# Patient Record
Sex: MALE | Race: OTHER | HISPANIC OR LATINO
[De-identification: names, ages, dates, MRNs, and addresses within clinical notes are randomized per-mention and may not be internally consistent; named-entity substitution may affect disease eponyms.]

---

## 2018-08-08 PROBLEM — Z00.00 ENCOUNTER FOR PREVENTIVE HEALTH EXAMINATION: Status: ACTIVE | Noted: 2018-08-08

## 2018-09-07 ENCOUNTER — APPOINTMENT (OUTPATIENT)
Dept: COLORECTAL SURGERY | Facility: CLINIC | Age: 53
End: 2018-09-07
Payer: COMMERCIAL

## 2018-09-07 VITALS
BODY MASS INDEX: 26.2 KG/M2 | TEMPERATURE: 98.7 F | WEIGHT: 183 LBS | DIASTOLIC BLOOD PRESSURE: 90 MMHG | SYSTOLIC BLOOD PRESSURE: 135 MMHG | HEIGHT: 70 IN | HEART RATE: 86 BPM

## 2018-09-07 PROCEDURE — 45300 PROCTOSIGMOIDOSCOPY DX: CPT

## 2018-09-07 PROCEDURE — 99213 OFFICE O/P EST LOW 20 MIN: CPT | Mod: 25

## 2019-01-18 ENCOUNTER — APPOINTMENT (OUTPATIENT)
Dept: COLORECTAL SURGERY | Facility: CLINIC | Age: 54
End: 2019-01-18
Payer: COMMERCIAL

## 2019-01-18 VITALS
TEMPERATURE: 98.3 F | DIASTOLIC BLOOD PRESSURE: 97 MMHG | SYSTOLIC BLOOD PRESSURE: 167 MMHG | WEIGHT: 187 LBS | BODY MASS INDEX: 26.77 KG/M2 | HEIGHT: 70 IN

## 2019-01-18 DIAGNOSIS — I10 ESSENTIAL (PRIMARY) HYPERTENSION: ICD-10-CM

## 2019-01-18 PROCEDURE — 45331 SIGMOIDOSCOPY AND BIOPSY: CPT

## 2019-01-18 PROCEDURE — 99215 OFFICE O/P EST HI 40 MIN: CPT | Mod: 25

## 2019-01-18 RX ORDER — CYANOCOBALAMIN (VITAMIN B-12) 2000 MCG
2000 TABLET ORAL
Refills: 0 | Status: ACTIVE | COMMUNITY

## 2019-01-19 NOTE — HISTORY OF PRESENT ILLNESS
[FreeTextEntry1] : 54 yo M presents for f/u rectal cancer\par \par Diagnosed 5/2018\par Completed CRT 07/2018\par Completed chemotherapy 09/2018-12/20/2018\par \par BRBPR after BMs, noted on TP and scant amount on stool\par BH: twice daily, formed.\par Denies pain, fever, weight loss\par (+) reports weight gain since chemotherapy, attributes it to eating more lately.\par \par Last seen by Dr. Pina 1/2/19:\par \par MRI w/wo 1/2/19:\par Tumor location from anal verge: 4 cm\par Distance of inferior border to top of sphincter: 0 cm\par Relationship to anterior peritoneal reflection: below\par Size: 0.9 x 0.9 x 0.4 cm mass\par Circumferential location: Indeterminate, too low to tell\par Mucinous: mildly T2 hyperintense\par T2N0\par Impression: very tiny lesion which is decreased in size at the superior margin of the internal anal sphincter suggestive of T2N0 low rectal cancer\par \par CT c/a/p 1/2/2019:\par no evidence of metastatic disease\par 5 mm RLL groundglass nodular apactiy will need contniued follow up, atypical for metasasis, current protocol should suffice\par Hepatic steatosis\par Significantly thickened and mildly contracted urinary bladder w/ punctate calcification sitting on median lobe of the prostate vs. the bladder wall or bladder lumen is unchanged\par \par Recently diagnosed with hypertension, started on unknown medication once daily

## 2019-01-19 NOTE — ASSESSMENT
[FreeTextEntry1] : Review of his recent MRI by Dr. Osmani Bliss at [facility] revealed MRtrg 1- complete regression.\par \par We will await results of biopsy .\par \par I have reviewed the patient the risks benefits alternatives of a definitive resection versus continued close observation.

## 2019-01-19 NOTE — PHYSICAL EXAM
[Normal] : was normal [None] : there was no rectal mass  [Excoriation] : no perianal excoriation [FreeTextEntry1] : The risks , benefits and alternatives of the procedure were reviewed with the patient. The patient consents to the planned procedure.\par \par The flexible sigmoidoscope was passed through the anus into the rectum. The scope was passed to  30 cm from the anal verge.\par \par The findings revealed:\par \par well healed scar at the dentate line extending 3 cm.  biopsied x 3.\par

## 2019-01-23 LAB — LH SURGICAL PATHOLOGY FINAL REPORT: NORMAL

## 2019-04-26 ENCOUNTER — APPOINTMENT (OUTPATIENT)
Dept: COLORECTAL SURGERY | Facility: CLINIC | Age: 54
End: 2019-04-26
Payer: COMMERCIAL

## 2019-04-26 VITALS
HEIGHT: 70 IN | HEART RATE: 114 BPM | DIASTOLIC BLOOD PRESSURE: 91 MMHG | WEIGHT: 192 LBS | SYSTOLIC BLOOD PRESSURE: 148 MMHG | TEMPERATURE: 97.7 F | BODY MASS INDEX: 27.49 KG/M2

## 2019-04-26 PROCEDURE — 45300 PROCTOSIGMOIDOSCOPY DX: CPT

## 2019-04-26 PROCEDURE — 99214 OFFICE O/P EST MOD 30 MIN: CPT | Mod: 25

## 2019-04-26 NOTE — HISTORY OF PRESENT ILLNESS
[FreeTextEntry1] : 54 y/o M presents for 3 mo f/u rectal cancer\par Diagnosed 5/2018, Completed CRT 07/2018\par Completed chemotherapy 09/2018-12/20/2018\par Complete regression noted on last MRI 1/2019\par Last seen by Dr. Pina yesterday\par \par Last seen in office 1/18/19, Biopsy at 3cm taken\par Pathology: negative for malignancy (1/18/19), (+) focal active inflammation, evidence of erosion/ulcer and associated reactive changes\par Denies abdominal or rectal pain or itching. Reports 1 episodes of BRBPR on the TP after a BM\par Reports good appetite, has continued to gain weight. Energy still decreased in comparison to pre-treatment but slowly returning to normal\par BH: BID\par Denies constipation, diarrhea or straining. Reports FU and partial FI a couple times per month \par \par

## 2019-04-26 NOTE — ASSESSMENT
[FreeTextEntry1] : The patient will continue with close surveillance-watch and wait protocol. Advised return in 3-4 months repeat sigmoidoscopy and MRI.\par

## 2019-04-26 NOTE — PHYSICAL EXAM
[Abdomen Masses] : No abdominal masses [Abdomen Tenderness] : ~T No ~M abdominal tenderness [Normal] : was normal [None] : there was no rectal mass  [FreeTextEntry1] : A rigid proctosigmoidoscope was passed through he anus into the rectum to 12  cm. . The mucosal surface were inspected. The patient tolerated the procedure well.\par \par The findings revealed:\par well healed scar at the dentate line- no evidence of recurrent mass or lesions/ulcer

## 2019-08-19 ENCOUNTER — APPOINTMENT (OUTPATIENT)
Dept: COLORECTAL SURGERY | Facility: CLINIC | Age: 54
End: 2019-08-19
Payer: COMMERCIAL

## 2019-08-19 VITALS
DIASTOLIC BLOOD PRESSURE: 90 MMHG | HEIGHT: 70 IN | HEART RATE: 82 BPM | SYSTOLIC BLOOD PRESSURE: 135 MMHG | BODY MASS INDEX: 26.2 KG/M2 | WEIGHT: 183 LBS | TEMPERATURE: 97.8 F

## 2019-08-19 DIAGNOSIS — E78.00 PURE HYPERCHOLESTEROLEMIA, UNSPECIFIED: ICD-10-CM

## 2019-08-19 PROCEDURE — 45300 PROCTOSIGMOIDOSCOPY DX: CPT

## 2019-08-19 PROCEDURE — 99215 OFFICE O/P EST HI 40 MIN: CPT | Mod: 25

## 2019-08-19 RX ORDER — ATORVASTATIN CALCIUM 10 MG/1
10 TABLET, FILM COATED ORAL
Refills: 0 | Status: ACTIVE | COMMUNITY

## 2019-08-19 RX ORDER — VITAMIN B COMPLEX
CAPSULE ORAL
Refills: 0 | Status: ACTIVE | COMMUNITY

## 2019-08-19 RX ORDER — AMLODIPINE BESYLATE 5 MG/1
5 TABLET ORAL
Refills: 0 | Status: ACTIVE | COMMUNITY

## 2019-08-23 ENCOUNTER — APPOINTMENT (OUTPATIENT)
Dept: COLORECTAL SURGERY | Facility: HOSPITAL | Age: 54
End: 2019-08-23

## 2019-08-23 ENCOUNTER — OUTPATIENT (OUTPATIENT)
Dept: OUTPATIENT SERVICES | Facility: HOSPITAL | Age: 54
LOS: 1 days | Discharge: HOME | End: 2019-08-23
Payer: COMMERCIAL

## 2019-08-23 ENCOUNTER — RESULT REVIEW (OUTPATIENT)
Age: 54
End: 2019-08-23

## 2019-08-23 PROCEDURE — 45380 COLONOSCOPY AND BIOPSY: CPT

## 2019-08-23 PROCEDURE — 45380 COLONOSCOPY AND BIOPSY: CPT | Mod: GC

## 2019-08-23 PROCEDURE — 88305 TISSUE EXAM BY PATHOLOGIST: CPT

## 2019-08-23 NOTE — HISTORY OF PRESENT ILLNESS
[FreeTextEntry1] : 55 y/o M presents for f/u rectal cancer\par Diagnosed 5/2018, Completed RT 07/2018, chemotherapy 09/2018-12/20/2018\par Complete regression noted on last MRI 1/2019\par Reports new onset intermittent rectal pain for the past week, present more often than not. Describes it as stabbing pain that radiates down his legs. Has never had similar pain in the past, denies back injury or h/o back pain\par Continues to f/u with Dr. Pina. Last seen in his office 3-4 weeks. CT and MRI done in the last month [facility]\par Denies N/V\par Reports sensation of fullness and needing to run to the restroom, worse after eating. Eats 2 meals daily\par Denies change in weight\par BH: up 10 times daily, soft but formed \par Denies recent travel, change in medications\par last colonoscopy completed 4/11/18

## 2019-08-23 NOTE — ASSESSMENT
[FreeTextEntry1] : I had extensive discussion with the patient regarding my concerns of recurrent rectal cancer. I have recommended prompt colonoscopy and biopsy. I have reviewed with the patient that pending review of the pathology, the suspicion of recurrent rectal cancer will require a definitive abdominal perineal resection with permanent colostomy creation.\par \par I had extensive discussion with the patient (45 minutes) regarding the diagnosis and treatment options. I recommended that he consider proceeding with a robotic abdominal perineal resection of the rectum.\par \par The associated risks, benefits, alternatives of the procedure have been outlined discussed and reviewed with the patient's family. These risks including but not limited to bleeding, infection, change in bowel habits, change in urinary function, nerve injury, change in sexual function, as well as the risk of heart and lung complications infection , DVT/PE and death were detailed. The patient understands these risks and consents the planned procedure. Appropriate  literature regarding surgery and post operative treatment/complications and enhanced recovery pathway has been detailed and reviewed. Consent was obtained. All questions were answered.\par \par

## 2019-08-23 NOTE — CONSULT LETTER
[Dear  ___] : Dear  [unfilled], [( Thank you for referring [unfilled] for consultation for _____ )] : Thank you for referring [unfilled] for consultation for [unfilled] [Consult Letter:] : I had the pleasure of evaluating your patient, [unfilled]. [Please see my note below.] : Please see my note below. [Consult Closing:] : Thank you very much for allowing me to participate in the care of this patient.  If you have any questions, please do not hesitate to contact me. [Sincerely,] : Sincerely, [FreeTextEntry2] : NATANAEL ROTHMAN\par [address]  [address]\[place] [address] [FreeTextEntry3] : MARGARET EDWARDS MD

## 2019-08-23 NOTE — PHYSICAL EXAM
[None] : no anal fissures seen [Normal] : was normal [___ Posterior] : a [unfilled] ~Ucm rectal mass was present posteriorly [Abdomen Masses] : No abdominal masses [Abdomen Tenderness] : ~T No ~M abdominal tenderness [de-identified] : At 4 cm from anal verge-firm 3 x 3 cm mass with central ulceration tender [FreeTextEntry1] : A rigid proctosigmoidoscope was passed through he anus into the rectum to 4  cm. . The mucosal surface were inspected. The patient tolerated the procedure well.\par \par The findings revealed:\par limited exam secondary to pain.\par Mass at 4 cm. . friable. ulcerated.

## 2019-08-26 ENCOUNTER — OUTPATIENT (OUTPATIENT)
Dept: OUTPATIENT SERVICES | Facility: HOSPITAL | Age: 54
LOS: 1 days | End: 2019-08-26

## 2019-08-26 LAB — SURGICAL PATHOLOGY STUDY: SIGNIFICANT CHANGE UP

## 2019-08-28 ENCOUNTER — APPOINTMENT (OUTPATIENT)
Dept: COLORECTAL SURGERY | Facility: CLINIC | Age: 54
End: 2019-08-28
Payer: COMMERCIAL

## 2019-08-28 ENCOUNTER — LABORATORY RESULT (OUTPATIENT)
Age: 54
End: 2019-08-28

## 2019-08-28 VITALS — OXYGEN SATURATION: 94 % | DIASTOLIC BLOOD PRESSURE: 77 MMHG | SYSTOLIC BLOOD PRESSURE: 119 MMHG | HEART RATE: 65 BPM

## 2019-08-28 VITALS
DIASTOLIC BLOOD PRESSURE: 88 MMHG | SYSTOLIC BLOOD PRESSURE: 134 MMHG | TEMPERATURE: 97.8 F | HEART RATE: 84 BPM | BODY MASS INDEX: 25.91 KG/M2 | HEIGHT: 70 IN | WEIGHT: 181 LBS

## 2019-08-28 PROCEDURE — 45305 PROCTOSIGMOIDOSCOPY W/BX: CPT

## 2019-08-28 PROCEDURE — 99212 OFFICE O/P EST SF 10 MIN: CPT | Mod: 25

## 2019-08-28 RX ORDER — OXYCODONE AND ACETAMINOPHEN 5; 325 MG/1; MG/1
5-325 TABLET ORAL EVERY 6 HOURS
Qty: 20 | Refills: 0 | Status: DISCONTINUED | COMMUNITY
Start: 2019-08-19 | End: 2019-08-28

## 2019-08-28 NOTE — HISTORY OF PRESENT ILLNESS
[FreeTextEntry1] : 53 y/o M presents for biopsy of rectal mass\par Diagnosed 5/2018, Completed RT 07/2018, chemotherapy 09/2018-12/20/2018\par Reports new onset intermittent rectal pain for the past week, present more often than not. Describes it as stabbing pain that radiates down his legs. Has never had similar pain in the past, denies back injury or h/o back pain\par Reports sensation of fullness and needing to run to the restroom, worse after eating. Eats 2 meals daily\par Denies change in weight\par BH: up 10 times daily, soft but formed \par Colonoscopy completed 8/23/19 which reveled necrotic debris \par Has lost ~ 10 lbs over the last couple of week since pain began. He reports he has cut back on starches but also reports eating less recently due decreased appetite\par \par Colonoscopy performed 8/23/19 revealed distal rectal mass, friable. Biopsies taken \par \par Surgical Final Report\par \par \par \par \par Final Diagnosis\par \par Rectum, mass; biopsy:\par - Necrotic debris and fibrino-purulent exudate with\par bacterial colonies\par \par Verified by: Angie Moseley M.D.\par (Electronic Signature)\par Reported on: 08/26/19 10:39 EDT, [facility] [address] [facility], [place] [address]\par \par Discussed with patient results, given concern for sampling error, recommend repeat biopsy today

## 2019-08-28 NOTE — ASSESSMENT
[FreeTextEntry1] : Distal rectal mass, concern for recurrent cancer\par Patient scheduled for imaging in 2 days\par Biopsy sent STAT, discussed with pathologist\par Surgical planning and management per Dr Lafleur\par Discussed with Dr Lafleur\par All questions were answered, patient expressed understanding, and is agreeable to this plan.\par

## 2019-08-28 NOTE — PHYSICAL EXAM
[FreeTextEntry1] :  Medical assistant present for duration of physical examination\par \par Gen NAD, AOx3\par \par Anorectal Exam:\par Inspection no perianal lesions\par COREY mildly tender, posterior mass palpated\par Rigid Proctoscopy reveals distal posterior friable rectal mass, multiple biopsies taken with biopsy forcep through the scope, sent to pathology for specimen, patient tolerated procedure\par \par

## 2019-08-29 ENCOUNTER — FORM ENCOUNTER (OUTPATIENT)
Age: 54
End: 2019-08-29

## 2019-08-30 ENCOUNTER — APPOINTMENT (OUTPATIENT)
Dept: MRI IMAGING | Facility: CLINIC | Age: 54
End: 2019-08-30
Payer: COMMERCIAL

## 2019-08-30 ENCOUNTER — OUTPATIENT (OUTPATIENT)
Dept: OUTPATIENT SERVICES | Facility: HOSPITAL | Age: 54
LOS: 1 days | End: 2019-08-30

## 2019-08-30 PROCEDURE — 72197 MRI PELVIS W/O & W/DYE: CPT | Mod: 26

## 2019-09-03 PROBLEM — C18.9 MALIGNANT NEOPLASM OF COLON, UNSPECIFIED: Chronic | Status: ACTIVE | Noted: 2019-08-30

## 2019-09-14 ENCOUNTER — APPOINTMENT (OUTPATIENT)
Dept: PLASTIC SURGERY | Facility: CLINIC | Age: 54
End: 2019-09-14

## 2019-09-18 ENCOUNTER — APPOINTMENT (OUTPATIENT)
Dept: PLASTIC SURGERY | Facility: CLINIC | Age: 54
End: 2019-09-18
Payer: COMMERCIAL

## 2019-09-18 VITALS — WEIGHT: 182 LBS | BODY MASS INDEX: 26.05 KG/M2 | HEIGHT: 70 IN

## 2019-09-18 PROCEDURE — 99244 OFF/OP CNSLTJ NEW/EST MOD 40: CPT

## 2019-09-19 NOTE — CONSULT LETTER
[Dear  ___] : Dear  [unfilled], [Consult Letter:] : I had the pleasure of evaluating your patient, [unfilled]. [Consult Closing:] : Thank you very much for allowing me to participate in the care of this patient.  If you have any questions, please do not hesitate to contact me. [Please see my note below.] : Please see my note below. [FreeTextEntry3] : Saturnino Preciado MD [Sincerely,] : Sincerely,

## 2019-09-19 NOTE — PHYSICAL EXAM
[de-identified] : The patient is ambulatory, well groomed, no signs of cachexia. [de-identified] : Normocephalic, atraumatic. [de-identified] : No conjunctival erythema, hyperemia, or discharge bilaterally; no ectropion, entropion, lagophthalmos, or lid malposition bilaterally. Pupils are equal, round, and reactive to light bilaterally. [de-identified] : There are no masses, scars, or lesions of the external ear. External nose is midline with no scars or masses. Gross hearing intact bilaterally (finger rub). Nasal mucosa has no edema, lesions, or signs of desiccation. Lips and gums have no masses, lesions, friability, or edema. [de-identified] : Neck is soft without edema, masses, or crepitus. Trachea midline. No thyromegaly; no palpable thyroid nodules. [de-identified] : No sign of respiratory distress, no tachypnea, no use of accessory respiratory muscles. [de-identified] : No hepatomegaly or splenomegaly. No abdominal wall tenderness or masses on palpation. No abdominal wall hernias noted. [de-identified] : No swelling, tenderness, or varicosities of the extremities bilaterally; extremities are warm to palpation and pedal pulses intact. [de-identified] : On examination, patient has normal gait and station. [de-identified] : On exam perianal area with radiation changes noted, \par no perianal lesions, no active bleeding or discharge\par  [de-identified] : CN 2-12 are intact, no sensory disturbances noted (e.g. by touch) [de-identified] : The patient is alert and oriented to time, place, and person. No obvious disorder of mood or affect such as anxiety or agitation.

## 2019-09-19 NOTE — ADDENDUM
[FreeTextEntry1] : All medical record entries made were at my, ARLIN ZAPATA MD, direction and personally dictated by me. I have reviewed the chart and agree that the record accurately reflects my personal performance of the history, physical exam, assessment, and plan.

## 2019-09-19 NOTE — ASSESSMENT
[FreeTextEntry1] : 54 year old male who presents to the office for an initial consultation at the request of Dr. Lafleur (Colorectal Surgery) regarding recent upcoming APR with Dr. Lafleur. History of Radiation 05-07/2018. Exam findings were discussed with the patient.\par I explained that following the procedure, there will be a full thickness defect of the involved area.  The reconstructive options will be based on the defect size and surrounding tissue laxity of the involved area.  Primary closure is only possible for smaller defects. For larger defects, local tissue rearrangement such as a myocutaneous flap or local rotational flaps may be necessary.  The patient was explained the risks involved with reconstruction including but not limited to wound dehiscence, contour irregularity, bleeding, infection, and paraesthesias, and donor site complications.  We discussed increased risk of wound complications following the surgery due to recent history of radiation to the area. All questions were answered; the patient fully understands the risks and plan and would like to proceed with the above.\par \par Surgery will be planned in accordance with Dr. Lafleur; surgical paperwork to follow.

## 2019-09-19 NOTE — HISTORY OF PRESENT ILLNESS
[FreeTextEntry1] : This is a pleasant 54 year old male who presents to the office for an initial consultation at the request of Dr. Lafleur (Colorectal Surgery) regarding recent upcoming APR with Dr. Lafleur.\par Pt was diagnosed with rectal cancer in May 2018. Pt underwent radiation and chemotherapy. He finished radiation July 2018 and finished chemotherapy in Dec 2018. He reports recent occurrence of difficulty defecating as well as pain which prompted medical attention. Subsequent colonoscopy and biopsies confirmed recurrence of rectal cancer. Pt will be proceeding with colon resection, APR with formation of colostomy and is referred to PRS to discuss reconstructive options. Pt currently has pain in the area poorly controlled with oral medications.\par His PMH is pertinent for HLD and HTN. He denies smoking, no anticoagulant use. \par Denies family history of colon or rectal cancer.\par Otherwise no other complaints or concerns; denies fever, sweats, or chills.

## 2019-09-20 RX ORDER — OXYCODONE AND ACETAMINOPHEN 5; 325 MG/1; MG/1
5-325 TABLET ORAL EVERY 6 HOURS
Qty: 10 | Refills: 0 | Status: DISCONTINUED | COMMUNITY
Start: 2019-08-28 | End: 2019-09-20

## 2019-09-20 RX ORDER — OXYCODONE AND ACETAMINOPHEN 5; 325 MG/1; MG/1
5-325 TABLET ORAL EVERY 6 HOURS
Qty: 30 | Refills: 0 | Status: DISCONTINUED | COMMUNITY
Start: 2019-09-06 | End: 2019-09-20

## 2019-09-23 VITALS
HEART RATE: 89 BPM | SYSTOLIC BLOOD PRESSURE: 120 MMHG | RESPIRATION RATE: 19 BRPM | OXYGEN SATURATION: 100 % | DIASTOLIC BLOOD PRESSURE: 75 MMHG | TEMPERATURE: 97 F | HEIGHT: 70 IN | WEIGHT: 177.47 LBS

## 2019-09-23 RX ORDER — AMLODIPINE BESYLATE 2.5 MG/1
1 TABLET ORAL
Qty: 0 | Refills: 0 | DISCHARGE

## 2019-09-23 RX ORDER — INFLUENZA VIRUS VACCINE 15; 15; 15; 15 UG/.5ML; UG/.5ML; UG/.5ML; UG/.5ML
0.5 SUSPENSION INTRAMUSCULAR ONCE
Refills: 0 | Status: DISCONTINUED | OUTPATIENT
Start: 2019-09-24 | End: 2019-10-31

## 2019-09-24 ENCOUNTER — INPATIENT (INPATIENT)
Facility: HOSPITAL | Age: 54
LOS: 36 days | Discharge: HOME | DRG: 329 | End: 2019-10-31
Attending: SURGERY | Admitting: SURGERY
Payer: COMMERCIAL

## 2019-09-24 ENCOUNTER — APPOINTMENT (OUTPATIENT)
Dept: COLORECTAL SURGERY | Facility: HOSPITAL | Age: 54
End: 2019-09-24

## 2019-09-24 ENCOUNTER — RESULT REVIEW (OUTPATIENT)
Age: 54
End: 2019-09-24

## 2019-09-24 DIAGNOSIS — Z41.9 ENCOUNTER FOR PROCEDURE FOR PURPOSES OTHER THAN REMEDYING HEALTH STATE, UNSPECIFIED: Chronic | ICD-10-CM

## 2019-09-24 LAB
BLD GP AB SCN SERPL QL: NEGATIVE — SIGNIFICANT CHANGE UP
GLUCOSE BLDC GLUCOMTR-MCNC: 108 MG/DL — HIGH (ref 70–99)
GLUCOSE BLDC GLUCOMTR-MCNC: 156 MG/DL — HIGH (ref 70–99)
RH IG SCN BLD-IMP: POSITIVE — SIGNIFICANT CHANGE UP

## 2019-09-24 PROCEDURE — 15738 MUSCLE-SKIN GRAFT LEG: CPT

## 2019-09-24 PROCEDURE — 45395 LAP REMOVAL OF RECTUM: CPT | Mod: GC,62

## 2019-09-24 PROCEDURE — 14000 TIS TRNFR TRUNK 10 SQ CM/<: CPT | Mod: 59

## 2019-09-24 PROCEDURE — 14301 TIS TRNFR ANY 30.1-60 SQ CM: CPT

## 2019-09-24 PROCEDURE — 45395 LAP REMOVAL OF RECTUM: CPT | Mod: 62

## 2019-09-24 RX ORDER — ONDANSETRON 8 MG/1
4 TABLET, FILM COATED ORAL EVERY 6 HOURS
Refills: 0 | Status: DISCONTINUED | OUTPATIENT
Start: 2019-09-24 | End: 2019-09-24

## 2019-09-24 RX ORDER — SODIUM CHLORIDE 9 MG/ML
1000 INJECTION, SOLUTION INTRAVENOUS
Refills: 0 | Status: DISCONTINUED | OUTPATIENT
Start: 2019-09-24 | End: 2019-09-24

## 2019-09-24 RX ORDER — SODIUM CHLORIDE 9 MG/ML
1000 INJECTION, SOLUTION INTRAVENOUS
Refills: 0 | Status: DISCONTINUED | OUTPATIENT
Start: 2019-09-24 | End: 2019-10-03

## 2019-09-24 RX ORDER — DOCUSATE SODIUM 100 MG
100 CAPSULE ORAL THREE TIMES A DAY
Refills: 0 | Status: DISCONTINUED | OUTPATIENT
Start: 2019-09-24 | End: 2019-09-27

## 2019-09-24 RX ORDER — OXYCODONE HYDROCHLORIDE 5 MG/1
5 TABLET ORAL EVERY 4 HOURS
Refills: 0 | Status: DISCONTINUED | OUTPATIENT
Start: 2019-09-24 | End: 2019-09-24

## 2019-09-24 RX ORDER — ENOXAPARIN SODIUM 100 MG/ML
40 INJECTION SUBCUTANEOUS DAILY
Refills: 0 | Status: DISCONTINUED | OUTPATIENT
Start: 2019-09-24 | End: 2019-09-24

## 2019-09-24 RX ORDER — ENOXAPARIN SODIUM 100 MG/ML
40 INJECTION SUBCUTANEOUS DAILY
Refills: 0 | Status: DISCONTINUED | OUTPATIENT
Start: 2019-09-25 | End: 2019-10-14

## 2019-09-24 RX ORDER — ACETAMINOPHEN 500 MG
1000 TABLET ORAL ONCE
Refills: 0 | Status: COMPLETED | OUTPATIENT
Start: 2019-09-24 | End: 2019-09-24

## 2019-09-24 RX ORDER — KETOROLAC TROMETHAMINE 0.5 %
1 DROPS OPHTHALMIC (EYE) EVERY 6 HOURS
Refills: 0 | Status: DISCONTINUED | OUTPATIENT
Start: 2019-09-24 | End: 2019-10-31

## 2019-09-24 RX ORDER — HYDROMORPHONE HYDROCHLORIDE 2 MG/ML
0.5 INJECTION INTRAMUSCULAR; INTRAVENOUS; SUBCUTANEOUS
Refills: 0 | Status: DISCONTINUED | OUTPATIENT
Start: 2019-09-24 | End: 2019-09-25

## 2019-09-24 RX ORDER — ATORVASTATIN CALCIUM 80 MG/1
1 TABLET, FILM COATED ORAL
Qty: 0 | Refills: 0 | DISCHARGE

## 2019-09-24 RX ORDER — KETOROLAC TROMETHAMINE 30 MG/ML
30 SYRINGE (ML) INJECTION EVERY 8 HOURS
Refills: 0 | Status: DISCONTINUED | OUTPATIENT
Start: 2019-09-24 | End: 2019-09-25

## 2019-09-24 RX ORDER — HEPARIN SODIUM 5000 [USP'U]/ML
5000 INJECTION INTRAVENOUS; SUBCUTANEOUS ONCE
Refills: 0 | Status: COMPLETED | OUTPATIENT
Start: 2019-09-24 | End: 2019-09-24

## 2019-09-24 RX ORDER — ATORVASTATIN CALCIUM 80 MG/1
10 TABLET, FILM COATED ORAL AT BEDTIME
Refills: 0 | Status: DISCONTINUED | OUTPATIENT
Start: 2019-09-24 | End: 2019-10-14

## 2019-09-24 RX ORDER — AMLODIPINE BESYLATE 2.5 MG/1
2 TABLET ORAL
Qty: 0 | Refills: 0 | DISCHARGE

## 2019-09-24 RX ORDER — ACETAMINOPHEN 500 MG
1000 TABLET ORAL EVERY 6 HOURS
Refills: 0 | Status: COMPLETED | OUTPATIENT
Start: 2019-09-24 | End: 2019-09-25

## 2019-09-24 RX ORDER — OXYCODONE HYDROCHLORIDE 5 MG/1
10 TABLET ORAL EVERY 4 HOURS
Refills: 0 | Status: DISCONTINUED | OUTPATIENT
Start: 2019-09-24 | End: 2019-09-24

## 2019-09-24 RX ORDER — CEFOTETAN DISODIUM 1 G
1 VIAL (EA) INJECTION EVERY 12 HOURS
Refills: 0 | Status: DISCONTINUED | OUTPATIENT
Start: 2019-09-24 | End: 2019-10-10

## 2019-09-24 RX ORDER — HYDROMORPHONE HYDROCHLORIDE 2 MG/ML
0.2 INJECTION INTRAMUSCULAR; INTRAVENOUS; SUBCUTANEOUS EVERY 4 HOURS
Refills: 0 | Status: DISCONTINUED | OUTPATIENT
Start: 2019-09-24 | End: 2019-09-27

## 2019-09-24 RX ORDER — AMLODIPINE BESYLATE 2.5 MG/1
5 TABLET ORAL DAILY
Refills: 0 | Status: DISCONTINUED | OUTPATIENT
Start: 2019-09-24 | End: 2019-10-14

## 2019-09-24 RX ORDER — GABAPENTIN 400 MG/1
600 CAPSULE ORAL ONCE
Refills: 0 | Status: COMPLETED | OUTPATIENT
Start: 2019-09-24 | End: 2019-09-24

## 2019-09-24 RX ADMIN — Medication 100 MILLIGRAM(S): at 22:48

## 2019-09-24 RX ADMIN — GABAPENTIN 600 MILLIGRAM(S): 400 CAPSULE ORAL at 11:25

## 2019-09-24 RX ADMIN — HEPARIN SODIUM 5000 UNIT(S): 5000 INJECTION INTRAVENOUS; SUBCUTANEOUS at 11:25

## 2019-09-24 RX ADMIN — HYDROMORPHONE HYDROCHLORIDE 0.5 MILLIGRAM(S): 2 INJECTION INTRAMUSCULAR; INTRAVENOUS; SUBCUTANEOUS at 21:12

## 2019-09-24 RX ADMIN — ATORVASTATIN CALCIUM 10 MILLIGRAM(S): 80 TABLET, FILM COATED ORAL at 22:48

## 2019-09-24 RX ADMIN — HYDROMORPHONE HYDROCHLORIDE 0.5 MILLIGRAM(S): 2 INJECTION INTRAMUSCULAR; INTRAVENOUS; SUBCUTANEOUS at 21:27

## 2019-09-24 RX ADMIN — Medication 1000 MILLIGRAM(S): at 11:25

## 2019-09-24 RX ADMIN — Medication 1 DROP(S): at 22:09

## 2019-09-24 NOTE — H&P ADULT - HISTORY OF PRESENT ILLNESS
54M with PMH HTN, HLD, rectal adenocarcinoma presents for elective surgery.    Diagnosed in May 2018, underwent chemoXRT (finished radiation July 2018, chemotherapy December 2018)

## 2019-09-24 NOTE — BRIEF OPERATIVE NOTE - OPERATION/FINDINGS
Robot-assisted laparoscopic abdominoperineal resection performed for low rectal cancer. Patient had previously undergone chemotherapy and radiation. Adhesions noted in the pelvis, taken down. Medial to lateral dissection. Descending colon stapled. Mesorectum dissected. Dissection from below performed and specimen pulled through perineum. End colostomy brought up. All other ports closed. Patient currently in OR with PRS for reconstruction.

## 2019-09-24 NOTE — H&P ADULT - NSHPPHYSICALEXAM_GEN_ALL_CORE
Gen: NAD, resting comfortably in bed. Well developed, well groomed  Neuro: CNII-XII grossly intact. AAOx3. Follows commands  HEENT: PERRL, EOMI, MMM  Pulm: no respiratory distress, nonlabored breathing  C/V: no MRG  Abd: Soft, NT/ND. No tympany, no rebound, no guarding.

## 2019-09-24 NOTE — PROGRESS NOTE ADULT - SUBJECTIVE AND OBJECTIVE BOX
POST-OPERATIVE NOTE    Procedure: Laparoscopy-assisted abdominoperineal resection     Diagnosis/Indication: Rectal cancer     Surgeon: Dr. Lafleur    S: Patient denies CP, SOB, JENNINGS, calf tenderness. Pain controlled with medication. Denies nausea, vomiting. Pt does c/o of eyes burning bilaterally since he came out of surgery.  Eye drops ordered by the anesthesiologist.      O:  T(C): 36.4 (09-24-19 @ 20:28), Max: 36.4 (09-24-19 @ 20:28)  T(F): 97.5 (09-24-19 @ 20:28), Max: 97.5 (09-24-19 @ 20:28)  HR: 82 (09-24-19 @ 21:28) (70 - 82)  BP: 141/81 (09-24-19 @ 21:28) (126/90 - 148/85)  RR: 13 (09-24-19 @ 21:28) (13 - 16)  SpO2: 100% (09-24-19 @ 21:28) (99% - 100%)  Wt(kg): --    Gen: NAD, resting comfortably in bed  C/V: NSR  Pulm: Nonlabored breathing, no respiratory distress, on high flow (ERAS protocol)  Abd: soft, NT/ND, incisional areas on abd are clean, dry and intact, ostomy area is pink, no output yet, JAKUB to suction with serosang output  : peritoneal area is covered with petroleum jelly dressing and is clean and intact  Extrem: WWP, no calf edema or tenderness, JAKUB to suction on left thigh with serosang output, SCDs in place    A/P: 54y Male s/p above procedure  Diet: CLD  IVF: LR @ 40cc/hr  ERAS protocol  Pain/nausea control  Lovenox/SCDs/OOBA/IS  Dispo pending pain control, PO tolerance, clinical improvement POST-OPERATIVE NOTE    Procedure: Laparoscopy-assisted abdominoperineal resection and reconstruction with the Plastics team    Diagnosis/Indication: Rectal cancer     Surgeon: Dr. Lafleur    S: Patient denies CP, SOB, JENNINGS, calf tenderness. Pain controlled with medication. Denies nausea, vomiting. Pt does c/o of eyes burning bilaterally since he came out of surgery.  Eye drops ordered by the anesthesiologist.      O:  T(C): 36.4 (09-24-19 @ 20:28), Max: 36.4 (09-24-19 @ 20:28)  T(F): 97.5 (09-24-19 @ 20:28), Max: 97.5 (09-24-19 @ 20:28)  HR: 82 (09-24-19 @ 21:28) (70 - 82)  BP: 141/81 (09-24-19 @ 21:28) (126/90 - 148/85)  RR: 13 (09-24-19 @ 21:28) (13 - 16)  SpO2: 100% (09-24-19 @ 21:28) (99% - 100%)  Wt(kg): --    Gen: NAD, resting comfortably in bed  C/V: NSR  Pulm: Nonlabored breathing, no respiratory distress, on high flow (ERAS protocol)  Abd: soft, NT/ND, incisional areas on abd are clean, dry and intact, ostomy area is pink, no output yet, JAKUB to suction with serosang output  : peritoneal area is covered with petroleum jelly dressing and is clean and intact  Extrem: WWP, no calf edema or tenderness, JAKUB to suction on left thigh with serosang output, SCDs in place    A/P: 54y Male s/p above procedure  Diet: CLD  IVF: LR @ 40cc/hr  ERAS protocol  Pain/nausea control  Lovenox/SCDs/OOBA/IS  Dispo pending pain control, PO tolerance, clinical improvement

## 2019-09-24 NOTE — H&P ADULT - ASSESSMENT
54M with HTN, HLD, rectal cancer presents for elective resection  OR  Admit to Surgery Team 1 Dr Lafleur [facility]

## 2019-09-25 LAB
ANION GAP SERPL CALC-SCNC: 12 MMOL/L — SIGNIFICANT CHANGE UP (ref 5–17)
BUN SERPL-MCNC: 13 MG/DL — SIGNIFICANT CHANGE UP (ref 7–23)
CALCIUM SERPL-MCNC: 9.8 MG/DL — SIGNIFICANT CHANGE UP (ref 8.4–10.5)
CHLORIDE SERPL-SCNC: 102 MMOL/L — SIGNIFICANT CHANGE UP (ref 96–108)
CO2 SERPL-SCNC: 25 MMOL/L — SIGNIFICANT CHANGE UP (ref 22–31)
CREAT SERPL-MCNC: 0.83 MG/DL — SIGNIFICANT CHANGE UP (ref 0.5–1.3)
GLUCOSE SERPL-MCNC: 141 MG/DL — HIGH (ref 70–99)
HCT VFR BLD CALC: 36 % — LOW (ref 39–50)
HCV AB S/CO SERPL IA: 0.03 S/CO — SIGNIFICANT CHANGE UP
HCV AB SERPL-IMP: SIGNIFICANT CHANGE UP
HGB BLD-MCNC: 11.5 G/DL — LOW (ref 13–17)
MAGNESIUM SERPL-MCNC: 2 MG/DL — SIGNIFICANT CHANGE UP (ref 1.6–2.6)
MCHC RBC-ENTMCNC: 30.8 PG — SIGNIFICANT CHANGE UP (ref 27–34)
MCHC RBC-ENTMCNC: 31.9 GM/DL — LOW (ref 32–36)
MCV RBC AUTO: 96.5 FL — SIGNIFICANT CHANGE UP (ref 80–100)
NRBC # BLD: 0 /100 WBCS — SIGNIFICANT CHANGE UP (ref 0–0)
PHOSPHATE SERPL-MCNC: 4 MG/DL — SIGNIFICANT CHANGE UP (ref 2.5–4.5)
PLATELET # BLD AUTO: 277 K/UL — SIGNIFICANT CHANGE UP (ref 150–400)
POTASSIUM SERPL-MCNC: 4.6 MMOL/L — SIGNIFICANT CHANGE UP (ref 3.5–5.3)
POTASSIUM SERPL-SCNC: 4.6 MMOL/L — SIGNIFICANT CHANGE UP (ref 3.5–5.3)
RBC # BLD: 3.73 M/UL — LOW (ref 4.2–5.8)
RBC # FLD: 12.8 % — SIGNIFICANT CHANGE UP (ref 10.3–14.5)
SODIUM SERPL-SCNC: 139 MMOL/L — SIGNIFICANT CHANGE UP (ref 135–145)
WBC # BLD: 13.42 K/UL — HIGH (ref 3.8–10.5)
WBC # FLD AUTO: 13.42 K/UL — HIGH (ref 3.8–10.5)

## 2019-09-25 RX ORDER — HYDROMORPHONE HYDROCHLORIDE 2 MG/ML
1 INJECTION INTRAMUSCULAR; INTRAVENOUS; SUBCUTANEOUS EVERY 4 HOURS
Refills: 0 | Status: DISCONTINUED | OUTPATIENT
Start: 2019-09-25 | End: 2019-09-25

## 2019-09-25 RX ORDER — HYDROMORPHONE HYDROCHLORIDE 2 MG/ML
0.5 INJECTION INTRAMUSCULAR; INTRAVENOUS; SUBCUTANEOUS ONCE
Refills: 0 | Status: DISCONTINUED | OUTPATIENT
Start: 2019-09-25 | End: 2019-09-25

## 2019-09-25 RX ORDER — HYDROMORPHONE HYDROCHLORIDE 2 MG/ML
0.5 INJECTION INTRAMUSCULAR; INTRAVENOUS; SUBCUTANEOUS EVERY 4 HOURS
Refills: 0 | Status: DISCONTINUED | OUTPATIENT
Start: 2019-09-25 | End: 2019-09-26

## 2019-09-25 RX ADMIN — ATORVASTATIN CALCIUM 10 MILLIGRAM(S): 80 TABLET, FILM COATED ORAL at 21:59

## 2019-09-25 RX ADMIN — Medication 1 DROP(S): at 21:59

## 2019-09-25 RX ADMIN — Medication 30 MILLIGRAM(S): at 11:57

## 2019-09-25 RX ADMIN — Medication 1000 MILLIGRAM(S): at 18:15

## 2019-09-25 RX ADMIN — HYDROMORPHONE HYDROCHLORIDE 0.5 MILLIGRAM(S): 2 INJECTION INTRAMUSCULAR; INTRAVENOUS; SUBCUTANEOUS at 23:46

## 2019-09-25 RX ADMIN — Medication 1000 MILLIGRAM(S): at 12:00

## 2019-09-25 RX ADMIN — ENOXAPARIN SODIUM 40 MILLIGRAM(S): 100 INJECTION SUBCUTANEOUS at 11:57

## 2019-09-25 RX ADMIN — HYDROMORPHONE HYDROCHLORIDE 0.2 MILLIGRAM(S): 2 INJECTION INTRAMUSCULAR; INTRAVENOUS; SUBCUTANEOUS at 21:59

## 2019-09-25 RX ADMIN — Medication 100 GRAM(S): at 18:16

## 2019-09-25 RX ADMIN — Medication 100 GRAM(S): at 05:47

## 2019-09-25 RX ADMIN — Medication 30 MILLIGRAM(S): at 04:40

## 2019-09-25 RX ADMIN — Medication 1000 MILLIGRAM(S): at 19:00

## 2019-09-25 RX ADMIN — Medication 1000 MILLIGRAM(S): at 05:47

## 2019-09-25 RX ADMIN — Medication 30 MILLIGRAM(S): at 03:46

## 2019-09-25 RX ADMIN — Medication 100 MILLIGRAM(S): at 05:47

## 2019-09-25 RX ADMIN — Medication 100 MILLIGRAM(S): at 11:57

## 2019-09-25 RX ADMIN — HYDROMORPHONE HYDROCHLORIDE 0.2 MILLIGRAM(S): 2 INJECTION INTRAMUSCULAR; INTRAVENOUS; SUBCUTANEOUS at 15:03

## 2019-09-25 RX ADMIN — Medication 1000 MILLIGRAM(S): at 11:57

## 2019-09-25 RX ADMIN — Medication 30 MILLIGRAM(S): at 19:00

## 2019-09-25 RX ADMIN — HYDROMORPHONE HYDROCHLORIDE 0.2 MILLIGRAM(S): 2 INJECTION INTRAMUSCULAR; INTRAVENOUS; SUBCUTANEOUS at 09:50

## 2019-09-25 RX ADMIN — HYDROMORPHONE HYDROCHLORIDE 0.2 MILLIGRAM(S): 2 INJECTION INTRAMUSCULAR; INTRAVENOUS; SUBCUTANEOUS at 22:42

## 2019-09-25 RX ADMIN — AMLODIPINE BESYLATE 5 MILLIGRAM(S): 2.5 TABLET ORAL at 05:47

## 2019-09-25 RX ADMIN — Medication 30 MILLIGRAM(S): at 18:16

## 2019-09-25 RX ADMIN — HYDROMORPHONE HYDROCHLORIDE 0.2 MILLIGRAM(S): 2 INJECTION INTRAMUSCULAR; INTRAVENOUS; SUBCUTANEOUS at 16:00

## 2019-09-25 RX ADMIN — Medication 30 MILLIGRAM(S): at 12:50

## 2019-09-25 RX ADMIN — Medication 1 DROP(S): at 04:47

## 2019-09-25 RX ADMIN — Medication 100 MILLIGRAM(S): at 21:59

## 2019-09-25 RX ADMIN — HYDROMORPHONE HYDROCHLORIDE 0.2 MILLIGRAM(S): 2 INJECTION INTRAMUSCULAR; INTRAVENOUS; SUBCUTANEOUS at 08:55

## 2019-09-25 NOTE — PROGRESS NOTE ADULT - SUBJECTIVE AND OBJECTIVE BOX
called to see patient regarding pain left eye after LAR . I saw him and examined him.  pain started post operatively Rxed in PACU with toradol opthalmic.  Patient still complaining of pain Photophobia "sticking feeling".  Visual acuity  intact.  I  would continue conservative treatment.  ie. parental /po analgesics and consult Opthomology

## 2019-09-25 NOTE — ADVANCED PRACTICE NURSE CONSULT - ASSESSMENT
54M with PMH HTN, HLD, rectal adenocarcinoma, POD#1 APR, creation of colostomy. Pt c/o left eye pain which is making it hard for him to concentrate on learning how to care for his ostomy. Extra supplies left at bedside, ostomy appliance in place without leaks.

## 2019-09-25 NOTE — PROGRESS NOTE ADULT - ASSESSMENT
54M with HTN, HLD, rectal cancer s/p abdominal peritoneal resection  - abx/pain per primary  - keep incision clean, dry, and intact

## 2019-09-25 NOTE — PROGRESS NOTE ADULT - SUBJECTIVE AND OBJECTIVE BOX
STATUS POST:  9/24: abdominal peritoneal resection     SUBJECTIVE: Patient seen and examined bedside by chief resident.  complains of left eye pain, irritation and tearing. abdominal pain and leg pain controlled.     amLODIPine   Tablet 5 milliGRAM(s) Oral daily  cefoTEtan  IVPB 1 Gram(s) IV Intermittent every 12 hours  enoxaparin Injectable 40 milliGRAM(s) SubCutaneous daily      Vital Signs Last 24 Hrs  T(C): 36.8 (25 Sep 2019 05:01), Max: 37.2 (24 Sep 2019 23:20)  T(F): 98.3 (25 Sep 2019 05:01), Max: 99 (24 Sep 2019 23:20)  HR: 75 (25 Sep 2019 05:01) (70 - 93)  BP: 115/76 (25 Sep 2019 05:01) (115/76 - 148/85)  BP(mean): 106 (24 Sep 2019 22:28) (95 - 109)  RR: 17 (25 Sep 2019 05:01) (13 - 17)  SpO2: 97% (25 Sep 2019 05:01) (97% - 100%)  I&O's Detail    24 Sep 2019 07:01  -  25 Sep 2019 07:00  --------------------------------------------------------  IN:    lactated ringers.: 1880 mL  Total IN: 1880 mL    OUT:    Bulb: 27.5 mL    Bulb: 45 mL    Estimated Blood Loss: 200 mL    Indwelling Catheter - Urethral: 1850 mL  Total OUT: 2122.5 mL    Total NET: -242.5 mL          General: NAD, resting comfortably in bed  C/V: NSR  Pulm: Nonlabored breathing, no respiratory distress  Abd: soft, ND, minimal tenderness, JAKUB to suction with serosang fluid, ostomy without output, stoma pink and patent  Extrem: WWP, left thigh incision and dressing c/d/i, JAKUB to suction with minimal serosang fluid        LABS:                        11.5   13.42 )-----------( 277      ( 25 Sep 2019 06:33 )             36.0                 RADIOLOGY & ADDITIONAL STUDIES:

## 2019-09-25 NOTE — PROGRESS NOTE ADULT - ASSESSMENT
54M with HTN, HLD, rectal cancer s/p abdominal peritoneal resection    Plan:  ERAS  pain/nausea control  CLD/IVF  kim  JAKUB x 2 (1 on abd and 1 L thigh)  Ostomy teaching

## 2019-09-25 NOTE — PROGRESS NOTE ADULT - SUBJECTIVE AND OBJECTIVE BOX
SUBJECTIVE: Primary complaint is pain to left eye. No overnight events.     OBJECTIVE:     ** VITAL SIGNS / I&O's **    Vital Signs Last 24 Hrs  T(C): 36.8 (25 Sep 2019 05:01), Max: 37.2 (24 Sep 2019 23:20)  T(F): 98.3 (25 Sep 2019 05:01), Max: 99 (24 Sep 2019 23:20)  HR: 75 (25 Sep 2019 05:01) (70 - 93)  BP: 115/76 (25 Sep 2019 05:01) (115/76 - 148/85)  BP(mean): 106 (24 Sep 2019 22:28) (95 - 109)  RR: 17 (25 Sep 2019 05:01) (13 - 17)  SpO2: 97% (25 Sep 2019 05:01) (97% - 100%)      24 Sep 2019 07:01  -  25 Sep 2019 07:00  --------------------------------------------------------  IN:    lactated ringers.: 1880 mL  Total IN: 1880 mL    OUT:    Bulb: 27.5 mL    Bulb: 45 mL    Estimated Blood Loss: 200 mL    Indwelling Catheter - Urethral: 1850 mL  Total OUT: 2122.5 mL    Total NET: -242.5 mL          ** PHYSICAL EXAM **    -- CONSTITUTIONAL: Alert, Awake. NAD.   -- RESPIRATORY: unlabored breathing, no respiratory distress  -- ABD: Incision clean, dry, and intact  -- EXTREMITY: Left thigh incision and dressing c/d/i      ** LABS **                          11.5   13.42 )-----------( 277      ( 25 Sep 2019 06:33 )             36.0     25 Sep 2019 06:33    139    |  102    |  13     ----------------------------<  141    4.6     |  25     |  0.83     Ca    9.8        25 Sep 2019 06:33  Phos  4.0       25 Sep 2019 06:33  Mg     2.0       25 Sep 2019 06:33        CAPILLARY BLOOD GLUCOSE      POCT Blood Glucose.: 156 mg/dL (24 Sep 2019 21:19)  POCT Blood Glucose.: 108 mg/dL (24 Sep 2019 11:22)

## 2019-09-26 LAB
ANION GAP SERPL CALC-SCNC: 10 MMOL/L — SIGNIFICANT CHANGE UP (ref 5–17)
BUN SERPL-MCNC: 19 MG/DL — SIGNIFICANT CHANGE UP (ref 7–23)
CALCIUM SERPL-MCNC: 9.4 MG/DL — SIGNIFICANT CHANGE UP (ref 8.4–10.5)
CHLORIDE SERPL-SCNC: 100 MMOL/L — SIGNIFICANT CHANGE UP (ref 96–108)
CO2 SERPL-SCNC: 28 MMOL/L — SIGNIFICANT CHANGE UP (ref 22–31)
CREAT SERPL-MCNC: 0.84 MG/DL — SIGNIFICANT CHANGE UP (ref 0.5–1.3)
GLUCOSE SERPL-MCNC: 171 MG/DL — HIGH (ref 70–99)
HCT VFR BLD CALC: 36 % — LOW (ref 39–50)
HGB BLD-MCNC: 11.3 G/DL — LOW (ref 13–17)
MAGNESIUM SERPL-MCNC: 2 MG/DL — SIGNIFICANT CHANGE UP (ref 1.6–2.6)
MCHC RBC-ENTMCNC: 30.5 PG — SIGNIFICANT CHANGE UP (ref 27–34)
MCHC RBC-ENTMCNC: 31.4 GM/DL — LOW (ref 32–36)
MCV RBC AUTO: 97.3 FL — SIGNIFICANT CHANGE UP (ref 80–100)
NRBC # BLD: 0 /100 WBCS — SIGNIFICANT CHANGE UP (ref 0–0)
PHOSPHATE SERPL-MCNC: 2.8 MG/DL — SIGNIFICANT CHANGE UP (ref 2.5–4.5)
PLATELET # BLD AUTO: 307 K/UL — SIGNIFICANT CHANGE UP (ref 150–400)
POTASSIUM SERPL-MCNC: 4.6 MMOL/L — SIGNIFICANT CHANGE UP (ref 3.5–5.3)
POTASSIUM SERPL-SCNC: 4.6 MMOL/L — SIGNIFICANT CHANGE UP (ref 3.5–5.3)
RBC # BLD: 3.7 M/UL — LOW (ref 4.2–5.8)
RBC # FLD: 13.2 % — SIGNIFICANT CHANGE UP (ref 10.3–14.5)
SODIUM SERPL-SCNC: 138 MMOL/L — SIGNIFICANT CHANGE UP (ref 135–145)
WBC # BLD: 12.58 K/UL — HIGH (ref 3.8–10.5)
WBC # FLD AUTO: 12.58 K/UL — HIGH (ref 3.8–10.5)

## 2019-09-26 PROCEDURE — 74019 RADEX ABDOMEN 2 VIEWS: CPT | Mod: 26

## 2019-09-26 PROCEDURE — 71045 X-RAY EXAM CHEST 1 VIEW: CPT | Mod: 26,76

## 2019-09-26 RX ORDER — HYDROMORPHONE HYDROCHLORIDE 2 MG/ML
0.5 INJECTION INTRAMUSCULAR; INTRAVENOUS; SUBCUTANEOUS EVERY 4 HOURS
Refills: 0 | Status: DISCONTINUED | OUTPATIENT
Start: 2019-09-26 | End: 2019-10-03

## 2019-09-26 RX ORDER — ALVIMOPAN 12 MG/1
12 CAPSULE ORAL EVERY 12 HOURS
Refills: 0 | Status: DISCONTINUED | OUTPATIENT
Start: 2019-09-26 | End: 2019-09-26

## 2019-09-26 RX ORDER — HYDROMORPHONE HYDROCHLORIDE 2 MG/ML
1 INJECTION INTRAMUSCULAR; INTRAVENOUS; SUBCUTANEOUS EVERY 4 HOURS
Refills: 0 | Status: DISCONTINUED | OUTPATIENT
Start: 2019-09-26 | End: 2019-10-03

## 2019-09-26 RX ORDER — ACETAMINOPHEN 500 MG
1000 TABLET ORAL ONCE
Refills: 0 | Status: COMPLETED | OUTPATIENT
Start: 2019-09-26 | End: 2019-09-26

## 2019-09-26 RX ORDER — ACETAMINOPHEN 500 MG
1000 TABLET ORAL EVERY 8 HOURS
Refills: 0 | Status: COMPLETED | OUTPATIENT
Start: 2019-09-26 | End: 2019-09-26

## 2019-09-26 RX ORDER — PANTOPRAZOLE SODIUM 20 MG/1
40 TABLET, DELAYED RELEASE ORAL DAILY
Refills: 0 | Status: DISCONTINUED | OUTPATIENT
Start: 2019-09-26 | End: 2019-10-14

## 2019-09-26 RX ORDER — ACETAMINOPHEN 500 MG
1000 TABLET ORAL ONCE
Refills: 0 | Status: DISCONTINUED | OUTPATIENT
Start: 2019-09-26 | End: 2019-09-26

## 2019-09-26 RX ORDER — FAMOTIDINE 10 MG/ML
20 INJECTION INTRAVENOUS ONCE
Refills: 0 | Status: COMPLETED | OUTPATIENT
Start: 2019-09-26 | End: 2019-09-26

## 2019-09-26 RX ORDER — METOCLOPRAMIDE HCL 10 MG
10 TABLET ORAL ONCE
Refills: 0 | Status: COMPLETED | OUTPATIENT
Start: 2019-09-26 | End: 2019-09-26

## 2019-09-26 RX ORDER — SODIUM CHLORIDE 9 MG/ML
1000 INJECTION, SOLUTION INTRAVENOUS ONCE
Refills: 0 | Status: COMPLETED | OUTPATIENT
Start: 2019-09-26 | End: 2019-09-26

## 2019-09-26 RX ADMIN — Medication 400 MILLIGRAM(S): at 10:48

## 2019-09-26 RX ADMIN — HYDROMORPHONE HYDROCHLORIDE 0.2 MILLIGRAM(S): 2 INJECTION INTRAMUSCULAR; INTRAVENOUS; SUBCUTANEOUS at 06:02

## 2019-09-26 RX ADMIN — AMLODIPINE BESYLATE 5 MILLIGRAM(S): 2.5 TABLET ORAL at 05:02

## 2019-09-26 RX ADMIN — Medication 100 GRAM(S): at 05:02

## 2019-09-26 RX ADMIN — ENOXAPARIN SODIUM 40 MILLIGRAM(S): 100 INJECTION SUBCUTANEOUS at 14:17

## 2019-09-26 RX ADMIN — HYDROMORPHONE HYDROCHLORIDE 1 MILLIGRAM(S): 2 INJECTION INTRAMUSCULAR; INTRAVENOUS; SUBCUTANEOUS at 22:51

## 2019-09-26 RX ADMIN — HYDROMORPHONE HYDROCHLORIDE 1 MILLIGRAM(S): 2 INJECTION INTRAMUSCULAR; INTRAVENOUS; SUBCUTANEOUS at 06:40

## 2019-09-26 RX ADMIN — Medication 400 MILLIGRAM(S): at 18:43

## 2019-09-26 RX ADMIN — Medication 1 DROP(S): at 05:02

## 2019-09-26 RX ADMIN — HYDROMORPHONE HYDROCHLORIDE 1 MILLIGRAM(S): 2 INJECTION INTRAMUSCULAR; INTRAVENOUS; SUBCUTANEOUS at 15:00

## 2019-09-26 RX ADMIN — HYDROMORPHONE HYDROCHLORIDE 1 MILLIGRAM(S): 2 INJECTION INTRAMUSCULAR; INTRAVENOUS; SUBCUTANEOUS at 03:00

## 2019-09-26 RX ADMIN — Medication 1000 MILLIGRAM(S): at 11:20

## 2019-09-26 RX ADMIN — HYDROMORPHONE HYDROCHLORIDE 0.5 MILLIGRAM(S): 2 INJECTION INTRAMUSCULAR; INTRAVENOUS; SUBCUTANEOUS at 09:33

## 2019-09-26 RX ADMIN — FAMOTIDINE 104 MILLIGRAM(S): 10 INJECTION INTRAVENOUS at 15:35

## 2019-09-26 RX ADMIN — HYDROMORPHONE HYDROCHLORIDE 1 MILLIGRAM(S): 2 INJECTION INTRAMUSCULAR; INTRAVENOUS; SUBCUTANEOUS at 14:18

## 2019-09-26 RX ADMIN — Medication 100 MILLIGRAM(S): at 05:02

## 2019-09-26 RX ADMIN — HYDROMORPHONE HYDROCHLORIDE 1 MILLIGRAM(S): 2 INJECTION INTRAMUSCULAR; INTRAVENOUS; SUBCUTANEOUS at 06:27

## 2019-09-26 RX ADMIN — HYDROMORPHONE HYDROCHLORIDE 0.5 MILLIGRAM(S): 2 INJECTION INTRAMUSCULAR; INTRAVENOUS; SUBCUTANEOUS at 09:45

## 2019-09-26 RX ADMIN — Medication 1 DROP(S): at 01:00

## 2019-09-26 RX ADMIN — HYDROMORPHONE HYDROCHLORIDE 1 MILLIGRAM(S): 2 INJECTION INTRAMUSCULAR; INTRAVENOUS; SUBCUTANEOUS at 22:20

## 2019-09-26 RX ADMIN — Medication 1 DROP(S): at 18:10

## 2019-09-26 RX ADMIN — Medication 10 MILLIGRAM(S): at 21:23

## 2019-09-26 RX ADMIN — Medication 1 DROP(S): at 14:17

## 2019-09-26 RX ADMIN — Medication 100 GRAM(S): at 18:09

## 2019-09-26 RX ADMIN — HYDROMORPHONE HYDROCHLORIDE 0.2 MILLIGRAM(S): 2 INJECTION INTRAMUSCULAR; INTRAVENOUS; SUBCUTANEOUS at 05:02

## 2019-09-26 RX ADMIN — Medication 1000 MILLIGRAM(S): at 19:10

## 2019-09-26 RX ADMIN — Medication 1 DROP(S): at 10:48

## 2019-09-26 RX ADMIN — HYDROMORPHONE HYDROCHLORIDE 1 MILLIGRAM(S): 2 INJECTION INTRAMUSCULAR; INTRAVENOUS; SUBCUTANEOUS at 02:11

## 2019-09-26 RX ADMIN — HYDROMORPHONE HYDROCHLORIDE 0.5 MILLIGRAM(S): 2 INJECTION INTRAMUSCULAR; INTRAVENOUS; SUBCUTANEOUS at 00:15

## 2019-09-26 RX ADMIN — Medication 100 MILLIGRAM(S): at 14:17

## 2019-09-26 RX ADMIN — SODIUM CHLORIDE 1000 MILLILITER(S): 9 INJECTION, SOLUTION INTRAVENOUS at 15:07

## 2019-09-26 NOTE — PROGRESS NOTE ADULT - ASSESSMENT
54M with HTN, HLD, rectal cancer s/p robot assisted abdominal peritoneal resection 9/24.    - F/u Stat KUB flat and upright   - ERAS  - pain/nausea control  - CLD/IVF  - Marie  - Lovenox   - Abx: cefotetan  - JAKUB x 2 (1 on abd and 1 L thigh)  - Ostomy teaching  - F/u anesthesia  - F/u optho consult  - F/u plastics recs

## 2019-09-26 NOTE — PROGRESS NOTE ADULT - ASSESSMENT
54M with HTN, HLD, rectal cancer s/p abdominal peritoneal resection  - abx/pain per primary  - keep incision clean, dry, and intact  - Okay to begin ambulation with legs maintained in adduction

## 2019-09-26 NOTE — ADVANCED PRACTICE NURSE CONSULT - ASSESSMENT
Pt reports eye pain has decreased so is ready to be educated on ostomy care. Stoma approx 1 3/8", flush to abdomen. No output noted in old appliance, new 2 3/4" Dutton appliance placed with patient observing. Will use 1 3/4" appliance with next change. Stoma ring used around stoma to prevent leakage, skin barrier wipe applied to peristomal area. Extra supplies left at bedside for discharge. Spoke with pt on frequency of emptying and changing appliance.

## 2019-09-26 NOTE — PROGRESS NOTE ADULT - SUBJECTIVE AND OBJECTIVE BOX
SUBJECTIVE:  Patient's left eye has improved. Primary complaint is abdominal pain. No overnight events.     OBJECTIVE:     ** VITAL SIGNS / I&O's **    Vital Signs Last 24 Hrs  T(C): 37.3 (26 Sep 2019 05:07), Max: 37.5 (25 Sep 2019 23:48)  T(F): 99.2 (26 Sep 2019 05:07), Max: 99.5 (25 Sep 2019 23:48)  HR: 73 (26 Sep 2019 05:07) (71 - 78)  BP: 115/72 (26 Sep 2019 05:07) (100/55 - 115/72)  BP(mean): --  RR: 16 (26 Sep 2019 05:07) (15 - 16)  SpO2: 100% (26 Sep 2019 05:07) (96% - 100%)      25 Sep 2019 07:01  -  26 Sep 2019 07:00  --------------------------------------------------------  IN:    IV PiggyBack: 50 mL    lactated ringers.: 1000 mL    Oral Fluid: 500 mL  Total IN: 1550 mL    OUT:    Bulb: 250 mL    Bulb: 55 mL    Indwelling Catheter - Urethral: 1000 mL  Total OUT: 1305 mL    Total NET: 245 mL          ** PHYSICAL EXAM **    -- CONSTITUTIONAL: Alert, Awake. NAD.   -- RESPIRATORY: unlabored breathing, no respiratory distress  -- ABD: Incision clean, dry, and intact  -- EXTREMITY: Left thigh incision and dressing c/d/i      ** LABS **                          11.3   12.58 )-----------( 307      ( 26 Sep 2019 06:26 )             36.0     26 Sep 2019 06:26    138    |  100    |  19     ----------------------------<  171    4.6     |  28     |  0.84     Ca    9.4        26 Sep 2019 06:26  Phos  2.8       26 Sep 2019 06:26  Mg     2.0       26 Sep 2019 06:26        CAPILLARY BLOOD GLUCOSE

## 2019-09-26 NOTE — DIETITIAN INITIAL EVALUATION ADULT. - ADD RECOMMEND
1. Recommend advance towards low fiber, DASH/TLC diet as medically appropriate and add Ensure Enlive TID (1050 kcal, 60 gm protein) 2. Add MVI/minerals

## 2019-09-26 NOTE — DIETITIAN INITIAL EVALUATION ADULT. - ENERGY NEEDS
Ht (9/24): 177.8cm, Wt (9/24): 80.5kg, IBW: 75.5kg +/-10%, %IBW: 107%, BMI: 25.5   ABW used to calculate energy needs due to pt's current body weight within % IBW. Needs adjusted post-op, suspected malnutrition, hypermetabolic state.

## 2019-09-26 NOTE — DIETITIAN INITIAL EVALUATION ADULT. - OTHER INFO
54M with hx HTN, HLD, rectal cancer now s/p robot assisted abdominal peritoneal resection 9/24. Pt on CLD, pt has tried to drink some cranberry juice, water, and jello but pt endorses abdominal pain and acid reflux. Pt reports decreased PO intake for ~1.5 months PTA, pt follows regular diet, pt reports allergies to shellfish, cherries, papaya, and apples (entered into computer system). Pt endorses ~20lb unintentional wt loss x1.5 months. Moderate muscle loss noted around clavicles and moderate fat loss noted around triceps. GI: no N/V, +acid reflux, +colostomy (0mL x24h per flow sheet), Skin: Hector score 18, Pain: +pain per pt- improved with pain medications. Suspect severe malnutrition based on NFPE, ~10% unintentional wt loss x1.5 months, pt meeting <75% EER for >1 month; see chart note. Encouraged pt to increase PO intake as able as diet advanced. Discussed optimizing diet  with pt as diet further advanced. Left education regarding low fiber diet with pt at bedside as pt would like to read over information before discussing. Please see below for full nutritional recommendations- d/w team. RD to monitor and f/u for further diet education per protocol.

## 2019-09-26 NOTE — DIETITIAN INITIAL EVALUATION ADULT. - MALNUTRITION
Suspect severe malnutrition based on NFPE, ~10% unintentional wt loss x1.5 months, pt meeting <75% EER for >1 month; see chart note (suspect severe)

## 2019-09-26 NOTE — CHART NOTE - NSCHARTNOTEFT_GEN_A_CORE
Upon Nutritional Assessment by the Registered Dietitian your patient was determined to meet criteria / has evidence of the following diagnosis/diagnoses:          [ ]  Mild Protein Calorie Malnutrition        [ ]  Moderate Protein Calorie Malnutrition        [X] Severe Protein Calorie Malnutrition        [ ] Unspecified Protein Calorie Malnutrition        [ ] Underweight / BMI <19        [ ] Morbid Obesity / BMI > 40    Moderate muscle loss noted around clavicles and moderate fat loss noted around triceps  Suspect severe malnutrition based on NFPE, ~10% unintentional wt loss x1.5 months, pt meeting <75% EER for >1 month  Findings as based on:  •  Comprehensive nutrition assessment and consultation    Treatment:    The following diet has been recommended:  1. Recommend advance towards low fiber, DASH/TLC diet as medically appropriate and add Ensure Enlive TID (1050 kcal, 60 gm protein)  2. Add MVI/minerals    PROVIDER Section:     By signing this assessment you are acknowledging and agree with the diagnosis/diagnoses assigned by the Registered Dietitian    Comments:

## 2019-09-26 NOTE — PROGRESS NOTE ADULT - SUBJECTIVE AND OBJECTIVE BOX
INTERVAL HPI/OVERNIGHT EVENTS: NAEO.    STATUS POST:  robot assisted laparoscopic abdominal peritoneal resection 9/24    POST OPERATIVE DAY #: 2    SUBJECTIVE: Pt seen and examined at bedside this AM by surgery team. Eye pain improved this AM. Will follow up optho team recs. Denies f/n/v/cp/sob.    MEDICATIONS  (STANDING):  alvimopan 12 milliGRAM(s) Oral every 12 hours  amLODIPine   Tablet 5 milliGRAM(s) Oral daily  artificial  tears Solution 1 Drop(s) Both EYES every 4 hours  atorvastatin 10 milliGRAM(s) Oral at bedtime  cefoTEtan  IVPB 1 Gram(s) IV Intermittent every 12 hours  docusate sodium 100 milliGRAM(s) Oral three times a day  enoxaparin Injectable 40 milliGRAM(s) SubCutaneous daily  influenza   Vaccine 0.5 milliLiter(s) IntraMuscular once  lactated ringers. 1000 milliLiter(s) (40 mL/Hr) IV Continuous <Continuous>    MEDICATIONS  (PRN):  acetaminophen  IVPB .. 1000 milliGRAM(s) IV Intermittent every 8 hours PRN Moderate Pain (4 - 6)  HYDROmorphone  Injectable 0.2 milliGRAM(s) IV Push every 4 hours PRN breakthrough pain  HYDROmorphone  Injectable 1 milliGRAM(s) IV Push every 4 hours PRN Severe Pain (7 - 10)  HYDROmorphone  Injectable 0.5 milliGRAM(s) IV Push every 4 hours PRN Moderate Pain (4 - 6)  ketorolac 0.5% Ophthalmic Solution 1 Drop(s) Left EYE every 6 hours PRN eye pain irritation      Vital Signs Last 24 Hrs  T(C): 37.3 (26 Sep 2019 05:07), Max: 37.5 (25 Sep 2019 23:48)  T(F): 99.2 (26 Sep 2019 05:07), Max: 99.5 (25 Sep 2019 23:48)  HR: 73 (26 Sep 2019 05:07) (73 - 78)  BP: 115/72 (26 Sep 2019 05:07) (100/55 - 115/72)  BP(mean): --  RR: 16 (26 Sep 2019 05:07) (16 - 16)  SpO2: 100% (26 Sep 2019 05:07) (96% - 100%)    PHYSICAL EXAM:      Constitutional: A&Ox3, NAD     Respiratory: non labored breathing, no respiratory distress    Cardiovascular: NSR, RRR    Gastrointestinal:  soft, distended, appropriately ttp. JAKUB to suction w/ SS OP. Ostomy w/ stoma pink and patent.    : Marie draining clear yellow urine     Extremities: WWP, left thigh incision and dressing c/d/i, appropriately ttp. JAKUB to suction with minimal SS OP.            I&O's Detail    25 Sep 2019 07:01  -  26 Sep 2019 07:00  --------------------------------------------------------  IN:    IV PiggyBack: 50 mL    lactated ringers.: 1000 mL    Oral Fluid: 500 mL  Total IN: 1550 mL    OUT:    Bulb: 250 mL    Bulb: 55 mL    Indwelling Catheter - Urethral: 1000 mL  Total OUT: 1305 mL    Total NET: 245 mL          LABS:                        11.3   12.58 )-----------( 307      ( 26 Sep 2019 06:26 )             36.0     09-26    138  |  100  |  19  ----------------------------<  171<H>  4.6   |  28  |  0.84    Ca    9.4      26 Sep 2019 06:26  Phos  2.8     09-26  Mg     2.0     09-26            RADIOLOGY & ADDITIONAL STUDIES:

## 2019-09-26 NOTE — DIETITIAN INITIAL EVALUATION ADULT. - NUTRITIONGOAL OUTCOME1
Diet to advance within 24-48h. Pt to consistently meet>75%EER when diet advanced with good tolerance

## 2019-09-27 LAB
ANION GAP SERPL CALC-SCNC: 10 MMOL/L — SIGNIFICANT CHANGE UP (ref 5–17)
BUN SERPL-MCNC: 19 MG/DL — SIGNIFICANT CHANGE UP (ref 7–23)
CALCIUM SERPL-MCNC: 9.6 MG/DL — SIGNIFICANT CHANGE UP (ref 8.4–10.5)
CHLORIDE SERPL-SCNC: 99 MMOL/L — SIGNIFICANT CHANGE UP (ref 96–108)
CO2 SERPL-SCNC: 28 MMOL/L — SIGNIFICANT CHANGE UP (ref 22–31)
CREAT SERPL-MCNC: 0.71 MG/DL — SIGNIFICANT CHANGE UP (ref 0.5–1.3)
GLUCOSE SERPL-MCNC: 170 MG/DL — HIGH (ref 70–99)
HCT VFR BLD CALC: 36.3 % — LOW (ref 39–50)
HCT VFR BLD CALC: 37 % — LOW (ref 39–50)
HGB BLD-MCNC: 11.8 G/DL — LOW (ref 13–17)
HGB BLD-MCNC: 12.1 G/DL — LOW (ref 13–17)
MAGNESIUM SERPL-MCNC: 1.9 MG/DL — SIGNIFICANT CHANGE UP (ref 1.6–2.6)
MCHC RBC-ENTMCNC: 30.6 PG — SIGNIFICANT CHANGE UP (ref 27–34)
MCHC RBC-ENTMCNC: 30.8 PG — SIGNIFICANT CHANGE UP (ref 27–34)
MCHC RBC-ENTMCNC: 32.5 GM/DL — SIGNIFICANT CHANGE UP (ref 32–36)
MCHC RBC-ENTMCNC: 32.7 GM/DL — SIGNIFICANT CHANGE UP (ref 32–36)
MCV RBC AUTO: 94 FL — SIGNIFICANT CHANGE UP (ref 80–100)
MCV RBC AUTO: 94.1 FL — SIGNIFICANT CHANGE UP (ref 80–100)
NRBC # BLD: 0 /100 WBCS — SIGNIFICANT CHANGE UP (ref 0–0)
NRBC # BLD: 0 /100 WBCS — SIGNIFICANT CHANGE UP (ref 0–0)
PHOSPHATE SERPL-MCNC: 2.7 MG/DL — SIGNIFICANT CHANGE UP (ref 2.5–4.5)
PLATELET # BLD AUTO: 324 K/UL — SIGNIFICANT CHANGE UP (ref 150–400)
PLATELET # BLD AUTO: 336 K/UL — SIGNIFICANT CHANGE UP (ref 150–400)
POTASSIUM SERPL-MCNC: 4.3 MMOL/L — SIGNIFICANT CHANGE UP (ref 3.5–5.3)
POTASSIUM SERPL-SCNC: 4.3 MMOL/L — SIGNIFICANT CHANGE UP (ref 3.5–5.3)
RBC # BLD: 3.86 M/UL — LOW (ref 4.2–5.8)
RBC # BLD: 3.93 M/UL — LOW (ref 4.2–5.8)
RBC # FLD: 12.9 % — SIGNIFICANT CHANGE UP (ref 10.3–14.5)
RBC # FLD: 12.9 % — SIGNIFICANT CHANGE UP (ref 10.3–14.5)
SODIUM SERPL-SCNC: 137 MMOL/L — SIGNIFICANT CHANGE UP (ref 135–145)
WBC # BLD: 13.93 K/UL — HIGH (ref 3.8–10.5)
WBC # BLD: 15.81 K/UL — HIGH (ref 3.8–10.5)
WBC # FLD AUTO: 13.93 K/UL — HIGH (ref 3.8–10.5)
WBC # FLD AUTO: 15.81 K/UL — HIGH (ref 3.8–10.5)

## 2019-09-27 PROCEDURE — 74177 CT ABD & PELVIS W/CONTRAST: CPT | Mod: 26

## 2019-09-27 RX ORDER — DOCUSATE SODIUM 100 MG
100 CAPSULE ORAL THREE TIMES A DAY
Refills: 0 | Status: DISCONTINUED | OUTPATIENT
Start: 2019-09-27 | End: 2019-10-04

## 2019-09-27 RX ORDER — ACETAMINOPHEN 500 MG
1000 TABLET ORAL ONCE
Refills: 0 | Status: COMPLETED | OUTPATIENT
Start: 2019-09-27 | End: 2019-09-27

## 2019-09-27 RX ORDER — SODIUM CHLORIDE 9 MG/ML
1000 INJECTION, SOLUTION INTRAVENOUS ONCE
Refills: 0 | Status: COMPLETED | OUTPATIENT
Start: 2019-09-27 | End: 2019-09-27

## 2019-09-27 RX ADMIN — HYDROMORPHONE HYDROCHLORIDE 0.5 MILLIGRAM(S): 2 INJECTION INTRAMUSCULAR; INTRAVENOUS; SUBCUTANEOUS at 20:29

## 2019-09-27 RX ADMIN — HYDROMORPHONE HYDROCHLORIDE 0.2 MILLIGRAM(S): 2 INJECTION INTRAMUSCULAR; INTRAVENOUS; SUBCUTANEOUS at 01:25

## 2019-09-27 RX ADMIN — Medication 1000 MILLIGRAM(S): at 13:33

## 2019-09-27 RX ADMIN — HYDROMORPHONE HYDROCHLORIDE 1 MILLIGRAM(S): 2 INJECTION INTRAMUSCULAR; INTRAVENOUS; SUBCUTANEOUS at 14:29

## 2019-09-27 RX ADMIN — ENOXAPARIN SODIUM 40 MILLIGRAM(S): 100 INJECTION SUBCUTANEOUS at 11:10

## 2019-09-27 RX ADMIN — HYDROMORPHONE HYDROCHLORIDE 0.2 MILLIGRAM(S): 2 INJECTION INTRAMUSCULAR; INTRAVENOUS; SUBCUTANEOUS at 01:10

## 2019-09-27 RX ADMIN — SODIUM CHLORIDE 1000 MILLILITER(S): 9 INJECTION, SOLUTION INTRAVENOUS at 13:03

## 2019-09-27 RX ADMIN — HYDROMORPHONE HYDROCHLORIDE 1 MILLIGRAM(S): 2 INJECTION INTRAMUSCULAR; INTRAVENOUS; SUBCUTANEOUS at 06:49

## 2019-09-27 RX ADMIN — HYDROMORPHONE HYDROCHLORIDE 1 MILLIGRAM(S): 2 INJECTION INTRAMUSCULAR; INTRAVENOUS; SUBCUTANEOUS at 15:00

## 2019-09-27 RX ADMIN — HYDROMORPHONE HYDROCHLORIDE 1 MILLIGRAM(S): 2 INJECTION INTRAMUSCULAR; INTRAVENOUS; SUBCUTANEOUS at 02:45

## 2019-09-27 RX ADMIN — ATORVASTATIN CALCIUM 10 MILLIGRAM(S): 80 TABLET, FILM COATED ORAL at 22:17

## 2019-09-27 RX ADMIN — Medication 400 MILLIGRAM(S): at 13:01

## 2019-09-27 RX ADMIN — HYDROMORPHONE HYDROCHLORIDE 1 MILLIGRAM(S): 2 INJECTION INTRAMUSCULAR; INTRAVENOUS; SUBCUTANEOUS at 07:24

## 2019-09-27 RX ADMIN — Medication 1 DROP(S): at 22:17

## 2019-09-27 RX ADMIN — Medication 100 GRAM(S): at 18:19

## 2019-09-27 RX ADMIN — HYDROMORPHONE HYDROCHLORIDE 1 MILLIGRAM(S): 2 INJECTION INTRAMUSCULAR; INTRAVENOUS; SUBCUTANEOUS at 23:59

## 2019-09-27 RX ADMIN — Medication 100 GRAM(S): at 05:19

## 2019-09-27 RX ADMIN — HYDROMORPHONE HYDROCHLORIDE 1 MILLIGRAM(S): 2 INJECTION INTRAMUSCULAR; INTRAVENOUS; SUBCUTANEOUS at 02:33

## 2019-09-27 RX ADMIN — HYDROMORPHONE HYDROCHLORIDE 0.5 MILLIGRAM(S): 2 INJECTION INTRAMUSCULAR; INTRAVENOUS; SUBCUTANEOUS at 20:08

## 2019-09-27 RX ADMIN — PANTOPRAZOLE SODIUM 40 MILLIGRAM(S): 20 TABLET, DELAYED RELEASE ORAL at 11:10

## 2019-09-27 NOTE — PROGRESS NOTE ADULT - SUBJECTIVE AND OBJECTIVE BOX
SUBJECTIVE:  Patient with NG tube in place. Complaining of continued severe abdominal pain. Patient was able to stand yesterday, but felt to weak to ambulate.    OBJECTIVE:     ** VITAL SIGNS / I&O's **    Vital Signs Last 24 Hrs  T(C): 36.4 (27 Sep 2019 05:03), Max: 37.5 (26 Sep 2019 17:25)  T(F): 97.5 (27 Sep 2019 05:03), Max: 99.5 (26 Sep 2019 17:25)  HR: 93 (27 Sep 2019 01:13) (91 - 94)  BP: 138/86 (27 Sep 2019 01:13) (126/76 - 143/95)  BP(mean): --  RR: 18 (27 Sep 2019 01:13) (17 - 18)  SpO2: 97% (27 Sep 2019 01:13) (97% - 100%)      26 Sep 2019 07:01  -  27 Sep 2019 07:00  --------------------------------------------------------  IN:    IV PiggyBack: 300 mL    lactated ringers.: 1900 mL  Total IN: 2200 mL    OUT:    Bulb: 735 mL    Bulb: 40 mL    Indwelling Catheter - Urethral: 1050 mL    Nasoenteral Tube: 700 mL  Total OUT: 2525 mL    Total NET: -325 mL          ** PHYSICAL EXAM **    -- CONSTITUTIONAL: Alert, Awake. NAD.   -- RESPIRATORY: unlabored breathing, no respiratory distress  -- ABD: Incision clean, dry, and intact  -- EXTREMITY: Left thigh incision and dressing c/d/i       ** LABS **                          12.1   15.81 )-----------( 336      ( 27 Sep 2019 05:53 )             37.0     27 Sep 2019 05:53    137    |  99     |  19     ----------------------------<  170    4.3     |  28     |  0.71     Ca    9.6        27 Sep 2019 05:53  Phos  2.7       27 Sep 2019 05:53  Mg     1.9       27 Sep 2019 05:53        CAPILLARY BLOOD GLUCOSE

## 2019-09-27 NOTE — PROGRESS NOTE ADULT - SUBJECTIVE AND OBJECTIVE BOX
STATUS POST:  POD # 3 s/p APR    INTERVAL HPI/OVERNIGHT EVENTS: NGT placed for abdominal distention,       SUBJECTIVE: This morning he states he has pain in the abdomen. Denies nausea or vomiting. Passing gas, no BM.     amLODIPine   Tablet 5 milliGRAM(s) Oral daily  cefoTEtan  IVPB 1 Gram(s) IV Intermittent every 12 hours  enoxaparin Injectable 40 milliGRAM(s) SubCutaneous daily      Vital Signs Last 24 Hrs  T(C): 37.1 (27 Sep 2019 15:02), Max: 37.3 (27 Sep 2019 00:41)  T(F): 98.7 (27 Sep 2019 15:02), Max: 99.2 (27 Sep 2019 00:41)  HR: 88 (27 Sep 2019 15:02) (88 - 93)  BP: 132/78 (27 Sep 2019 15:02) (126/76 - 138/86)  BP(mean): --  RR: 18 (27 Sep 2019 15:02) (18 - 18)  SpO2: 97% (27 Sep 2019 15:02) (96% - 97%)  I&O's Detail    26 Sep 2019 07:01  -  27 Sep 2019 07:00  --------------------------------------------------------  IN:    IV PiggyBack: 300 mL    lactated ringers.: 1900 mL  Total IN: 2200 mL    OUT:    Bulb: 735 mL    Bulb: 40 mL    Indwelling Catheter - Urethral: 1050 mL    Nasoenteral Tube: 700 mL  Total OUT: 2525 mL    Total NET: -325 mL      27 Sep 2019 07:01  -  27 Sep 2019 19:51  --------------------------------------------------------  IN:    IV PiggyBack: 50 mL    Lactated Ringers IV Bolus: 1000 mL    lactated ringers.: 480 mL  Total IN: 1530 mL    OUT:    Bulb: 380 mL    Bulb: 30 mL    Colostomy: 150 mL    Indwelling Catheter - Urethral: 600 mL  Total OUT: 1160 mL    Total NET: 370 mL          Constitutional: A&Ox3, NAD   Respiratory: non labored breathing, no respiratory distress  Cardiovascular: NSR, RRR  Gastrointestinal:  soft, distended, tender. abdominal JAKUB w/ SS OP. Ostomy w/ stoma pink and patent minimal output.  : Marie draining clear yellow urine   Extremities: WWP, left thigh incision and dressing c/d/i, JAKUB to suction with minimal SS OP.          LABS:                        11.8   13.93 )-----------( 324      ( 27 Sep 2019 15:18 )             36.3     09-27    137  |  99  |  19  ----------------------------<  170<H>  4.3   |  28  |  0.71    Ca    9.6      27 Sep 2019 05:53  Phos  2.7     09-27  Mg     1.9     09-27

## 2019-09-27 NOTE — PROGRESS NOTE ADULT - ASSESSMENT
54M with HTN, HLD, rectal cancer s/p robot assisted abdominal peritoneal resection 9/24. Abdominal pain present. Will investigate it CT scan.     - ERAS  - Pain/nausea control  - NPO/IVF/NGT   - Marie  - Lovenox   - JAKUB x 2 (1 on abd and 1 L thigh)  - Ostomy teaching  - F/u plastics recs  -f/u CT

## 2019-09-28 LAB
ANION GAP SERPL CALC-SCNC: 7 MMOL/L — SIGNIFICANT CHANGE UP (ref 5–17)
BUN SERPL-MCNC: 21 MG/DL — SIGNIFICANT CHANGE UP (ref 7–23)
CALCIUM SERPL-MCNC: 9.2 MG/DL — SIGNIFICANT CHANGE UP (ref 8.4–10.5)
CHLORIDE SERPL-SCNC: 102 MMOL/L — SIGNIFICANT CHANGE UP (ref 96–108)
CO2 SERPL-SCNC: 30 MMOL/L — SIGNIFICANT CHANGE UP (ref 22–31)
CREAT SERPL-MCNC: 0.72 MG/DL — SIGNIFICANT CHANGE UP (ref 0.5–1.3)
GLUCOSE SERPL-MCNC: 140 MG/DL — HIGH (ref 70–99)
HCT VFR BLD CALC: 35.9 % — LOW (ref 39–50)
HGB BLD-MCNC: 11.7 G/DL — LOW (ref 13–17)
MAGNESIUM SERPL-MCNC: 2.1 MG/DL — SIGNIFICANT CHANGE UP (ref 1.6–2.6)
MCHC RBC-ENTMCNC: 30.8 PG — SIGNIFICANT CHANGE UP (ref 27–34)
MCHC RBC-ENTMCNC: 32.6 GM/DL — SIGNIFICANT CHANGE UP (ref 32–36)
MCV RBC AUTO: 94.5 FL — SIGNIFICANT CHANGE UP (ref 80–100)
NRBC # BLD: 0 /100 WBCS — SIGNIFICANT CHANGE UP (ref 0–0)
PHOSPHATE SERPL-MCNC: 3.3 MG/DL — SIGNIFICANT CHANGE UP (ref 2.5–4.5)
PLATELET # BLD AUTO: 346 K/UL — SIGNIFICANT CHANGE UP (ref 150–400)
POTASSIUM SERPL-MCNC: 4.1 MMOL/L — SIGNIFICANT CHANGE UP (ref 3.5–5.3)
POTASSIUM SERPL-SCNC: 4.1 MMOL/L — SIGNIFICANT CHANGE UP (ref 3.5–5.3)
RBC # BLD: 3.8 M/UL — LOW (ref 4.2–5.8)
RBC # FLD: 13 % — SIGNIFICANT CHANGE UP (ref 10.3–14.5)
SODIUM SERPL-SCNC: 139 MMOL/L — SIGNIFICANT CHANGE UP (ref 135–145)
WBC # BLD: 11.88 K/UL — HIGH (ref 3.8–10.5)
WBC # FLD AUTO: 11.88 K/UL — HIGH (ref 3.8–10.5)

## 2019-09-28 PROCEDURE — 74019 RADEX ABDOMEN 2 VIEWS: CPT | Mod: 26

## 2019-09-28 RX ADMIN — AMLODIPINE BESYLATE 5 MILLIGRAM(S): 2.5 TABLET ORAL at 07:47

## 2019-09-28 RX ADMIN — HYDROMORPHONE HYDROCHLORIDE 1 MILLIGRAM(S): 2 INJECTION INTRAMUSCULAR; INTRAVENOUS; SUBCUTANEOUS at 22:31

## 2019-09-28 RX ADMIN — ENOXAPARIN SODIUM 40 MILLIGRAM(S): 100 INJECTION SUBCUTANEOUS at 13:56

## 2019-09-28 RX ADMIN — Medication 1 DROP(S): at 22:18

## 2019-09-28 RX ADMIN — HYDROMORPHONE HYDROCHLORIDE 1 MILLIGRAM(S): 2 INJECTION INTRAMUSCULAR; INTRAVENOUS; SUBCUTANEOUS at 01:00

## 2019-09-28 RX ADMIN — Medication 100 GRAM(S): at 07:46

## 2019-09-28 RX ADMIN — Medication 100 MILLIGRAM(S): at 07:47

## 2019-09-28 RX ADMIN — HYDROMORPHONE HYDROCHLORIDE 1 MILLIGRAM(S): 2 INJECTION INTRAMUSCULAR; INTRAVENOUS; SUBCUTANEOUS at 12:23

## 2019-09-28 RX ADMIN — SODIUM CHLORIDE 40 MILLILITER(S): 9 INJECTION, SOLUTION INTRAVENOUS at 07:47

## 2019-09-28 RX ADMIN — HYDROMORPHONE HYDROCHLORIDE 1 MILLIGRAM(S): 2 INJECTION INTRAMUSCULAR; INTRAVENOUS; SUBCUTANEOUS at 16:08

## 2019-09-28 RX ADMIN — PANTOPRAZOLE SODIUM 40 MILLIGRAM(S): 20 TABLET, DELAYED RELEASE ORAL at 13:56

## 2019-09-28 RX ADMIN — HYDROMORPHONE HYDROCHLORIDE 0.5 MILLIGRAM(S): 2 INJECTION INTRAMUSCULAR; INTRAVENOUS; SUBCUTANEOUS at 09:50

## 2019-09-28 RX ADMIN — Medication 1 DROP(S): at 11:29

## 2019-09-28 RX ADMIN — HYDROMORPHONE HYDROCHLORIDE 1 MILLIGRAM(S): 2 INJECTION INTRAMUSCULAR; INTRAVENOUS; SUBCUTANEOUS at 05:00

## 2019-09-28 RX ADMIN — HYDROMORPHONE HYDROCHLORIDE 0.5 MILLIGRAM(S): 2 INJECTION INTRAMUSCULAR; INTRAVENOUS; SUBCUTANEOUS at 09:17

## 2019-09-28 RX ADMIN — Medication 100 MILLIGRAM(S): at 13:56

## 2019-09-28 RX ADMIN — Medication 100 MILLIGRAM(S): at 22:18

## 2019-09-28 RX ADMIN — HYDROMORPHONE HYDROCHLORIDE 1 MILLIGRAM(S): 2 INJECTION INTRAMUSCULAR; INTRAVENOUS; SUBCUTANEOUS at 22:45

## 2019-09-28 RX ADMIN — HYDROMORPHONE HYDROCHLORIDE 1 MILLIGRAM(S): 2 INJECTION INTRAMUSCULAR; INTRAVENOUS; SUBCUTANEOUS at 04:22

## 2019-09-28 RX ADMIN — Medication 100 GRAM(S): at 18:48

## 2019-09-28 RX ADMIN — Medication 1 DROP(S): at 06:46

## 2019-09-28 RX ADMIN — ATORVASTATIN CALCIUM 10 MILLIGRAM(S): 80 TABLET, FILM COATED ORAL at 22:18

## 2019-09-28 RX ADMIN — Medication 1 DROP(S): at 13:56

## 2019-09-28 NOTE — PROGRESS NOTE ADULT - SUBJECTIVE AND OBJECTIVE BOX
STATUS POST: POD # 4 s/p APR     INTERVAL HPI/OVERNIGHT EVENTS: VENKATESH   	  SUBJECTIVE: This morning he still complains of pain in the abdomen and the throat. Nausea is improved. Passing gas    amLODIPine   Tablet 5 milliGRAM(s) Oral daily  cefoTEtan  IVPB 1 Gram(s) IV Intermittent every 12 hours  enoxaparin Injectable 40 milliGRAM(s) SubCutaneous daily      Vital Signs Last 24 Hrs  T(C): 36.8 (28 Sep 2019 14:44), Max: 36.9 (27 Sep 2019 22:00)  T(F): 98.2 (28 Sep 2019 14:44), Max: 98.5 (27 Sep 2019 22:00)  HR: 88 (28 Sep 2019 18:56) (83 - 98)  BP: 138/84 (28 Sep 2019 18:56) (133/79 - 155/88)  BP(mean): --  RR: 17 (28 Sep 2019 18:56) (14 - 17)  SpO2: 99% (28 Sep 2019 18:56) (95% - 99%)  I&O's Detail    27 Sep 2019 07:01  -  28 Sep 2019 07:00  --------------------------------------------------------  IN:    IV PiggyBack: 50 mL    Lactated Ringers IV Bolus: 1000 mL    lactated ringers.: 720 mL  Total IN: 1770 mL    OUT:    Bulb: 100 mL    Bulb: 540 mL    Colostomy: 150 mL    Indwelling Catheter - Urethral: 1750 mL    Nasoenteral Tube: 950 mL  Total OUT: 3490 mL    Total NET: -1720 mL      28 Sep 2019 07:01  -  28 Sep 2019 19:22  --------------------------------------------------------  IN:    IV PiggyBack: 50 mL    lactated ringers.: 400 mL  Total IN: 450 mL    OUT:    Bulb: 20 mL    Bulb: 200 mL    Indwelling Catheter - Urethral: 450 mL    Nasoenteral Tube: 250 mL  Total OUT: 920 mL    Total NET: -470 mL        Constitutional: resting comfortably   Respiratory: non labored breathing, no respiratory distress  Cardiovascular: NSR, RRR  Gastrointestinal:  soft, distended, tender. abdominal JAKUB w/ SS OP. Ostomy w/ stoma pink and patent minimal output.  : Marie draining clear yellow urine   Extremities: WWP, left thigh incision and dressing c/d/i, JAKUB to suction with minimal SS OP.        LABS:                        11.7   11.88 )-----------( 346      ( 28 Sep 2019 07:28 )             35.9     09-28    139  |  102  |  21  ----------------------------<  140<H>  4.1   |  30  |  0.72    Ca    9.2      28 Sep 2019 07:28  Phos  3.3     09-28  Mg     2.1     09-28            RADIOLOGY & ADDITIONAL STUDIES:

## 2019-09-28 NOTE — PROGRESS NOTE ADULT - SUBJECTIVE AND OBJECTIVE BOX
SUBJECTIVE:  Patient with NG tube in place. Complaining of continued severe abdominal pain. Patient ambulated yesterday. CT scan showed possible ileus    OBJECTIVE:     ** VITAL SIGNS / I&O's **    Vital Signs Last 24 Hrs  T(C): 36.4 (27 Sep 2019 05:03), Max: 37.5 (26 Sep 2019 17:25)  T(F): 97.5 (27 Sep 2019 05:03), Max: 99.5 (26 Sep 2019 17:25)  HR: 93 (27 Sep 2019 01:13) (91 - 94)  BP: 138/86 (27 Sep 2019 01:13) (126/76 - 143/95)  BP(mean): --  RR: 18 (27 Sep 2019 01:13) (17 - 18)  SpO2: 97% (27 Sep 2019 01:13) (97% - 100%)      26 Sep 2019 07:01  -  27 Sep 2019 07:00  --------------------------------------------------------  IN:    IV PiggyBack: 300 mL    lactated ringers.: 1900 mL  Total IN: 2200 mL    OUT:    Bulb: 735 mL    Bulb: 40 mL    Indwelling Catheter - Urethral: 1050 mL    Nasoenteral Tube: 700 mL  Total OUT: 2525 mL    Total NET: -325 mL          ** PHYSICAL EXAM **    -- CONSTITUTIONAL: Alert, Awake. NAD.   -- RESPIRATORY: unlabored breathing, no respiratory distress  -- ABD: Incision clean, dry, and intact  -- EXTREMITY: Left thigh incision and dressing c/d/i. Perineal incision CDI      ** LABS **                          12.1   15.81 )-----------( 336      ( 27 Sep 2019 05:53 )             37.0     27 Sep 2019 05:53    137    |  99     |  19     ----------------------------<  170    4.3     |  28     |  0.71     Ca    9.6        27 Sep 2019 05:53  Phos  2.7       27 Sep 2019 05:53  Mg     1.9       27 Sep 2019 05:53        CAPILLARY BLOOD GLUCOSE

## 2019-09-28 NOTE — PROGRESS NOTE ADULT - ASSESSMENT
54M with HTN, HLD, rectal cancer s/p robot assisted abdominal peritoneal resection 9/24. Distention improved, but pain still present, possibly due to ileus. Will investigate with AXR    - ERAS  - Pain/nausea control  - NPO/IVF/NGT   - Marie  - Lovenox   - JAKUB x 2 (1 on abd and 1 L thigh)  - Ostomy teaching  - F/u anesthesia  - F/u plastics recs

## 2019-09-28 NOTE — PROGRESS NOTE ADULT - SUBJECTIVE AND OBJECTIVE BOX
Seen / examined.     Intermittent heartburn with occasional abdominal pain.  vss.   Afeb  JAKUB- serosang  colostomy- some stool  abd- soft, mild tender, mod distention.    CT- ileus    A/P ileus  pod 4  continue supportive care. No evidence of intraabdominal etiology of ileus.

## 2019-09-29 LAB
ANION GAP SERPL CALC-SCNC: 10 MMOL/L — SIGNIFICANT CHANGE UP (ref 5–17)
BUN SERPL-MCNC: 23 MG/DL — SIGNIFICANT CHANGE UP (ref 7–23)
CALCIUM SERPL-MCNC: 9.5 MG/DL — SIGNIFICANT CHANGE UP (ref 8.4–10.5)
CHLORIDE SERPL-SCNC: 104 MMOL/L — SIGNIFICANT CHANGE UP (ref 96–108)
CO2 SERPL-SCNC: 29 MMOL/L — SIGNIFICANT CHANGE UP (ref 22–31)
CREAT SERPL-MCNC: 0.77 MG/DL — SIGNIFICANT CHANGE UP (ref 0.5–1.3)
GLUCOSE SERPL-MCNC: 126 MG/DL — HIGH (ref 70–99)
HCT VFR BLD CALC: 36.4 % — LOW (ref 39–50)
HGB BLD-MCNC: 11.6 G/DL — LOW (ref 13–17)
MAGNESIUM SERPL-MCNC: 2.1 MG/DL — SIGNIFICANT CHANGE UP (ref 1.6–2.6)
MCHC RBC-ENTMCNC: 31 PG — SIGNIFICANT CHANGE UP (ref 27–34)
MCHC RBC-ENTMCNC: 31.9 GM/DL — LOW (ref 32–36)
MCV RBC AUTO: 97.3 FL — SIGNIFICANT CHANGE UP (ref 80–100)
NRBC # BLD: 0 /100 WBCS — SIGNIFICANT CHANGE UP (ref 0–0)
PHOSPHATE SERPL-MCNC: 3 MG/DL — SIGNIFICANT CHANGE UP (ref 2.5–4.5)
PLATELET # BLD AUTO: 350 K/UL — SIGNIFICANT CHANGE UP (ref 150–400)
POTASSIUM SERPL-MCNC: 3.7 MMOL/L — SIGNIFICANT CHANGE UP (ref 3.5–5.3)
POTASSIUM SERPL-SCNC: 3.7 MMOL/L — SIGNIFICANT CHANGE UP (ref 3.5–5.3)
RBC # BLD: 3.74 M/UL — LOW (ref 4.2–5.8)
RBC # FLD: 12.9 % — SIGNIFICANT CHANGE UP (ref 10.3–14.5)
SODIUM SERPL-SCNC: 143 MMOL/L — SIGNIFICANT CHANGE UP (ref 135–145)
WBC # BLD: 10.86 K/UL — HIGH (ref 3.8–10.5)
WBC # FLD AUTO: 10.86 K/UL — HIGH (ref 3.8–10.5)

## 2019-09-29 RX ORDER — POTASSIUM CHLORIDE 20 MEQ
10 PACKET (EA) ORAL
Refills: 0 | Status: COMPLETED | OUTPATIENT
Start: 2019-09-29 | End: 2019-09-29

## 2019-09-29 RX ADMIN — HYDROMORPHONE HYDROCHLORIDE 1 MILLIGRAM(S): 2 INJECTION INTRAMUSCULAR; INTRAVENOUS; SUBCUTANEOUS at 21:34

## 2019-09-29 RX ADMIN — HYDROMORPHONE HYDROCHLORIDE 1 MILLIGRAM(S): 2 INJECTION INTRAMUSCULAR; INTRAVENOUS; SUBCUTANEOUS at 05:59

## 2019-09-29 RX ADMIN — Medication 1 DROP(S): at 21:42

## 2019-09-29 RX ADMIN — Medication 100 MILLIEQUIVALENT(S): at 21:31

## 2019-09-29 RX ADMIN — Medication 1 DROP(S): at 18:59

## 2019-09-29 RX ADMIN — AMLODIPINE BESYLATE 5 MILLIGRAM(S): 2.5 TABLET ORAL at 06:32

## 2019-09-29 RX ADMIN — HYDROMORPHONE HYDROCHLORIDE 1 MILLIGRAM(S): 2 INJECTION INTRAMUSCULAR; INTRAVENOUS; SUBCUTANEOUS at 16:27

## 2019-09-29 RX ADMIN — PANTOPRAZOLE SODIUM 40 MILLIGRAM(S): 20 TABLET, DELAYED RELEASE ORAL at 13:50

## 2019-09-29 RX ADMIN — Medication 100 GRAM(S): at 06:32

## 2019-09-29 RX ADMIN — Medication 1 DROP(S): at 06:36

## 2019-09-29 RX ADMIN — Medication 100 MILLIGRAM(S): at 06:32

## 2019-09-29 RX ADMIN — Medication 100 MILLIEQUIVALENT(S): at 16:17

## 2019-09-29 RX ADMIN — HYDROMORPHONE HYDROCHLORIDE 1 MILLIGRAM(S): 2 INJECTION INTRAMUSCULAR; INTRAVENOUS; SUBCUTANEOUS at 16:42

## 2019-09-29 RX ADMIN — ENOXAPARIN SODIUM 40 MILLIGRAM(S): 100 INJECTION SUBCUTANEOUS at 13:50

## 2019-09-29 RX ADMIN — Medication 1 DROP(S): at 11:33

## 2019-09-29 RX ADMIN — Medication 100 MILLIEQUIVALENT(S): at 21:40

## 2019-09-29 RX ADMIN — HYDROMORPHONE HYDROCHLORIDE 1 MILLIGRAM(S): 2 INJECTION INTRAMUSCULAR; INTRAVENOUS; SUBCUTANEOUS at 06:15

## 2019-09-29 RX ADMIN — Medication 100 MILLIEQUIVALENT(S): at 16:18

## 2019-09-29 RX ADMIN — Medication 100 GRAM(S): at 18:59

## 2019-09-29 RX ADMIN — HYDROMORPHONE HYDROCHLORIDE 1 MILLIGRAM(S): 2 INJECTION INTRAMUSCULAR; INTRAVENOUS; SUBCUTANEOUS at 11:45

## 2019-09-29 RX ADMIN — Medication 100 MILLIGRAM(S): at 16:17

## 2019-09-29 RX ADMIN — HYDROMORPHONE HYDROCHLORIDE 1 MILLIGRAM(S): 2 INJECTION INTRAMUSCULAR; INTRAVENOUS; SUBCUTANEOUS at 22:00

## 2019-09-29 RX ADMIN — HYDROMORPHONE HYDROCHLORIDE 1 MILLIGRAM(S): 2 INJECTION INTRAMUSCULAR; INTRAVENOUS; SUBCUTANEOUS at 11:28

## 2019-09-29 NOTE — PROGRESS NOTE ADULT - SUBJECTIVE AND OBJECTIVE BOX
Post op ileus  Feels better  Abdomen- less distended, minimal tenderenss    Labs- pending    Improved Ileus  OOB,  ambulate  cont IV fluids    dc kim

## 2019-09-29 NOTE — PROGRESS NOTE ADULT - SUBJECTIVE AND OBJECTIVE BOX
SUBJECTIVE:  Patient with NG tube in place. Pain improved, distension improved    OBJECTIVE:  T(C): 36.8 (09-29-19 @ 05:20), Max: 37.1 (09-28-19 @ 20:30)  HR: 88 (09-29-19 @ 06:00) (84 - 90)  BP: 137/82 (09-29-19 @ 06:00) (131/79 - 148/88)  BP(mean): --  ABP: --  ABP(mean): --  RR: 15 (09-29-19 @ 06:00) (15 - 17)  SpO2: 97% (09-29-19 @ 06:00) (97% - 99%)  Wt(kg): --  CVP(mm Hg): --  CI: --  CAPILLARY BLOOD GLUCOSE       N/A      09-28 @ 07:01  -  09-29 @ 07:00  --------------------------------------------------------  IN:    IV PiggyBack: 50 mL    lactated ringers.: 480 mL  Total IN: 530 mL    OUT:    Bulb: 50 mL    Bulb: 270 mL    Indwelling Catheter - Urethral: 1650 mL    Nasoenteral Tube: 970 mL  Total OUT: 2940 mL    Total NET: -2410 mL          ** PHYSICAL EXAM **    -- CONSTITUTIONAL: Alert, Awake. NAD.   -- RESPIRATORY: unlabored breathing, no respiratory distress  -- ABD: Incision clean, dry, and intact  -- EXTREMITY: Left thigh incision and dressing c/d/i. Perineal incision CDI      ** LABS **                          12.1   15.81 )-----------( 336      ( 27 Sep 2019 05:53 )             37.0     27 Sep 2019 05:53    137    |  99     |  19     ----------------------------<  170    4.3     |  28     |  0.71     Ca    9.6        27 Sep 2019 05:53  Phos  2.7       27 Sep 2019 05:53  Mg     1.9       27 Sep 2019 05:53        CAPILLARY BLOOD GLUCOSE

## 2019-09-29 NOTE — PROGRESS NOTE ADULT - SUBJECTIVE AND OBJECTIVE BOX
STATUS POST:  abdominal perineal resection    POST OPERATIVE DAY #: 5    SUBJECTIVE: Patient examined bedside with chief resident. Patient reports pain is well controlled. Patient denies flatus and bowel movements. Patient denies nausea , emesis , SOB and chest pain. Patient making adequate urine output.      amLODIPine   Tablet 5 milliGRAM(s) Oral daily  cefoTEtan  IVPB 1 Gram(s) IV Intermittent every 12 hours  enoxaparin Injectable 40 milliGRAM(s) SubCutaneous daily      Vital Signs Last 24 Hrs  T(C): 36.8 (29 Sep 2019 14:00), Max: 37.1 (28 Sep 2019 20:30)  T(F): 98.2 (29 Sep 2019 14:00), Max: 98.7 (28 Sep 2019 20:30)  HR: 88 (29 Sep 2019 14:00) (77 - 88)  BP: 141/82 (29 Sep 2019 14:00) (131/79 - 148/88)  BP(mean): --  RR: 15 (29 Sep 2019 14:00) (15 - 17)  SpO2: 95% (29 Sep 2019 14:00) (95% - 99%)  I&O's Detail    28 Sep 2019 07:01  -  29 Sep 2019 07:00  --------------------------------------------------------  IN:    IV PiggyBack: 50 mL    lactated ringers.: 480 mL  Total IN: 530 mL    OUT:    Bulb: 50 mL    Bulb: 270 mL    Indwelling Catheter - Urethral: 1650 mL    Nasoenteral Tube: 970 mL  Total OUT: 2940 mL    Total NET: -2410 mL      29 Sep 2019 07:01  -  29 Sep 2019 16:12  --------------------------------------------------------  IN:    IV PiggyBack: 200 mL    lactated ringers.: 320 mL  Total IN: 520 mL    OUT:    Indwelling Catheter - Urethral: 1050 mL  Total OUT: 1050 mL    Total NET: -530 mL          General: NAD, resting comfortably in bed  C/V: NSR  Pulm: Nonlabored breathing, no respiratory distress  Abd: Soft , non-distended , TTP around incision site , incision clean dry and intact  Extrem: WWP, no edema, SCDs in place  JAKUB drain x2 serosanguineous       LABS:                        11.6   10.86 )-----------( 350      ( 29 Sep 2019 10:58 )             36.4     09-29    143  |  104  |  23  ----------------------------<  126<H>  3.7   |  29  |  0.77    Ca    9.5      29 Sep 2019 10:58  Phos  3.0     09-29  Mg     2.1     09-29            RADIOLOGY & ADDITIONAL STUDIES:

## 2019-09-29 NOTE — PROGRESS NOTE ADULT - ASSESSMENT
54M with HTN, HLD, rectal cancer s/p abdominal peritoneal resection  - abx/pain per primary  - keep incision clean, dry, and intact  - Okay to begin ambulation with legs maintained in adduction  - Encourage ambulation  - DVT ppx

## 2019-09-30 LAB
ANION GAP SERPL CALC-SCNC: 11 MMOL/L — SIGNIFICANT CHANGE UP (ref 5–17)
BUN SERPL-MCNC: 23 MG/DL — SIGNIFICANT CHANGE UP (ref 7–23)
CALCIUM SERPL-MCNC: 9.5 MG/DL — SIGNIFICANT CHANGE UP (ref 8.4–10.5)
CHLORIDE SERPL-SCNC: 103 MMOL/L — SIGNIFICANT CHANGE UP (ref 96–108)
CO2 SERPL-SCNC: 28 MMOL/L — SIGNIFICANT CHANGE UP (ref 22–31)
CREAT SERPL-MCNC: 0.73 MG/DL — SIGNIFICANT CHANGE UP (ref 0.5–1.3)
GLUCOSE SERPL-MCNC: 140 MG/DL — HIGH (ref 70–99)
HCT VFR BLD CALC: 34.7 % — LOW (ref 39–50)
HGB BLD-MCNC: 11.5 G/DL — LOW (ref 13–17)
MAGNESIUM SERPL-MCNC: 2.2 MG/DL — SIGNIFICANT CHANGE UP (ref 1.6–2.6)
MCHC RBC-ENTMCNC: 30.7 PG — SIGNIFICANT CHANGE UP (ref 27–34)
MCHC RBC-ENTMCNC: 33.1 GM/DL — SIGNIFICANT CHANGE UP (ref 32–36)
MCV RBC AUTO: 92.8 FL — SIGNIFICANT CHANGE UP (ref 80–100)
NRBC # BLD: 0 /100 WBCS — SIGNIFICANT CHANGE UP (ref 0–0)
PHOSPHATE SERPL-MCNC: 2.5 MG/DL — SIGNIFICANT CHANGE UP (ref 2.5–4.5)
PLATELET # BLD AUTO: 376 K/UL — SIGNIFICANT CHANGE UP (ref 150–400)
POTASSIUM SERPL-MCNC: 3.7 MMOL/L — SIGNIFICANT CHANGE UP (ref 3.5–5.3)
POTASSIUM SERPL-SCNC: 3.7 MMOL/L — SIGNIFICANT CHANGE UP (ref 3.5–5.3)
RBC # BLD: 3.74 M/UL — LOW (ref 4.2–5.8)
RBC # FLD: 12.9 % — SIGNIFICANT CHANGE UP (ref 10.3–14.5)
SODIUM SERPL-SCNC: 142 MMOL/L — SIGNIFICANT CHANGE UP (ref 135–145)
WBC # BLD: 11.85 K/UL — HIGH (ref 3.8–10.5)
WBC # FLD AUTO: 11.85 K/UL — HIGH (ref 3.8–10.5)

## 2019-09-30 RX ORDER — KETOROLAC TROMETHAMINE 30 MG/ML
15 SYRINGE (ML) INJECTION EVERY 6 HOURS
Refills: 0 | Status: DISCONTINUED | OUTPATIENT
Start: 2019-09-30 | End: 2019-10-05

## 2019-09-30 RX ORDER — POTASSIUM PHOSPHATE, MONOBASIC POTASSIUM PHOSPHATE, DIBASIC 236; 224 MG/ML; MG/ML
15 INJECTION, SOLUTION INTRAVENOUS ONCE
Refills: 0 | Status: COMPLETED | OUTPATIENT
Start: 2019-09-30 | End: 2019-09-30

## 2019-09-30 RX ADMIN — Medication 100 MILLIGRAM(S): at 23:02

## 2019-09-30 RX ADMIN — Medication 100 MILLIGRAM(S): at 14:20

## 2019-09-30 RX ADMIN — Medication 15 MILLIGRAM(S): at 11:51

## 2019-09-30 RX ADMIN — Medication 15 MILLIGRAM(S): at 11:36

## 2019-09-30 RX ADMIN — AMLODIPINE BESYLATE 5 MILLIGRAM(S): 2.5 TABLET ORAL at 05:23

## 2019-09-30 RX ADMIN — PANTOPRAZOLE SODIUM 40 MILLIGRAM(S): 20 TABLET, DELAYED RELEASE ORAL at 11:33

## 2019-09-30 RX ADMIN — Medication 100 GRAM(S): at 18:20

## 2019-09-30 RX ADMIN — Medication 15 MILLIGRAM(S): at 23:35

## 2019-09-30 RX ADMIN — HYDROMORPHONE HYDROCHLORIDE 1 MILLIGRAM(S): 2 INJECTION INTRAMUSCULAR; INTRAVENOUS; SUBCUTANEOUS at 14:44

## 2019-09-30 RX ADMIN — Medication 100 GRAM(S): at 05:23

## 2019-09-30 RX ADMIN — ENOXAPARIN SODIUM 40 MILLIGRAM(S): 100 INJECTION SUBCUTANEOUS at 11:34

## 2019-09-30 RX ADMIN — HYDROMORPHONE HYDROCHLORIDE 1 MILLIGRAM(S): 2 INJECTION INTRAMUSCULAR; INTRAVENOUS; SUBCUTANEOUS at 06:00

## 2019-09-30 RX ADMIN — Medication 15 MILLIGRAM(S): at 23:33

## 2019-09-30 RX ADMIN — Medication 15 MILLIGRAM(S): at 18:35

## 2019-09-30 RX ADMIN — HYDROMORPHONE HYDROCHLORIDE 1 MILLIGRAM(S): 2 INJECTION INTRAMUSCULAR; INTRAVENOUS; SUBCUTANEOUS at 05:24

## 2019-09-30 RX ADMIN — Medication 100 MILLIGRAM(S): at 05:24

## 2019-09-30 RX ADMIN — HYDROMORPHONE HYDROCHLORIDE 1 MILLIGRAM(S): 2 INJECTION INTRAMUSCULAR; INTRAVENOUS; SUBCUTANEOUS at 14:29

## 2019-09-30 RX ADMIN — Medication 15 MILLIGRAM(S): at 18:20

## 2019-09-30 RX ADMIN — POTASSIUM PHOSPHATE, MONOBASIC POTASSIUM PHOSPHATE, DIBASIC 63.75 MILLIMOLE(S): 236; 224 INJECTION, SOLUTION INTRAVENOUS at 10:13

## 2019-09-30 RX ADMIN — SODIUM CHLORIDE 40 MILLILITER(S): 9 INJECTION, SOLUTION INTRAVENOUS at 10:12

## 2019-09-30 NOTE — PROGRESS NOTE ADULT - SUBJECTIVE AND OBJECTIVE BOX
SUBJECTIVE: Patient complaining of difficulty sleeping with pain 2/2 NG tube irritation and abdominal pain. No overnight events.     OBJECTIVE:     ** VITAL SIGNS / I&O's **    Vital Signs Last 24 Hrs  T(C): 36.7 (30 Sep 2019 05:01), Max: 36.9 (29 Sep 2019 20:42)  T(F): 98.1 (30 Sep 2019 05:01), Max: 98.5 (29 Sep 2019 20:42)  HR: 97 (30 Sep 2019 05:01) (77 - 97)  BP: 145/75 (30 Sep 2019 05:01) (140/76 - 154/85)  BP(mean): --  RR: 16 (30 Sep 2019 05:01) (15 - 16)  SpO2: 97% (30 Sep 2019 05:01) (95% - 97%)      29 Sep 2019 07:01  -  30 Sep 2019 07:00  --------------------------------------------------------  IN:    IV PiggyBack: 550 mL    lactated ringers.: 920 mL  Total IN: 1470 mL    OUT:    Bulb: 20 mL    Bulb: 40 mL    Indwelling Catheter - Urethral: 1300 mL    Nasoenteral Tube: 400 mL    Voided: 1000 mL  Total OUT: 2760 mL    Total NET: -1290 mL          ** PHYSICAL EXAM **    -- CONSTITUTIONAL: Alert, Awake. NAD.   -- RESPIRATORY: unlabored breathing, no respiratory distress  -- ABD: Incision clean, dry, and intact  -- EXTREMITY: Left thigh incision and dressing c/d/i. Perineal incision CDI            ** LABS **                          11.5   11.85 )-----------( 376      ( 30 Sep 2019 06:02 )             34.7     30 Sep 2019 06:02    142    |  103    |  23     ----------------------------<  140    3.7     |  28     |  0.73     Ca    9.5        30 Sep 2019 06:02  Phos  2.5       30 Sep 2019 06:02  Mg     2.2       30 Sep 2019 06:02        CAPILLARY BLOOD GLUCOSE

## 2019-09-30 NOTE — PROGRESS NOTE ADULT - SUBJECTIVE AND OBJECTIVE BOX
STATUS POST: abdominal perineal resection (9/24)    POST OPERATIVE DAY #: 6    SUBJECTIVE: Patient examined bedside with chief resident. Patient reports pain is well controlled. Patient denies flatus and bowel movements. Patient denies nausea , emesis , SOB and chest pain. Patient making adequate urine output.      amLODIPine   Tablet 5 milliGRAM(s) Oral daily  cefoTEtan  IVPB 1 Gram(s) IV Intermittent every 12 hours  enoxaparin Injectable 40 milliGRAM(s) SubCutaneous daily      Vital Signs Last 24 Hrs  T(C): 36.7 (30 Sep 2019 05:01), Max: 36.9 (29 Sep 2019 20:42)  T(F): 98.1 (30 Sep 2019 05:01), Max: 98.5 (29 Sep 2019 20:42)  HR: 97 (30 Sep 2019 05:01) (77 - 97)  BP: 145/75 (30 Sep 2019 05:01) (140/76 - 154/85)  RR: 16 (30 Sep 2019 05:01) (15 - 16)  SpO2: 97% (30 Sep 2019 05:01) (95% - 97%)I&O's Detail      I&O's Detail    29 Sep 2019 07:01  -  30 Sep 2019 07:00  --------------------------------------------------------  IN:    IV PiggyBack: 550 mL    lactated ringers.: 920 mL  Total IN: 1470 mL    OUT:    Bulb: 20 mL    Bulb: 40 mL    Indwelling Catheter - Urethral: 1300 mL    Nasoenteral Tube: 400 mL    Voided: 1000 mL  Total OUT: 2760 mL    Total NET: -1290 mL        General: NAD, resting comfortably in bed  Pulm: Nonlabored breathing, no respiratory distress  Abd: Soft , non-distended , TTP around incision site, incision clean dry and intact. Ostomy w/ stoma pink and patent. Anal wound c/d/i. R abdominal JAKUB with serosanguious output.  Extrem: WWP, no edema, SCDs in place. Thigh flap incision c/d/i, no surrounding erythema. Thigh JAKUB with serosanguinous output.      LABS:                   09-30    142  |  103  |  23  ----------------------------<  140<H>  3.7   |  28  |  0.73    Ca    9.5      30 Sep 2019 06:02  Phos  2.5     09-30  Mg     2.2     09-30                          11.5   11.85 )-----------( 376      ( 30 Sep 2019 06:02 )             34.7 STATUS POST: abdominal perineal resection (9/24)    POST OPERATIVE DAY #: 6    SUBJECTIVE: Patient examined bedside with chief resident. Patient reports pain is well controlled, states that eye discomfort has resolved. Patient denies nausea, emesis , SOB and chest pain. Small amount of stool and gas present in colostomy bag.      amLODIPine   Tablet 5 milliGRAM(s) Oral daily  cefoTEtan  IVPB 1 Gram(s) IV Intermittent every 12 hours  enoxaparin Injectable 40 milliGRAM(s) SubCutaneous daily      Vital Signs Last 24 Hrs  T(C): 36.7 (30 Sep 2019 05:01), Max: 36.9 (29 Sep 2019 20:42)  T(F): 98.1 (30 Sep 2019 05:01), Max: 98.5 (29 Sep 2019 20:42)  HR: 97 (30 Sep 2019 05:01) (77 - 97)  BP: 145/75 (30 Sep 2019 05:01) (140/76 - 154/85)  RR: 16 (30 Sep 2019 05:01) (15 - 16)  SpO2: 97% (30 Sep 2019 05:01) (95% - 97%)I&O's Detail      I&O's Detail    29 Sep 2019 07:01  -  30 Sep 2019 07:00  --------------------------------------------------------  IN:    IV PiggyBack: 550 mL    lactated ringers.: 920 mL  Total IN: 1470 mL    OUT:    Bulb: 20 mL    Bulb: 40 mL    Indwelling Catheter - Urethral: 1300 mL    Nasoenteral Tube: 400 mL    Voided: 1000 mL  Total OUT: 2760 mL    Total NET: -1290 mL        General: NAD, resting comfortably in bed  Pulm: Nonlabored breathing, no respiratory distress  Abd: Soft , non-distended , TTP around incision site, incision clean dry and intact. Ostomy w/ stoma pink and patent, small amount of stool and gas in colostomy bag. Anal wound c/d/i. R abdominal JAKUB with serosanguinous output.  Extrem: WWP, no edema, SCDs in place. Thigh flap incision c/d/i, no surrounding erythema. Thigh JAKUB with serosanguinous output.      LABS:                   09-30    142  |  103  |  23  ----------------------------<  140<H>  3.7   |  28  |  0.73    Ca    9.5      30 Sep 2019 06:02  Phos  2.5     09-30  Mg     2.2     09-30                          11.5   11.85 )---------( 376      ( 30 Sep 2019 06:02 )             34.7

## 2019-09-30 NOTE — ADVANCED PRACTICE NURSE CONSULT - ASSESSMENT
Pt had just gotten pain medication, no teaching performed at this time. New 1 piece flat [place] appliance placed around stoma due to distension of abdomen. Very small amount of dark brown liquid in old appliance, which had begun to leak slightly.

## 2019-10-01 LAB
ANION GAP SERPL CALC-SCNC: 10 MMOL/L — SIGNIFICANT CHANGE UP (ref 5–17)
BASOPHILS # BLD AUTO: 0.02 K/UL — SIGNIFICANT CHANGE UP (ref 0–0.2)
BASOPHILS NFR BLD AUTO: 0.2 % — SIGNIFICANT CHANGE UP (ref 0–2)
BUN SERPL-MCNC: 24 MG/DL — HIGH (ref 7–23)
CALCIUM SERPL-MCNC: 9.3 MG/DL — SIGNIFICANT CHANGE UP (ref 8.4–10.5)
CHLORIDE SERPL-SCNC: 104 MMOL/L — SIGNIFICANT CHANGE UP (ref 96–108)
CO2 SERPL-SCNC: 27 MMOL/L — SIGNIFICANT CHANGE UP (ref 22–31)
CREAT SERPL-MCNC: 0.78 MG/DL — SIGNIFICANT CHANGE UP (ref 0.5–1.3)
EOSINOPHIL # BLD AUTO: 0.17 K/UL — SIGNIFICANT CHANGE UP (ref 0–0.5)
EOSINOPHIL NFR BLD AUTO: 1.5 % — SIGNIFICANT CHANGE UP (ref 0–6)
GLUCOSE SERPL-MCNC: 124 MG/DL — HIGH (ref 70–99)
HCT VFR BLD CALC: 34.3 % — LOW (ref 39–50)
HGB BLD-MCNC: 11.1 G/DL — LOW (ref 13–17)
IMM GRANULOCYTES NFR BLD AUTO: 0.6 % — SIGNIFICANT CHANGE UP (ref 0–1.5)
LYMPHOCYTES # BLD AUTO: 0.68 K/UL — LOW (ref 1–3.3)
LYMPHOCYTES # BLD AUTO: 6 % — LOW (ref 13–44)
MAGNESIUM SERPL-MCNC: 2.1 MG/DL — SIGNIFICANT CHANGE UP (ref 1.6–2.6)
MCHC RBC-ENTMCNC: 30.2 PG — SIGNIFICANT CHANGE UP (ref 27–34)
MCHC RBC-ENTMCNC: 32.4 GM/DL — SIGNIFICANT CHANGE UP (ref 32–36)
MCV RBC AUTO: 93.2 FL — SIGNIFICANT CHANGE UP (ref 80–100)
MONOCYTES # BLD AUTO: 0.73 K/UL — SIGNIFICANT CHANGE UP (ref 0–0.9)
MONOCYTES NFR BLD AUTO: 6.4 % — SIGNIFICANT CHANGE UP (ref 2–14)
NEUTROPHILS # BLD AUTO: 9.74 K/UL — HIGH (ref 1.8–7.4)
NEUTROPHILS NFR BLD AUTO: 85.3 % — HIGH (ref 43–77)
NRBC # BLD: 0 /100 WBCS — SIGNIFICANT CHANGE UP (ref 0–0)
PHOSPHATE SERPL-MCNC: 3.2 MG/DL — SIGNIFICANT CHANGE UP (ref 2.5–4.5)
PLATELET # BLD AUTO: 344 K/UL — SIGNIFICANT CHANGE UP (ref 150–400)
POTASSIUM SERPL-MCNC: 4 MMOL/L — SIGNIFICANT CHANGE UP (ref 3.5–5.3)
POTASSIUM SERPL-SCNC: 4 MMOL/L — SIGNIFICANT CHANGE UP (ref 3.5–5.3)
RBC # BLD: 3.68 M/UL — LOW (ref 4.2–5.8)
RBC # FLD: 13.2 % — SIGNIFICANT CHANGE UP (ref 10.3–14.5)
SODIUM SERPL-SCNC: 141 MMOL/L — SIGNIFICANT CHANGE UP (ref 135–145)
WBC # BLD: 11.41 K/UL — HIGH (ref 3.8–10.5)
WBC # FLD AUTO: 11.41 K/UL — HIGH (ref 3.8–10.5)

## 2019-10-01 PROCEDURE — 76937 US GUIDE VASCULAR ACCESS: CPT | Mod: 26,59

## 2019-10-01 PROCEDURE — 36569 INSJ PICC 5 YR+ W/O IMAGING: CPT

## 2019-10-01 PROCEDURE — 93010 ELECTROCARDIOGRAM REPORT: CPT

## 2019-10-01 PROCEDURE — 74177 CT ABD & PELVIS W/CONTRAST: CPT | Mod: 26

## 2019-10-01 RX ORDER — CHLORHEXIDINE GLUCONATE 213 G/1000ML
1 SOLUTION TOPICAL
Refills: 0 | Status: DISCONTINUED | OUTPATIENT
Start: 2019-10-02 | End: 2019-10-14

## 2019-10-01 RX ORDER — SODIUM CHLORIDE 9 MG/ML
10 INJECTION INTRAMUSCULAR; INTRAVENOUS; SUBCUTANEOUS
Refills: 0 | Status: DISCONTINUED | OUTPATIENT
Start: 2019-10-01 | End: 2019-10-14

## 2019-10-01 RX ORDER — ACETAMINOPHEN 500 MG
1000 TABLET ORAL ONCE
Refills: 0 | Status: COMPLETED | OUTPATIENT
Start: 2019-10-01 | End: 2019-10-01

## 2019-10-01 RX ORDER — FAMOTIDINE 10 MG/ML
20 INJECTION INTRAVENOUS ONCE
Refills: 0 | Status: COMPLETED | OUTPATIENT
Start: 2019-10-01 | End: 2019-10-01

## 2019-10-01 RX ORDER — ELECTROLYTE SOLUTION,INJ
1 VIAL (ML) INTRAVENOUS
Refills: 0 | Status: DISCONTINUED | OUTPATIENT
Start: 2019-10-01 | End: 2019-10-01

## 2019-10-01 RX ORDER — IOHEXOL 300 MG/ML
30 INJECTION, SOLUTION INTRAVENOUS ONCE
Refills: 0 | Status: DISCONTINUED | OUTPATIENT
Start: 2019-10-01 | End: 2019-10-01

## 2019-10-01 RX ORDER — ACETAMINOPHEN 500 MG
1000 TABLET ORAL ONCE
Refills: 0 | Status: COMPLETED | OUTPATIENT
Start: 2019-10-02 | End: 2019-10-02

## 2019-10-01 RX ORDER — CHLORPROMAZINE HCL 10 MG
25 TABLET ORAL EVERY 8 HOURS
Refills: 0 | Status: DISCONTINUED | OUTPATIENT
Start: 2019-10-01 | End: 2019-10-14

## 2019-10-01 RX ORDER — FAMOTIDINE 10 MG/ML
20 INJECTION INTRAVENOUS DAILY
Refills: 0 | Status: DISCONTINUED | OUTPATIENT
Start: 2019-10-02 | End: 2019-10-14

## 2019-10-01 RX ADMIN — HYDROMORPHONE HYDROCHLORIDE 1 MILLIGRAM(S): 2 INJECTION INTRAMUSCULAR; INTRAVENOUS; SUBCUTANEOUS at 02:25

## 2019-10-01 RX ADMIN — Medication 1 EACH: at 18:00

## 2019-10-01 RX ADMIN — Medication 100 GRAM(S): at 17:59

## 2019-10-01 RX ADMIN — ATORVASTATIN CALCIUM 10 MILLIGRAM(S): 80 TABLET, FILM COATED ORAL at 20:56

## 2019-10-01 RX ADMIN — HYDROMORPHONE HYDROCHLORIDE 1 MILLIGRAM(S): 2 INJECTION INTRAMUSCULAR; INTRAVENOUS; SUBCUTANEOUS at 23:29

## 2019-10-01 RX ADMIN — Medication 100 MILLIGRAM(S): at 05:35

## 2019-10-01 RX ADMIN — Medication 100 MILLIGRAM(S): at 13:55

## 2019-10-01 RX ADMIN — Medication 1000 MILLIGRAM(S): at 18:14

## 2019-10-01 RX ADMIN — HYDROMORPHONE HYDROCHLORIDE 1 MILLIGRAM(S): 2 INJECTION INTRAMUSCULAR; INTRAVENOUS; SUBCUTANEOUS at 10:25

## 2019-10-01 RX ADMIN — ENOXAPARIN SODIUM 40 MILLIGRAM(S): 100 INJECTION SUBCUTANEOUS at 11:52

## 2019-10-01 RX ADMIN — PANTOPRAZOLE SODIUM 40 MILLIGRAM(S): 20 TABLET, DELAYED RELEASE ORAL at 10:39

## 2019-10-01 RX ADMIN — Medication 15 MILLIGRAM(S): at 11:51

## 2019-10-01 RX ADMIN — HYDROMORPHONE HYDROCHLORIDE 1 MILLIGRAM(S): 2 INJECTION INTRAMUSCULAR; INTRAVENOUS; SUBCUTANEOUS at 22:47

## 2019-10-01 RX ADMIN — Medication 102 MILLIGRAM(S): at 16:52

## 2019-10-01 RX ADMIN — Medication 100 GRAM(S): at 05:40

## 2019-10-01 RX ADMIN — Medication 15 MILLIGRAM(S): at 05:36

## 2019-10-01 RX ADMIN — Medication 15 MILLIGRAM(S): at 18:14

## 2019-10-01 RX ADMIN — HYDROMORPHONE HYDROCHLORIDE 1 MILLIGRAM(S): 2 INJECTION INTRAMUSCULAR; INTRAVENOUS; SUBCUTANEOUS at 03:00

## 2019-10-01 RX ADMIN — SODIUM CHLORIDE 40 MILLILITER(S): 9 INJECTION, SOLUTION INTRAVENOUS at 11:51

## 2019-10-01 RX ADMIN — Medication 15 MILLIGRAM(S): at 17:59

## 2019-10-01 RX ADMIN — Medication 100 MILLIGRAM(S): at 20:56

## 2019-10-01 RX ADMIN — Medication 15 MILLIGRAM(S): at 07:10

## 2019-10-01 RX ADMIN — AMLODIPINE BESYLATE 5 MILLIGRAM(S): 2.5 TABLET ORAL at 05:35

## 2019-10-01 RX ADMIN — Medication 15 MILLIGRAM(S): at 12:06

## 2019-10-01 RX ADMIN — HYDROMORPHONE HYDROCHLORIDE 1 MILLIGRAM(S): 2 INJECTION INTRAMUSCULAR; INTRAVENOUS; SUBCUTANEOUS at 10:40

## 2019-10-01 RX ADMIN — FAMOTIDINE 20 MILLIGRAM(S): 10 INJECTION INTRAVENOUS at 12:41

## 2019-10-01 RX ADMIN — Medication 400 MILLIGRAM(S): at 11:51

## 2019-10-01 RX ADMIN — Medication 400 MILLIGRAM(S): at 17:59

## 2019-10-01 RX ADMIN — Medication 1000 MILLIGRAM(S): at 12:06

## 2019-10-01 NOTE — PROGRESS NOTE ADULT - SUBJECTIVE AND OBJECTIVE BOX
STATUS POST: abdominal perineal resection (9/24)    POST OPERATIVE DAY #: 7    SUBJECTIVE: Patient examined bedside with chief resident. Patient complains of persistent hiccuping, which exacerbates his abdominal pain. Patient denies nausea, emesis , SOB and chest pain. Small amount of stool and gas present in colostomy bag.      amLODIPine   Tablet 5 milliGRAM(s) Oral daily  cefoTEtan  IVPB 1 Gram(s) IV Intermittent every 12 hours  enoxaparin Injectable 40 milliGRAM(s) SubCutaneous daily      Vital Signs Last 24 Hrs  T(C): 36.8 (01 Oct 2019 05:54), Max: 37.5 (30 Sep 2019 20:34)  T(F): 98.3 (01 Oct 2019 05:54), Max: 99.5 (30 Sep 2019 20:34)  HR: 75 (01 Oct 2019 05:54) (75 - 92)  BP: 141/78 (01 Oct 2019 05:54) (121/78 - 143/84)  RR: 16 (01 Oct 2019 05:54) (15 - 17)  SpO2: 96% (01 Oct 2019 05:54) (95% - 97%)    I&O's Detail    30 Sep 2019 07:01  -  01 Oct 2019 07:00  --------------------------------------------------------  IN:    IV PiggyBack: 400 mL    lactated ringers.: 880 mL  Total IN: 1280 mL    OUT:    Bulb: 47.5 mL    Bulb: 22.5 mL    Colostomy: 20 mL    Nasoenteral Tube: 1000 mL    Voided: 900 mL  Total OUT: 1990 mL    Total NET: -710 mL          General: NAD, pt appears uncomfortable d/t hiccups  HEENT: NGT in place with clear output  Pulm: Nonlabored breathing, no respiratory distress  Abd: Soft , non-distended , TTP around incision site, incision clean dry and intact. Ostomy w/ stoma pink and patent, small amount of stool and gas in colostomy bag. Anal wound c/d/i. R abdominal JAKUB with serosanguinous output.  Extrem: WWP, no edema, SCDs in place. Thigh flap incision c/d/i, no surrounding erythema. L thigh JAKUB with serosanguinous output.      LABS:     09-30    142  |  103  |  23  ----------------------------<  140<H>  3.7   |  28  |  0.73    Ca    9.5      30 Sep 2019 06:02  Phos  2.5     09-30  Mg     2.2     09-30                          11.1   11.41 )-----------( 344      ( 01 Oct 2019 06:27 )             34.3 STATUS POST: abdominal perineal resection (9/24)    POST OPERATIVE DAY #: 7    SUBJECTIVE: Patient examined bedside with chief resident. Patient complains of persistent hiccuping, which exacerbates his abdominal pain. Patient denies nausea, emesis , SOB and chest pain. Small amount of stool and gas present in colostomy bag.      amLODIPine   Tablet 5 milliGRAM(s) Oral daily  cefoTEtan  IVPB 1 Gram(s) IV Intermittent every 12 hours  enoxaparin Injectable 40 milliGRAM(s) SubCutaneous daily      Vital Signs Last 24 Hrs  T(C): 36.8 (01 Oct 2019 05:54), Max: 37.5 (30 Sep 2019 20:34)  T(F): 98.3 (01 Oct 2019 05:54), Max: 99.5 (30 Sep 2019 20:34)  HR: 75 (01 Oct 2019 05:54) (75 - 92)  BP: 141/78 (01 Oct 2019 05:54) (121/78 - 143/84)  RR: 16 (01 Oct 2019 05:54) (15 - 17)  SpO2: 96% (01 Oct 2019 05:54) (95% - 97%)    I&O's Detail    30 Sep 2019 07:01  -  01 Oct 2019 07:00  --------------------------------------------------------  IN:    IV PiggyBack: 400 mL    lactated ringers.: 880 mL  Total IN: 1280 mL    OUT:    Bulb: 47.5 mL    Bulb: 22.5 mL    Colostomy: 20 mL    Nasoenteral Tube: 1000 mL    Voided: 900 mL  Total OUT: 1990 mL    Total NET: -710 mL          General: NAD, pt appears uncomfortable d/t hiccups  HEENT: NGT in place with clear output  Pulm: Nonlabored breathing, no respiratory distress  Abd: Soft , non-distended , TTP around incision site, incision clean dry and intact. Ostomy w/ stoma pink and patent, small amount of stool and gas in colostomy bag. Anal wound c/d/i. R abdominal JAKUB with serosanguinous output.  Extrem: WWP, no edema, SCDs in place. Thigh flap incision c/d/i, no surrounding erythema. L thigh JAKUB with serosanguinous output.      LABS:     10-01    141  |  104  |  24<H>  ----------------------------<  124<H>  4.0   |  27  |  0.78    Ca    9.3      01 Oct 2019 06:27  Phos  3.2     10-01  Mg     2.1     10-01                            11.1   11.41 )-----------( 344      ( 01 Oct 2019 06:27 )             34.3

## 2019-10-01 NOTE — PHYSICAL THERAPY INITIAL EVALUATION ADULT - THERAPY FREQUENCY, PT EVAL
2-3x/week/Patient educated on frequency of inpatient therapy at [facility], patient verbalized understanding.

## 2019-10-01 NOTE — PROCEDURE NOTE - NSPOSTCAREGUIDE_GEN_A_CORE
Care for catheter as per unit/ICU protocols/Verbal/written post procedure instructions were given to patient/caregiver/Keep the cast/splint/dressing clean and dry

## 2019-10-01 NOTE — PHYSICAL THERAPY INITIAL EVALUATION ADULT - PHYSICAL ASSIST/NONPHYSICAL ASSIST: SIT/SUPINE, REHAB EVAL
nonverbal cues (demo/gestures)/increased assist for B/L LEs onto bed surface; decreased eccentric trunk control/verbal cues/1 person assist

## 2019-10-01 NOTE — PHYSICAL THERAPY INITIAL EVALUATION ADULT - ADDITIONAL COMMENTS
Patient reports previously independent with all ADLs/IADLs prior to admission. No HHA. Denies history of mechanical falls. 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 Therefore no one home to assist him upon discharge (patient endorses sister and others may be able to help)".

## 2019-10-01 NOTE — PROGRESS NOTE ADULT - ASSESSMENT
54M with HTN, HLD, rectal cancer s/p robot assisted abdominal peritoneal resection 9/24    - ERAS  - Pain/nausea control  - NPO/IVF/NGT   - Lovenox   - JAKUB x 2 (1 on abd and 1 L thigh)  - Ostomy teaching  - F/u plastics recs  - F/u PT

## 2019-10-01 NOTE — PHYSICAL THERAPY INITIAL EVALUATION ADULT - PHYSICAL ASSIST/NONPHYSICAL ASSIST: STAND/SIT, REHAB EVAL
increased time required to complete task 2/2 abdominal incision discomfort; fair eccentric control upon descent/1 person assist/verbal cues/nonverbal cues (demo/gestures)

## 2019-10-01 NOTE — PHYSICAL THERAPY INITIAL EVALUATION ADULT - PERTINENT HX OF CURRENT PROBLEM, REHAB EVAL
54M with PMH HTN, HLD, rectal adenocarcinoma presents for elective surgery. Diagnosed in May 2018, underwent chemoXRT (finished radiation July 2018, chemotherapy December 2018). Please refer to H&P on [place] for remaining.

## 2019-10-01 NOTE — PHYSICAL THERAPY INITIAL EVALUATION ADULT - PHYSICAL ASSIST/NONPHYSICAL ASSIST: SIT/STAND, REHAB EVAL
1 person assist/nonverbal cues (demo/gestures)/verbal cues/slightly unsteady however no LOB/knee buckling noted

## 2019-10-01 NOTE — CHART NOTE - NSCHARTNOTEFT_GEN_A_CORE
Admitting Diagnosis:   Patient is a 54 y.o Male who presents with a chief complaint of elective surgery (01 Oct 2019 07:48)      PAST MEDICAL & SURGICAL HISTORY:  HLD (hyperlipidemia)  HTN (hypertension)  Colon cancer: near rectum  Other elective surgery: Right Upper Chest- Chemo Port      Current Nutrition Order:  NPO Except Medications (09-30-19 @ 15:40)      PO Intake: Good (%) [   ]  Fair (50-75%) [   ] Poor (<25%) [   ]- 0% as Pt is NPO.     GI Issues: No N/V/D/C noted.     Pain: Per chart, pt c/o pain throughout his abdomen.     Skin Integrity: Surgical incision noted.     Labs:   10-01    141  |  104  |  24<H>  ----------------------------<  124<H>  4.0   |  27  |  0.78    Ca    9.3      01 Oct 2019 06:27  Phos  3.2     10-01  Mg     2.1     10-01      CAPILLARY BLOOD GLUCOSE          Medications:  MEDICATIONS  (STANDING):  acetaminophen  IVPB .. 1000 milliGRAM(s) IV Intermittent once  amLODIPine   Tablet 5 milliGRAM(s) Oral daily  atorvastatin 10 milliGRAM(s) Oral at bedtime  cefoTEtan  IVPB 1 Gram(s) IV Intermittent every 12 hours  docusate sodium Liquid 100 milliGRAM(s) Oral three times a day  enoxaparin Injectable 40 milliGRAM(s) SubCutaneous daily  famotidine Injectable 20 milliGRAM(s) IV Push once  influenza   Vaccine 0.5 milliLiter(s) IntraMuscular once  ketorolac   Injectable 15 milliGRAM(s) IV Push every 6 hours  lactated ringers. 1000 milliLiter(s) (40 mL/Hr) IV Continuous <Continuous>  pantoprazole  Injectable 40 milliGRAM(s) IV Push daily  Parenteral Nutrition - Adult 1 Each (63 mL/Hr) TPN Continuous <Continuous>    MEDICATIONS  (PRN):  artificial  tears Solution 1 Drop(s) Both EYES every 4 hours PRN Dry Eyes  HYDROmorphone  Injectable 0.2 milliGRAM(s) IV Push every 4 hours PRN breakthrough pain  HYDROmorphone  Injectable 1 milliGRAM(s) IV Push every 4 hours PRN Severe Pain (7 - 10)  HYDROmorphone  Injectable 0.5 milliGRAM(s) IV Push every 4 hours PRN Moderate Pain (4 - 6)  ketorolac 0.5% Ophthalmic Solution 1 Drop(s) Left EYE every 6 hours PRN eye pain irritation      Weight: (9/24) 80 kg     Weight Change: No new wts available. Please obtain new wts     Nutrition Focused Physical Exam: Completed [ X; Completed 9/26 see note ]  Not Pertinent [   ]      Estimated energy needs:   Ht (9/24): 177.8cm, Wt (9/24): 80.5kg, IBW: 75.5kg +/-10%, %IBW: 107%, BMI: 25.5    ABW (80.5 kg) used to calculate energy needs due to pt's current body weight within % IBW. Needs adjusted post-op, suspected malnutrition, hypermetabolic state.     (30-35 kcal/kg): 1025-2343 kcal/day  (1.0-1.2 gm protein/kg): 80.5-96.6 g protein/day   (25-30 ml/kg): 6143-6304 ml/day        Subjective:   54 y.o M with hx HTN, HLD, rectal cancer now POD7 s/p robot assisted abdominal peritoneal resection 9/24. Pt reports decreased PO intake for ~1.5 months PTA, pt follows regular diet, pt reports allergies to shellfish, cherries, papaya, and apples (entered into computer system). Pt endorses ~20lb unintentional wt loss x1.5 months. Moderate muscle loss noted around clavicles and moderate fat loss noted around triceps. +colostomy output (20 mL x 24hrs per flowsheet). Pt off unit for CT scan at this time. Pt previously on clear liquids x 2 days (9/24-9/26), followed by NPO x 6 days (9/26-9/30). Pt noted w/ high NGT output (1000 mL x 24 hrs). Per discussion with team, pending plans for TPN this afternoon. See below for full nutritional recommendations.     Previous Nutrition Diagnosis:  Malnutrition (suspect severe) RT decreased ability to meet EER AEB NFPE, ~10% unintentional wt loss x1.5 months, pt meeting <75% EER for >1 month.    Active [ X ]  Resolved [   ]    Goal:   Diet to advance within 24-48h. Pt to consistently meet>75%EER when diet advanced with good tolerance.    Recommendations:  1) Recommend goal TPN via central line: 480g Dex, 90 g AA, 50g 20% Lipids to provide in total: 2492 Kcal, 90 g protein, GIR 4.14 mg/kg/min based on ABW (80.5 kg). Fluids and lytes per MD discretion.   >> Start at:     150g Dex, 50 g 20% lipids, 90 g AA on Day 1     250g Dex, 50 g 20% lipids, 90 g AA on Day 2 and advance to goal of:    480 g Dex, 50 g 20% lipids, 90 g AA on Day 3  2) Recommend checking TG before starting TPN and then check TG weekly.   3) Check Mg, Phos, K daily and POC BG Q6hrs.   4) Trend daily weights.       Education:   RD to f/u on nutrition education.     Risk Level: High [ X  ] Moderate [   ] Low [   ] Admitting Diagnosis:   Patient is a 54 y.o Male who presents with a chief complaint of elective surgery (01 Oct 2019 07:48)      PAST MEDICAL & SURGICAL HISTORY:  HLD (hyperlipidemia)  HTN (hypertension)  Colon cancer: near rectum  Other elective surgery: Right Upper Chest- Chemo Port      Current Nutrition Order:  NPO Except Medications (09-30-19 @ 15:40)      PO Intake: Good (%) [   ]  Fair (50-75%) [   ] Poor (<25%) [   ]- 0% as Pt is NPO.     GI Issues: No N/V/D/C noted.     Pain: Per chart, pt c/o pain throughout his abdomen.     Skin Integrity: Surgical incision noted.     Labs:   10-01    141  |  104  |  24<H>  ----------------------------<  124<H>  4.0   |  27  |  0.78    Ca    9.3      01 Oct 2019 06:27  Phos  3.2     10-01  Mg     2.1     10-01      CAPILLARY BLOOD GLUCOSE          Medications:  MEDICATIONS  (STANDING):  acetaminophen  IVPB .. 1000 milliGRAM(s) IV Intermittent once  amLODIPine   Tablet 5 milliGRAM(s) Oral daily  atorvastatin 10 milliGRAM(s) Oral at bedtime  cefoTEtan  IVPB 1 Gram(s) IV Intermittent every 12 hours  docusate sodium Liquid 100 milliGRAM(s) Oral three times a day  enoxaparin Injectable 40 milliGRAM(s) SubCutaneous daily  famotidine Injectable 20 milliGRAM(s) IV Push once  influenza   Vaccine 0.5 milliLiter(s) IntraMuscular once  ketorolac   Injectable 15 milliGRAM(s) IV Push every 6 hours  lactated ringers. 1000 milliLiter(s) (40 mL/Hr) IV Continuous <Continuous>  pantoprazole  Injectable 40 milliGRAM(s) IV Push daily  Parenteral Nutrition - Adult 1 Each (63 mL/Hr) TPN Continuous <Continuous>    MEDICATIONS  (PRN):  artificial  tears Solution 1 Drop(s) Both EYES every 4 hours PRN Dry Eyes  HYDROmorphone  Injectable 0.2 milliGRAM(s) IV Push every 4 hours PRN breakthrough pain  HYDROmorphone  Injectable 1 milliGRAM(s) IV Push every 4 hours PRN Severe Pain (7 - 10)  HYDROmorphone  Injectable 0.5 milliGRAM(s) IV Push every 4 hours PRN Moderate Pain (4 - 6)  ketorolac 0.5% Ophthalmic Solution 1 Drop(s) Left EYE every 6 hours PRN eye pain irritation      Weight: (9/24) 80 kg     Weight Change: No new wts available. Please obtain new wts     Nutrition Focused Physical Exam: Completed [ X; Completed 9/26 see note ]  Not Pertinent [   ]      Estimated energy needs:   Ht (9/24): 177.8cm, Wt (9/24): 80.5kg, IBW: 75.5kg +/-10%, %IBW: 107%, BMI: 25.5    ABW (80.5 kg) used to calculate energy needs due to pt's current body weight within % IBW. Needs adjusted post-op, suspected malnutrition, hypermetabolic state.     (30-35 kcal/kg): 4940-8072 kcal/day  (1.0-1.2 gm protein/kg): 80.5-96.6 g protein/day   (25-30 ml/kg): 3030-8219 ml/day        Subjective:   54 y.o M with hx HTN, HLD, rectal cancer now POD7 s/p robot assisted abdominal peritoneal resection 9/24. Pt reports decreased PO intake for ~1.5 months PTA, pt follows regular diet, pt reports allergies to shellfish, cherries, papaya, and apples (entered into computer system). Pt endorses ~20lb unintentional wt loss x1.5 months. Moderate muscle loss noted around clavicles and moderate fat loss noted around triceps. +colostomy output (20 mL x 24hrs per flowsheet). Pt off unit for CT scan at this time. Pt previously on clear liquids x 2 days (9/24-9/26), followed by NPO x 6 days (9/26-9/30). Pt noted w/ high NGT output (1000 mL x 24 hrs). Per discussion with team, pending plans for TPN this afternoon. See below for full nutritional recommendations.     Previous Nutrition Diagnosis:  Malnutrition (suspect severe) RT decreased ability to meet EER AEB NFPE, ~10% unintentional wt loss x1.5 months, pt meeting <75% EER for >1 month.    Active [ X ]  Resolved [   ]    Goal:   Diet to advance within 24-48h. Pt to consistently meet>75%EER when diet advanced with good tolerance.    Recommendations:  1) Recommend goal TPN via **line: 480g Dex, 90 g AA, 50g 20% daily Lipids to provide in total: 2492 Kcal, 1.1 g protein/kg, GIR 4.14 mg/kg/min based on ABW (80.5 kg). Fluids and lytes per MD discretion.   >> Start at:     150g Dex, 50 g 20% lipids, 90 g AA on Day 1     250g Dex, 50 g 20% lipids, 90 g AA on Day 2     350g Dex, 50 g 20% lipids, 90 g AA on Day 3 and advance to goal of:    480g Dex, 50 g 20% lipids, 90 g AA on Day 4  2) Recommend checking TG before starting TPN, check TG daily for a week, and then check TG weekly.   3) Check Mg, Phos, K daily and POC BG Q6hrs.   4) Recommend closely monitor lytes and aggressive repletion as pt at risk for refeeding syndrome.   5) Trend daily weights.       Education:   RD to f/u on nutrition education.     Risk Level: High [ X  ] Moderate [   ] Low [   ] Admitting Diagnosis:   Patient is a 54 y.o Male who presents with a chief complaint of elective surgery (01 Oct 2019 07:48)      PAST MEDICAL & SURGICAL HISTORY:  HLD (hyperlipidemia)  HTN (hypertension)  Colon cancer: near rectum  Other elective surgery: Right Upper Chest- Chemo Port      Current Nutrition Order:  NPO Except Medications (09-30-19 @ 15:40)      PO Intake: Good (%) [   ]  Fair (50-75%) [   ] Poor (<25%) [   ]- 0% as Pt is NPO.     GI Issues: No N/V/D/C noted.     Pain: Per chart, pt c/o pain throughout his abdomen.     Skin Integrity: Surgical incision noted.     Labs:   10-01    141  |  104  |  24<H>  ----------------------------<  124<H>  4.0   |  27  |  0.78    Ca    9.3      01 Oct 2019 06:27  Phos  3.2     10-01  Mg     2.1     10-01      CAPILLARY BLOOD GLUCOSE          Medications:  MEDICATIONS  (STANDING):  acetaminophen  IVPB .. 1000 milliGRAM(s) IV Intermittent once  amLODIPine   Tablet 5 milliGRAM(s) Oral daily  atorvastatin 10 milliGRAM(s) Oral at bedtime  cefoTEtan  IVPB 1 Gram(s) IV Intermittent every 12 hours  docusate sodium Liquid 100 milliGRAM(s) Oral three times a day  enoxaparin Injectable 40 milliGRAM(s) SubCutaneous daily  famotidine Injectable 20 milliGRAM(s) IV Push once  influenza   Vaccine 0.5 milliLiter(s) IntraMuscular once  ketorolac   Injectable 15 milliGRAM(s) IV Push every 6 hours  lactated ringers. 1000 milliLiter(s) (40 mL/Hr) IV Continuous <Continuous>  pantoprazole  Injectable 40 milliGRAM(s) IV Push daily  Parenteral Nutrition - Adult 1 Each (63 mL/Hr) TPN Continuous <Continuous>    MEDICATIONS  (PRN):  artificial  tears Solution 1 Drop(s) Both EYES every 4 hours PRN Dry Eyes  HYDROmorphone  Injectable 0.2 milliGRAM(s) IV Push every 4 hours PRN breakthrough pain  HYDROmorphone  Injectable 1 milliGRAM(s) IV Push every 4 hours PRN Severe Pain (7 - 10)  HYDROmorphone  Injectable 0.5 milliGRAM(s) IV Push every 4 hours PRN Moderate Pain (4 - 6)  ketorolac 0.5% Ophthalmic Solution 1 Drop(s) Left EYE every 6 hours PRN eye pain irritation      Weight: (9/24) 80 kg     Weight Change: No new wts available. Please obtain new wts     Nutrition Focused Physical Exam: Completed [ X; Completed 9/26]  Not Pertinent [   ]  (From 9/26): Moderate muscle loss noted around clavicles and moderate fat loss noted around triceps.       Estimated energy needs:   Ht (9/24): 177.8cm, Wt (9/24): 80.5kg, IBW: 75.5kg +/-10%, %IBW: 107%, BMI: 25.5    ABW (80.5 kg) used to calculate energy needs due to pt's current body weight within % IBW. Needs adjusted post-op, suspected malnutrition, hypermetabolic state.     (30-35 kcal/kg): 9179-5646 kcal/day  (1.0-1.2 gm protein/kg): 80.5-96.6 g protein/day   (25-30 ml/kg): 5916-7607 ml/day        Subjective:   54 y.o M with hx HTN, HLD, rectal cancer now POD7 s/p robot assisted abdominal peritoneal resection 9/24. Pt reports decreased PO intake for ~1.5 months PTA, pt follows regular diet, pt reports allergies to shellfish, cherries, papaya, and apples (entered into computer system). Pt endorses ~20lb unintentional wt loss x1.5 months. +colostomy output (20 mL x 24hrs per flowsheet). Pt off unit for CT scan at this time. Pt previously on clear liquids x 2 days (9/24-9/26), followed by NPO x 6 days (9/26-9/30). Pt noted w/ high NGT output (1000 mL x 24 hrs). Per discussion with team, pending plans for TPN this afternoon. See below for full nutritional recommendations.     Previous Nutrition Diagnosis:  Malnutrition (suspect severe) RT decreased ability to meet EER AEB NFPE, ~10% unintentional wt loss x1.5 months, pt meeting <75% EER for >1 month.    Active [ X ]  Resolved [   ]    Goal:   Diet to advance within 24-48h. Pt to consistently meet>75%EER when diet advanced with good tolerance.    Recommendations:  1) Recommend goal TPN via **line: 480g Dex, 90 g AA, 50g 20% daily Lipids to provide in total: 2492 Kcal, 1.1 g protein/kg, GIR 4.14 mg/kg/min based on ABW (80.5 kg). Fluids and lytes per MD discretion.   >> Start at:     150g Dex, 50 g 20% lipids, 90 g AA on Day 1     250g Dex, 50 g 20% lipids, 90 g AA on Day 2     350g Dex, 50 g 20% lipids, 90 g AA on Day 3 and advance to goal of:    480g Dex, 50 g 20% lipids, 90 g AA on Day 4  2) Recommend checking TG before starting TPN, check TG daily for a week, and then check TG weekly.   3) Check Mg, Phos, K daily and POC BG Q6hrs.   4) Recommend closely monitor lytes and aggressive repletion as pt at risk for refeeding syndrome.   5) Trend daily weights.       Education:   RD to f/u on nutrition education.     Risk Level: High [ X  ] Moderate [   ] Low [   ] Admitting Diagnosis:   Patient is a 54 y.o Male who presents with a chief complaint of elective surgery (01 Oct 2019 07:48)      PAST MEDICAL & SURGICAL HISTORY:  HLD (hyperlipidemia)  HTN (hypertension)  Colon cancer: near rectum  Other elective surgery: Right Upper Chest- Chemo Port      Current Nutrition Order:  NPO Except Medications (09-30-19 @ 15:40)      PO Intake: Good (%) [   ]  Fair (50-75%) [   ] Poor (<25%) [   ]- 0% as Pt is NPO.     GI Issues: No N/V/D/C noted.     Pain: Per chart, pt c/o pain throughout his abdomen.     Skin Integrity: Surgical incision noted.     Labs:   10-01    141  |  104  |  24<H>  ----------------------------<  124<H>  4.0   |  27  |  0.78    Ca    9.3      01 Oct 2019 06:27  Phos  3.2     10-01  Mg     2.1     10-01      CAPILLARY BLOOD GLUCOSE          Medications:  MEDICATIONS  (STANDING):  acetaminophen  IVPB .. 1000 milliGRAM(s) IV Intermittent once  amLODIPine   Tablet 5 milliGRAM(s) Oral daily  atorvastatin 10 milliGRAM(s) Oral at bedtime  cefoTEtan  IVPB 1 Gram(s) IV Intermittent every 12 hours  docusate sodium Liquid 100 milliGRAM(s) Oral three times a day  enoxaparin Injectable 40 milliGRAM(s) SubCutaneous daily  famotidine Injectable 20 milliGRAM(s) IV Push once  influenza   Vaccine 0.5 milliLiter(s) IntraMuscular once  ketorolac   Injectable 15 milliGRAM(s) IV Push every 6 hours  lactated ringers. 1000 milliLiter(s) (40 mL/Hr) IV Continuous <Continuous>  pantoprazole  Injectable 40 milliGRAM(s) IV Push daily  Parenteral Nutrition - Adult 1 Each (63 mL/Hr) TPN Continuous <Continuous>    MEDICATIONS  (PRN):  artificial  tears Solution 1 Drop(s) Both EYES every 4 hours PRN Dry Eyes  HYDROmorphone  Injectable 0.2 milliGRAM(s) IV Push every 4 hours PRN breakthrough pain  HYDROmorphone  Injectable 1 milliGRAM(s) IV Push every 4 hours PRN Severe Pain (7 - 10)  HYDROmorphone  Injectable 0.5 milliGRAM(s) IV Push every 4 hours PRN Moderate Pain (4 - 6)  ketorolac 0.5% Ophthalmic Solution 1 Drop(s) Left EYE every 6 hours PRN eye pain irritation      Weight: (9/24) 80 kg     Weight Change: No new wts available. Please obtain new wts     Nutrition Focused Physical Exam: Completed [ X; Completed 9/26]  Not Pertinent [   ]  (From 9/26): Moderate muscle loss noted around clavicles and moderate fat loss noted around triceps.       Estimated energy needs:   Ht (9/24): 177.8cm, Wt (9/24): 80.5kg, IBW: 75.5kg +/-10%, %IBW: 107%, BMI: 25.5    ABW (80.5 kg) used to calculate energy needs due to pt's current body weight within % IBW. Needs adjusted post-op, suspected malnutrition, hypermetabolic state.     (30-35 kcal/kg): 5483-8248 kcal/day  (1.0-1.2 gm protein/kg): 80.5-96.6 g protein/day   (25-30 ml/kg): 7539-1745 ml/day        Subjective:   54 y.o M with hx HTN, HLD, rectal cancer now POD7 s/p robot assisted abdominal peritoneal resection 9/24. Pt reports decreased PO intake for ~1.5 months PTA, pt follows regular diet, pt reports allergies to shellfish, cherries, papaya, and apples (entered into computer system). Pt endorses ~20lb unintentional wt loss x1.5 months. +colostomy output (20 mL x 24hrs per flowsheet). Pt off unit for CT scan at this time. Pt previously on clear liquids x 2 days (9/24-9/26), followed by NPO x 6 days (9/26-9/30). Pt noted w/ high NGT output (1000 mL x 24 hrs). Per discussion with team, pending plans for TPN this afternoon. See below for full nutritional recommendations-discussed recommendations w/ team.     Previous Nutrition Diagnosis:  Malnutrition (suspect severe) RT decreased ability to meet EER AEB NFPE, ~10% unintentional wt loss x1.5 months, pt meeting <75% EER for >1 month.    Active [ X ]  Resolved [   ]    Goal:   Diet to advance within 24-48h. Pt to consistently meet>75%EER when diet advanced with good tolerance.    Recommendations:  1) Recommend goal TPN via central line: 480g Dex, 90 g AA, 50g 20% daily Lipids to provide in total: 2492 Kcal, 1.1 g protein/kg, GIR 4.14 mg/kg/min based on ABW (80.5 kg). Fluids and lytes per MD discretion.   >> Start at:     150g Dex, 50 g 20% lipids, 90 g AA on Day 1     250g Dex, 50 g 20% lipids, 90 g AA on Day 2     350g Dex, 50 g 20% lipids, 90 g AA on Day 3 and advance to goal of:    480g Dex, 50 g 20% lipids, 90 g AA on Day 4  2) Recommend checking TG before starting TPN, check TG daily for a week, and then check TG weekly.   3) Check Mg, Phos, K daily and POC BG Q6hrs.   4) Recommend closely monitor lytes and aggressive repletion as pt at risk for refeeding syndrome.   5) Trend daily weights.     Education:   RD to f/u on nutrition education.     Risk Level: High [ X  ] Moderate [   ] Low [   ] Admitting Diagnosis:   Patient is a 54 y.o Male who presents with a chief complaint of elective surgery (01 Oct 2019 07:48)      PAST MEDICAL & SURGICAL HISTORY:  HLD (hyperlipidemia)  HTN (hypertension)  Colon cancer: near rectum  Other elective surgery: Right Upper Chest- Chemo Port      Current Nutrition Order:  NPO Except Medications (09-30-19 @ 15:40)      PO Intake: Good (%) [   ]  Fair (50-75%) [   ] Poor (<25%) [   ]- 0% as Pt is NPO.     GI Issues: No N/V/D/C noted.     Pain: Per chart, pt c/o pain throughout his abdomen.     Skin Integrity: Surgical incision noted.     Labs:   10-01    141  |  104  |  24<H>  ----------------------------<  124<H>  4.0   |  27  |  0.78    Ca    9.3      01 Oct 2019 06:27  Phos  3.2     10-01  Mg     2.1     10-01      CAPILLARY BLOOD GLUCOSE          Medications:  MEDICATIONS  (STANDING):  acetaminophen  IVPB .. 1000 milliGRAM(s) IV Intermittent once  amLODIPine   Tablet 5 milliGRAM(s) Oral daily  atorvastatin 10 milliGRAM(s) Oral at bedtime  cefoTEtan  IVPB 1 Gram(s) IV Intermittent every 12 hours  docusate sodium Liquid 100 milliGRAM(s) Oral three times a day  enoxaparin Injectable 40 milliGRAM(s) SubCutaneous daily  famotidine Injectable 20 milliGRAM(s) IV Push once  influenza   Vaccine 0.5 milliLiter(s) IntraMuscular once  ketorolac   Injectable 15 milliGRAM(s) IV Push every 6 hours  lactated ringers. 1000 milliLiter(s) (40 mL/Hr) IV Continuous <Continuous>  pantoprazole  Injectable 40 milliGRAM(s) IV Push daily  Parenteral Nutrition - Adult 1 Each (63 mL/Hr) TPN Continuous <Continuous>    MEDICATIONS  (PRN):  artificial  tears Solution 1 Drop(s) Both EYES every 4 hours PRN Dry Eyes  HYDROmorphone  Injectable 0.2 milliGRAM(s) IV Push every 4 hours PRN breakthrough pain  HYDROmorphone  Injectable 1 milliGRAM(s) IV Push every 4 hours PRN Severe Pain (7 - 10)  HYDROmorphone  Injectable 0.5 milliGRAM(s) IV Push every 4 hours PRN Moderate Pain (4 - 6)  ketorolac 0.5% Ophthalmic Solution 1 Drop(s) Left EYE every 6 hours PRN eye pain irritation      Weight: (9/24) 80 kg     Weight Change: No new wts available. Please obtain new wts     Nutrition Focused Physical Exam: Completed [ X; Completed 9/26]  Not Pertinent [   ]  (From 9/26): Moderate muscle loss noted around clavicles and moderate fat loss noted around triceps.       Estimated energy needs:   Ht (9/24): 177.8cm, Wt (9/24): 80.5kg, IBW: 75.5kg +/-10%, %IBW: 107%, BMI: 25.5    ABW (80.5 kg) used to calculate energy needs due to pt's current body weight within % IBW. Needs adjusted post-op, suspected malnutrition, hypermetabolic state.     (30-35 kcal/kg): 2796-7429 kcal/day  (1.0-1.2 gm protein/kg): 80.5-96.6 g protein/day   (25-30 ml/kg): 0618-5790 ml/day        Subjective:   Pt seen for nutrition consult for TPN/PPN. 54 y.o M with hx HTN, HLD, rectal cancer now POD7 s/p robot assisted abdominal peritoneal resection 9/24. Pt reports decreased PO intake for ~1.5 months PTA, pt follows regular diet, pt reports allergies to shellfish, cherries, papaya, and apples (entered into computer system). Pt endorses ~20lb unintentional wt loss x1.5 months. +colostomy output (20 mL x 24hrs per flowsheet). Pt off unit for CT scan at this time. Pt previously on clear liquids x 2 days (9/24-9/26), followed by NPO x 6 days (9/26-9/30). Pt noted w/ high NGT output (1000 mL x 24 hrs). Per discussion with team, pending plans for PICC line placement this afternoon. See below for full nutritional recs-discussed recommendations w/ team.     Previous Nutrition Diagnosis:  Malnutrition (suspect severe) RT decreased ability to meet EER AEB NFPE, ~10% unintentional wt loss x1.5 months, pt meeting <75% EER for >1 month.    Active [ X ]  Resolved [   ]    Goal:   Diet to advance within 24-48h. Pt to consistently meet>75%EER when diet advanced with good tolerance.    Recommendations:  1) Recommend goal TPN via central line: 480g Dex, 90 g AA, 50g 20% daily Lipids to provide in total: 2492 Kcal, 1.1 g protein/kg, GIR 4.14 mg/kg/min based on ABW (80.5 kg). Fluids and lytes per MD discretion.   >> Start at:     150g Dex, 50 g 20% lipids, 90 g AA on Day 1     250g Dex, 50 g 20% lipids, 90 g AA on Day 2     350g Dex, 50 g 20% lipids, 90 g AA on Day 3 and advance to goal of:    480g Dex, 50 g 20% lipids, 90 g AA on Day 4  2) Recommend checking TG before starting TPN, check TG daily for a week, and then check TG weekly.   3) Check Mg, Phos, K daily and POC BG Q6hrs.   4) Recommend closely monitor lytes and aggressive repletion as pt at risk for refeeding syndrome.   5) Trend daily weights.     Education:   RD to f/u on nutrition education.     Risk Level: High [ X  ] Moderate [   ] Low [   ] Admitting Diagnosis:   Patient is a 54 y.o Male who presents with a chief complaint of elective surgery (01 Oct 2019 07:48)      PAST MEDICAL & SURGICAL HISTORY:  HLD (hyperlipidemia)  HTN (hypertension)  Colon cancer: near rectum  Other elective surgery: Right Upper Chest- Chemo Port      Current Nutrition Order:  NPO Except Medications (09-30-19 @ 15:40)      PO Intake: Good (%) [   ]  Fair (50-75%) [   ] Poor (<25%) [   ]- 0% as Pt is NPO.     GI Issues: No N/V/D/C noted.     Pain: Per chart, pt c/o pain throughout his abdomen.     Skin Integrity: Surgical incision noted.     Labs:   10-01    141  |  104  |  24<H>  ----------------------------<  124<H>  4.0   |  27  |  0.78    Ca    9.3      01 Oct 2019 06:27  Phos  3.2     10-01  Mg     2.1     10-01      CAPILLARY BLOOD GLUCOSE          Medications:  MEDICATIONS  (STANDING):  acetaminophen  IVPB .. 1000 milliGRAM(s) IV Intermittent once  amLODIPine   Tablet 5 milliGRAM(s) Oral daily  atorvastatin 10 milliGRAM(s) Oral at bedtime  cefoTEtan  IVPB 1 Gram(s) IV Intermittent every 12 hours  docusate sodium Liquid 100 milliGRAM(s) Oral three times a day  enoxaparin Injectable 40 milliGRAM(s) SubCutaneous daily  famotidine Injectable 20 milliGRAM(s) IV Push once  influenza   Vaccine 0.5 milliLiter(s) IntraMuscular once  ketorolac   Injectable 15 milliGRAM(s) IV Push every 6 hours  lactated ringers. 1000 milliLiter(s) (40 mL/Hr) IV Continuous <Continuous>  pantoprazole  Injectable 40 milliGRAM(s) IV Push daily  Parenteral Nutrition - Adult 1 Each (63 mL/Hr) TPN Continuous <Continuous>    MEDICATIONS  (PRN):  artificial  tears Solution 1 Drop(s) Both EYES every 4 hours PRN Dry Eyes  HYDROmorphone  Injectable 0.2 milliGRAM(s) IV Push every 4 hours PRN breakthrough pain  HYDROmorphone  Injectable 1 milliGRAM(s) IV Push every 4 hours PRN Severe Pain (7 - 10)  HYDROmorphone  Injectable 0.5 milliGRAM(s) IV Push every 4 hours PRN Moderate Pain (4 - 6)  ketorolac 0.5% Ophthalmic Solution 1 Drop(s) Left EYE every 6 hours PRN eye pain irritation      Weight: (9/24) 80 kg     Weight Change: No new wts available. Please obtain new wts     Nutrition Focused Physical Exam: Completed [ X; Completed 9/26]  Not Pertinent [   ]  (From 9/26): Moderate muscle loss noted around clavicles and moderate fat loss noted around triceps.       Estimated energy needs:   Ht (9/24): 177.8cm, Wt (9/24): 80.5kg, IBW: 75.5kg +/-10%, %IBW: 107%, BMI: 25.5    ABW (80.5 kg) used to calculate energy needs due to pt's current body weight within % IBW. Needs adjusted post-op, suspected malnutrition, hypermetabolic state.     (30-35 kcal/kg): 9769-2666 kcal/day  (1.0-1.2 gm protein/kg): 80.5-96.6 g protein/day   (25-30 ml/kg): 1726-8703 ml/day        Subjective:   Pt seen for nutrition consult for TPN/PPN. 54 y.o M with hx HTN, HLD, rectal cancer now POD7 s/p robot assisted abdominal peritoneal resection 9/24. Pt reports decreased PO intake for ~1.5 months PTA, pt follows regular diet, pt reports allergies to shellfish, cherries, papaya, and apples (entered into computer system). Pt endorses ~20lb unintentional wt loss x1.5 months. +colostomy output (20 mL x 24hrs per flowsheet). Pt off unit for CT scan upon initial attempt and in the process of PICC line placement upon second attempt. Pt previously on clear liquids x 2 days (9/24-9/26), followed by NPO x 6 days (9/26-9/30). Pt noted w/ high NGT output (1000 mL x 24 hrs). Per discussion with team, pending plans for TPN this afternoon. See below for full nutritional recs-discussed recommendations w/ team.     Previous Nutrition Diagnosis:  Malnutrition (suspect severe) RT decreased ability to meet EER AEB NFPE, ~10% unintentional wt loss x1.5 months, pt meeting <75% EER for >1 month.    Active [ X ]  Resolved [   ]    Goal:   Diet to advance within 24-48h. Pt to consistently meet>75%EER when diet advanced with good tolerance.    Recommendations:  1) Recommend goal TPN via central line: 480g Dex, 90 g AA, 50g 20% daily Lipids to provide in total: 2492 Kcal, 1.1 g protein/kg, GIR 4.14 mg/kg/min based on ABW (80.5 kg). Fluids and lytes per MD discretion.   >> Start at:     150g Dex, 50 g 20% lipids, 90 g AA on Day 1     250g Dex, 50 g 20% lipids, 90 g AA on Day 2     350g Dex, 50 g 20% lipids, 90 g AA on Day 3 and advance to goal of:    480g Dex, 50 g 20% lipids, 90 g AA on Day 4  2) Recommend checking TG before starting TPN, check TG daily for a week, and then check TG weekly.   3) Check Mg, Phos, K daily and POC BG Q6hrs.   4) Recommend closely monitor lytes and aggressive repletion as pt at risk for refeeding syndrome.   5) Trend daily weights.     Education:   RD to f/u on nutrition education.     Risk Level: High [ X  ] Moderate [   ] Low [   ]

## 2019-10-01 NOTE — PROGRESS NOTE ADULT - SUBJECTIVE AND OBJECTIVE BOX
Plastic Surgery Progress Note (pg [facility]: [id number], [facility]: [phone], [facility]: [phone])    SUBJECTIVE:  Pt c/o significant pain throughout his abdomen, he is disappointed that his pain control has not been optimized, he continues to hiccup, he would like to have complete pain relief.     OBJECTIVE:     ** VITAL SIGNS / I&O's **    Vital Signs Last 24 Hrs  T(C): 36.8 (01 Oct 2019 05:54), Max: 37.5 (30 Sep 2019 20:34)  T(F): 98.3 (01 Oct 2019 05:54), Max: 99.5 (30 Sep 2019 20:34)  HR: 75 (01 Oct 2019 05:54) (75 - 92)  BP: 141/78 (01 Oct 2019 05:54) (121/78 - 143/84)  BP(mean): --  RR: 16 (01 Oct 2019 05:54) (15 - 17)  SpO2: 96% (01 Oct 2019 05:54) (95% - 97%)      30 Sep 2019 07:01  -  01 Oct 2019 07:00  --------------------------------------------------------  IN:    IV PiggyBack: 400 mL    lactated ringers.: 960 mL  Total IN: 1360 mL    OUT:    Bulb: 47.5 mL    Bulb: 22.5 mL    Colostomy: 20 mL    Nasoenteral Tube: 1000 mL    Voided: 1150 mL  Total OUT: 2240 mL    Total NET: -880 mL          ** PHYSICAL EXAM **    -- CONSTITUTIONAL: AOx3. NAD.   -- ABDOMEN: Incisional tenderness, non-distended, drain site c/d/i, JAKUB sero>sang  -- EXTREMITIES: L thigh incision c/d/i, no collections, JAKUB sero>sang      **MEDS**  amLODIPine   Tablet 5 milliGRAM(s) Oral daily  artificial  tears Solution 1 Drop(s) Both EYES every 4 hours PRN  atorvastatin 10 milliGRAM(s) Oral at bedtime  cefoTEtan  IVPB 1 Gram(s) IV Intermittent every 12 hours  docusate sodium Liquid 100 milliGRAM(s) Oral three times a day  enoxaparin Injectable 40 milliGRAM(s) SubCutaneous daily  HYDROmorphone  Injectable 0.2 milliGRAM(s) IV Push every 4 hours PRN  HYDROmorphone  Injectable 1 milliGRAM(s) IV Push every 4 hours PRN  HYDROmorphone  Injectable 0.5 milliGRAM(s) IV Push every 4 hours PRN  influenza   Vaccine 0.5 milliLiter(s) IntraMuscular once  ketorolac   Injectable 15 milliGRAM(s) IV Push every 6 hours  ketorolac 0.5% Ophthalmic Solution 1 Drop(s) Left EYE every 6 hours PRN  lactated ringers. 1000 milliLiter(s) IV Continuous <Continuous>  pantoprazole  Injectable 40 milliGRAM(s) IV Push daily      ** LABS **                          11.1   11.41 )-----------( 344      ( 01 Oct 2019 06:27 )             34.3     01 Oct 2019 06:27    141    |  104    |  24     ----------------------------<  124    4.0     |  27     |  0.78     Ca    9.3        01 Oct 2019 06:27  Phos  3.2       01 Oct 2019 06:27  Mg     2.1       01 Oct 2019 06:27

## 2019-10-01 NOTE — PHYSICAL THERAPY INITIAL EVALUATION ADULT - GAIT DEVIATIONS NOTED, PT EVAL
fairly steady gait, no LOB/knee buckling noted, *NARROW CELINA maintained throughout to prevent B/L hip abduction; good negotiation through hallway obstacles without gait disturbances noted/decreased dwayne/decreased velocity of limb motion/decreased weight-shifting ability/decreased step length

## 2019-10-01 NOTE — PHYSICAL THERAPY INITIAL EVALUATION ADULT - GENERAL OBSERVATIONS, REHAB EVAL
Chart reviewed. IE Completed. Patient without complaints of pain at rest, agreeable to PT (only reports pain with onset of 'hiccups'). Patient received semi-supine, NAD, +tele, +(L)PICC, +IV hep lock, +abd incision C/D/I, +(R)abd JAKUB intact, +(L)gracilis incision C/D/I, +(L)thigh JAKUB intact, sister at bedside, ISAMAR Laurent cleared patient for treatment.

## 2019-10-01 NOTE — PROCEDURE NOTE - NSPROCDETAILS_GEN_ALL_CORE
sterile dressing applied/location identified, draped/prepped, sterile technique used/sterile technique, catheter placed/ultrasound guidance/ultrasound assessment/supine position

## 2019-10-01 NOTE — PHYSICAL THERAPY INITIAL EVALUATION ADULT - IMPAIRED TRANSFERS: SIT/STAND, REHAB EVAL
decreased strength/pain/impaired postural control/decreased ROM/impaired balance/decreased flexibility

## 2019-10-01 NOTE — PHYSICAL THERAPY INITIAL EVALUATION ADULT - ACTIVE RANGE OF MOTION EXAMINATION, REHAB EVAL
REFRAIN (R)hip abduction/bilateral upper extremity Active ROM was WFL (within functional limits)/bilateral  lower extremity Active ROM was WFL (within functional limits)

## 2019-10-01 NOTE — PROGRESS NOTE ADULT - ASSESSMENT
54M with HTN, HLD, rectal cancer s/p abdominal peritoneal resection (POD7)  - Would recommend Acute pain services for enhance pain control  - abx/pain per primary  - JAKUB care  - keep incision clean, dry, and intact  - Okay to begin ambulation with legs maintained in adduction  - Encourage ambulation  - DVT ppx    Jose Chavez  PGY-5  Plastic Surgery

## 2019-10-01 NOTE — PHYSICAL THERAPY INITIAL EVALUATION ADULT - PLANNED THERAPY INTERVENTIONS, PT EVAL
stair training/strengthening/transfer training/balance training balance training/bed mobility training/transfer training/gait training/strengthening/stair training

## 2019-10-02 LAB
ALBUMIN SERPL ELPH-MCNC: 3.1 G/DL — LOW (ref 3.3–5)
ALP SERPL-CCNC: 64 U/L — SIGNIFICANT CHANGE UP (ref 40–120)
ALT FLD-CCNC: 68 U/L — HIGH (ref 10–45)
ANION GAP SERPL CALC-SCNC: 11 MMOL/L — SIGNIFICANT CHANGE UP (ref 5–17)
AST SERPL-CCNC: 45 U/L — HIGH (ref 10–40)
BILIRUB SERPL-MCNC: 1 MG/DL — SIGNIFICANT CHANGE UP (ref 0.2–1.2)
BUN SERPL-MCNC: 21 MG/DL — SIGNIFICANT CHANGE UP (ref 7–23)
CALCIUM SERPL-MCNC: 9 MG/DL — SIGNIFICANT CHANGE UP (ref 8.4–10.5)
CHLORIDE SERPL-SCNC: 104 MMOL/L — SIGNIFICANT CHANGE UP (ref 96–108)
CO2 SERPL-SCNC: 27 MMOL/L — SIGNIFICANT CHANGE UP (ref 22–31)
CREAT SERPL-MCNC: 0.71 MG/DL — SIGNIFICANT CHANGE UP (ref 0.5–1.3)
GLUCOSE SERPL-MCNC: 152 MG/DL — HIGH (ref 70–99)
HCT VFR BLD CALC: 36.8 % — LOW (ref 39–50)
HGB BLD-MCNC: 11.9 G/DL — LOW (ref 13–17)
MAGNESIUM SERPL-MCNC: 2.4 MG/DL — SIGNIFICANT CHANGE UP (ref 1.6–2.6)
MCHC RBC-ENTMCNC: 30.7 PG — SIGNIFICANT CHANGE UP (ref 27–34)
MCHC RBC-ENTMCNC: 32.3 GM/DL — SIGNIFICANT CHANGE UP (ref 32–36)
MCV RBC AUTO: 94.8 FL — SIGNIFICANT CHANGE UP (ref 80–100)
NRBC # BLD: 0 /100 WBCS — SIGNIFICANT CHANGE UP (ref 0–0)
PHOSPHATE SERPL-MCNC: 3.2 MG/DL — SIGNIFICANT CHANGE UP (ref 2.5–4.5)
PLATELET # BLD AUTO: 342 K/UL — SIGNIFICANT CHANGE UP (ref 150–400)
POTASSIUM SERPL-MCNC: 4.2 MMOL/L — SIGNIFICANT CHANGE UP (ref 3.5–5.3)
POTASSIUM SERPL-SCNC: 4.2 MMOL/L — SIGNIFICANT CHANGE UP (ref 3.5–5.3)
PROT SERPL-MCNC: 5.9 G/DL — LOW (ref 6–8.3)
RBC # BLD: 3.88 M/UL — LOW (ref 4.2–5.8)
RBC # FLD: 13.2 % — SIGNIFICANT CHANGE UP (ref 10.3–14.5)
SODIUM SERPL-SCNC: 142 MMOL/L — SIGNIFICANT CHANGE UP (ref 135–145)
TRIGL SERPL-MCNC: 111 MG/DL — SIGNIFICANT CHANGE UP (ref 10–149)
WBC # BLD: 10.12 K/UL — SIGNIFICANT CHANGE UP (ref 3.8–10.5)
WBC # FLD AUTO: 10.12 K/UL — SIGNIFICANT CHANGE UP (ref 3.8–10.5)

## 2019-10-02 RX ORDER — ELECTROLYTE SOLUTION,INJ
1 VIAL (ML) INTRAVENOUS
Refills: 0 | Status: DISCONTINUED | OUTPATIENT
Start: 2019-10-02 | End: 2019-10-02

## 2019-10-02 RX ORDER — BENZOCAINE AND MENTHOL 5; 1 G/100ML; G/100ML
1 LIQUID ORAL EVERY 4 HOURS
Refills: 0 | Status: DISCONTINUED | OUTPATIENT
Start: 2019-10-02 | End: 2019-10-03

## 2019-10-02 RX ADMIN — Medication 15 MILLIGRAM(S): at 00:04

## 2019-10-02 RX ADMIN — AMLODIPINE BESYLATE 5 MILLIGRAM(S): 2.5 TABLET ORAL at 05:25

## 2019-10-02 RX ADMIN — HYDROMORPHONE HYDROCHLORIDE 1 MILLIGRAM(S): 2 INJECTION INTRAMUSCULAR; INTRAVENOUS; SUBCUTANEOUS at 21:58

## 2019-10-02 RX ADMIN — Medication 100 MILLIGRAM(S): at 12:22

## 2019-10-02 RX ADMIN — Medication 15 MILLIGRAM(S): at 18:25

## 2019-10-02 RX ADMIN — HYDROMORPHONE HYDROCHLORIDE 0.5 MILLIGRAM(S): 2 INJECTION INTRAMUSCULAR; INTRAVENOUS; SUBCUTANEOUS at 10:58

## 2019-10-02 RX ADMIN — ENOXAPARIN SODIUM 40 MILLIGRAM(S): 100 INJECTION SUBCUTANEOUS at 12:22

## 2019-10-02 RX ADMIN — BENZOCAINE AND MENTHOL 1 LOZENGE: 5; 1 LIQUID ORAL at 05:46

## 2019-10-02 RX ADMIN — Medication 100 MILLIGRAM(S): at 05:25

## 2019-10-02 RX ADMIN — HYDROMORPHONE HYDROCHLORIDE 0.5 MILLIGRAM(S): 2 INJECTION INTRAMUSCULAR; INTRAVENOUS; SUBCUTANEOUS at 10:34

## 2019-10-02 RX ADMIN — Medication 1 EACH: at 17:18

## 2019-10-02 RX ADMIN — Medication 100 GRAM(S): at 18:09

## 2019-10-02 RX ADMIN — BENZOCAINE AND MENTHOL 1 LOZENGE: 5; 1 LIQUID ORAL at 10:39

## 2019-10-02 RX ADMIN — Medication 15 MILLIGRAM(S): at 13:00

## 2019-10-02 RX ADMIN — Medication 15 MILLIGRAM(S): at 05:25

## 2019-10-02 RX ADMIN — ATORVASTATIN CALCIUM 10 MILLIGRAM(S): 80 TABLET, FILM COATED ORAL at 20:54

## 2019-10-02 RX ADMIN — Medication 1000 MILLIGRAM(S): at 03:08

## 2019-10-02 RX ADMIN — FAMOTIDINE 20 MILLIGRAM(S): 10 INJECTION INTRAVENOUS at 12:22

## 2019-10-02 RX ADMIN — Medication 400 MILLIGRAM(S): at 02:10

## 2019-10-02 RX ADMIN — HYDROMORPHONE HYDROCHLORIDE 1 MILLIGRAM(S): 2 INJECTION INTRAMUSCULAR; INTRAVENOUS; SUBCUTANEOUS at 06:44

## 2019-10-02 RX ADMIN — HYDROMORPHONE HYDROCHLORIDE 1 MILLIGRAM(S): 2 INJECTION INTRAMUSCULAR; INTRAVENOUS; SUBCUTANEOUS at 20:54

## 2019-10-02 RX ADMIN — Medication 100 GRAM(S): at 05:25

## 2019-10-02 RX ADMIN — Medication 100 MILLIGRAM(S): at 20:54

## 2019-10-02 RX ADMIN — Medication 15 MILLIGRAM(S): at 05:36

## 2019-10-02 RX ADMIN — Medication 15 MILLIGRAM(S): at 18:05

## 2019-10-02 RX ADMIN — CHLORHEXIDINE GLUCONATE 1 APPLICATION(S): 213 SOLUTION TOPICAL at 05:26

## 2019-10-02 RX ADMIN — PANTOPRAZOLE SODIUM 40 MILLIGRAM(S): 20 TABLET, DELAYED RELEASE ORAL at 12:22

## 2019-10-02 RX ADMIN — BENZOCAINE AND MENTHOL 1 LOZENGE: 5; 1 LIQUID ORAL at 16:23

## 2019-10-02 RX ADMIN — Medication 102 MILLIGRAM(S): at 12:42

## 2019-10-02 RX ADMIN — Medication 15 MILLIGRAM(S): at 12:22

## 2019-10-02 RX ADMIN — HYDROMORPHONE HYDROCHLORIDE 1 MILLIGRAM(S): 2 INJECTION INTRAMUSCULAR; INTRAVENOUS; SUBCUTANEOUS at 06:59

## 2019-10-02 RX ADMIN — Medication 15 MILLIGRAM(S): at 00:25

## 2019-10-02 NOTE — PROGRESS NOTE ADULT - SUBJECTIVE AND OBJECTIVE BOX
SUBJECTIVE:  Patient states pain slightly better controlled today. He feels he is making some progress. No new complaints.    OBJECTIVE:     ** VITAL SIGNS / I&O's **    Vital Signs Last 24 Hrs  T(C): 36.6 (02 Oct 2019 00:08), Max: 37.2 (01 Oct 2019 18:23)  T(F): 97.8 (02 Oct 2019 00:08), Max: 98.9 (01 Oct 2019 18:23)  HR: 90 (02 Oct 2019 05:21) (76 - 104)  BP: 116/78 (02 Oct 2019 05:21) (98/64 - 132/81)  BP(mean): --  RR: 17 (02 Oct 2019 00:08) (15 - 18)  SpO2: 93% (02 Oct 2019 00:08) (93% - 97%)      01 Oct 2019 07:01  -  02 Oct 2019 07:00  --------------------------------------------------------  IN:    IV PiggyBack: 350 mL    lactated ringers.: 1380 mL    TPN (Total Parenteral Nutrition): 882 mL  Total IN: 2612 mL    OUT:    Bulb: 30 mL    Bulb: 22.5 mL    Colostomy: 105 mL    Nasoenteral Tube: 1100 mL    Voided: 1550 mL  Total OUT: 2807.5 mL    Total NET: -195.5 mL          ** PHYSICAL EXAM **    -- CONSTITUTIONAL: Alert, Awake. NAD.   -- RESPIRATORY: unlabored breathing, no respiratory distress  -- THIGH: left thigh incision c/d/i with prineo dressing, no collections, iris serosanguinous      ** LABS **                          11.9   10.12 )-----------( 342      ( 02 Oct 2019 06:14 )             36.8     02 Oct 2019 06:14    142    |  104    |  21     ----------------------------<  152    4.2     |  27     |  0.71     Ca    9.0        02 Oct 2019 06:14  Phos  3.2       02 Oct 2019 06:14  Mg     2.4       02 Oct 2019 06:14    TPro  5.9    /  Alb  3.1    /  TBili  1.0    /  DBili  x      /  AST  45     /  ALT  68     /  AlkPhos  64     02 Oct 2019 06:14      CAPILLARY BLOOD GLUCOSE

## 2019-10-02 NOTE — ADVANCED PRACTICE NURSE CONSULT - ASSESSMENT
New 2 3/4" [place] 1 piece appliance placed with patient observing. He removed appliance independently. Small piece of stoma ring placed at indentation at peristomal area (9:00) to prevent leakage. CAvilon barrier wipes applied to peristomal area prior to application of pouch. Reviewed frequency of emptying and changing appliance. Small amount of dark green drainage noted in old appliance.

## 2019-10-02 NOTE — PROGRESS NOTE ADULT - ASSESSMENT
53 y/o male s/p abdominal peritoneal resection with gracilis flap  - continue iris care  - OOBA  - prn analgesia  - care per primary team

## 2019-10-02 NOTE — PROGRESS NOTE ADULT - ASSESSMENT
54M with HTN, HLD, rectal cancer s/p robot assisted abdominal peritoneal resection 9/24    - ERAS  - Pain/nausea control  - NPO/IVF/NGT   - Lovenox   - JAKUB x 2 (1 on abd and 1 L thigh)  - Ostomy teaching  - F/u plastics recs  - OOB/AAT

## 2019-10-02 NOTE — PROGRESS NOTE ADULT - SUBJECTIVE AND OBJECTIVE BOX
STATUS POST: abdominal perineal resection (9/24)    POST OPERATIVE DAY #: 8    SUBJECTIVE: Patient examined bedside with chief resident. Patient states he is feeling better, no longer having hiccups. Patient denies nausea, emesis , SOB and chest pain. Small amount of stool and gas present in colostomy bag.      amLODIPine   Tablet 5 milliGRAM(s) Oral daily  cefoTEtan  IVPB 1 Gram(s) IV Intermittent every 12 hours  enoxaparin Injectable 40 milliGRAM(s) SubCutaneous daily      Vital Signs Last 24 Hrs  T(C): 36.6 (02 Oct 2019 00:08), Max: 37.2 (01 Oct 2019 18:23)  T(F): 97.8 (02 Oct 2019 00:08), Max: 98.9 (01 Oct 2019 18:23)  HR: 90 (02 Oct 2019 05:21) (76 - 104)  BP: 116/78 (02 Oct 2019 05:21) (98/64 - 132/81)  BP(mean): --  RR: 17 (02 Oct 2019 00:08) (15 - 18)  SpO2: 93% (02 Oct 2019 00:08) (93% - 97%)      I&O's Detail    30 Sep 2019 07:01  -  01 Oct 2019 07:00  --------------------------------------------------------  IN:    IV PiggyBack: 400 mL    lactated ringers.: 960 mL  Total IN: 1360 mL    OUT:    Bulb: 47.5 mL    Bulb: 22.5 mL    Colostomy: 20 mL    Nasoenteral Tube: 1000 mL    Voided: 1150 mL  Total OUT: 2240 mL    Total NET: -880 mL      01 Oct 2019 07:01  -  02 Oct 2019 06:44  --------------------------------------------------------  IN:    IV PiggyBack: 350 mL    lactated ringers.: 1380 mL    TPN (Total Parenteral Nutrition): 882 mL  Total IN: 2612 mL    OUT:    Bulb: 30 mL    Bulb: 22.5 mL    Colostomy: 105 mL    Nasoenteral Tube: 1100 mL    Voided: 1550 mL  Total OUT: 2807.5 mL    Total NET: -195.5 mL          General: NAD  HEENT: NGT in place   Pulm: Nonlabored breathing, no respiratory distress  Abd: Soft , non-distended , TTP around incision site, incision clean dry and intact. Ostomy w/ stoma pink and patent, small amount of stool and gas in colostomy bag. Anal wound c/d/i. R abdominal JAKUB with serosanguinous output.  Extrem: WWP, no edema, SCDs in place. LUE PICC in place. Thigh flap incision c/d/i, no surrounding erythema. L thigh JAKUB with minimal serosanguinous output.      LABS:    10-01    141  |  104  |  24<H>  ----------------------------<  124<H>  4.0   |  27  |  0.78    Ca    9.3      01 Oct 2019 06:27  Phos  3.2     10-01  Mg     2.1     10-01                            11.9   10.12 )-----------( 342      ( 02 Oct 2019 06:14 )             36.8 STATUS POST: abdominal perineal resection (9/24)    POST OPERATIVE DAY #: 8    SUBJECTIVE: Patient examined bedside with chief resident. Patient states he is feeling better, no longer having hiccups. Patient denies nausea, emesis , SOB and chest pain. Small amount of stool and gas present in colostomy bag.      amLODIPine   Tablet 5 milliGRAM(s) Oral daily  cefoTEtan  IVPB 1 Gram(s) IV Intermittent every 12 hours  enoxaparin Injectable 40 milliGRAM(s) SubCutaneous daily      Vital Signs Last 24 Hrs  T(C): 36.6 (02 Oct 2019 00:08), Max: 37.2 (01 Oct 2019 18:23)  T(F): 97.8 (02 Oct 2019 00:08), Max: 98.9 (01 Oct 2019 18:23)  HR: 90 (02 Oct 2019 05:21) (76 - 104)  BP: 116/78 (02 Oct 2019 05:21) (98/64 - 132/81)  BP(mean): --  RR: 17 (02 Oct 2019 00:08) (15 - 18)  SpO2: 93% (02 Oct 2019 00:08) (93% - 97%)      I&O's Detail    30 Sep 2019 07:01  -  01 Oct 2019 07:00  --------------------------------------------------------  IN:    IV PiggyBack: 400 mL    lactated ringers.: 960 mL  Total IN: 1360 mL    OUT:    Bulb: 47.5 mL    Bulb: 22.5 mL    Colostomy: 20 mL    Nasoenteral Tube: 1000 mL    Voided: 1150 mL  Total OUT: 2240 mL    Total NET: -880 mL      01 Oct 2019 07:01  -  02 Oct 2019 06:44  --------------------------------------------------------  IN:    IV PiggyBack: 350 mL    lactated ringers.: 1380 mL    TPN (Total Parenteral Nutrition): 882 mL  Total IN: 2612 mL    OUT:    Bulb: 30 mL    Bulb: 22.5 mL    Colostomy: 105 mL    Nasoenteral Tube: 1100 mL    Voided: 1550 mL  Total OUT: 2807.5 mL    Total NET: -195.5 mL          General: NAD  HEENT: NGT in place   Pulm: Nonlabored breathing, no respiratory distress  Abd: Soft , non-distended , TTP around incision site, incision clean dry and intact. Ostomy w/ stoma pink and patent, small amount of stool and gas in colostomy bag. Anal wound c/d/i. R abdominal JAKUB with serosanguinous output.  Extrem: WWP, no edema, SCDs in place. LUE PICC in place. Thigh flap incision c/d/i, no surrounding erythema. L thigh JAKUB with minimal serosanguinous output.      LABS:   10-02    142  |  104  |  21  ----------------------------<  152<H>  4.2   |  27  |  0.71    Ca    9.0      02 Oct 2019 06:14  Phos  3.2     10-02  Mg     2.4     10-02    TPro  5.9<L>  /  Alb  3.1<L>  /  TBili  1.0  /  DBili  x   /  AST  45<H>  /  ALT  68<H>  /  AlkPhos  64  10-02                            11.9   10.12 )-----------( 342      ( 02 Oct 2019 06:14 )             36.8

## 2019-10-03 LAB
ANION GAP SERPL CALC-SCNC: 8 MMOL/L — SIGNIFICANT CHANGE UP (ref 5–17)
BUN SERPL-MCNC: 20 MG/DL — SIGNIFICANT CHANGE UP (ref 7–23)
CALCIUM SERPL-MCNC: 9 MG/DL — SIGNIFICANT CHANGE UP (ref 8.4–10.5)
CHLORIDE SERPL-SCNC: 104 MMOL/L — SIGNIFICANT CHANGE UP (ref 96–108)
CO2 SERPL-SCNC: 29 MMOL/L — SIGNIFICANT CHANGE UP (ref 22–31)
CREAT SERPL-MCNC: 0.7 MG/DL — SIGNIFICANT CHANGE UP (ref 0.5–1.3)
GLUCOSE SERPL-MCNC: 171 MG/DL — HIGH (ref 70–99)
HCT VFR BLD CALC: 31.6 % — LOW (ref 39–50)
HGB BLD-MCNC: 10.4 G/DL — LOW (ref 13–17)
MAGNESIUM SERPL-MCNC: 2.2 MG/DL — SIGNIFICANT CHANGE UP (ref 1.6–2.6)
MCHC RBC-ENTMCNC: 31 PG — SIGNIFICANT CHANGE UP (ref 27–34)
MCHC RBC-ENTMCNC: 32.9 GM/DL — SIGNIFICANT CHANGE UP (ref 32–36)
MCV RBC AUTO: 94.3 FL — SIGNIFICANT CHANGE UP (ref 80–100)
NRBC # BLD: 0 /100 WBCS — SIGNIFICANT CHANGE UP (ref 0–0)
PHOSPHATE SERPL-MCNC: 2.9 MG/DL — SIGNIFICANT CHANGE UP (ref 2.5–4.5)
PLATELET # BLD AUTO: 333 K/UL — SIGNIFICANT CHANGE UP (ref 150–400)
POTASSIUM SERPL-MCNC: 3.4 MMOL/L — LOW (ref 3.5–5.3)
POTASSIUM SERPL-SCNC: 3.4 MMOL/L — LOW (ref 3.5–5.3)
RBC # BLD: 3.35 M/UL — LOW (ref 4.2–5.8)
RBC # FLD: 13.5 % — SIGNIFICANT CHANGE UP (ref 10.3–14.5)
SODIUM SERPL-SCNC: 141 MMOL/L — SIGNIFICANT CHANGE UP (ref 135–145)
SURGICAL PATHOLOGY STUDY: SIGNIFICANT CHANGE UP
WBC # BLD: 10.08 K/UL — SIGNIFICANT CHANGE UP (ref 3.8–10.5)
WBC # FLD AUTO: 10.08 K/UL — SIGNIFICANT CHANGE UP (ref 3.8–10.5)

## 2019-10-03 RX ORDER — BENZOCAINE AND MENTHOL 5; 1 G/100ML; G/100ML
1 LIQUID ORAL
Refills: 0 | Status: DISCONTINUED | OUTPATIENT
Start: 2019-10-03 | End: 2019-10-14

## 2019-10-03 RX ORDER — ELECTROLYTE SOLUTION,INJ
1 VIAL (ML) INTRAVENOUS
Refills: 0 | Status: DISCONTINUED | OUTPATIENT
Start: 2019-10-03 | End: 2019-10-03

## 2019-10-03 RX ORDER — ACETAMINOPHEN 500 MG
650 TABLET ORAL ONCE
Refills: 0 | Status: COMPLETED | OUTPATIENT
Start: 2019-10-03 | End: 2019-10-03

## 2019-10-03 RX ORDER — HYDROMORPHONE HYDROCHLORIDE 2 MG/ML
0.5 INJECTION INTRAMUSCULAR; INTRAVENOUS; SUBCUTANEOUS EVERY 4 HOURS
Refills: 0 | Status: DISCONTINUED | OUTPATIENT
Start: 2019-10-03 | End: 2019-10-07

## 2019-10-03 RX ORDER — HYDROMORPHONE HYDROCHLORIDE 2 MG/ML
1 INJECTION INTRAMUSCULAR; INTRAVENOUS; SUBCUTANEOUS EVERY 4 HOURS
Refills: 0 | Status: DISCONTINUED | OUTPATIENT
Start: 2019-10-03 | End: 2019-10-07

## 2019-10-03 RX ADMIN — Medication 100 GRAM(S): at 04:31

## 2019-10-03 RX ADMIN — Medication 650 MILLIGRAM(S): at 22:10

## 2019-10-03 RX ADMIN — Medication 100 MILLIGRAM(S): at 21:24

## 2019-10-03 RX ADMIN — BENZOCAINE AND MENTHOL 1 LOZENGE: 5; 1 LIQUID ORAL at 10:34

## 2019-10-03 RX ADMIN — ATORVASTATIN CALCIUM 10 MILLIGRAM(S): 80 TABLET, FILM COATED ORAL at 21:24

## 2019-10-03 RX ADMIN — HYDROMORPHONE HYDROCHLORIDE 1 MILLIGRAM(S): 2 INJECTION INTRAMUSCULAR; INTRAVENOUS; SUBCUTANEOUS at 11:42

## 2019-10-03 RX ADMIN — Medication 15 MILLIGRAM(S): at 18:06

## 2019-10-03 RX ADMIN — AMLODIPINE BESYLATE 5 MILLIGRAM(S): 2.5 TABLET ORAL at 04:31

## 2019-10-03 RX ADMIN — BENZOCAINE AND MENTHOL 1 LOZENGE: 5; 1 LIQUID ORAL at 17:50

## 2019-10-03 RX ADMIN — HYDROMORPHONE HYDROCHLORIDE 1 MILLIGRAM(S): 2 INJECTION INTRAMUSCULAR; INTRAVENOUS; SUBCUTANEOUS at 02:29

## 2019-10-03 RX ADMIN — PANTOPRAZOLE SODIUM 40 MILLIGRAM(S): 20 TABLET, DELAYED RELEASE ORAL at 13:23

## 2019-10-03 RX ADMIN — BENZOCAINE AND MENTHOL 1 LOZENGE: 5; 1 LIQUID ORAL at 22:30

## 2019-10-03 RX ADMIN — Medication 650 MILLIGRAM(S): at 21:24

## 2019-10-03 RX ADMIN — HYDROMORPHONE HYDROCHLORIDE 1 MILLIGRAM(S): 2 INJECTION INTRAMUSCULAR; INTRAVENOUS; SUBCUTANEOUS at 12:05

## 2019-10-03 RX ADMIN — Medication 15 MILLIGRAM(S): at 00:00

## 2019-10-03 RX ADMIN — Medication 100 MILLIGRAM(S): at 04:31

## 2019-10-03 RX ADMIN — HYDROMORPHONE HYDROCHLORIDE 1 MILLIGRAM(S): 2 INJECTION INTRAMUSCULAR; INTRAVENOUS; SUBCUTANEOUS at 07:04

## 2019-10-03 RX ADMIN — Medication 15 MILLIGRAM(S): at 13:45

## 2019-10-03 RX ADMIN — Medication 15 MILLIGRAM(S): at 13:23

## 2019-10-03 RX ADMIN — Medication 100 GRAM(S): at 18:06

## 2019-10-03 RX ADMIN — HYDROMORPHONE HYDROCHLORIDE 1 MILLIGRAM(S): 2 INJECTION INTRAMUSCULAR; INTRAVENOUS; SUBCUTANEOUS at 02:45

## 2019-10-03 RX ADMIN — Medication 15 MILLIGRAM(S): at 05:00

## 2019-10-03 RX ADMIN — Medication 15 MILLIGRAM(S): at 00:34

## 2019-10-03 RX ADMIN — Medication 15 MILLIGRAM(S): at 18:50

## 2019-10-03 RX ADMIN — HYDROMORPHONE HYDROCHLORIDE 1 MILLIGRAM(S): 2 INJECTION INTRAMUSCULAR; INTRAVENOUS; SUBCUTANEOUS at 07:40

## 2019-10-03 RX ADMIN — Medication 1 EACH: at 18:07

## 2019-10-03 RX ADMIN — ENOXAPARIN SODIUM 40 MILLIGRAM(S): 100 INJECTION SUBCUTANEOUS at 13:23

## 2019-10-03 RX ADMIN — HYDROMORPHONE HYDROCHLORIDE 1 MILLIGRAM(S): 2 INJECTION INTRAMUSCULAR; INTRAVENOUS; SUBCUTANEOUS at 21:30

## 2019-10-03 RX ADMIN — CHLORHEXIDINE GLUCONATE 1 APPLICATION(S): 213 SOLUTION TOPICAL at 04:31

## 2019-10-03 RX ADMIN — Medication 15 MILLIGRAM(S): at 05:27

## 2019-10-03 RX ADMIN — HYDROMORPHONE HYDROCHLORIDE 1 MILLIGRAM(S): 2 INJECTION INTRAMUSCULAR; INTRAVENOUS; SUBCUTANEOUS at 21:01

## 2019-10-03 RX ADMIN — FAMOTIDINE 20 MILLIGRAM(S): 10 INJECTION INTRAVENOUS at 13:23

## 2019-10-03 NOTE — PROGRESS NOTE ADULT - SUBJECTIVE AND OBJECTIVE BOX
Plastic Surgery Progress Note (pg [facility]: [id number], [facility]: [phone], [facility]: [phone])    SUBJECTIVE:  Doing better today, pain well controlled, ambulating well.     OBJECTIVE:     ** VITAL SIGNS / I&O's **    Vital Signs Last 24 Hrs  T(C): 37.2 (03 Oct 2019 04:44), Max: 37.3 (02 Oct 2019 20:50)  T(F): 99 (03 Oct 2019 04:44), Max: 99.2 (02 Oct 2019 20:50)  HR: 89 (03 Oct 2019 04:44) (89 - 109)  BP: 134/81 (03 Oct 2019 04:44) (104/64 - 134/81)  BP(mean): --  RR: 18 (03 Oct 2019 04:44) (16 - 18)  SpO2: 97% (03 Oct 2019 04:44) (93% - 98%)      02 Oct 2019 07:01  -  03 Oct 2019 07:00  --------------------------------------------------------  IN:    IV PiggyBack: 151 mL    lactated ringers.: 1100 mL    TPN (Total Parenteral Nutrition): 1449 mL  Total IN: 2700 mL    OUT:    Bulb: 40 mL    Bulb: 35 mL    Colostomy: 150 mL    Nasoenteral Tube: 775 mL    Voided: 2000 mL  Total OUT: 3000 mL    Total NET: -300 mL          ** PHYSICAL EXAM **    -- CONSTITUTIONAL: AOx3. NAD.   -- ABDOMEN: incision c/d/i, non-tender, non-distended, JAKUB ss  -- PERINEUM: Incision c/d/i w/ sutures in place, no signs of infection  -- EXTREMITIES: L thigh incision c/d/i, no collections, JAKUB ss      **MEDS**  amLODIPine   Tablet 5 milliGRAM(s) Oral daily  artificial  tears Solution 1 Drop(s) Both EYES every 4 hours PRN  atorvastatin 10 milliGRAM(s) Oral at bedtime  benzocaine 15 mG/menthol 3.6 mG Lozenge 1 Lozenge Oral every 4 hours PRN  cefoTEtan  IVPB 1 Gram(s) IV Intermittent every 12 hours  chlorhexidine 2% Cloths 1 Application(s) Topical <User Schedule>  chlorproMAZINE    IVPB 25 milliGRAM(s) IV Intermittent every 8 hours PRN  docusate sodium Liquid 100 milliGRAM(s) Oral three times a day  enoxaparin Injectable 40 milliGRAM(s) SubCutaneous daily  famotidine Injectable 20 milliGRAM(s) IV Push daily  HYDROmorphone  Injectable 1 milliGRAM(s) IV Push every 4 hours PRN  HYDROmorphone  Injectable 0.5 milliGRAM(s) IV Push every 4 hours PRN  influenza   Vaccine 0.5 milliLiter(s) IntraMuscular once  ketorolac   Injectable 15 milliGRAM(s) IV Push every 6 hours  ketorolac 0.5% Ophthalmic Solution 1 Drop(s) Left EYE every 6 hours PRN  lactated ringers. 1000 milliLiter(s) IV Continuous <Continuous>  pantoprazole  Injectable 40 milliGRAM(s) IV Push daily  Parenteral Nutrition - Adult 1 Each TPN Continuous <Continuous>  sodium chloride 0.9% lock flush 10 milliLiter(s) IV Push every 1 hour PRN      ** LABS **                          10.4   10.08 )-----------( 333      ( 03 Oct 2019 06:23 )             31.6     03 Oct 2019 06:23    141    |  104    |  20     ----------------------------<  171    3.4     |  29     |  0.70     Ca    9.0        03 Oct 2019 06:23  Phos  2.9       03 Oct 2019 06:23  Mg     2.2       03 Oct 2019 06:23    TPro  5.9    /  Alb  3.1    /  TBili  1.0    /  DBili  x      /  AST  45     /  ALT  68     /  AlkPhos  64     02 Oct 2019 06:14      CAPILLARY BLOOD GLUCOSE

## 2019-10-03 NOTE — PROGRESS NOTE ADULT - ASSESSMENT
A/P: 55 y/o male s/p abdominal peritoneal resection with gracilis flap (POD9) w/ ileus requiring TPN    - continue iris care  - OOBA  - prn analgesia  - care per primary team    Jose Chavez  PGY-5  Plastic Surgery

## 2019-10-03 NOTE — ADVANCED PRACTICE NURSE CONSULT - ASSESSMENT
Pt had recently received pain medication so education provided to sister, who will help him after discharge. She speaks only Bahamian so video  used. Provided education on frequency of emptying and changing appliance, how to order supplies. Extra supplies given to patient for discharge. Dark brown/green effluent noted in appliance, which is adhering well.

## 2019-10-03 NOTE — PROVIDER CONTACT NOTE (OTHER) - SITUATION
Pt. had episode of V-tach, 3 beats and  6 beats but not sustained. V/S checked stable,afebrile. No complaints of chest pain or palpitation.

## 2019-10-03 NOTE — PROGRESS NOTE ADULT - SUBJECTIVE AND OBJECTIVE BOX
STATUS POST: abdominal perineal resection (9/24)    POST OPERATIVE DAY #: 9    SUBJECTIVE: Patient examined bedside with chief resident. Patient states he is feeling better, no longer having hiccups, has been ambulating frequently. Patient denies nausea, emesis , SOB and chest pain. Pain is controlled. States he has been having more output in colostomy bag.    amLODIPine   Tablet 5 milliGRAM(s) Oral daily  cefoTEtan  IVPB 1 Gram(s) IV Intermittent every 12 hours  enoxaparin Injectable 40 milliGRAM(s) SubCutaneous daily      Vital Signs Last 24 Hrs  T(C): 37.2 (03 Oct 2019 04:44), Max: 37.3 (02 Oct 2019 20:50)  T(F): 99 (03 Oct 2019 04:44), Max: 99.2 (02 Oct 2019 20:50)  HR: 89 (03 Oct 2019 04:44) (89 - 109)  BP: 134/81 (03 Oct 2019 04:44) (104/64 - 134/81)  RR: 18 (03 Oct 2019 04:44) (16 - 18)  SpO2: 97% (03 Oct 2019 04:44) (93% - 98%)      I&O's Detail    02 Oct 2019 07:01  -  03 Oct 2019 07:00  --------------------------------------------------------  IN:    IV PiggyBack: 151 mL    lactated ringers.: 1100 mL    TPN (Total Parenteral Nutrition): 1449 mL  Total IN: 2700 mL    OUT:    Bulb: 40 mL    Bulb: 35 mL    Colostomy: 150 mL    Nasoenteral Tube: 775 mL    Voided: 2000 mL  Total OUT: 3000 mL    Total NET: -300 mL          General: NAD  HEENT: NGT in place   Pulm: Nonlabored breathing, no respiratory distress  Abd: Soft , non-distended , TTP around incision site, incision clean dry and intact. Ostomy w/ stoma pink and patent, stool and gas in colostomy bag. R abdominal JAKUB with serosanguinous output. Anal wound c/d/i.   Extrem: WWP, no edema, SCDs in place. LUE PICC in place. Thigh flap incision c/d/i, no surrounding erythema. L thigh JAKUB with minimal serosanguinous output.      LABS:  10-03    141  |  104  |  20  ----------------------------<  171<H>  3.4<L>   |  29  |  0.70    Ca    9.0      03 Oct 2019 06:23  Phos  2.9     10-03  Mg     2.2     10-03    TPro  5.9<L>  /  Alb  3.1<L>  /  TBili  1.0  /  DBili  x   /  AST  45<H>  /  ALT  68<H>  /  AlkPhos  64  10-02                                10.4   10.08 )-----------( 333      ( 03 Oct 2019 06:23 )             31.6

## 2019-10-04 LAB
ANION GAP SERPL CALC-SCNC: 8 MMOL/L — SIGNIFICANT CHANGE UP (ref 5–17)
BASOPHILS # BLD AUTO: 0.02 K/UL — SIGNIFICANT CHANGE UP (ref 0–0.2)
BASOPHILS NFR BLD AUTO: 0.2 % — SIGNIFICANT CHANGE UP (ref 0–2)
BUN SERPL-MCNC: 20 MG/DL — SIGNIFICANT CHANGE UP (ref 7–23)
CALCIUM SERPL-MCNC: 8.7 MG/DL — SIGNIFICANT CHANGE UP (ref 8.4–10.5)
CHLORIDE SERPL-SCNC: 106 MMOL/L — SIGNIFICANT CHANGE UP (ref 96–108)
CO2 SERPL-SCNC: 26 MMOL/L — SIGNIFICANT CHANGE UP (ref 22–31)
CREAT SERPL-MCNC: 0.62 MG/DL — SIGNIFICANT CHANGE UP (ref 0.5–1.3)
EOSINOPHIL # BLD AUTO: 0.28 K/UL — SIGNIFICANT CHANGE UP (ref 0–0.5)
EOSINOPHIL NFR BLD AUTO: 3 % — SIGNIFICANT CHANGE UP (ref 0–6)
GLUCOSE SERPL-MCNC: 161 MG/DL — HIGH (ref 70–99)
HCT VFR BLD CALC: 30.5 % — LOW (ref 39–50)
HGB BLD-MCNC: 10 G/DL — LOW (ref 13–17)
IMM GRANULOCYTES NFR BLD AUTO: 0.8 % — SIGNIFICANT CHANGE UP (ref 0–1.5)
LYMPHOCYTES # BLD AUTO: 0.64 K/UL — LOW (ref 1–3.3)
LYMPHOCYTES # BLD AUTO: 6.9 % — LOW (ref 13–44)
MAGNESIUM SERPL-MCNC: 2.2 MG/DL — SIGNIFICANT CHANGE UP (ref 1.6–2.6)
MCHC RBC-ENTMCNC: 30.6 PG — SIGNIFICANT CHANGE UP (ref 27–34)
MCHC RBC-ENTMCNC: 32.8 GM/DL — SIGNIFICANT CHANGE UP (ref 32–36)
MCV RBC AUTO: 93.3 FL — SIGNIFICANT CHANGE UP (ref 80–100)
MONOCYTES # BLD AUTO: 0.59 K/UL — SIGNIFICANT CHANGE UP (ref 0–0.9)
MONOCYTES NFR BLD AUTO: 6.4 % — SIGNIFICANT CHANGE UP (ref 2–14)
NEUTROPHILS # BLD AUTO: 7.66 K/UL — HIGH (ref 1.8–7.4)
NEUTROPHILS NFR BLD AUTO: 82.7 % — HIGH (ref 43–77)
NRBC # BLD: 0 /100 WBCS — SIGNIFICANT CHANGE UP (ref 0–0)
PHOSPHATE SERPL-MCNC: 3 MG/DL — SIGNIFICANT CHANGE UP (ref 2.5–4.5)
PLATELET # BLD AUTO: 308 K/UL — SIGNIFICANT CHANGE UP (ref 150–400)
POTASSIUM SERPL-MCNC: 3.8 MMOL/L — SIGNIFICANT CHANGE UP (ref 3.5–5.3)
POTASSIUM SERPL-SCNC: 3.8 MMOL/L — SIGNIFICANT CHANGE UP (ref 3.5–5.3)
RBC # BLD: 3.27 M/UL — LOW (ref 4.2–5.8)
RBC # FLD: 13.5 % — SIGNIFICANT CHANGE UP (ref 10.3–14.5)
SODIUM SERPL-SCNC: 140 MMOL/L — SIGNIFICANT CHANGE UP (ref 135–145)
WBC # BLD: 9.26 K/UL — SIGNIFICANT CHANGE UP (ref 3.8–10.5)
WBC # FLD AUTO: 9.26 K/UL — SIGNIFICANT CHANGE UP (ref 3.8–10.5)

## 2019-10-04 RX ORDER — ELECTROLYTE SOLUTION,INJ
1 VIAL (ML) INTRAVENOUS
Refills: 0 | Status: DISCONTINUED | OUTPATIENT
Start: 2019-10-04 | End: 2019-10-04

## 2019-10-04 RX ORDER — I.V. FAT EMULSION 20 G/100ML
0.62 EMULSION INTRAVENOUS
Qty: 50 | Refills: 0 | Status: DISCONTINUED | OUTPATIENT
Start: 2019-10-04 | End: 2019-10-04

## 2019-10-04 RX ORDER — DOCUSATE SODIUM 100 MG
100 CAPSULE ORAL THREE TIMES A DAY
Refills: 0 | Status: DISCONTINUED | OUTPATIENT
Start: 2019-10-04 | End: 2019-10-14

## 2019-10-04 RX ADMIN — HYDROMORPHONE HYDROCHLORIDE 1 MILLIGRAM(S): 2 INJECTION INTRAMUSCULAR; INTRAVENOUS; SUBCUTANEOUS at 09:00

## 2019-10-04 RX ADMIN — Medication 15 MILLIGRAM(S): at 12:11

## 2019-10-04 RX ADMIN — Medication 1 EACH: at 18:00

## 2019-10-04 RX ADMIN — HYDROMORPHONE HYDROCHLORIDE 1 MILLIGRAM(S): 2 INJECTION INTRAMUSCULAR; INTRAVENOUS; SUBCUTANEOUS at 14:38

## 2019-10-04 RX ADMIN — Medication 100 MILLIGRAM(S): at 21:58

## 2019-10-04 RX ADMIN — ATORVASTATIN CALCIUM 10 MILLIGRAM(S): 80 TABLET, FILM COATED ORAL at 21:58

## 2019-10-04 RX ADMIN — FAMOTIDINE 20 MILLIGRAM(S): 10 INJECTION INTRAVENOUS at 12:10

## 2019-10-04 RX ADMIN — HYDROMORPHONE HYDROCHLORIDE 1 MILLIGRAM(S): 2 INJECTION INTRAMUSCULAR; INTRAVENOUS; SUBCUTANEOUS at 19:58

## 2019-10-04 RX ADMIN — HYDROMORPHONE HYDROCHLORIDE 1 MILLIGRAM(S): 2 INJECTION INTRAMUSCULAR; INTRAVENOUS; SUBCUTANEOUS at 14:55

## 2019-10-04 RX ADMIN — CHLORHEXIDINE GLUCONATE 1 APPLICATION(S): 213 SOLUTION TOPICAL at 12:11

## 2019-10-04 RX ADMIN — Medication 15 MILLIGRAM(S): at 06:35

## 2019-10-04 RX ADMIN — HYDROMORPHONE HYDROCHLORIDE 1 MILLIGRAM(S): 2 INJECTION INTRAMUSCULAR; INTRAVENOUS; SUBCUTANEOUS at 08:41

## 2019-10-04 RX ADMIN — Medication 15 MILLIGRAM(S): at 18:42

## 2019-10-04 RX ADMIN — HYDROMORPHONE HYDROCHLORIDE 1 MILLIGRAM(S): 2 INJECTION INTRAMUSCULAR; INTRAVENOUS; SUBCUTANEOUS at 19:37

## 2019-10-04 RX ADMIN — Medication 15 MILLIGRAM(S): at 12:30

## 2019-10-04 RX ADMIN — Medication 100 GRAM(S): at 06:06

## 2019-10-04 RX ADMIN — Medication 100 GRAM(S): at 18:25

## 2019-10-04 RX ADMIN — Medication 15 MILLIGRAM(S): at 18:25

## 2019-10-04 RX ADMIN — BENZOCAINE AND MENTHOL 1 LOZENGE: 5; 1 LIQUID ORAL at 12:10

## 2019-10-04 RX ADMIN — PANTOPRAZOLE SODIUM 40 MILLIGRAM(S): 20 TABLET, DELAYED RELEASE ORAL at 12:10

## 2019-10-04 RX ADMIN — BENZOCAINE AND MENTHOL 1 LOZENGE: 5; 1 LIQUID ORAL at 20:02

## 2019-10-04 RX ADMIN — Medication 15 MILLIGRAM(S): at 06:06

## 2019-10-04 RX ADMIN — Medication 15 MILLIGRAM(S): at 01:00

## 2019-10-04 RX ADMIN — Medication 100 MILLIGRAM(S): at 06:06

## 2019-10-04 RX ADMIN — ENOXAPARIN SODIUM 40 MILLIGRAM(S): 100 INJECTION SUBCUTANEOUS at 12:10

## 2019-10-04 RX ADMIN — HYDROMORPHONE HYDROCHLORIDE 1 MILLIGRAM(S): 2 INJECTION INTRAMUSCULAR; INTRAVENOUS; SUBCUTANEOUS at 04:43

## 2019-10-04 RX ADMIN — I.V. FAT EMULSION 20.83 GM/KG/DAY: 20 EMULSION INTRAVENOUS at 18:00

## 2019-10-04 RX ADMIN — Medication 15 MILLIGRAM(S): at 00:32

## 2019-10-04 RX ADMIN — AMLODIPINE BESYLATE 5 MILLIGRAM(S): 2.5 TABLET ORAL at 06:06

## 2019-10-04 RX ADMIN — Medication 102 MILLIGRAM(S): at 19:36

## 2019-10-04 NOTE — PROGRESS NOTE ADULT - SUBJECTIVE AND OBJECTIVE BOX
Pt seen and examined. Doing well. No acute events o/n. On TPN.    T(C): 36.5 (10-04-19 @ 06:01), Max: 37.9 (10-03-19 @ 20:56)  T(F): 97.7 (10-04-19 @ 06:01), Max: 100.2 (10-03-19 @ 20:56)  HR: 77 (10-04-19 @ 06:01) (77 - 89)  BP: 131/81 (10-04-19 @ 06:01) (113/70 - 134/78)  RR: 16 (10-04-19 @ 06:01) (15 - 18)  SpO2: 98% (10-04-19 @ 06:01) (95% - 98%)      03 Oct 2019 07:01  -  04 Oct 2019 07:00  --------------------------------------------------------  IN:    IV PiggyBack: 100 mL    lactated ringers.: 400 mL    TPN (Total Parenteral Nutrition): 1197 mL  Total IN: 1697 mL    OUT:    Bulb: 28.5 mL    Bulb: 45 mL    Colostomy: 100 mL    Voided: 1400 mL  Total OUT: 1573.5 mL    Total NET: 123.5 mL          Exam:  Abdomen soft but moderately distended  Minimal output in ostomy  Perineal incision c/d/i  Thigh soft  JPs serosanguinous.                           10.0   9.26  )-----------( 308      ( 04 Oct 2019 06:27 )             30.5     10-04    140  |  106  |  20  ----------------------------<  161<H>  3.8   |  26  |  0.62    Ca    8.7      04 Oct 2019 06:27  Phos  3.0     10-04  Mg     2.2     10-04

## 2019-10-04 NOTE — PROGRESS NOTE ADULT - SUBJECTIVE AND OBJECTIVE BOX
STATUS POST: abdominal perineal resection (9/24)    POST OPERATIVE DAY #: 10    SUBJECTIVE: Patient examined bedside with chief resident. Patient states he is feeling better, has been ambulating frequently. Patient denies nausea, emesis , SOB and chest pain. Pain is controlled.       amLODIPine   Tablet 5 milliGRAM(s) Oral daily  cefoTEtan  IVPB 1 Gram(s) IV Intermittent every 12 hours  enoxaparin Injectable 40 milliGRAM(s) SubCutaneous daily      Vital Signs Last 24 Hrs  T(C): 36.5 (04 Oct 2019 06:01), Max: 37.9 (03 Oct 2019 20:56)  T(F): 97.7 (04 Oct 2019 06:01), Max: 100.2 (03 Oct 2019 20:56)  HR: 77 (04 Oct 2019 06:01) (77 - 89)  BP: 131/81 (04 Oct 2019 06:01) (113/70 - 134/78)  RR: 16 (04 Oct 2019 06:01) (15 - 18)  SpO2: 98% (04 Oct 2019 06:01) (95% - 98%)      I&O's Detail    03 Oct 2019 07:01  -  04 Oct 2019 07:00  --------------------------------------------------------  IN:    IV PiggyBack: 100 mL    lactated ringers.: 400 mL    TPN (Total Parenteral Nutrition): 1197 mL  Total IN: 1697 mL    OUT:    Bulb: 28.5 mL    Bulb: 45 mL    Colostomy: 100 mL    Voided: 1400 mL  Total OUT: 1573.5 mL    Total NET: 123.5 mL          General: NAD  HEENT: NGT in place   Pulm: Nonlabored breathing, no respiratory distress  Abd: Soft , non-distended , TTP around incision site, incision clean dry and intact. Ostomy w/ stoma pink and patent, stool and gas in colostomy bag. R abdominal JAKUB with serosanguinous output. Anal wound c/d/i.   Extrem: WWP, no edema, SCDs in place. LUE PICC in place. Thigh flap incision c/d/i, no surrounding erythema. L thigh JAKUB with minimal serosanguinous output.      LABS:    10-04    140  |  106  |  20  ----------------------------<  161<H>  3.8   |  26  |  0.62    Ca    8.7      04 Oct 2019 06:27  Phos  3.0     10-04  Mg     2.2     10-04                          10.0   9.26  )-----------( 308      ( 04 Oct 2019 06:27 )             30.5

## 2019-10-04 NOTE — CHART NOTE - NSCHARTNOTEFT_GEN_A_CORE
Admitting Diagnosis:   Patient is a 54y old  Male who presents with a chief complaint of elective surgery (04 Oct 2019 07:47)    PAST MEDICAL & SURGICAL HISTORY:  HLD (hyperlipidemia)  HTN (hypertension)  Colon cancer: near rectum  Other elective surgery: Right Upper Chest- Chemo Port    Current Nutrition Order:   Diet, NPO:   Except Medications  With Ice Chips/Sips of Water (10-04-19 @ 08:32)    PO Intake: Good (%) [   ]  Fair (50-75%) [   ] Poor (<25%) [   ]- Pt is NPO on TPN at this time.     GI Issues: Awaiting bowel function to return. No N/V noted. Small amount of stool/ flatus present in colostomy bag.    Pain: Pain well controlled with medication    Skin Integrity: surgical incision.     Labs:   10-04    140  |  106  |  20  ----------------------------<  161<H>  3.8   |  26  |  0.62    Ca    8.7      04 Oct 2019 06:27  Phos  3.0     10-04  Mg     2.2     10-04    CAPILLARY BLOOD GLUCOSE    Medications:  MEDICATIONS  (STANDING):  amLODIPine   Tablet 5 milliGRAM(s) Oral daily  atorvastatin 10 milliGRAM(s) Oral at bedtime  cefoTEtan  IVPB 1 Gram(s) IV Intermittent every 12 hours  chlorhexidine 2% Cloths 1 Application(s) Topical <User Schedule>  docusate sodium Liquid 100 milliGRAM(s) Oral three times a day  enoxaparin Injectable 40 milliGRAM(s) SubCutaneous daily  famotidine Injectable 20 milliGRAM(s) IV Push daily  fat emulsion (Plant Based) 20% Infusion 0.621 Gm/kG/Day (20.83 mL/Hr) IV Continuous <Continuous>  influenza   Vaccine 0.5 milliLiter(s) IntraMuscular once  ketorolac   Injectable 15 milliGRAM(s) IV Push every 6 hours  pantoprazole  Injectable 40 milliGRAM(s) IV Push daily  Parenteral Nutrition - Adult 1 Each (63 mL/Hr) TPN Continuous <Continuous>  Parenteral Nutrition - Adult 1 Each (63 mL/Hr) TPN Continuous <Continuous>    MEDICATIONS  (PRN):  artificial  tears Solution 1 Drop(s) Both EYES every 4 hours PRN Dry Eyes  benzocaine 15 mG/menthol 3.6 mG (Sugar-Free) Lozenge 1 Lozenge Oral every 2 hours PRN Sore Throat  chlorproMAZINE    IVPB 25 milliGRAM(s) IV Intermittent every 8 hours PRN hiccups  HYDROmorphone  Injectable 1 milliGRAM(s) IV Push every 4 hours PRN Severe Pain (7 - 10)  HYDROmorphone  Injectable 0.5 milliGRAM(s) IV Push every 4 hours PRN Moderate Pain (4 - 6)  ketorolac 0.5% Ophthalmic Solution 1 Drop(s) Left EYE every 6 hours PRN eye pain irritation  sodium chloride 0.9% lock flush 10 milliLiter(s) IV Push every 1 hour PRN Pre/post blood products, medications, blood draw, and to maintain line patency    Weight:  9/24 77.7 kg/m2    Weight Change: No new weights recorded since admission. Recommend obtain new weight and trend weights bi-weekly.     Nutrition Focused Physical Exam: Completed [ X; Completed 9/26]  Not Pertinent [   ]  (From 9/26): Moderate muscle loss noted around clavicles and moderate fat loss noted around triceps.     Estimated energy needs:   Ht (9/24): 177.8cm, Wt (9/24): 80.5kg, IBW: 75.5kg +/-10%, %IBW: 107%, BMI: 25.5    ABW (80.5 kg) used to calculate energy needs due to pt's current body weight within % IBW. Needs adjusted post-op, suspected malnutrition, hypermetabolic state.     (30-35 kcal/kg): 6038-5046 kcal/day  (1.0-1.2 gm protein/kg): 80.5-96.6 g protein/day   (25-30 ml/kg): 5651-7480 ml/day      Subjective:   Pt seen for nutrition consult for TPN/PPN. 54 y.o M with hx HTN, HLD, rectal cancer now POD10 s/p robot assisted abdominal peritoneal resection 9/24. Pt reports decreased PO intake for ~1.5 months PTA, pt follows regular diet, pt reports allergies to shellfish, cherries, papaya, and apples (entered into computer system). Pt endorses ~20lb unintentional wt loss x1.5 months. +colostomy output (20 mL x 24hrs per flowsheet). Pt off unit for CT scan upon initial attempt and in the process of PICC line placement upon second attempt. Pt previously on clear liquids x 2 days (9/24-9/26), followed by NPO x 6 days (9/26-9/30). Pt noted w/ high NGT output (1000 mL x 24 hrs). Pt has been on TPN since 10/1, tolerating well with no complaints. Per emr, awaiting bowel function to return to start PO diet. On assessment pt is resting comfortably in bed with no complaints. Denies N/V/abd pain. Small amount of stool and gas present in colostomy bag per emr. Pt has been ambulating around unit. Plan to continue ERAS and pain/nausea control. GI: WDL, colostomy noted. Skin: surgical incisions. Pain: None noted at this time. RD to follow per protocol.     Previous Nutrition Diagnosis:  Malnutrition (suspect severe) RT decreased ability to meet EER AEB NFPE, ~10% unintentional wt loss x1.5 months, pt meeting <75% EER for >1 month.    Active [ X ]  Resolved [   ]    Goal: Diet to advance within 24-48h. Pt to consistently meet>75%EER when diet advanced with good tolerance.    Recommendations:  1) Continue with goal TPN via central line: 480g Dex, 90 g AA, 50g 20% daily Lipids to provide in total: 2492 Kcal, 1.1 g protein/kg, GIR 4.14 mg/kg/min based on ABW (80.5 kg). Fluids and lytes per MD discretion.   >> Start at:     150g Dex, 50 g 20% lipids, 90 g AA on Day 1     250g Dex, 50 g 20% lipids, 90 g AA on Day 2     350g Dex, 50 g 20% lipids, 90 g AA on Day 3 and advance to goal of:    480g Dex, 50 g 20% lipids, 90 g AA on Day 4  2) Recommend checking TG before starting TPN, check TG daily for a week, and then check TG weekly.   3) Check Mg, Phos, K daily and POC BG Q6hrs.   4) Recommend closely monitor lytes and aggressive repletion as pt at risk for refeeding syndrome.   5) Trend daily weights.     Education: RD to f/u on nutrition education once diet advances     Risk Level: High [ X  ] Moderate [   ] Low [   ].

## 2019-10-04 NOTE — PROGRESS NOTE ADULT - ASSESSMENT
Plan  - care per primary team  - cont thigh JAKUB  - keep incision c/d  - await return of bowel fxn  - will cont to follow

## 2019-10-04 NOTE — PROGRESS NOTE ADULT - ASSESSMENT
54M with HTN, HLD, rectal cancer s/p robot assisted abdominal peritoneal resection 9/24    - ERAS  - Pain/nausea control  - NPO/IVF/NGT   - Lovenox   - JAKUB x 2 (1 on abd and 1 L thigh)  - Ostomy teaching  - F/u plastics recs  - OOB/AAT 54M with HTN, HLD, rectal cancer s/p robot assisted abdominal peritoneal resection 9/24    - ERAS  - Pain/nausea control  - sips & chips/IVF  - Lovenox   - JAKUB x 2 (1 on abd and 1 L thigh)  - Ostomy teaching  - F/u plastics recs  - OOB/AAT

## 2019-10-05 LAB
ANION GAP SERPL CALC-SCNC: 11 MMOL/L — SIGNIFICANT CHANGE UP (ref 5–17)
BUN SERPL-MCNC: 19 MG/DL — SIGNIFICANT CHANGE UP (ref 7–23)
CALCIUM SERPL-MCNC: 9.3 MG/DL — SIGNIFICANT CHANGE UP (ref 8.4–10.5)
CHLORIDE SERPL-SCNC: 105 MMOL/L — SIGNIFICANT CHANGE UP (ref 96–108)
CO2 SERPL-SCNC: 26 MMOL/L — SIGNIFICANT CHANGE UP (ref 22–31)
CREAT SERPL-MCNC: 0.66 MG/DL — SIGNIFICANT CHANGE UP (ref 0.5–1.3)
GLUCOSE SERPL-MCNC: 158 MG/DL — HIGH (ref 70–99)
HCT VFR BLD CALC: 33.3 % — LOW (ref 39–50)
HGB BLD-MCNC: 10.9 G/DL — LOW (ref 13–17)
MAGNESIUM SERPL-MCNC: 2.4 MG/DL — SIGNIFICANT CHANGE UP (ref 1.6–2.6)
MCHC RBC-ENTMCNC: 30.8 PG — SIGNIFICANT CHANGE UP (ref 27–34)
MCHC RBC-ENTMCNC: 32.7 GM/DL — SIGNIFICANT CHANGE UP (ref 32–36)
MCV RBC AUTO: 94.1 FL — SIGNIFICANT CHANGE UP (ref 80–100)
NRBC # BLD: 0 /100 WBCS — SIGNIFICANT CHANGE UP (ref 0–0)
PHOSPHATE SERPL-MCNC: 3.3 MG/DL — SIGNIFICANT CHANGE UP (ref 2.5–4.5)
PLATELET # BLD AUTO: 312 K/UL — SIGNIFICANT CHANGE UP (ref 150–400)
POTASSIUM SERPL-MCNC: 4.2 MMOL/L — SIGNIFICANT CHANGE UP (ref 3.5–5.3)
POTASSIUM SERPL-SCNC: 4.2 MMOL/L — SIGNIFICANT CHANGE UP (ref 3.5–5.3)
RBC # BLD: 3.54 M/UL — LOW (ref 4.2–5.8)
RBC # FLD: 13.7 % — SIGNIFICANT CHANGE UP (ref 10.3–14.5)
SODIUM SERPL-SCNC: 142 MMOL/L — SIGNIFICANT CHANGE UP (ref 135–145)
TRIGL SERPL-MCNC: 101 MG/DL — SIGNIFICANT CHANGE UP (ref 10–149)
WBC # BLD: 10.46 K/UL — SIGNIFICANT CHANGE UP (ref 3.8–10.5)
WBC # FLD AUTO: 10.46 K/UL — SIGNIFICANT CHANGE UP (ref 3.8–10.5)

## 2019-10-05 RX ORDER — I.V. FAT EMULSION 20 G/100ML
0.62 EMULSION INTRAVENOUS
Qty: 50 | Refills: 0 | Status: DISCONTINUED | OUTPATIENT
Start: 2019-10-05 | End: 2019-10-05

## 2019-10-05 RX ORDER — ELECTROLYTE SOLUTION,INJ
1 VIAL (ML) INTRAVENOUS
Refills: 0 | Status: DISCONTINUED | OUTPATIENT
Start: 2019-10-05 | End: 2019-10-05

## 2019-10-05 RX ADMIN — Medication 15 MILLIGRAM(S): at 18:05

## 2019-10-05 RX ADMIN — Medication 15 MILLIGRAM(S): at 12:39

## 2019-10-05 RX ADMIN — ENOXAPARIN SODIUM 40 MILLIGRAM(S): 100 INJECTION SUBCUTANEOUS at 12:07

## 2019-10-05 RX ADMIN — Medication 15 MILLIGRAM(S): at 12:08

## 2019-10-05 RX ADMIN — Medication 100 MILLIGRAM(S): at 06:00

## 2019-10-05 RX ADMIN — Medication 15 MILLIGRAM(S): at 18:40

## 2019-10-05 RX ADMIN — AMLODIPINE BESYLATE 5 MILLIGRAM(S): 2.5 TABLET ORAL at 06:00

## 2019-10-05 RX ADMIN — Medication 100 MILLIGRAM(S): at 12:07

## 2019-10-05 RX ADMIN — Medication 100 GRAM(S): at 06:00

## 2019-10-05 RX ADMIN — HYDROMORPHONE HYDROCHLORIDE 1 MILLIGRAM(S): 2 INJECTION INTRAMUSCULAR; INTRAVENOUS; SUBCUTANEOUS at 03:25

## 2019-10-05 RX ADMIN — HYDROMORPHONE HYDROCHLORIDE 1 MILLIGRAM(S): 2 INJECTION INTRAMUSCULAR; INTRAVENOUS; SUBCUTANEOUS at 09:06

## 2019-10-05 RX ADMIN — Medication 100 MILLIGRAM(S): at 20:57

## 2019-10-05 RX ADMIN — FAMOTIDINE 20 MILLIGRAM(S): 10 INJECTION INTRAVENOUS at 12:08

## 2019-10-05 RX ADMIN — Medication 1 EACH: at 17:27

## 2019-10-05 RX ADMIN — BENZOCAINE AND MENTHOL 1 LOZENGE: 5; 1 LIQUID ORAL at 12:07

## 2019-10-05 RX ADMIN — HYDROMORPHONE HYDROCHLORIDE 0.5 MILLIGRAM(S): 2 INJECTION INTRAMUSCULAR; INTRAVENOUS; SUBCUTANEOUS at 17:00

## 2019-10-05 RX ADMIN — Medication 15 MILLIGRAM(S): at 06:00

## 2019-10-05 RX ADMIN — HYDROMORPHONE HYDROCHLORIDE 1 MILLIGRAM(S): 2 INJECTION INTRAMUSCULAR; INTRAVENOUS; SUBCUTANEOUS at 21:30

## 2019-10-05 RX ADMIN — Medication 15 MILLIGRAM(S): at 07:00

## 2019-10-05 RX ADMIN — HYDROMORPHONE HYDROCHLORIDE 0.5 MILLIGRAM(S): 2 INJECTION INTRAMUSCULAR; INTRAVENOUS; SUBCUTANEOUS at 16:29

## 2019-10-05 RX ADMIN — CHLORHEXIDINE GLUCONATE 1 APPLICATION(S): 213 SOLUTION TOPICAL at 06:30

## 2019-10-05 RX ADMIN — HYDROMORPHONE HYDROCHLORIDE 1 MILLIGRAM(S): 2 INJECTION INTRAMUSCULAR; INTRAVENOUS; SUBCUTANEOUS at 20:57

## 2019-10-05 RX ADMIN — I.V. FAT EMULSION 20.8 GM/KG/DAY: 20 EMULSION INTRAVENOUS at 17:27

## 2019-10-05 RX ADMIN — Medication 100 GRAM(S): at 18:05

## 2019-10-05 RX ADMIN — PANTOPRAZOLE SODIUM 40 MILLIGRAM(S): 20 TABLET, DELAYED RELEASE ORAL at 12:08

## 2019-10-05 RX ADMIN — HYDROMORPHONE HYDROCHLORIDE 1 MILLIGRAM(S): 2 INJECTION INTRAMUSCULAR; INTRAVENOUS; SUBCUTANEOUS at 02:51

## 2019-10-05 RX ADMIN — Medication 15 MILLIGRAM(S): at 01:30

## 2019-10-05 RX ADMIN — ATORVASTATIN CALCIUM 10 MILLIGRAM(S): 80 TABLET, FILM COATED ORAL at 20:57

## 2019-10-05 RX ADMIN — HYDROMORPHONE HYDROCHLORIDE 1 MILLIGRAM(S): 2 INJECTION INTRAMUSCULAR; INTRAVENOUS; SUBCUTANEOUS at 09:55

## 2019-10-05 RX ADMIN — Medication 15 MILLIGRAM(S): at 00:45

## 2019-10-05 NOTE — PROGRESS NOTE ADULT - ASSESSMENT
A/P: 55 y/o male s/p abdominal peritoneal resection with gracilis flap w/ ileus requiring TPN  - care per primary team  - cont thigh JAKUB  - keep incision c/d  - No need for xeroform over perineal incision  - will cont to follow    Jose Chavez  PGY-5  Plastic Surgery

## 2019-10-05 NOTE — PROGRESS NOTE ADULT - ASSESSMENT
54M with HTN, HLD, rectal cancer s/p robot assisted abdominal peritoneal resection 9/24    - Pain/nausea control  - NPO w/ sips and chips/IVF  - TPN  - Cefotetan  - Lovenox   - Protonix, famotidine  - JAKUB x 2 (1 on abd and 1 L thigh)  - F/u plastics recs  - PT recs home w/ home PT

## 2019-10-05 NOTE — PROGRESS NOTE ADULT - SUBJECTIVE AND OBJECTIVE BOX
INTERVAL HPI/OVERNIGHT EVENTS: Overnight the pt was tachy to 109 with exertion, which was self-limited. No other issues. He states that he is feeling less distended. Endorses a minimal gas release into ostomy. Denies n/v/cp/sob.    STATUS POST:  robotic abdominal perineal resection    POST OPERATIVE DAY #: 11    MEDICATIONS  (STANDING):  amLODIPine   Tablet 5 milliGRAM(s) Oral daily  atorvastatin 10 milliGRAM(s) Oral at bedtime  cefoTEtan  IVPB 1 Gram(s) IV Intermittent every 12 hours  chlorhexidine 2% Cloths 1 Application(s) Topical <User Schedule>  docusate sodium Liquid 100 milliGRAM(s) Oral three times a day  enoxaparin Injectable 40 milliGRAM(s) SubCutaneous daily  famotidine Injectable 20 milliGRAM(s) IV Push daily  fat emulsion (Plant Based) 20% Infusion 0.621 Gm/kG/Day (20.83 mL/Hr) IV Continuous <Continuous>  fat emulsion (Plant Based) 20% Infusion 0.621 Gm/kG/Day (20.83 mL/Hr) IV Continuous <Continuous>  influenza   Vaccine 0.5 milliLiter(s) IntraMuscular once  ketorolac   Injectable 15 milliGRAM(s) IV Push every 6 hours  pantoprazole  Injectable 40 milliGRAM(s) IV Push daily  Parenteral Nutrition - Adult 1 Each (63 mL/Hr) TPN Continuous <Continuous>  Parenteral Nutrition - Adult 1 Each (63 mL/Hr) TPN Continuous <Continuous>    MEDICATIONS  (PRN):  artificial  tears Solution 1 Drop(s) Both EYES every 4 hours PRN Dry Eyes  benzocaine 15 mG/menthol 3.6 mG (Sugar-Free) Lozenge 1 Lozenge Oral every 2 hours PRN Sore Throat  chlorproMAZINE    IVPB 25 milliGRAM(s) IV Intermittent every 8 hours PRN hiccups  HYDROmorphone  Injectable 1 milliGRAM(s) IV Push every 4 hours PRN Severe Pain (7 - 10)  HYDROmorphone  Injectable 0.5 milliGRAM(s) IV Push every 4 hours PRN Moderate Pain (4 - 6)  ketorolac 0.5% Ophthalmic Solution 1 Drop(s) Left EYE every 6 hours PRN eye pain irritation  sodium chloride 0.9% lock flush 10 milliLiter(s) IV Push every 1 hour PRN Pre/post blood products, medications, blood draw, and to maintain line patency      Vital Signs Last 24 Hrs  T(C): 36.1 (05 Oct 2019 05:25), Max: 37.6 (05 Oct 2019 00:44)  T(F): 97 (05 Oct 2019 05:25), Max: 99.7 (05 Oct 2019 00:44)  HR: 92 (05 Oct 2019 05:25) (74 - 109)  BP: 111/73 (05 Oct 2019 05:25) (96/56 - 155/96)  BP(mean): --  RR: 18 (05 Oct 2019 05:25) (15 - 18)  SpO2: 100% (05 Oct 2019 05:25) (95% - 100%)    PHYSICAL EXAM:      Constitutional: A&Ox3    Respiratory: non labored breathing, no respiratory distress    Cardiovascular: NSR, RRR    Gastrointestinal: abdomen is moderately distended, non-tender, mildly tympanitic to percussion                 Incision: c/d/i      Extremities: (-) edema                  I&O's Detail    04 Oct 2019 07:01  -  05 Oct 2019 07:00  --------------------------------------------------------  IN:    fat emulsion (Plant Based) 20% Infusion: 270.4 mL    IV PiggyBack: 100 mL    TPN (Total Parenteral Nutrition): 1386 mL  Total IN: 1756.4 mL    OUT:    Bulb: 26 mL    Bulb: 17 mL    Colostomy: 52 mL    Voided: 1550 mL  Total OUT: 1645 mL    Total NET: 111.4 mL          LABS:                        10.0   9.26  )-----------( 308      ( 04 Oct 2019 06:27 )             30.5     10-04    140  |  106  |  20  ----------------------------<  161<H>  3.8   |  26  |  0.62    Ca    8.7      04 Oct 2019 06:27  Phos  3.0     10-04  Mg     2.2     10-04            RADIOLOGY & ADDITIONAL STUDIES:

## 2019-10-05 NOTE — PROGRESS NOTE ADULT - SUBJECTIVE AND OBJECTIVE BOX
Plastic Surgery Progress Note (pg [facility]: [id number], [facility]: [phone], [facility]: [phone])    SUBJECTIVE:  Doing well. No overnight events. Still c/o some abdominal pain but the medications work to alleviate. Ambulating w/o issue. Denies leg pain.     OBJECTIVE:     ** VITAL SIGNS / I&O's **    Vital Signs Last 24 Hrs  T(C): 36.1 (05 Oct 2019 05:25), Max: 37.6 (05 Oct 2019 00:44)  T(F): 97 (05 Oct 2019 05:25), Max: 99.7 (05 Oct 2019 00:44)  HR: 92 (05 Oct 2019 05:25) (75 - 109)  BP: 111/73 (05 Oct 2019 05:25) (96/56 - 155/96)  BP(mean): --  RR: 18 (05 Oct 2019 05:25) (16 - 18)  SpO2: 100% (05 Oct 2019 05:25) (95% - 100%)      04 Oct 2019 07:01  -  05 Oct 2019 07:00  --------------------------------------------------------  IN:    fat emulsion (Plant Based) 20% Infusion: 270.4 mL    IV PiggyBack: 100 mL    TPN (Total Parenteral Nutrition): 1386 mL  Total IN: 1756.4 mL    OUT:    Bulb: 26 mL    Bulb: 17 mL    Colostomy: 52 mL    Voided: 1550 mL  Total OUT: 1645 mL    Total NET: 111.4 mL          ** PHYSICAL EXAM **    -- CONSTITUTIONAL: AOx3. NAD.   -- ABDOMEN: Some distension, JKAUB ss  -- BUTTOCK: Incision c/d/i, no breakdown, no signs of infection  -- EXTREMITIES: L thigh incision c/d/i, no collections, JAKUB serous      **MEDS**  amLODIPine   Tablet 5 milliGRAM(s) Oral daily  artificial  tears Solution 1 Drop(s) Both EYES every 4 hours PRN  atorvastatin 10 milliGRAM(s) Oral at bedtime  benzocaine 15 mG/menthol 3.6 mG (Sugar-Free) Lozenge 1 Lozenge Oral every 2 hours PRN  cefoTEtan  IVPB 1 Gram(s) IV Intermittent every 12 hours  chlorhexidine 2% Cloths 1 Application(s) Topical <User Schedule>  chlorproMAZINE    IVPB 25 milliGRAM(s) IV Intermittent every 8 hours PRN  docusate sodium Liquid 100 milliGRAM(s) Oral three times a day  enoxaparin Injectable 40 milliGRAM(s) SubCutaneous daily  famotidine Injectable 20 milliGRAM(s) IV Push daily  fat emulsion (Plant Based) 20% Infusion 0.621 Gm/kG/Day IV Continuous <Continuous>  fat emulsion (Plant Based) 20% Infusion 0.621 Gm/kG/Day IV Continuous <Continuous>  HYDROmorphone  Injectable 1 milliGRAM(s) IV Push every 4 hours PRN  HYDROmorphone  Injectable 0.5 milliGRAM(s) IV Push every 4 hours PRN  influenza   Vaccine 0.5 milliLiter(s) IntraMuscular once  ketorolac   Injectable 15 milliGRAM(s) IV Push every 6 hours  ketorolac 0.5% Ophthalmic Solution 1 Drop(s) Left EYE every 6 hours PRN  pantoprazole  Injectable 40 milliGRAM(s) IV Push daily  Parenteral Nutrition - Adult 1 Each TPN Continuous <Continuous>  Parenteral Nutrition - Adult 1 Each TPN Continuous <Continuous>  sodium chloride 0.9% lock flush 10 milliLiter(s) IV Push every 1 hour PRN      ** LABS **                          10.9   10.46 )-----------( 312      ( 05 Oct 2019 08:34 )             33.3     04 Oct 2019 06:27    140    |  106    |  20     ----------------------------<  161    3.8     |  26     |  0.62     Ca    8.7        04 Oct 2019 06:27  Phos  3.0       04 Oct 2019 06:27  Mg     2.2       04 Oct 2019 06:27        CAPILLARY BLOOD GLUCOSE

## 2019-10-06 LAB
ANION GAP SERPL CALC-SCNC: 11 MMOL/L — SIGNIFICANT CHANGE UP (ref 5–17)
BUN SERPL-MCNC: 17 MG/DL — SIGNIFICANT CHANGE UP (ref 7–23)
CALCIUM SERPL-MCNC: 9 MG/DL — SIGNIFICANT CHANGE UP (ref 8.4–10.5)
CHLORIDE SERPL-SCNC: 102 MMOL/L — SIGNIFICANT CHANGE UP (ref 96–108)
CO2 SERPL-SCNC: 26 MMOL/L — SIGNIFICANT CHANGE UP (ref 22–31)
CREAT SERPL-MCNC: 0.63 MG/DL — SIGNIFICANT CHANGE UP (ref 0.5–1.3)
GLUCOSE SERPL-MCNC: 157 MG/DL — HIGH (ref 70–99)
HCT VFR BLD CALC: 33.9 % — LOW (ref 39–50)
HGB BLD-MCNC: 11.2 G/DL — LOW (ref 13–17)
MAGNESIUM SERPL-MCNC: 2.2 MG/DL — SIGNIFICANT CHANGE UP (ref 1.6–2.6)
MCHC RBC-ENTMCNC: 30.6 PG — SIGNIFICANT CHANGE UP (ref 27–34)
MCHC RBC-ENTMCNC: 33 GM/DL — SIGNIFICANT CHANGE UP (ref 32–36)
MCV RBC AUTO: 92.6 FL — SIGNIFICANT CHANGE UP (ref 80–100)
NRBC # BLD: 0 /100 WBCS — SIGNIFICANT CHANGE UP (ref 0–0)
PHOSPHATE SERPL-MCNC: 3.3 MG/DL — SIGNIFICANT CHANGE UP (ref 2.5–4.5)
PLATELET # BLD AUTO: 346 K/UL — SIGNIFICANT CHANGE UP (ref 150–400)
POTASSIUM SERPL-MCNC: 4.3 MMOL/L — SIGNIFICANT CHANGE UP (ref 3.5–5.3)
POTASSIUM SERPL-SCNC: 4.3 MMOL/L — SIGNIFICANT CHANGE UP (ref 3.5–5.3)
RBC # BLD: 3.66 M/UL — LOW (ref 4.2–5.8)
RBC # FLD: 13.6 % — SIGNIFICANT CHANGE UP (ref 10.3–14.5)
SODIUM SERPL-SCNC: 139 MMOL/L — SIGNIFICANT CHANGE UP (ref 135–145)
WBC # BLD: 12.21 K/UL — HIGH (ref 3.8–10.5)
WBC # FLD AUTO: 12.21 K/UL — HIGH (ref 3.8–10.5)

## 2019-10-06 PROCEDURE — 74019 RADEX ABDOMEN 2 VIEWS: CPT | Mod: 26

## 2019-10-06 RX ORDER — SIMETHICONE 80 MG/1
80 TABLET, CHEWABLE ORAL DAILY
Refills: 0 | Status: DISCONTINUED | OUTPATIENT
Start: 2019-10-06 | End: 2019-10-14

## 2019-10-06 RX ORDER — ELECTROLYTE SOLUTION,INJ
1 VIAL (ML) INTRAVENOUS
Refills: 0 | Status: DISCONTINUED | OUTPATIENT
Start: 2019-10-06 | End: 2019-10-06

## 2019-10-06 RX ORDER — I.V. FAT EMULSION 20 G/100ML
0.62 EMULSION INTRAVENOUS
Qty: 50 | Refills: 0 | Status: DISCONTINUED | OUTPATIENT
Start: 2019-10-06 | End: 2019-10-06

## 2019-10-06 RX ORDER — ACETAMINOPHEN 500 MG
650 TABLET ORAL ONCE
Refills: 0 | Status: COMPLETED | OUTPATIENT
Start: 2019-10-06 | End: 2019-10-06

## 2019-10-06 RX ADMIN — I.V. FAT EMULSION 20.8 GM/KG/DAY: 20 EMULSION INTRAVENOUS at 17:19

## 2019-10-06 RX ADMIN — PANTOPRAZOLE SODIUM 40 MILLIGRAM(S): 20 TABLET, DELAYED RELEASE ORAL at 12:10

## 2019-10-06 RX ADMIN — HYDROMORPHONE HYDROCHLORIDE 1 MILLIGRAM(S): 2 INJECTION INTRAMUSCULAR; INTRAVENOUS; SUBCUTANEOUS at 15:13

## 2019-10-06 RX ADMIN — HYDROMORPHONE HYDROCHLORIDE 1 MILLIGRAM(S): 2 INJECTION INTRAMUSCULAR; INTRAVENOUS; SUBCUTANEOUS at 05:38

## 2019-10-06 RX ADMIN — Medication 100 MILLIGRAM(S): at 21:23

## 2019-10-06 RX ADMIN — HYDROMORPHONE HYDROCHLORIDE 1 MILLIGRAM(S): 2 INJECTION INTRAMUSCULAR; INTRAVENOUS; SUBCUTANEOUS at 02:00

## 2019-10-06 RX ADMIN — Medication 650 MILLIGRAM(S): at 10:10

## 2019-10-06 RX ADMIN — Medication 100 MILLIGRAM(S): at 05:38

## 2019-10-06 RX ADMIN — Medication 100 GRAM(S): at 05:38

## 2019-10-06 RX ADMIN — CHLORHEXIDINE GLUCONATE 1 APPLICATION(S): 213 SOLUTION TOPICAL at 07:19

## 2019-10-06 RX ADMIN — HYDROMORPHONE HYDROCHLORIDE 1 MILLIGRAM(S): 2 INJECTION INTRAMUSCULAR; INTRAVENOUS; SUBCUTANEOUS at 14:33

## 2019-10-06 RX ADMIN — Medication 650 MILLIGRAM(S): at 09:31

## 2019-10-06 RX ADMIN — Medication 1 EACH: at 17:20

## 2019-10-06 RX ADMIN — AMLODIPINE BESYLATE 5 MILLIGRAM(S): 2.5 TABLET ORAL at 05:38

## 2019-10-06 RX ADMIN — HYDROMORPHONE HYDROCHLORIDE 1 MILLIGRAM(S): 2 INJECTION INTRAMUSCULAR; INTRAVENOUS; SUBCUTANEOUS at 21:39

## 2019-10-06 RX ADMIN — FAMOTIDINE 20 MILLIGRAM(S): 10 INJECTION INTRAVENOUS at 12:10

## 2019-10-06 RX ADMIN — SIMETHICONE 80 MILLIGRAM(S): 80 TABLET, CHEWABLE ORAL at 12:10

## 2019-10-06 RX ADMIN — Medication 100 GRAM(S): at 17:50

## 2019-10-06 RX ADMIN — HYDROMORPHONE HYDROCHLORIDE 1 MILLIGRAM(S): 2 INJECTION INTRAMUSCULAR; INTRAVENOUS; SUBCUTANEOUS at 21:23

## 2019-10-06 RX ADMIN — HYDROMORPHONE HYDROCHLORIDE 1 MILLIGRAM(S): 2 INJECTION INTRAMUSCULAR; INTRAVENOUS; SUBCUTANEOUS at 06:22

## 2019-10-06 RX ADMIN — HYDROMORPHONE HYDROCHLORIDE 1 MILLIGRAM(S): 2 INJECTION INTRAMUSCULAR; INTRAVENOUS; SUBCUTANEOUS at 01:30

## 2019-10-06 RX ADMIN — Medication 100 MILLIGRAM(S): at 12:10

## 2019-10-06 RX ADMIN — ATORVASTATIN CALCIUM 10 MILLIGRAM(S): 80 TABLET, FILM COATED ORAL at 21:23

## 2019-10-06 RX ADMIN — ENOXAPARIN SODIUM 40 MILLIGRAM(S): 100 INJECTION SUBCUTANEOUS at 12:09

## 2019-10-06 NOTE — PROVIDER CONTACT NOTE (OTHER) - ASSESSMENT
Pt stated he has shooting pain across his abdomen that would come and go and last intermittently. As per pt, pt has been ambulating in hallway during the day.
Pt's HR is ranging between 106-114 bpm. At rest pt's HR is trending below 110 bpm. Pt denied chest pain or sob. Pain level 3/10 in abdomen after receiving dilaudid 1mg IV at 1937.
Pt's HR went as high as 136 while standing in bathroom. Pt denied chest pain, sob, or lightheadedness.
A/Ox4

## 2019-10-06 NOTE — PROGRESS NOTE ADULT - SUBJECTIVE AND OBJECTIVE BOX
Plastic Surgery Progress Note (pg [facility]: [id number], [facility]: [phone], [facility]: [phone])    SUBJECTIVE:  Doing well. No overnight events.     OBJECTIVE:     ** VITAL SIGNS / I&O's **    Vital Signs Last 24 Hrs  T(C): 37 (06 Oct 2019 04:49), Max: 37 (06 Oct 2019 04:49)  T(F): 98.6 (06 Oct 2019 04:49), Max: 98.6 (06 Oct 2019 04:49)  HR: 84 (06 Oct 2019 04:49) (81 - 85)  BP: 122/78 (06 Oct 2019 04:49) (122/78 - 138/83)  BP(mean): --  RR: 16 (06 Oct 2019 04:49) (16 - 16)  SpO2: 99% (06 Oct 2019 04:49) (99% - 100%)      05 Oct 2019 07:01  -  06 Oct 2019 07:00  --------------------------------------------------------  IN:    fat emulsion (Plant Based) 20% Infusion: 270.4 mL    IV PiggyBack: 100 mL    TPN (Total Parenteral Nutrition): 1449 mL  Total IN: 1819.4 mL    OUT:    Bulb: 85 mL    Bulb: 22.5 mL    Colostomy: 178 mL    Voided: 1320 mL  Total OUT: 1605.5 mL    Total NET: 213.9 mL          ** PHYSICAL EXAM **    -- CONSTITUTIONAL: AOx3. NAD.   -- ABDOMEN: Soft, less distended, drain site c/d/i, ss output  -- EXTREMITIES: L thigh incision c/d/i, no collections; JAKUB ss  -- PERINEUM: incision c/d/i; at the most cephalad portion of the incision, the right skin edge is not perfectly aligned with the left; there is no dehiscence, but there is some skin edge that is exposed; no signs of breakdown or infection    **MEDS**  amLODIPine   Tablet 5 milliGRAM(s) Oral daily  artificial  tears Solution 1 Drop(s) Both EYES every 4 hours PRN  atorvastatin 10 milliGRAM(s) Oral at bedtime  benzocaine 15 mG/menthol 3.6 mG (Sugar-Free) Lozenge 1 Lozenge Oral every 2 hours PRN  cefoTEtan  IVPB 1 Gram(s) IV Intermittent every 12 hours  chlorhexidine 2% Cloths 1 Application(s) Topical <User Schedule>  chlorproMAZINE    IVPB 25 milliGRAM(s) IV Intermittent every 8 hours PRN  docusate sodium Liquid 100 milliGRAM(s) Oral three times a day  enoxaparin Injectable 40 milliGRAM(s) SubCutaneous daily  famotidine Injectable 20 milliGRAM(s) IV Push daily  fat emulsion (Plant Based) 20% Infusion 0.621 Gm/kG/Day IV Continuous <Continuous>  HYDROmorphone  Injectable 1 milliGRAM(s) IV Push every 4 hours PRN  HYDROmorphone  Injectable 0.5 milliGRAM(s) IV Push every 4 hours PRN  influenza   Vaccine 0.5 milliLiter(s) IntraMuscular once  ketorolac 0.5% Ophthalmic Solution 1 Drop(s) Left EYE every 6 hours PRN  pantoprazole  Injectable 40 milliGRAM(s) IV Push daily  Parenteral Nutrition - Adult 1 Each TPN Continuous <Continuous>  sodium chloride 0.9% lock flush 10 milliLiter(s) IV Push every 1 hour PRN      ** LABS **                          11.2   12.21 )-----------( 346      ( 06 Oct 2019 07:43 )             33.9     06 Oct 2019 07:43    139    |  102    |  17     ----------------------------<  157    4.3     |  26     |  0.63     Ca    9.0        06 Oct 2019 07:43  Phos  3.3       06 Oct 2019 07:43  Mg     2.2       06 Oct 2019 07:43        CAPILLARY BLOOD GLUCOSE

## 2019-10-06 NOTE — PROGRESS NOTE ADULT - SUBJECTIVE AND OBJECTIVE BOX
INTERVAL HPI/OVERNIGHT EVENTS: Overnight the pt reports that he felt a sense of increased pressure and subsequent pain in his abdomen, however states he feels he is somewhat less bloated than yesterday. He is having increased output into his ostomy. Denies N/V, chest pain, SOB.    STATUS POST:  robotic abdominal perineal resection    POST OPERATIVE DAY #: 12        MEDICATIONS  (STANDING):  amLODIPine   Tablet 5 milliGRAM(s) Oral daily  atorvastatin 10 milliGRAM(s) Oral at bedtime  cefoTEtan  IVPB 1 Gram(s) IV Intermittent every 12 hours  chlorhexidine 2% Cloths 1 Application(s) Topical <User Schedule>  docusate sodium Liquid 100 milliGRAM(s) Oral three times a day  enoxaparin Injectable 40 milliGRAM(s) SubCutaneous daily  famotidine Injectable 20 milliGRAM(s) IV Push daily  fat emulsion (Plant Based) 20% Infusion 0.621 Gm/kG/Day (20.8 mL/Hr) IV Continuous <Continuous>  influenza   Vaccine 0.5 milliLiter(s) IntraMuscular once  pantoprazole  Injectable 40 milliGRAM(s) IV Push daily  Parenteral Nutrition - Adult 1 Each (63 mL/Hr) TPN Continuous <Continuous>    MEDICATIONS  (PRN):  artificial  tears Solution 1 Drop(s) Both EYES every 4 hours PRN Dry Eyes  benzocaine 15 mG/menthol 3.6 mG (Sugar-Free) Lozenge 1 Lozenge Oral every 2 hours PRN Sore Throat  chlorproMAZINE    IVPB 25 milliGRAM(s) IV Intermittent every 8 hours PRN hiccups  HYDROmorphone  Injectable 1 milliGRAM(s) IV Push every 4 hours PRN Severe Pain (7 - 10)  HYDROmorphone  Injectable 0.5 milliGRAM(s) IV Push every 4 hours PRN Moderate Pain (4 - 6)  ketorolac 0.5% Ophthalmic Solution 1 Drop(s) Left EYE every 6 hours PRN eye pain irritation  sodium chloride 0.9% lock flush 10 milliLiter(s) IV Push every 1 hour PRN Pre/post blood products, medications, blood draw, and to maintain line patency        Vital Signs Last 24 Hrs  T(C): 37 (06 Oct 2019 04:49), Max: 37.1 (05 Oct 2019 08:50)  T(F): 98.6 (06 Oct 2019 04:49), Max: 98.7 (05 Oct 2019 08:50)  HR: 84 (06 Oct 2019 04:49) (81 - 88)  BP: 122/78 (06 Oct 2019 04:49) (122/78 - 138/83)  RR: 16 (06 Oct 2019 04:49) (16 - 16)  SpO2: 99% (06 Oct 2019 04:49) (98% - 100%)        PHYSICAL EXAM:    General: Resting comfortably, NAD.  Respiratory: non labored breathing, no respiratory distress  Gastrointestinal: Abdomen is soft, moderately distended, non-tender, mildly tympanitic to percussion. Ostomy with stoma pink and patent, moderate amount of gas and stool output. Incision c/d/i. R abdominal JAKUB with serosanguinous output. Anal wound c/d/i.   Extremities: WWP, no edema, SCDs in place. LUE PICC in place. Thigh flap incision c/d/i, no surrounding erythema. L thigh JAKUB with minimal serosanguinous output.        I&O's Detail    05 Oct 2019 07:01  -  06 Oct 2019 07:00  --------------------------------------------------------  IN:    fat emulsion (Plant Based) 20% Infusion: 270.4 mL    IV PiggyBack: 100 mL    TPN (Total Parenteral Nutrition): 1449 mL  Total IN: 1819.4 mL    OUT:    Bulb: 85 mL    Bulb: 22.5 mL    Colostomy: 178 mL    Voided: 1320 mL  Total OUT: 1605.5 mL    Total NET: 213.9 mL            LABS:       10-05    142  |  105  |  19  ----------------------------<  158<H>  4.2   |  26  |  0.66    Ca    9.3      05 Oct 2019 08:34  Phos  3.3     10-05  Mg     2.4     10-05                            10.9   10.46 )-----------( 312      ( 05 Oct 2019 08:34 )             33.3 INTERVAL HPI/OVERNIGHT EVENTS: Overnight the pt reports that he felt a sense of increased pressure and subsequent pain in his abdomen, however states he feels he is somewhat less bloated than yesterday. He is having increased output into his ostomy. Denies N/V, chest pain, SOB.    STATUS POST:  robotic abdominal perineal resection    POST OPERATIVE DAY #: 12        MEDICATIONS  (STANDING):  amLODIPine   Tablet 5 milliGRAM(s) Oral daily  atorvastatin 10 milliGRAM(s) Oral at bedtime  cefoTEtan  IVPB 1 Gram(s) IV Intermittent every 12 hours  chlorhexidine 2% Cloths 1 Application(s) Topical <User Schedule>  docusate sodium Liquid 100 milliGRAM(s) Oral three times a day  enoxaparin Injectable 40 milliGRAM(s) SubCutaneous daily  famotidine Injectable 20 milliGRAM(s) IV Push daily  fat emulsion (Plant Based) 20% Infusion 0.621 Gm/kG/Day (20.8 mL/Hr) IV Continuous <Continuous>  influenza   Vaccine 0.5 milliLiter(s) IntraMuscular once  pantoprazole  Injectable 40 milliGRAM(s) IV Push daily  Parenteral Nutrition - Adult 1 Each (63 mL/Hr) TPN Continuous <Continuous>    MEDICATIONS  (PRN):  artificial  tears Solution 1 Drop(s) Both EYES every 4 hours PRN Dry Eyes  benzocaine 15 mG/menthol 3.6 mG (Sugar-Free) Lozenge 1 Lozenge Oral every 2 hours PRN Sore Throat  chlorproMAZINE    IVPB 25 milliGRAM(s) IV Intermittent every 8 hours PRN hiccups  HYDROmorphone  Injectable 1 milliGRAM(s) IV Push every 4 hours PRN Severe Pain (7 - 10)  HYDROmorphone  Injectable 0.5 milliGRAM(s) IV Push every 4 hours PRN Moderate Pain (4 - 6)  ketorolac 0.5% Ophthalmic Solution 1 Drop(s) Left EYE every 6 hours PRN eye pain irritation  sodium chloride 0.9% lock flush 10 milliLiter(s) IV Push every 1 hour PRN Pre/post blood products, medications, blood draw, and to maintain line patency        Vital Signs Last 24 Hrs  T(C): 37 (06 Oct 2019 04:49), Max: 37.1 (05 Oct 2019 08:50)  T(F): 98.6 (06 Oct 2019 04:49), Max: 98.7 (05 Oct 2019 08:50)  HR: 84 (06 Oct 2019 04:49) (81 - 88)  BP: 122/78 (06 Oct 2019 04:49) (122/78 - 138/83)  RR: 16 (06 Oct 2019 04:49) (16 - 16)  SpO2: 99% (06 Oct 2019 04:49) (98% - 100%)        PHYSICAL EXAM:    General: Resting comfortably, NAD.  Respiratory: non labored breathing, no respiratory distress  Gastrointestinal: Abdomen is soft, moderately distended, non-tender, mildly tympanitic to percussion. Ostomy with stoma pink and patent, moderate amount of gas and stool output. Incision c/d/i. R abdominal JAKUB with serosanguinous output. Anal wound c/d/i.   Extremities: WWP, no edema, SCDs in place. LUE PICC in place. Thigh flap incision c/d/i, no surrounding erythema. L thigh JAKUB with minimal serosanguinous output.        I&O's Detail    05 Oct 2019 07:01  -  06 Oct 2019 07:00  --------------------------------------------------------  IN:    fat emulsion (Plant Based) 20% Infusion: 270.4 mL    IV PiggyBack: 100 mL    TPN (Total Parenteral Nutrition): 1449 mL  Total IN: 1819.4 mL    OUT:    Bulb: 85 mL    Bulb: 22.5 mL    Colostomy: 178 mL    Voided: 1320 mL  Total OUT: 1605.5 mL    Total NET: 213.9 mL            LABS:       10-05    142  |  105  |  19  ----------------------------<  158<H>  4.2   |  26  |  0.66    Ca    9.3      05 Oct 2019 08:34  Phos  3.3     10-05  Mg     2.4     10-05                            11.2   12.21 )-----------( 346      ( 06 Oct 2019 07:43 )             33.9

## 2019-10-06 NOTE — PROVIDER CONTACT NOTE (OTHER) - ACTION/TREATMENT ORDERED:
No intervention at this time. Will continue to monitor.
No intervention at this time. Will continue to monitor.
MD Priti stated she will look into seeing if pt can get simethicone.

## 2019-10-06 NOTE — PROGRESS NOTE ADULT - ASSESSMENT
54M with HTN, HLD, rectal cancer s/p robot-assisted abdominal peritoneal resection 9/24. Given no significant improvement in bowel function, plan to continue current management and monitor for changes.    - Pain/nausea control  - NPO w/ sips and chips/IVF  - TPN  - Cefotetan  - Lovenox   - Protonix, famotidine  - JAKUB x 2 (1 on abd and 1 L thigh)  - Encourage OOB/AAT  - F/u plastics recs  - PT recs home w/ home PT

## 2019-10-07 LAB
ALBUMIN SERPL ELPH-MCNC: 3.6 G/DL — SIGNIFICANT CHANGE UP (ref 3.3–5)
ALP SERPL-CCNC: 131 U/L — HIGH (ref 40–120)
ALT FLD-CCNC: 83 U/L — HIGH (ref 10–45)
ANION GAP SERPL CALC-SCNC: 11 MMOL/L — SIGNIFICANT CHANGE UP (ref 5–17)
AST SERPL-CCNC: 41 U/L — HIGH (ref 10–40)
BILIRUB SERPL-MCNC: 0.9 MG/DL — SIGNIFICANT CHANGE UP (ref 0.2–1.2)
BUN SERPL-MCNC: 17 MG/DL — SIGNIFICANT CHANGE UP (ref 7–23)
CALCIUM SERPL-MCNC: 9.4 MG/DL — SIGNIFICANT CHANGE UP (ref 8.4–10.5)
CHLORIDE SERPL-SCNC: 102 MMOL/L — SIGNIFICANT CHANGE UP (ref 96–108)
CO2 SERPL-SCNC: 25 MMOL/L — SIGNIFICANT CHANGE UP (ref 22–31)
CREAT SERPL-MCNC: 0.71 MG/DL — SIGNIFICANT CHANGE UP (ref 0.5–1.3)
EXTRA LAVENDER TOP TUBE: SIGNIFICANT CHANGE UP
GLUCOSE SERPL-MCNC: 156 MG/DL — HIGH (ref 70–99)
HCT VFR BLD CALC: 31 % — LOW (ref 39–50)
HGB BLD-MCNC: 10.4 G/DL — LOW (ref 13–17)
MAGNESIUM SERPL-MCNC: 2.1 MG/DL — SIGNIFICANT CHANGE UP (ref 1.6–2.6)
MCHC RBC-ENTMCNC: 31 PG — SIGNIFICANT CHANGE UP (ref 27–34)
MCHC RBC-ENTMCNC: 33.5 GM/DL — SIGNIFICANT CHANGE UP (ref 32–36)
MCV RBC AUTO: 92.3 FL — SIGNIFICANT CHANGE UP (ref 80–100)
NRBC # BLD: 0 /100 WBCS — SIGNIFICANT CHANGE UP (ref 0–0)
PHOSPHATE SERPL-MCNC: 3.4 MG/DL — SIGNIFICANT CHANGE UP (ref 2.5–4.5)
PLATELET # BLD AUTO: 352 K/UL — SIGNIFICANT CHANGE UP (ref 150–400)
POTASSIUM SERPL-MCNC: 4 MMOL/L — SIGNIFICANT CHANGE UP (ref 3.5–5.3)
POTASSIUM SERPL-SCNC: 4 MMOL/L — SIGNIFICANT CHANGE UP (ref 3.5–5.3)
PROT SERPL-MCNC: 6.4 G/DL — SIGNIFICANT CHANGE UP (ref 6–8.3)
RBC # BLD: 3.36 M/UL — LOW (ref 4.2–5.8)
RBC # FLD: 13.6 % — SIGNIFICANT CHANGE UP (ref 10.3–14.5)
SODIUM SERPL-SCNC: 138 MMOL/L — SIGNIFICANT CHANGE UP (ref 135–145)
TRIGL SERPL-MCNC: 80 MG/DL — SIGNIFICANT CHANGE UP (ref 10–149)
WBC # BLD: 11.15 K/UL — HIGH (ref 3.8–10.5)
WBC # FLD AUTO: 11.15 K/UL — HIGH (ref 3.8–10.5)

## 2019-10-07 PROCEDURE — 74019 RADEX ABDOMEN 2 VIEWS: CPT | Mod: 26

## 2019-10-07 RX ORDER — I.V. FAT EMULSION 20 G/100ML
0.62 EMULSION INTRAVENOUS
Qty: 50 | Refills: 0 | Status: DISCONTINUED | OUTPATIENT
Start: 2019-10-07 | End: 2019-10-07

## 2019-10-07 RX ORDER — ELECTROLYTE SOLUTION,INJ
1 VIAL (ML) INTRAVENOUS
Refills: 0 | Status: DISCONTINUED | OUTPATIENT
Start: 2019-10-07 | End: 2019-10-07

## 2019-10-07 RX ORDER — ACETAMINOPHEN 500 MG
650 TABLET ORAL EVERY 6 HOURS
Refills: 0 | Status: DISCONTINUED | OUTPATIENT
Start: 2019-10-07 | End: 2019-10-14

## 2019-10-07 RX ORDER — HYDROMORPHONE HYDROCHLORIDE 2 MG/ML
0.5 INJECTION INTRAMUSCULAR; INTRAVENOUS; SUBCUTANEOUS EVERY 6 HOURS
Refills: 0 | Status: DISCONTINUED | OUTPATIENT
Start: 2019-10-07 | End: 2019-10-08

## 2019-10-07 RX ADMIN — AMLODIPINE BESYLATE 5 MILLIGRAM(S): 2.5 TABLET ORAL at 05:20

## 2019-10-07 RX ADMIN — Medication 100 MILLIGRAM(S): at 20:37

## 2019-10-07 RX ADMIN — PANTOPRAZOLE SODIUM 40 MILLIGRAM(S): 20 TABLET, DELAYED RELEASE ORAL at 13:05

## 2019-10-07 RX ADMIN — I.V. FAT EMULSION 20.83 GM/KG/DAY: 20 EMULSION INTRAVENOUS at 18:03

## 2019-10-07 RX ADMIN — HYDROMORPHONE HYDROCHLORIDE 1 MILLIGRAM(S): 2 INJECTION INTRAMUSCULAR; INTRAVENOUS; SUBCUTANEOUS at 01:49

## 2019-10-07 RX ADMIN — ATORVASTATIN CALCIUM 10 MILLIGRAM(S): 80 TABLET, FILM COATED ORAL at 20:37

## 2019-10-07 RX ADMIN — FAMOTIDINE 20 MILLIGRAM(S): 10 INJECTION INTRAVENOUS at 13:05

## 2019-10-07 RX ADMIN — HYDROMORPHONE HYDROCHLORIDE 1 MILLIGRAM(S): 2 INJECTION INTRAMUSCULAR; INTRAVENOUS; SUBCUTANEOUS at 19:16

## 2019-10-07 RX ADMIN — Medication 100 MILLIGRAM(S): at 13:19

## 2019-10-07 RX ADMIN — Medication 100 GRAM(S): at 05:20

## 2019-10-07 RX ADMIN — HYDROMORPHONE HYDROCHLORIDE 1 MILLIGRAM(S): 2 INJECTION INTRAMUSCULAR; INTRAVENOUS; SUBCUTANEOUS at 06:08

## 2019-10-07 RX ADMIN — HYDROMORPHONE HYDROCHLORIDE 1 MILLIGRAM(S): 2 INJECTION INTRAMUSCULAR; INTRAVENOUS; SUBCUTANEOUS at 19:42

## 2019-10-07 RX ADMIN — HYDROMORPHONE HYDROCHLORIDE 1 MILLIGRAM(S): 2 INJECTION INTRAMUSCULAR; INTRAVENOUS; SUBCUTANEOUS at 10:10

## 2019-10-07 RX ADMIN — HYDROMORPHONE HYDROCHLORIDE 1 MILLIGRAM(S): 2 INJECTION INTRAMUSCULAR; INTRAVENOUS; SUBCUTANEOUS at 02:30

## 2019-10-07 RX ADMIN — Medication 1 EACH: at 18:03

## 2019-10-07 RX ADMIN — HYDROMORPHONE HYDROCHLORIDE 1 MILLIGRAM(S): 2 INJECTION INTRAMUSCULAR; INTRAVENOUS; SUBCUTANEOUS at 15:30

## 2019-10-07 RX ADMIN — Medication 100 GRAM(S): at 18:03

## 2019-10-07 RX ADMIN — HYDROMORPHONE HYDROCHLORIDE 1 MILLIGRAM(S): 2 INJECTION INTRAMUSCULAR; INTRAVENOUS; SUBCUTANEOUS at 15:07

## 2019-10-07 RX ADMIN — ENOXAPARIN SODIUM 40 MILLIGRAM(S): 100 INJECTION SUBCUTANEOUS at 13:19

## 2019-10-07 RX ADMIN — CHLORHEXIDINE GLUCONATE 1 APPLICATION(S): 213 SOLUTION TOPICAL at 05:20

## 2019-10-07 RX ADMIN — HYDROMORPHONE HYDROCHLORIDE 1 MILLIGRAM(S): 2 INJECTION INTRAMUSCULAR; INTRAVENOUS; SUBCUTANEOUS at 10:30

## 2019-10-07 RX ADMIN — Medication 100 MILLIGRAM(S): at 05:20

## 2019-10-07 RX ADMIN — HYDROMORPHONE HYDROCHLORIDE 1 MILLIGRAM(S): 2 INJECTION INTRAMUSCULAR; INTRAVENOUS; SUBCUTANEOUS at 06:32

## 2019-10-07 NOTE — ADVANCED PRACTICE NURSE CONSULT - ASSESSMENT
New 1 piece 2 3/4" Liu appliance placed over stoma by pt's sister with patient observing. Minimal output noted in old appliance, abdomen still distended. Denies pain, states he will perform next appliance change.

## 2019-10-07 NOTE — PROGRESS NOTE ADULT - ASSESSMENT
Assessment/Plan:  54M with HTN, HLD, rectal cancer s/p robot assisted abdominal peritoneal resection 9/24    - Pain/nausea control  - NPO w/ sips and chips/IVF  - TPN  - Cefotetan  - Lovenox   - Protonix, famotidine  - JAKUB x 2 (1 on abd and 1 L thigh)  - Encourage OOB/AAT  - F/u plastics recs  - PT recs home w/ home PT

## 2019-10-07 NOTE — PROGRESS NOTE ADULT - SUBJECTIVE AND OBJECTIVE BOX
Post-Op Day #: 13  Procedure/Dx/Injuries: robotic abdominal perineal resection 9/24    Subjective: Pt seen and examined, states his pain is much improved since yesterday and that the ostomy output is also significantly increased compared to last week.       Vitals: T(F): 97.6 (10-07-19 @ 05:19), Max: 99.1 (10-06-19 @ 17:38)  HR: 96 (10-07-19 @ 05:19)  BP: 120/57 (10-07-19 @ 05:19)  RR: 16 (10-07-19 @ 05:19)  SpO2: 100% (10-07-19 @ 05:19)        Diet, NPO:   Except Medications  With Ice Chips/Sips of Water      10-06-19 @ 07:01  -  10-07-19 @ 07:00  --------------------------------------------------------  IN:    fat emulsion (Plant Based) 20% Infusion: 270.4 mL    IV PiggyBack: 100 mL    TPN (Total Parenteral Nutrition): 1512 mL  Total IN: 1882.4 mL    OUT:    Bulb: 34 mL    Bulb: 150 mL    Colostomy: 250 mL    Voided: 1460 mL  Total OUT: 1894 mL    Total NET: -11.6 mL      PHYSICAL EXAM  General: NAD. Resting comfortably.  Pulmonary: Non-labored breathing. No respiratory distress.  Abd: Soft , non-distended , mild TTP around incision site, incision clean dry and intact. Ostomy w/ stoma pink and patent, stool and gas in colostomy bag. R abdominal JAKUB with serosanguinous output. Anal wound c/d/i.   Extrem: WWP, no edema, SCDs in place. LUE PICC in place. Thigh flap incision c/d/i, no surrounding erythema. L thigh JAKUB with minimal serosanguinous output.        MEDICATIONS  (STANDING):  amLODIPine   Tablet 5 milliGRAM(s) Oral daily  atorvastatin 10 milliGRAM(s) Oral at bedtime  cefoTEtan  IVPB 1 Gram(s) IV Intermittent every 12 hours  chlorhexidine 2% Cloths 1 Application(s) Topical <User Schedule>  docusate sodium Liquid 100 milliGRAM(s) Oral three times a day  enoxaparin Injectable 40 milliGRAM(s) SubCutaneous daily  famotidine Injectable 20 milliGRAM(s) IV Push daily  influenza   Vaccine 0.5 milliLiter(s) IntraMuscular once  pantoprazole  Injectable 40 milliGRAM(s) IV Push daily  Parenteral Nutrition - Adult 1 Each (63 mL/Hr) TPN Continuous <Continuous>    MEDICATIONS  (PRN):  artificial  tears Solution 1 Drop(s) Both EYES every 4 hours PRN Dry Eyes  benzocaine 15 mG/menthol 3.6 mG (Sugar-Free) Lozenge 1 Lozenge Oral every 2 hours PRN Sore Throat  chlorproMAZINE    IVPB 25 milliGRAM(s) IV Intermittent every 8 hours PRN hiccups  HYDROmorphone  Injectable 1 milliGRAM(s) IV Push every 4 hours PRN Severe Pain (7 - 10)  HYDROmorphone  Injectable 0.5 milliGRAM(s) IV Push every 4 hours PRN Moderate Pain (4 - 6)  ketorolac 0.5% Ophthalmic Solution 1 Drop(s) Left EYE every 6 hours PRN eye pain irritation  simethicone 80 milliGRAM(s) Chew daily PRN gas pains  sodium chloride 0.9% lock flush 10 milliLiter(s) IV Push every 1 hour PRN Pre/post blood products, medications, blood draw, and to maintain line patency      LAB/STUDIES:  CAPILLARY BLOOD GLUCOSE                                         11.2   12.21 )-----------( 346      ( 06 Oct 2019 07:43 )             33.9       10-06    139  |  102  |  17  ----------------------------<  157<H>  4.3   |  26  |  0.63    Ca    9.0      06 Oct 2019 07:43  Phos  3.3     10-06  Mg     2.2     10-06 Post-Op Day #: 13  Procedure/Dx/Injuries: robotic abdominal perineal resection 9/24    Subjective: Pt seen and examined, states his pain is much improved since yesterday and that the ostomy output is also significantly increased compared to last week.       Vitals: T(F): 97.6 (10-07-19 @ 05:19), Max: 99.1 (10-06-19 @ 17:38)  HR: 96 (10-07-19 @ 05:19)  BP: 120/57 (10-07-19 @ 05:19)  RR: 16 (10-07-19 @ 05:19)  SpO2: 100% (10-07-19 @ 05:19)        Diet, NPO:   Except Medications  With Ice Chips/Sips of Water      10-06-19 @ 07:01  -  10-07-19 @ 07:00  --------------------------------------------------------  IN:    fat emulsion (Plant Based) 20% Infusion: 270.4 mL    IV PiggyBack: 100 mL    TPN (Total Parenteral Nutrition): 1512 mL  Total IN: 1882.4 mL    OUT:    Bulb: 34 mL    Bulb: 150 mL    Colostomy: 250 mL    Voided: 1460 mL  Total OUT: 1894 mL    Total NET: -11.6 mL      PHYSICAL EXAM  General: NAD. Resting comfortably.  Pulmonary: Non-labored breathing. No respiratory distress.  Abd: Soft , non-distended , mild TTP around incision site, incision clean dry and intact. Ostomy w/ stoma pink and patent, stool and gas in colostomy bag. R abdominal JAKUB with serosanguinous output. Anal wound c/d/i.   Extrem: WWP, no edema, SCDs in place. LUE PICC in place. Thigh flap incision c/d/i, no surrounding erythema. L thigh JAKUB with minimal serosanguinous output.        MEDICATIONS  (STANDING):  amLODIPine   Tablet 5 milliGRAM(s) Oral daily  atorvastatin 10 milliGRAM(s) Oral at bedtime  cefoTEtan  IVPB 1 Gram(s) IV Intermittent every 12 hours  chlorhexidine 2% Cloths 1 Application(s) Topical <User Schedule>  docusate sodium Liquid 100 milliGRAM(s) Oral three times a day  enoxaparin Injectable 40 milliGRAM(s) SubCutaneous daily  famotidine Injectable 20 milliGRAM(s) IV Push daily  influenza   Vaccine 0.5 milliLiter(s) IntraMuscular once  pantoprazole  Injectable 40 milliGRAM(s) IV Push daily  Parenteral Nutrition - Adult 1 Each (63 mL/Hr) TPN Continuous <Continuous>    MEDICATIONS  (PRN):  artificial  tears Solution 1 Drop(s) Both EYES every 4 hours PRN Dry Eyes  benzocaine 15 mG/menthol 3.6 mG (Sugar-Free) Lozenge 1 Lozenge Oral every 2 hours PRN Sore Throat  chlorproMAZINE    IVPB 25 milliGRAM(s) IV Intermittent every 8 hours PRN hiccups  HYDROmorphone  Injectable 1 milliGRAM(s) IV Push every 4 hours PRN Severe Pain (7 - 10)  HYDROmorphone  Injectable 0.5 milliGRAM(s) IV Push every 4 hours PRN Moderate Pain (4 - 6)  ketorolac 0.5% Ophthalmic Solution 1 Drop(s) Left EYE every 6 hours PRN eye pain irritation  simethicone 80 milliGRAM(s) Chew daily PRN gas pains  sodium chloride 0.9% lock flush 10 milliLiter(s) IV Push every 1 hour PRN Pre/post blood products, medications, blood draw, and to maintain line patency      LAB/STUDIES:                                 11.2   12.21 )-----------( 346      ( 06 Oct 2019 07:43 )             33.9     10-07    138  |  102  |  17  ----------------------------<  156<H>  4.0   |  25  |  0.71    Ca    9.4      07 Oct 2019 07:34  Phos  3.4     10-07  Mg     2.1     10-07    TPro  6.4  /  Alb  3.6  /  TBili  0.9  /  DBili  x   /  AST  41<H>  /  ALT  83<H>  /  AlkPhos  131<H>  10-07

## 2019-10-08 LAB
ALBUMIN SERPL ELPH-MCNC: 2.8 G/DL — LOW (ref 3.3–5)
ALP SERPL-CCNC: 128 U/L — HIGH (ref 40–120)
ALT FLD-CCNC: 84 U/L — HIGH (ref 10–45)
ANION GAP SERPL CALC-SCNC: 10 MMOL/L — SIGNIFICANT CHANGE UP (ref 5–17)
AST SERPL-CCNC: 46 U/L — HIGH (ref 10–40)
BASOPHILS # BLD AUTO: 0.02 K/UL — SIGNIFICANT CHANGE UP (ref 0–0.2)
BASOPHILS NFR BLD AUTO: 0.2 % — SIGNIFICANT CHANGE UP (ref 0–2)
BILIRUB DIRECT SERPL-MCNC: 0.3 MG/DL — HIGH (ref 0–0.2)
BILIRUB SERPL-MCNC: 0.7 MG/DL — SIGNIFICANT CHANGE UP (ref 0.2–1.2)
BUN SERPL-MCNC: 12 MG/DL — SIGNIFICANT CHANGE UP (ref 7–23)
CALCIUM SERPL-MCNC: 8.9 MG/DL — SIGNIFICANT CHANGE UP (ref 8.4–10.5)
CHLORIDE SERPL-SCNC: 103 MMOL/L — SIGNIFICANT CHANGE UP (ref 96–108)
CO2 SERPL-SCNC: 26 MMOL/L — SIGNIFICANT CHANGE UP (ref 22–31)
CREAT SERPL-MCNC: 0.56 MG/DL — SIGNIFICANT CHANGE UP (ref 0.5–1.3)
EOSINOPHIL # BLD AUTO: 0.19 K/UL — SIGNIFICANT CHANGE UP (ref 0–0.5)
EOSINOPHIL NFR BLD AUTO: 1.9 % — SIGNIFICANT CHANGE UP (ref 0–6)
GLUCOSE SERPL-MCNC: 173 MG/DL — HIGH (ref 70–99)
HCT VFR BLD CALC: 30.9 % — LOW (ref 39–50)
HGB BLD-MCNC: 10.1 G/DL — LOW (ref 13–17)
IMM GRANULOCYTES NFR BLD AUTO: 1.4 % — SIGNIFICANT CHANGE UP (ref 0–1.5)
LYMPHOCYTES # BLD AUTO: 0.69 K/UL — LOW (ref 1–3.3)
LYMPHOCYTES # BLD AUTO: 6.8 % — LOW (ref 13–44)
MAGNESIUM SERPL-MCNC: 2.1 MG/DL — SIGNIFICANT CHANGE UP (ref 1.6–2.6)
MCHC RBC-ENTMCNC: 30.4 PG — SIGNIFICANT CHANGE UP (ref 27–34)
MCHC RBC-ENTMCNC: 32.7 GM/DL — SIGNIFICANT CHANGE UP (ref 32–36)
MCV RBC AUTO: 93.1 FL — SIGNIFICANT CHANGE UP (ref 80–100)
MONOCYTES # BLD AUTO: 0.58 K/UL — SIGNIFICANT CHANGE UP (ref 0–0.9)
MONOCYTES NFR BLD AUTO: 5.7 % — SIGNIFICANT CHANGE UP (ref 2–14)
NEUTROPHILS # BLD AUTO: 8.57 K/UL — HIGH (ref 1.8–7.4)
NEUTROPHILS NFR BLD AUTO: 84 % — HIGH (ref 43–77)
NRBC # BLD: 0 /100 WBCS — SIGNIFICANT CHANGE UP (ref 0–0)
PHOSPHATE SERPL-MCNC: 3.3 MG/DL — SIGNIFICANT CHANGE UP (ref 2.5–4.5)
PLATELET # BLD AUTO: 337 K/UL — SIGNIFICANT CHANGE UP (ref 150–400)
POTASSIUM SERPL-MCNC: 4.2 MMOL/L — SIGNIFICANT CHANGE UP (ref 3.5–5.3)
POTASSIUM SERPL-SCNC: 4.2 MMOL/L — SIGNIFICANT CHANGE UP (ref 3.5–5.3)
PREALB SERPL-MCNC: 16 MG/DL — LOW (ref 20–40)
PROT SERPL-MCNC: 6.3 G/DL — SIGNIFICANT CHANGE UP (ref 6–8.3)
RBC # BLD: 3.32 M/UL — LOW (ref 4.2–5.8)
RBC # FLD: 13.4 % — SIGNIFICANT CHANGE UP (ref 10.3–14.5)
SODIUM SERPL-SCNC: 139 MMOL/L — SIGNIFICANT CHANGE UP (ref 135–145)
TRIGL SERPL-MCNC: 104 MG/DL — SIGNIFICANT CHANGE UP (ref 10–149)
WBC # BLD: 10.19 K/UL — SIGNIFICANT CHANGE UP (ref 3.8–10.5)
WBC # FLD AUTO: 10.19 K/UL — SIGNIFICANT CHANGE UP (ref 3.8–10.5)

## 2019-10-08 RX ORDER — ELECTROLYTE SOLUTION,INJ
1 VIAL (ML) INTRAVENOUS
Refills: 0 | Status: DISCONTINUED | OUTPATIENT
Start: 2019-10-08 | End: 2019-10-08

## 2019-10-08 RX ORDER — HYDROMORPHONE HYDROCHLORIDE 2 MG/ML
0.5 INJECTION INTRAMUSCULAR; INTRAVENOUS; SUBCUTANEOUS EVERY 4 HOURS
Refills: 0 | Status: DISCONTINUED | OUTPATIENT
Start: 2019-10-08 | End: 2019-10-14

## 2019-10-08 RX ORDER — I.V. FAT EMULSION 20 G/100ML
0.64 EMULSION INTRAVENOUS
Qty: 50 | Refills: 0 | Status: DISCONTINUED | OUTPATIENT
Start: 2019-10-08 | End: 2019-10-08

## 2019-10-08 RX ADMIN — Medication 100 MILLIGRAM(S): at 06:14

## 2019-10-08 RX ADMIN — HYDROMORPHONE HYDROCHLORIDE 0.5 MILLIGRAM(S): 2 INJECTION INTRAMUSCULAR; INTRAVENOUS; SUBCUTANEOUS at 01:40

## 2019-10-08 RX ADMIN — HYDROMORPHONE HYDROCHLORIDE 0.5 MILLIGRAM(S): 2 INJECTION INTRAMUSCULAR; INTRAVENOUS; SUBCUTANEOUS at 07:44

## 2019-10-08 RX ADMIN — ATORVASTATIN CALCIUM 10 MILLIGRAM(S): 80 TABLET, FILM COATED ORAL at 21:32

## 2019-10-08 RX ADMIN — Medication 1 EACH: at 18:00

## 2019-10-08 RX ADMIN — Medication 650 MILLIGRAM(S): at 18:33

## 2019-10-08 RX ADMIN — Medication 650 MILLIGRAM(S): at 18:55

## 2019-10-08 RX ADMIN — ENOXAPARIN SODIUM 40 MILLIGRAM(S): 100 INJECTION SUBCUTANEOUS at 12:21

## 2019-10-08 RX ADMIN — Medication 100 GRAM(S): at 18:33

## 2019-10-08 RX ADMIN — CHLORHEXIDINE GLUCONATE 1 APPLICATION(S): 213 SOLUTION TOPICAL at 06:15

## 2019-10-08 RX ADMIN — HYDROMORPHONE HYDROCHLORIDE 0.5 MILLIGRAM(S): 2 INJECTION INTRAMUSCULAR; INTRAVENOUS; SUBCUTANEOUS at 21:32

## 2019-10-08 RX ADMIN — Medication 650 MILLIGRAM(S): at 00:50

## 2019-10-08 RX ADMIN — Medication 100 GRAM(S): at 06:14

## 2019-10-08 RX ADMIN — HYDROMORPHONE HYDROCHLORIDE 0.5 MILLIGRAM(S): 2 INJECTION INTRAMUSCULAR; INTRAVENOUS; SUBCUTANEOUS at 17:30

## 2019-10-08 RX ADMIN — HYDROMORPHONE HYDROCHLORIDE 0.5 MILLIGRAM(S): 2 INJECTION INTRAMUSCULAR; INTRAVENOUS; SUBCUTANEOUS at 21:30

## 2019-10-08 RX ADMIN — Medication 100 MILLIGRAM(S): at 21:32

## 2019-10-08 RX ADMIN — FAMOTIDINE 20 MILLIGRAM(S): 10 INJECTION INTRAVENOUS at 12:20

## 2019-10-08 RX ADMIN — HYDROMORPHONE HYDROCHLORIDE 0.5 MILLIGRAM(S): 2 INJECTION INTRAMUSCULAR; INTRAVENOUS; SUBCUTANEOUS at 01:24

## 2019-10-08 RX ADMIN — HYDROMORPHONE HYDROCHLORIDE 0.5 MILLIGRAM(S): 2 INJECTION INTRAMUSCULAR; INTRAVENOUS; SUBCUTANEOUS at 12:21

## 2019-10-08 RX ADMIN — Medication 650 MILLIGRAM(S): at 05:50

## 2019-10-08 RX ADMIN — Medication 650 MILLIGRAM(S): at 04:57

## 2019-10-08 RX ADMIN — Medication 650 MILLIGRAM(S): at 21:38

## 2019-10-08 RX ADMIN — I.V. FAT EMULSION 20.96 GM/KG/DAY: 20 EMULSION INTRAVENOUS at 18:00

## 2019-10-08 RX ADMIN — Medication 650 MILLIGRAM(S): at 00:02

## 2019-10-08 RX ADMIN — PANTOPRAZOLE SODIUM 40 MILLIGRAM(S): 20 TABLET, DELAYED RELEASE ORAL at 12:20

## 2019-10-08 RX ADMIN — HYDROMORPHONE HYDROCHLORIDE 0.5 MILLIGRAM(S): 2 INJECTION INTRAMUSCULAR; INTRAVENOUS; SUBCUTANEOUS at 17:08

## 2019-10-08 RX ADMIN — Medication 650 MILLIGRAM(S): at 21:32

## 2019-10-08 RX ADMIN — Medication 650 MILLIGRAM(S): at 12:59

## 2019-10-08 RX ADMIN — Medication 650 MILLIGRAM(S): at 12:21

## 2019-10-08 RX ADMIN — HYDROMORPHONE HYDROCHLORIDE 0.5 MILLIGRAM(S): 2 INJECTION INTRAMUSCULAR; INTRAVENOUS; SUBCUTANEOUS at 12:45

## 2019-10-08 RX ADMIN — AMLODIPINE BESYLATE 5 MILLIGRAM(S): 2.5 TABLET ORAL at 06:14

## 2019-10-08 RX ADMIN — HYDROMORPHONE HYDROCHLORIDE 0.5 MILLIGRAM(S): 2 INJECTION INTRAMUSCULAR; INTRAVENOUS; SUBCUTANEOUS at 08:15

## 2019-10-08 NOTE — PROGRESS NOTE ADULT - SUBJECTIVE AND OBJECTIVE BOX
INTERVAL HPI/OVERNIGHT EVENTS: NAEO. Ostomy draining minimal OP however flatus present in bag.     STATUS POST:  Abdominal perineal resection 9/24    SUBJECTIVE: Pt seen and examined at bedside this AM by surgery team. Pain well controlled. No acute complaints. Denies f/n/v/cp/sob.    MEDICATIONS  (STANDING):  acetaminophen   Tablet .. 650 milliGRAM(s) Oral every 6 hours  amLODIPine   Tablet 5 milliGRAM(s) Oral daily  atorvastatin 10 milliGRAM(s) Oral at bedtime  cefoTEtan  IVPB 1 Gram(s) IV Intermittent every 12 hours  chlorhexidine 2% Cloths 1 Application(s) Topical <User Schedule>  docusate sodium Liquid 100 milliGRAM(s) Oral three times a day  enoxaparin Injectable 40 milliGRAM(s) SubCutaneous daily  famotidine Injectable 20 milliGRAM(s) IV Push daily  fat emulsion (Plant Based) 20% Infusion 0.621 Gm/kG/Day (20.83 mL/Hr) IV Continuous <Continuous>  influenza   Vaccine 0.5 milliLiter(s) IntraMuscular once  pantoprazole  Injectable 40 milliGRAM(s) IV Push daily  Parenteral Nutrition - Adult 1 Each (63 mL/Hr) TPN Continuous <Continuous>  Parenteral Nutrition - Adult 1 Each (63 mL/Hr) TPN Continuous <Continuous>    MEDICATIONS  (PRN):  artificial  tears Solution 1 Drop(s) Both EYES every 4 hours PRN Dry Eyes  benzocaine 15 mG/menthol 3.6 mG (Sugar-Free) Lozenge 1 Lozenge Oral every 2 hours PRN Sore Throat  chlorproMAZINE    IVPB 25 milliGRAM(s) IV Intermittent every 8 hours PRN hiccups  HYDROmorphone  Injectable 0.5 milliGRAM(s) IV Push every 6 hours PRN Severe Pain (7 - 10)  ketorolac 0.5% Ophthalmic Solution 1 Drop(s) Left EYE every 6 hours PRN eye pain irritation  simethicone 80 milliGRAM(s) Chew daily PRN gas pains  sodium chloride 0.9% lock flush 10 milliLiter(s) IV Push every 1 hour PRN Pre/post blood products, medications, blood draw, and to maintain line patency      Vital Signs Last 24 Hrs  T(C): 36.9 (08 Oct 2019 06:13), Max: 37 (08 Oct 2019 00:01)  T(F): 98.5 (08 Oct 2019 06:13), Max: 98.6 (08 Oct 2019 00:01)  HR: 99 (08 Oct 2019 06:13) (79 - 99)  BP: 110/66 (08 Oct 2019 06:13) (110/66 - 137/80)  BP(mean): --  RR: 16 (08 Oct 2019 06:13) (15 - 18)  SpO2: 98% (08 Oct 2019 06:13) (94% - 100%)    PHYSICAL EXAM:      Constitutional: A&Ox3, NAD     Respiratory: non labored breathing, no respiratory distress    Cardiovascular: NSR, RRR    Gastrointestinal: Abdomen soft, nd, mild TTP around incision site, incision c/d/i. Ostomy w/ stoma pink and patent, gas and minimal stool present in colostomy bag. R abdominal JAKUB leaking. Anal wound c/d/i.     Extremities: wwp, no calf tenderness or edema. SCDs in place. LUE PICC in place. Thigh flap incision c/d/i, no surrounding erythema. L thigh JAKUB with minimal serosanguinous output.        I&O's Detail    07 Oct 2019 07:01  -  08 Oct 2019 07:00  --------------------------------------------------------  IN:    fat emulsion (Plant Based) 20% Infusion: 249.6 mL    IV PiggyBack: 50 mL    TPN (Total Parenteral Nutrition): 1449 mL  Total IN: 1748.6 mL    OUT:    Bulb: 58 mL    Bulb: 110 mL    Colostomy: 25 mL    Voided: 1700 mL  Total OUT: 1893 mL    Total NET: -144.4 mL          LABS:                        10.1   10.19 )-----------( 337      ( 08 Oct 2019 06:27 )             30.9     10-08    139  |  103  |  12  ----------------------------<  173<H>  4.2   |  26  |  0.56    Ca    8.9      08 Oct 2019 06:27  Phos  3.3     10-08  Mg     2.1     10-08    TPro  6.3  /  Alb  2.8<L>  /  TBili  0.7  /  DBili  0.3<H>  /  AST  46<H>  /  ALT  84<H>  /  AlkPhos  128<H>  10-08          RADIOLOGY & ADDITIONAL STUDIES:

## 2019-10-08 NOTE — PROGRESS NOTE ADULT - ASSESSMENT
54M with HTN, HLD, rectal cancer s/p robot assisted abdominal peritoneal resection 9/24    - Pain/nausea control  - NPO w/ sips and chips/IVF  - TPN  - Cefotetan  - Lovenox   - Protonix, famotidine  - JAKUB x 2 (1 on abd and 1 L thigh), will remove abdominal JAKUB today due to leakage   - Encourage OOB/AAT  - F/u plastics recs  - PT recs home w/ home PT  - Monitor ostomy OP

## 2019-10-08 NOTE — PROGRESS NOTE ADULT - SUBJECTIVE AND OBJECTIVE BOX
Post op ileus  remains afeb.  abd-less distended this am. colostomy appliance filled with air  Iris- serosang fluid- leaking    Stable  cont tpn  Appears to have some return of gi function.  dc abd iris today.

## 2019-10-08 NOTE — PROGRESS NOTE ADULT - SUBJECTIVE AND OBJECTIVE BOX
Subjective:  Pt reports mild pain overnight.  Pt also states that one of his abdominal drains was leaking overnight as well.  Pt denies fever, cough, SOB, CP, N/V/D    Objective: **Vitals Signs/Input/Output**    Temp: 98.5  Pulse: 99-77  BP: 110/66  RR: 16  O2SAT: 98% RA      10/7/19-10/8/19 7:00-7:00    In: TPN-1449 ml       Fat Emulsion-249.6            IV Piggyback-50        Out: Urine-1700         Colostomy-25         L. Thigh drain-58         R. Thigh drain-110    Physical Exam:    General: A & O x3, no signs of acute distress,   Abdomen: mild distension, NT, minimal leakge coming from drain site  Extremities: L thigh incision is C/D/I   Perineum: no wound dehiscence noted, no signs of infection, incision is C/D/I    MEDICATIONS  (STANDING):  acetaminophen   Tablet .. 650 milliGRAM(s) Oral every 6 hours  amLODIPine   Tablet 5 milliGRAM(s) Oral daily  atorvastatin 10 milliGRAM(s) Oral at bedtime  cefoTEtan  IVPB 1 Gram(s) IV Intermittent every 12 hours  chlorhexidine 2% Cloths 1 Application(s) Topical <User Schedule>  docusate sodium Liquid 100 milliGRAM(s) Oral three times a day  enoxaparin Injectable 40 milliGRAM(s) SubCutaneous daily  famotidine Injectable 20 milliGRAM(s) IV Push daily  fat emulsion (Plant Based) 20% Infusion 0.621 Gm/kG/Day (20.83 mL/Hr) IV Continuous <Continuous>  influenza   Vaccine 0.5 milliLiter(s) IntraMuscular once  pantoprazole  Injectable 40 milliGRAM(s) IV Push daily  Parenteral Nutrition - Adult 1 Each (63 mL/Hr) TPN Continuous <Continuous>  Parenteral Nutrition - Adult 1 Each (63 mL/Hr) TPN Continuous <Continuous>    MEDICATIONS  (PRN):  artificial  tears Solution 1 Drop(s) Both EYES every 4 hours PRN Dry Eyes  benzocaine 15 mG/menthol 3.6 mG (Sugar-Free) Lozenge 1 Lozenge Oral every 2 hours PRN Sore Throat  chlorproMAZINE    IVPB 25 milliGRAM(s) IV Intermittent every 8 hours PRN hiccups  HYDROmorphone  Injectable 0.5 milliGRAM(s) IV Push every 6 hours PRN Severe Pain (7 - 10)  ketorolac 0.5% Ophthalmic Solution 1 Drop(s) Left EYE every 6 hours PRN eye pain irritation  simethicone 80 milliGRAM(s) Chew daily PRN gas pains  sodium chloride 0.9% lock flush 10 milliLiter(s) IV Push every 1 hour PRN Pre/post blood products, medications, blood draw, and to maintain line patency                          10.1   10.19 )-----------( 337      ( 08 Oct 2019 06:27 )             30.9   10-08    139  |  103  |  12  ----------------------------<  173<H>  4.2   |  26  |  0.56    Ca    8.9      08 Oct 2019 06:27  Phos  3.3     10-08  Mg     2.1     10-08 Subjective:  Pt reports mild pain overnight.  Pt also states that one of his abdominal drains was leaking overnight as well.  Pt denies fever, cough, SOB, CP, N/V/D    Objective: **Vitals Signs/Input/Output**    Temp: 98.5  Pulse: 99-77  BP: 110/66  RR: 16  O2SAT: 98% RA      10/7/19-10/8/19 7:00-7:00    In: TPN-1449 ml       Fat Emulsion-249.6            IV Piggyback-50        Out: Urine-1700         Colostomy-25         L. Thigh drain-58         R. Thigh drain-110    Physical Exam:    General: A & O x3, no signs of acute distress,   Abdomen: mild distension, NT, minimal leakage coming from drain site, +ostomy with more gas in bag 25 cc overnight  Extremities: L thigh incision is C/D/I, soft with no collections, iris x1 20 cc overnight  Perineum: no wound dehiscence noted, no signs of infection, incision is C/D/I    MEDICATIONS  (STANDING):  acetaminophen   Tablet .. 650 milliGRAM(s) Oral every 6 hours  amLODIPine   Tablet 5 milliGRAM(s) Oral daily  atorvastatin 10 milliGRAM(s) Oral at bedtime  cefoTEtan  IVPB 1 Gram(s) IV Intermittent every 12 hours  chlorhexidine 2% Cloths 1 Application(s) Topical <User Schedule>  docusate sodium Liquid 100 milliGRAM(s) Oral three times a day  enoxaparin Injectable 40 milliGRAM(s) SubCutaneous daily  famotidine Injectable 20 milliGRAM(s) IV Push daily  fat emulsion (Plant Based) 20% Infusion 0.621 Gm/kG/Day (20.83 mL/Hr) IV Continuous <Continuous>  influenza   Vaccine 0.5 milliLiter(s) IntraMuscular once  pantoprazole  Injectable 40 milliGRAM(s) IV Push daily  Parenteral Nutrition - Adult 1 Each (63 mL/Hr) TPN Continuous <Continuous>  Parenteral Nutrition - Adult 1 Each (63 mL/Hr) TPN Continuous <Continuous>    MEDICATIONS  (PRN):  artificial  tears Solution 1 Drop(s) Both EYES every 4 hours PRN Dry Eyes  benzocaine 15 mG/menthol 3.6 mG (Sugar-Free) Lozenge 1 Lozenge Oral every 2 hours PRN Sore Throat  chlorproMAZINE    IVPB 25 milliGRAM(s) IV Intermittent every 8 hours PRN hiccups  HYDROmorphone  Injectable 0.5 milliGRAM(s) IV Push every 6 hours PRN Severe Pain (7 - 10)  ketorolac 0.5% Ophthalmic Solution 1 Drop(s) Left EYE every 6 hours PRN eye pain irritation  simethicone 80 milliGRAM(s) Chew daily PRN gas pains  sodium chloride 0.9% lock flush 10 milliLiter(s) IV Push every 1 hour PRN Pre/post blood products, medications, blood draw, and to maintain line patency                          10.1   10.19 )-----------( 337      ( 08 Oct 2019 06:27 )             30.9   10-08    139  |  103  |  12  ----------------------------<  173<H>  4.2   |  26  |  0.56    Ca    8.9      08 Oct 2019 06:27  Phos  3.3     10-08  Mg     2.1     10-08

## 2019-10-08 NOTE — PROGRESS NOTE ADULT - ASSESSMENT
Assessment:  This is a 55 Y/O male s/p abdominal peritoneal resection with gracilis flap w/ileus requiring TPN  -general surgery to change leaking dressing today  -continue JAKUB in b/l thigh  -ensure thigh and perineal incisions are C/D/I  -will continue to follow

## 2019-10-09 LAB
ALBUMIN SERPL ELPH-MCNC: 3.2 G/DL — LOW (ref 3.3–5)
ALP SERPL-CCNC: 139 U/L — HIGH (ref 40–120)
ALT FLD-CCNC: 77 U/L — HIGH (ref 10–45)
ANION GAP SERPL CALC-SCNC: 9 MMOL/L — SIGNIFICANT CHANGE UP (ref 5–17)
AST SERPL-CCNC: 39 U/L — SIGNIFICANT CHANGE UP (ref 10–40)
BILIRUB SERPL-MCNC: 0.5 MG/DL — SIGNIFICANT CHANGE UP (ref 0.2–1.2)
BUN SERPL-MCNC: 10 MG/DL — SIGNIFICANT CHANGE UP (ref 7–23)
CALCIUM SERPL-MCNC: 9.1 MG/DL — SIGNIFICANT CHANGE UP (ref 8.4–10.5)
CHLORIDE SERPL-SCNC: 101 MMOL/L — SIGNIFICANT CHANGE UP (ref 96–108)
CO2 SERPL-SCNC: 26 MMOL/L — SIGNIFICANT CHANGE UP (ref 22–31)
CREAT SERPL-MCNC: 0.61 MG/DL — SIGNIFICANT CHANGE UP (ref 0.5–1.3)
GLUCOSE SERPL-MCNC: 181 MG/DL — HIGH (ref 70–99)
HCT VFR BLD CALC: 31.5 % — LOW (ref 39–50)
HGB BLD-MCNC: 10.5 G/DL — LOW (ref 13–17)
MAGNESIUM SERPL-MCNC: 2.1 MG/DL — SIGNIFICANT CHANGE UP (ref 1.6–2.6)
MCHC RBC-ENTMCNC: 30.8 PG — SIGNIFICANT CHANGE UP (ref 27–34)
MCHC RBC-ENTMCNC: 33.3 GM/DL — SIGNIFICANT CHANGE UP (ref 32–36)
MCV RBC AUTO: 92.4 FL — SIGNIFICANT CHANGE UP (ref 80–100)
NRBC # BLD: 0 /100 WBCS — SIGNIFICANT CHANGE UP (ref 0–0)
PHOSPHATE SERPL-MCNC: 3.3 MG/DL — SIGNIFICANT CHANGE UP (ref 2.5–4.5)
PLATELET # BLD AUTO: 371 K/UL — SIGNIFICANT CHANGE UP (ref 150–400)
POTASSIUM SERPL-MCNC: 4.1 MMOL/L — SIGNIFICANT CHANGE UP (ref 3.5–5.3)
POTASSIUM SERPL-SCNC: 4.1 MMOL/L — SIGNIFICANT CHANGE UP (ref 3.5–5.3)
PROT SERPL-MCNC: 6.5 G/DL — SIGNIFICANT CHANGE UP (ref 6–8.3)
RBC # BLD: 3.41 M/UL — LOW (ref 4.2–5.8)
RBC # FLD: 13.5 % — SIGNIFICANT CHANGE UP (ref 10.3–14.5)
SODIUM SERPL-SCNC: 136 MMOL/L — SIGNIFICANT CHANGE UP (ref 135–145)
TRIGL SERPL-MCNC: 77 MG/DL — SIGNIFICANT CHANGE UP (ref 10–149)
WBC # BLD: 11.82 K/UL — HIGH (ref 3.8–10.5)
WBC # FLD AUTO: 11.82 K/UL — HIGH (ref 3.8–10.5)

## 2019-10-09 RX ORDER — ELECTROLYTE SOLUTION,INJ
1 VIAL (ML) INTRAVENOUS
Refills: 0 | Status: DISCONTINUED | OUTPATIENT
Start: 2019-10-09 | End: 2019-10-09

## 2019-10-09 RX ORDER — I.V. FAT EMULSION 20 G/100ML
0.66 EMULSION INTRAVENOUS
Qty: 50 | Refills: 0 | Status: DISCONTINUED | OUTPATIENT
Start: 2019-10-09 | End: 2019-10-09

## 2019-10-09 RX ADMIN — HYDROMORPHONE HYDROCHLORIDE 0.5 MILLIGRAM(S): 2 INJECTION INTRAMUSCULAR; INTRAVENOUS; SUBCUTANEOUS at 10:00

## 2019-10-09 RX ADMIN — Medication 650 MILLIGRAM(S): at 05:32

## 2019-10-09 RX ADMIN — Medication 100 GRAM(S): at 18:07

## 2019-10-09 RX ADMIN — Medication 100 MILLIGRAM(S): at 13:45

## 2019-10-09 RX ADMIN — CHLORHEXIDINE GLUCONATE 1 APPLICATION(S): 213 SOLUTION TOPICAL at 05:32

## 2019-10-09 RX ADMIN — I.V. FAT EMULSION 20.9 GM/KG/DAY: 20 EMULSION INTRAVENOUS at 21:08

## 2019-10-09 RX ADMIN — Medication 650 MILLIGRAM(S): at 21:13

## 2019-10-09 RX ADMIN — FAMOTIDINE 20 MILLIGRAM(S): 10 INJECTION INTRAVENOUS at 11:32

## 2019-10-09 RX ADMIN — HYDROMORPHONE HYDROCHLORIDE 0.5 MILLIGRAM(S): 2 INJECTION INTRAMUSCULAR; INTRAVENOUS; SUBCUTANEOUS at 18:29

## 2019-10-09 RX ADMIN — Medication 650 MILLIGRAM(S): at 05:20

## 2019-10-09 RX ADMIN — PANTOPRAZOLE SODIUM 40 MILLIGRAM(S): 20 TABLET, DELAYED RELEASE ORAL at 11:32

## 2019-10-09 RX ADMIN — Medication 650 MILLIGRAM(S): at 22:10

## 2019-10-09 RX ADMIN — Medication 650 MILLIGRAM(S): at 11:42

## 2019-10-09 RX ADMIN — HYDROMORPHONE HYDROCHLORIDE 0.5 MILLIGRAM(S): 2 INJECTION INTRAMUSCULAR; INTRAVENOUS; SUBCUTANEOUS at 18:07

## 2019-10-09 RX ADMIN — Medication 1 EACH: at 18:07

## 2019-10-09 RX ADMIN — HYDROMORPHONE HYDROCHLORIDE 0.5 MILLIGRAM(S): 2 INJECTION INTRAMUSCULAR; INTRAVENOUS; SUBCUTANEOUS at 09:41

## 2019-10-09 RX ADMIN — HYDROMORPHONE HYDROCHLORIDE 0.5 MILLIGRAM(S): 2 INJECTION INTRAMUSCULAR; INTRAVENOUS; SUBCUTANEOUS at 05:29

## 2019-10-09 RX ADMIN — HYDROMORPHONE HYDROCHLORIDE 0.5 MILLIGRAM(S): 2 INJECTION INTRAMUSCULAR; INTRAVENOUS; SUBCUTANEOUS at 14:00

## 2019-10-09 RX ADMIN — AMLODIPINE BESYLATE 5 MILLIGRAM(S): 2.5 TABLET ORAL at 05:20

## 2019-10-09 RX ADMIN — Medication 650 MILLIGRAM(S): at 11:32

## 2019-10-09 RX ADMIN — ENOXAPARIN SODIUM 40 MILLIGRAM(S): 100 INJECTION SUBCUTANEOUS at 11:36

## 2019-10-09 RX ADMIN — Medication 100 GRAM(S): at 05:22

## 2019-10-09 RX ADMIN — HYDROMORPHONE HYDROCHLORIDE 0.5 MILLIGRAM(S): 2 INJECTION INTRAMUSCULAR; INTRAVENOUS; SUBCUTANEOUS at 03:00

## 2019-10-09 RX ADMIN — Medication 100 MILLIGRAM(S): at 05:32

## 2019-10-09 RX ADMIN — HYDROMORPHONE HYDROCHLORIDE 0.5 MILLIGRAM(S): 2 INJECTION INTRAMUSCULAR; INTRAVENOUS; SUBCUTANEOUS at 22:25

## 2019-10-09 RX ADMIN — Medication 100 MILLIGRAM(S): at 21:10

## 2019-10-09 RX ADMIN — HYDROMORPHONE HYDROCHLORIDE 0.5 MILLIGRAM(S): 2 INJECTION INTRAMUSCULAR; INTRAVENOUS; SUBCUTANEOUS at 22:10

## 2019-10-09 RX ADMIN — HYDROMORPHONE HYDROCHLORIDE 0.5 MILLIGRAM(S): 2 INJECTION INTRAMUSCULAR; INTRAVENOUS; SUBCUTANEOUS at 13:45

## 2019-10-09 RX ADMIN — HYDROMORPHONE HYDROCHLORIDE 0.5 MILLIGRAM(S): 2 INJECTION INTRAMUSCULAR; INTRAVENOUS; SUBCUTANEOUS at 06:00

## 2019-10-09 RX ADMIN — ATORVASTATIN CALCIUM 10 MILLIGRAM(S): 80 TABLET, FILM COATED ORAL at 21:09

## 2019-10-09 RX ADMIN — HYDROMORPHONE HYDROCHLORIDE 0.5 MILLIGRAM(S): 2 INJECTION INTRAMUSCULAR; INTRAVENOUS; SUBCUTANEOUS at 02:23

## 2019-10-09 NOTE — PROGRESS NOTE ADULT - SUBJECTIVE AND OBJECTIVE BOX
INTERVAL HPI/OVERNIGHT EVENTS: NAEO. AFVSS. Low ostomy output.    STATUS POST: Abdominal perineal resection 9/24     POST OPERATIVE DAY #: 15    SUBJECTIVE: Pt seen and examined at bedside this AM by surgery team. Pain is well controlled. No acute complaints. Reporting gas present in ostomy bag. Denies f/n/v/cp/sob.     MEDICATIONS  (STANDING):  acetaminophen   Tablet .. 650 milliGRAM(s) Oral every 6 hours  amLODIPine   Tablet 5 milliGRAM(s) Oral daily  atorvastatin 10 milliGRAM(s) Oral at bedtime  cefoTEtan  IVPB 1 Gram(s) IV Intermittent every 12 hours  chlorhexidine 2% Cloths 1 Application(s) Topical <User Schedule>  docusate sodium Liquid 100 milliGRAM(s) Oral three times a day  enoxaparin Injectable 40 milliGRAM(s) SubCutaneous daily  famotidine Injectable 20 milliGRAM(s) IV Push daily  fat emulsion (Plant Based) 20% Infusion 0.64 Gm/kG/Day (20.956 mL/Hr) IV Continuous <Continuous>  influenza   Vaccine 0.5 milliLiter(s) IntraMuscular once  pantoprazole  Injectable 40 milliGRAM(s) IV Push daily  Parenteral Nutrition - Adult 1 Each (67 mL/Hr) TPN Continuous <Continuous>    MEDICATIONS  (PRN):  artificial  tears Solution 1 Drop(s) Both EYES every 4 hours PRN Dry Eyes  benzocaine 15 mG/menthol 3.6 mG (Sugar-Free) Lozenge 1 Lozenge Oral every 2 hours PRN Sore Throat  chlorproMAZINE    IVPB 25 milliGRAM(s) IV Intermittent every 8 hours PRN hiccups  HYDROmorphone  Injectable 0.5 milliGRAM(s) IV Push every 4 hours PRN breakthrough pain  ketorolac 0.5% Ophthalmic Solution 1 Drop(s) Left EYE every 6 hours PRN eye pain irritation  simethicone 80 milliGRAM(s) Chew daily PRN gas pains  sodium chloride 0.9% lock flush 10 milliLiter(s) IV Push every 1 hour PRN Pre/post blood products, medications, blood draw, and to maintain line patency      Vital Signs Last 24 Hrs  T(C): 37 (09 Oct 2019 05:27), Max: 37.3 (08 Oct 2019 21:20)  T(F): 98.6 (09 Oct 2019 05:27), Max: 99.1 (08 Oct 2019 21:20)  HR: 87 (09 Oct 2019 05:34) (81 - 94)  BP: 123/77 (09 Oct 2019 05:34) (112/72 - 125/77)  BP(mean): --  RR: 16 (09 Oct 2019 05:34) (16 - 19)  SpO2: 97% (09 Oct 2019 05:34) (95% - 100%)    PHYSICAL EXAM:      Constitutional: A&Ox3, NAD     Respiratory: non labored breathing, no respiratory distress    Cardiovascular: NSR, RRR    Gastrointestinal: Abdomen soft, distended, mild TTP around incision site, incision c/d/i. Ostomy w/ stoma pink and patent, gas and minimal stool present in colostomy bag. Anal wound c/d/i.     Extremities: wwp, sensory/motor intact, no calf tenderness or edema. SCDs in place. LUE PICC in place. Thigh flap incision c/d/i, no surrounding erythema. L thigh JAKUB with minimal serosanguinous output.      I&O's Detail    08 Oct 2019 07:01  -  09 Oct 2019 07:00  --------------------------------------------------------  IN:    fat emulsion (Plant Based) 20% Infusion: 41.8 mL    TPN (Total Parenteral Nutrition): 764 mL  Total IN: 805.8 mL    OUT:    Bulb: 5 mL    Colostomy: 55 mL    Voided: 1350 mL  Total OUT: 1410 mL    Total NET: -604.2 mL          LABS:                        10.5   11.82 )-----------( 371      ( 09 Oct 2019 07:19 )             31.5     10-09    136  |  101  |  10  ----------------------------<  181<H>  4.1   |  26  |  0.61    Ca    9.1      09 Oct 2019 07:19  Phos  3.3     10-09  Mg     2.1     10-09    TPro  6.5  /  Alb  3.2<L>  /  TBili  0.5  /  DBili  x   /  AST  39  /  ALT  77<H>  /  AlkPhos  139<H>  10-09          RADIOLOGY & ADDITIONAL STUDIES:

## 2019-10-09 NOTE — PROGRESS NOTE ADULT - ASSESSMENT
53 y/o male s/p abdominal peritoneal resection with gracilis flap   - continue to monitor drain  - care per primary team

## 2019-10-09 NOTE — PROGRESS NOTE ADULT - SUBJECTIVE AND OBJECTIVE BOX
SUBJECTIVE:  No overnight events.     OBJECTIVE:     ** VITAL SIGNS / I&O's **    Vital Signs Last 24 Hrs  T(C): 37.3 (09 Oct 2019 08:32), Max: 37.3 (08 Oct 2019 21:20)  T(F): 99.2 (09 Oct 2019 08:32), Max: 99.2 (09 Oct 2019 08:32)  HR: 84 (09 Oct 2019 08:32) (81 - 94)  BP: 114/75 (09 Oct 2019 08:32) (112/72 - 125/77)  BP(mean): --  RR: 17 (09 Oct 2019 08:32) (16 - 19)  SpO2: 98% (09 Oct 2019 08:32) (95% - 100%)      08 Oct 2019 07:01  -  09 Oct 2019 07:00  --------------------------------------------------------  IN:    fat emulsion (Plant Based) 20% Infusion: 41.8 mL    TPN (Total Parenteral Nutrition): 764 mL  Total IN: 805.8 mL    OUT:    Bulb: 5 mL    Colostomy: 55 mL    Voided: 1350 mL  Total OUT: 1410 mL    Total NET: -604.2 mL      09 Oct 2019 07:01  -  09 Oct 2019 08:50  --------------------------------------------------------  IN:  Total IN: 0 mL    OUT:    Voided: 300 mL  Total OUT: 300 mL    Total NET: -300 mL          ** PHYSICAL EXAM **    -- CONSTITUTIONAL: Alert, Awake. NAD.   -- RESPIRATORY: unlabored breathing, no respiratory distress  -- ABDOMEN: drain removed yesterday. +gas in ostomy bag  --THIGH: no collections, soft, iris stripped and no clot noted      ** LABS **                          10.5   11.82 )-----------( 371      ( 09 Oct 2019 07:19 )             31.5     09 Oct 2019 07:19    136    |  101    |  10     ----------------------------<  181    4.1     |  26     |  0.61     Ca    9.1        09 Oct 2019 07:19  Phos  3.3       09 Oct 2019 07:19  Mg     2.1       09 Oct 2019 07:19    TPro  6.5    /  Alb  3.2    /  TBili  0.5    /  DBili  x      /  AST  39     /  ALT  77     /  AlkPhos  139    09 Oct 2019 07:19      CAPILLARY BLOOD GLUCOSE

## 2019-10-09 NOTE — CHART NOTE - NSCHARTNOTEFT_GEN_A_CORE
Admitting Diagnosis:   Patient is a 54y old  Male who presents with a chief complaint of elective surgery (09 Oct 2019 08:49)    PAST MEDICAL & SURGICAL HISTORY:  HLD (hyperlipidemia)  HTN (hypertension)  Colon cancer: near rectum  Other elective surgery: Right Upper Chest- Chemo Port    Current Nutrition Order:   Diet, Clear Liquid (10-09-19 @ 08:10) with TPN/ Lipids    PO Intake: Good (%) [   ]  Fair (50-75%) [ x  ] Poor (<25%) [   ]- per RN, pt consuming 50% of CLD tray this am.     GI Issues: Distended, Colostomy, BM 10/9 Diarrhea    Pain: Well controlled on medication per discussion with RN    Skin Integrity: Surgical Incisions    Labs:   10-09    136  |  101  |  10  ----------------------------<  181<H>  4.1   |  26  |  0.61    Ca    9.1      09 Oct 2019 07:19  Phos  3.3     10-09  Mg     2.1     10-09    TPro  6.5  /  Alb  3.2<L>  /  TBili  0.5  /  DBili  x   /  AST  39  /  ALT  77<H>  /  AlkPhos  139<H>  10-09    CAPILLARY BLOOD GLUCOSE    Medications:  MEDICATIONS  (STANDING):  acetaminophen   Tablet .. 650 milliGRAM(s) Oral every 6 hours  amLODIPine   Tablet 5 milliGRAM(s) Oral daily  atorvastatin 10 milliGRAM(s) Oral at bedtime  cefoTEtan  IVPB 1 Gram(s) IV Intermittent every 12 hours  chlorhexidine 2% Cloths 1 Application(s) Topical <User Schedule>  docusate sodium Liquid 100 milliGRAM(s) Oral three times a day  enoxaparin Injectable 40 milliGRAM(s) SubCutaneous daily  famotidine Injectable 20 milliGRAM(s) IV Push daily  fat emulsion (Plant Based) 20% Infusion 0.66 Gm/kG/Day (20.9 mL/Hr) IV Continuous <Continuous>  influenza   Vaccine 0.5 milliLiter(s) IntraMuscular once  pantoprazole  Injectable 40 milliGRAM(s) IV Push daily  Parenteral Nutrition - Adult 1 Each (67 mL/Hr) TPN Continuous <Continuous>  Parenteral Nutrition - Adult 1 Each (71 mL/Hr) TPN Continuous <Continuous>    MEDICATIONS  (PRN):  artificial  tears Solution 1 Drop(s) Both EYES every 4 hours PRN Dry Eyes  benzocaine 15 mG/menthol 3.6 mG (Sugar-Free) Lozenge 1 Lozenge Oral every 2 hours PRN Sore Throat  chlorproMAZINE    IVPB 25 milliGRAM(s) IV Intermittent every 8 hours PRN hiccups  HYDROmorphone  Injectable 0.5 milliGRAM(s) IV Push every 4 hours PRN breakthrough pain  ketorolac 0.5% Ophthalmic Solution 1 Drop(s) Left EYE every 6 hours PRN eye pain irritation  simethicone 80 milliGRAM(s) Chew daily PRN gas pains  sodium chloride 0.9% lock flush 10 milliLiter(s) IV Push every 1 hour PRN Pre/post blood products, medications, blood draw, and to maintain line patency    Weight:  9/24 77.7 kg/m2  10/2 76.2 kg/m2  10/3 76.7 kg/m2   10/5 77.6 kg/m2  10/6 78.6 kg/m2  10/7 77.9 kg/m2    Weight Change: Weight has been consistent since admission with minimal fluctuations. Continue to weigh patient weekly to assess weight trends.     Nutrition Focused Physical Exam: Completed [ X; Completed 9/26]  Not Pertinent [   ]  (From 9/26): Moderate muscle loss noted around clavicles and moderate fat loss noted around triceps.     Estimated energy needs:   Ht (9/24): 177.8cm, Wt (9/24): 80.5kg, IBW: 75.5kg +/-10%, %IBW: 107%, BMI: 25.5    ABW (80.5 kg) used to calculate energy needs due to pt's current body weight within % IBW. Needs adjusted post-op, suspected malnutrition, hypermetabolic state.     (30-35 kcal/kg): 5216-7111 kcal/day  (1.0-1.2 gm protein/kg): 80.5-96.6 g protein/day   (25-30 ml/kg): 8339-7683 ml/day      Subjective:   Pt seen for nutrition consult for TPN/PPN. 54 y.o M with hx HTN, HLD, rectal cancer now POD #15 s/p robot assisted abdominal peritoneal resection 9/24. Pt reports decreased PO intake for ~1.5 months PTA, pt follows regular diet, pt reports allergies to shellfish, cherries, papaya, and apples (entered into computer system). Pt endorses ~20lb unintentional wt loss x1.5 months. Pt has been on TPN since 10/1, tolerating well with no complaints. Pt's ostomy bag filled with gas/+F and started on CLD to sip on throughout the day per EMR. Per discussion with RN, pt tolerated 50% of breakfast this am with no complaints. Continues to have low ostomy output. Abd JAKUB drain was d/c 2/2 continued leakage. On assessment pt is resting comfortably in bed with no complaints. Denies N/V/abd pain. Pt has been ambulating around unit. GI: WDL, colostomy noted. Skin: surgical incisions. Pain: none stated at this time. RD to follow per protocol.    Previous Nutrition Diagnosis:  Malnutrition (suspect severe) RT decreased ability to meet EER AEB NFPE, ~10% unintentional wt loss x1.5 months, pt meeting <75% EER for >1 month.    Active [ X ]  Resolved [   ]    Goal: Diet to advance within 24-48h. Pt to consistently meet>75%EER when diet advanced with good tolerance.    Recommendations:  1) Continue with goal TPN via central line: 480g Dex, 90 g AA, 50g 20% daily Lipids to provide in total: 2492 Kcal, 1.1 g protein/kg, GIR 4.14 mg/kg/min based on ABW (80.5 kg). Fluids and lytes per MD discretion.   2) Continue with CLD, recommend advancing to   3) Recommend checking TG weekly.   4) Check Mg, Phos, K daily and POC BG Q6hrs.   5) Recommend closely monitor lytes and aggressive repletion  6) Trend daily weights.     Education: RD to f/u on nutrition education once diet advances     Risk Level: High [ X  ] Moderate [   ] Low [   ]. Admitting Diagnosis:   Patient is a 54y old  Male who presents with a chief complaint of elective surgery (09 Oct 2019 08:49)    PAST MEDICAL & SURGICAL HISTORY:  HLD (hyperlipidemia)  HTN (hypertension)  Colon cancer: near rectum  Other elective surgery: Right Upper Chest- Chemo Port    Current Nutrition Order:   Diet, Clear Liquid (10-09-19 @ 08:10) with TPN/ Lipids    PO Intake: Good (%) [   ]  Fair (50-75%) [ x  ] Poor (<25%) [   ]- per RN, pt consuming 50% of CLD tray this am.     GI Issues: Distended, Colostomy, BM 10/9 Diarrhea    Pain: Well controlled on medication per discussion with RN    Skin Integrity: Surgical Incisions    Labs:   10-09    136  |  101  |  10  ----------------------------<  181<H>  4.1   |  26  |  0.61    Ca    9.1      09 Oct 2019 07:19  Phos  3.3     10-09  Mg     2.1     10-09    TPro  6.5  /  Alb  3.2<L>  /  TBili  0.5  /  DBili  x   /  AST  39  /  ALT  77<H>  /  AlkPhos  139<H>  10-09    CAPILLARY BLOOD GLUCOSE    Medications:  MEDICATIONS  (STANDING):  acetaminophen   Tablet .. 650 milliGRAM(s) Oral every 6 hours  amLODIPine   Tablet 5 milliGRAM(s) Oral daily  atorvastatin 10 milliGRAM(s) Oral at bedtime  cefoTEtan  IVPB 1 Gram(s) IV Intermittent every 12 hours  chlorhexidine 2% Cloths 1 Application(s) Topical <User Schedule>  docusate sodium Liquid 100 milliGRAM(s) Oral three times a day  enoxaparin Injectable 40 milliGRAM(s) SubCutaneous daily  famotidine Injectable 20 milliGRAM(s) IV Push daily  fat emulsion (Plant Based) 20% Infusion 0.66 Gm/kG/Day (20.9 mL/Hr) IV Continuous <Continuous>  influenza   Vaccine 0.5 milliLiter(s) IntraMuscular once  pantoprazole  Injectable 40 milliGRAM(s) IV Push daily  Parenteral Nutrition - Adult 1 Each (67 mL/Hr) TPN Continuous <Continuous>  Parenteral Nutrition - Adult 1 Each (71 mL/Hr) TPN Continuous <Continuous>    MEDICATIONS  (PRN):  artificial  tears Solution 1 Drop(s) Both EYES every 4 hours PRN Dry Eyes  benzocaine 15 mG/menthol 3.6 mG (Sugar-Free) Lozenge 1 Lozenge Oral every 2 hours PRN Sore Throat  chlorproMAZINE    IVPB 25 milliGRAM(s) IV Intermittent every 8 hours PRN hiccups  HYDROmorphone  Injectable 0.5 milliGRAM(s) IV Push every 4 hours PRN breakthrough pain  ketorolac 0.5% Ophthalmic Solution 1 Drop(s) Left EYE every 6 hours PRN eye pain irritation  simethicone 80 milliGRAM(s) Chew daily PRN gas pains  sodium chloride 0.9% lock flush 10 milliLiter(s) IV Push every 1 hour PRN Pre/post blood products, medications, blood draw, and to maintain line patency    Weight:  9/24 77.7 kg/m2  10/2 76.2 kg/m2  10/3 76.7 kg/m2   10/5 77.6 kg/m2  10/6 78.6 kg/m2  10/7 77.9 kg/m2    Weight Change: Weight has been consistent since admission with minimal fluctuations. Continue to weigh patient weekly to assess weight trends.     Nutrition Focused Physical Exam: Completed [ X; Completed 9/26]  Not Pertinent [   ]  (From 9/26): Moderate muscle loss noted around clavicles and moderate fat loss noted around triceps.     Estimated energy needs:   Ht (9/24): 177.8cm, Wt (9/24): 80.5kg, IBW: 75.5kg +/-10%, %IBW: 107%, BMI: 25.5    ABW (80.5 kg) used to calculate energy needs due to pt's current body weight within % IBW. Needs adjusted post-op, suspected malnutrition, hypermetabolic state.     (30-35 kcal/kg): 9431-2888 kcal/day  (1.0-1.2 gm protein/kg): 80.5-96.6 g protein/day   (25-30 ml/kg): 3485-1282 ml/day      Subjective:   Pt seen for nutrition consult for TPN/PPN. 54 y.o M with hx HTN, HLD, rectal cancer now POD #15 s/p robot assisted abdominal peritoneal resection 9/24. Pt reports decreased PO intake for ~1.5 months PTA, pt follows regular diet, pt reports allergies to shellfish, cherries, papaya, and apples (entered into computer system). Pt endorses ~20lb unintentional wt loss x1.5 months. Pt has been on TPN since 10/1, tolerating well with no complaints. Pt's ostomy bag filled with gas/+F and started on CLD to sip on throughout the day per EMR. Per discussion with RN, pt tolerated 50% of breakfast this am with no complaints. Continues to have low ostomy output. Abd JAKUB drain was d/c 2/2 continued leakage. On assessment pt is resting comfortably in bed with no complaints. Denies N/V/abd pain. Pt has been ambulating around unit. GI: WDL, colostomy noted. Skin: surgical incisions. Pain: none stated at this time. RD to follow per protocol.    Previous Nutrition Diagnosis:  Malnutrition (suspect severe) RT decreased ability to meet EER AEB NFPE, ~10% unintentional wt loss x1.5 months, pt meeting <75% EER for >1 month.    Active [ X ]  Resolved [   ]    Goal: Diet to advance within 24-48h. Pt to consistently meet>75%EER when diet advanced with good tolerance.    Recommendations:  1) Continue with goal TPN via central line: 480g Dex, 90 g AA, 50g 20% daily Lipids to provide in total: 2492 Kcal, 1.1 g protein/kg, GIR 4.14 mg/kg/min based on ABW (80.5 kg). Fluids and lytes per MD discretion.   2) Continue with CLD, recommend advancing to  to DASH/ TLC diet when medically feasible   3) Recommend checking TG weekly.   4) Check Mg, Phos, K daily and POC BG Q6hrs.   5) Recommend closely monitor lytes and aggressive repletion  6) Trend daily weights.     Education: RD to f/u on nutrition education once diet advances     Risk Level: High [ X  ] Moderate [   ] Low [   ].

## 2019-10-09 NOTE — PROGRESS NOTE ADULT - ASSESSMENT
54M with HTN, HLD, rectal cancer s/p robot assisted abdominal peritoneal resection 9/24    - Pain/nausea control  - Sips of juice only/IVF  - TPN  - Cefotetan  - Lovenox   - Protonix, famotidine  - JAKUB x 1 abdomen, d/c'd 10/8 due to leakage   - JAKUB x 1 L thigh  - Encourage OOB/AAT  - F/u plastics recs  - PT recs home w/ home PT  - Monitor ostomy OP

## 2019-10-10 LAB
ANION GAP SERPL CALC-SCNC: 11 MMOL/L — SIGNIFICANT CHANGE UP (ref 5–17)
BUN SERPL-MCNC: 10 MG/DL — SIGNIFICANT CHANGE UP (ref 7–23)
CALCIUM SERPL-MCNC: 9.2 MG/DL — SIGNIFICANT CHANGE UP (ref 8.4–10.5)
CHLORIDE SERPL-SCNC: 101 MMOL/L — SIGNIFICANT CHANGE UP (ref 96–108)
CO2 SERPL-SCNC: 26 MMOL/L — SIGNIFICANT CHANGE UP (ref 22–31)
CREAT SERPL-MCNC: 0.59 MG/DL — SIGNIFICANT CHANGE UP (ref 0.5–1.3)
GLUCOSE SERPL-MCNC: 190 MG/DL — HIGH (ref 70–99)
HCT VFR BLD CALC: 31.7 % — LOW (ref 39–50)
HGB BLD-MCNC: 10.4 G/DL — LOW (ref 13–17)
MAGNESIUM SERPL-MCNC: 2 MG/DL — SIGNIFICANT CHANGE UP (ref 1.6–2.6)
MCHC RBC-ENTMCNC: 30.5 PG — SIGNIFICANT CHANGE UP (ref 27–34)
MCHC RBC-ENTMCNC: 32.8 GM/DL — SIGNIFICANT CHANGE UP (ref 32–36)
MCV RBC AUTO: 93 FL — SIGNIFICANT CHANGE UP (ref 80–100)
NRBC # BLD: 0 /100 WBCS — SIGNIFICANT CHANGE UP (ref 0–0)
PHOSPHATE SERPL-MCNC: 3.6 MG/DL — SIGNIFICANT CHANGE UP (ref 2.5–4.5)
PLATELET # BLD AUTO: 377 K/UL — SIGNIFICANT CHANGE UP (ref 150–400)
POTASSIUM SERPL-MCNC: 4.2 MMOL/L — SIGNIFICANT CHANGE UP (ref 3.5–5.3)
POTASSIUM SERPL-SCNC: 4.2 MMOL/L — SIGNIFICANT CHANGE UP (ref 3.5–5.3)
RBC # BLD: 3.41 M/UL — LOW (ref 4.2–5.8)
RBC # FLD: 13.7 % — SIGNIFICANT CHANGE UP (ref 10.3–14.5)
SODIUM SERPL-SCNC: 138 MMOL/L — SIGNIFICANT CHANGE UP (ref 135–145)
TRIGL SERPL-MCNC: 94 MG/DL — SIGNIFICANT CHANGE UP (ref 10–149)
WBC # BLD: 10.6 K/UL — HIGH (ref 3.8–10.5)
WBC # FLD AUTO: 10.6 K/UL — HIGH (ref 3.8–10.5)

## 2019-10-10 RX ORDER — I.V. FAT EMULSION 20 G/100ML
0.65 EMULSION INTRAVENOUS
Qty: 50 | Refills: 0 | Status: DISCONTINUED | OUTPATIENT
Start: 2019-10-10 | End: 2019-10-10

## 2019-10-10 RX ORDER — OXYCODONE AND ACETAMINOPHEN 5; 325 MG/1; MG/1
1 TABLET ORAL EVERY 4 HOURS
Refills: 0 | Status: DISCONTINUED | OUTPATIENT
Start: 2019-10-10 | End: 2019-10-11

## 2019-10-10 RX ORDER — ELECTROLYTE SOLUTION,INJ
1 VIAL (ML) INTRAVENOUS
Refills: 0 | Status: DISCONTINUED | OUTPATIENT
Start: 2019-10-10 | End: 2019-10-10

## 2019-10-10 RX ADMIN — OXYCODONE AND ACETAMINOPHEN 1 TABLET(S): 5; 325 TABLET ORAL at 21:32

## 2019-10-10 RX ADMIN — Medication 650 MILLIGRAM(S): at 09:57

## 2019-10-10 RX ADMIN — HYDROMORPHONE HYDROCHLORIDE 0.5 MILLIGRAM(S): 2 INJECTION INTRAMUSCULAR; INTRAVENOUS; SUBCUTANEOUS at 08:05

## 2019-10-10 RX ADMIN — Medication 650 MILLIGRAM(S): at 18:27

## 2019-10-10 RX ADMIN — PANTOPRAZOLE SODIUM 40 MILLIGRAM(S): 20 TABLET, DELAYED RELEASE ORAL at 13:16

## 2019-10-10 RX ADMIN — HYDROMORPHONE HYDROCHLORIDE 0.5 MILLIGRAM(S): 2 INJECTION INTRAMUSCULAR; INTRAVENOUS; SUBCUTANEOUS at 03:50

## 2019-10-10 RX ADMIN — HYDROMORPHONE HYDROCHLORIDE 0.5 MILLIGRAM(S): 2 INJECTION INTRAMUSCULAR; INTRAVENOUS; SUBCUTANEOUS at 07:50

## 2019-10-10 RX ADMIN — HYDROMORPHONE HYDROCHLORIDE 0.5 MILLIGRAM(S): 2 INJECTION INTRAMUSCULAR; INTRAVENOUS; SUBCUTANEOUS at 19:27

## 2019-10-10 RX ADMIN — Medication 100 MILLIGRAM(S): at 21:30

## 2019-10-10 RX ADMIN — Medication 650 MILLIGRAM(S): at 17:27

## 2019-10-10 RX ADMIN — Medication 100 GRAM(S): at 06:02

## 2019-10-10 RX ADMIN — Medication 650 MILLIGRAM(S): at 03:33

## 2019-10-10 RX ADMIN — Medication 100 MILLIGRAM(S): at 06:00

## 2019-10-10 RX ADMIN — FAMOTIDINE 20 MILLIGRAM(S): 10 INJECTION INTRAVENOUS at 13:17

## 2019-10-10 RX ADMIN — Medication 650 MILLIGRAM(S): at 02:57

## 2019-10-10 RX ADMIN — ENOXAPARIN SODIUM 40 MILLIGRAM(S): 100 INJECTION SUBCUTANEOUS at 13:16

## 2019-10-10 RX ADMIN — Medication 100 MILLIGRAM(S): at 13:17

## 2019-10-10 RX ADMIN — CHLORHEXIDINE GLUCONATE 1 APPLICATION(S): 213 SOLUTION TOPICAL at 07:49

## 2019-10-10 RX ADMIN — HYDROMORPHONE HYDROCHLORIDE 0.5 MILLIGRAM(S): 2 INJECTION INTRAMUSCULAR; INTRAVENOUS; SUBCUTANEOUS at 13:17

## 2019-10-10 RX ADMIN — Medication 1 EACH: at 17:27

## 2019-10-10 RX ADMIN — AMLODIPINE BESYLATE 5 MILLIGRAM(S): 2.5 TABLET ORAL at 06:00

## 2019-10-10 RX ADMIN — OXYCODONE AND ACETAMINOPHEN 1 TABLET(S): 5; 325 TABLET ORAL at 22:42

## 2019-10-10 RX ADMIN — HYDROMORPHONE HYDROCHLORIDE 0.5 MILLIGRAM(S): 2 INJECTION INTRAMUSCULAR; INTRAVENOUS; SUBCUTANEOUS at 03:32

## 2019-10-10 RX ADMIN — HYDROMORPHONE HYDROCHLORIDE 0.5 MILLIGRAM(S): 2 INJECTION INTRAMUSCULAR; INTRAVENOUS; SUBCUTANEOUS at 22:42

## 2019-10-10 RX ADMIN — ATORVASTATIN CALCIUM 10 MILLIGRAM(S): 80 TABLET, FILM COATED ORAL at 21:30

## 2019-10-10 RX ADMIN — I.V. FAT EMULSION 20.83 GM/KG/DAY: 20 EMULSION INTRAVENOUS at 17:27

## 2019-10-10 RX ADMIN — HYDROMORPHONE HYDROCHLORIDE 0.5 MILLIGRAM(S): 2 INJECTION INTRAMUSCULAR; INTRAVENOUS; SUBCUTANEOUS at 18:30

## 2019-10-10 RX ADMIN — HYDROMORPHONE HYDROCHLORIDE 0.5 MILLIGRAM(S): 2 INJECTION INTRAMUSCULAR; INTRAVENOUS; SUBCUTANEOUS at 14:16

## 2019-10-10 RX ADMIN — Medication 650 MILLIGRAM(S): at 10:45

## 2019-10-10 RX ADMIN — HYDROMORPHONE HYDROCHLORIDE 0.5 MILLIGRAM(S): 2 INJECTION INTRAMUSCULAR; INTRAVENOUS; SUBCUTANEOUS at 22:55

## 2019-10-10 NOTE — PROGRESS NOTE ADULT - ASSESSMENT
54M with HTN, HLD, rectal cancer s/p robot assisted abdominal peritoneal resection 9/24    - Pain/nausea control  - NPO w/ sips and chips/IVF  - TPN  - Cefotetan  - Lovenox   - Protonix, famotidine  - Cont JAKUB x 1 thigh, may DC today pending plastics   - Encourage OOB/AAT  - F/u plastics recs  - PT recs home w/ home PT  - Monitor ostomy OP

## 2019-10-10 NOTE — PROGRESS NOTE ADULT - ASSESSMENT
55 y/o male s/p abdominal peritoneal resection with gracilis flap  - Likely will d/c leg drain today, pending attending approval  - Care per primary team    Jose Chavez  PGY-5  Plastic Surgery

## 2019-10-10 NOTE — PROGRESS NOTE ADULT - SUBJECTIVE AND OBJECTIVE BOX
INTERVAL HPI/OVERNIGHT EVENTS: NAEO. Ostomy draining minimal OP however flatus present in bag.     STATUS POST:  Abdominal perineal resection 9/24    SUBJECTIVE: Pt seen and examined at bedside this AM by surgery team. Pain well controlled. No acute complaints. Denies f/n/v/cp/sob.      MEDICATIONS  (STANDING):  acetaminophen   Tablet .. 650 milliGRAM(s) Oral every 6 hours  amLODIPine   Tablet 5 milliGRAM(s) Oral daily  atorvastatin 10 milliGRAM(s) Oral at bedtime  cefoTEtan  IVPB 1 Gram(s) IV Intermittent every 12 hours  chlorhexidine 2% Cloths 1 Application(s) Topical <User Schedule>  docusate sodium Liquid 100 milliGRAM(s) Oral three times a day  enoxaparin Injectable 40 milliGRAM(s) SubCutaneous daily  famotidine Injectable 20 milliGRAM(s) IV Push daily  fat emulsion (Plant Based) 20% Infusion 0.65 Gm/kG/Day (20.83 mL/Hr) IV Continuous <Continuous>  influenza   Vaccine 0.5 milliLiter(s) IntraMuscular once  pantoprazole  Injectable 40 milliGRAM(s) IV Push daily  Parenteral Nutrition - Adult 1 Each (71 mL/Hr) TPN Continuous <Continuous>  Parenteral Nutrition - Adult 1 Each (71 mL/Hr) TPN Continuous <Continuous>    MEDICATIONS  (PRN):  artificial  tears Solution 1 Drop(s) Both EYES every 4 hours PRN Dry Eyes  benzocaine 15 mG/menthol 3.6 mG (Sugar-Free) Lozenge 1 Lozenge Oral every 2 hours PRN Sore Throat  chlorproMAZINE    IVPB 25 milliGRAM(s) IV Intermittent every 8 hours PRN hiccups  HYDROmorphone  Injectable 0.5 milliGRAM(s) IV Push every 4 hours PRN breakthrough pain  ketorolac 0.5% Ophthalmic Solution 1 Drop(s) Left EYE every 6 hours PRN eye pain irritation  simethicone 80 milliGRAM(s) Chew daily PRN gas pains  sodium chloride 0.9% lock flush 10 milliLiter(s) IV Push every 1 hour PRN Pre/post blood products, medications, blood draw, and to maintain line patency      Vital Signs Last 24 Hrs  T(C): 36.6 (10 Oct 2019 13:54), Max: 37.9 (09 Oct 2019 20:55)  T(F): 97.9 (10 Oct 2019 13:54), Max: 100.3 (09 Oct 2019 20:55)  HR: 86 (10 Oct 2019 13:54) (80 - 103)  BP: 100/57 (10 Oct 2019 13:54) (100/57 - 128/75)  BP(mean): 86 (09 Oct 2019 16:28) (86 - 86)  RR: 17 (10 Oct 2019 13:54) (12 - 18)  SpO2: 98% (10 Oct 2019 13:54) (95% - 98%)  PHYSICAL EXAM:      Constitutional: A&Ox3, NAD   Respiratory: non labored breathing, no respiratory distress  Cardiovascular: NSR  Gastrointestinal: Abdomen soft, nd, mild TTP around incision site, incision c/d/i. Ostomy w/ stoma pink and patent, gas and minimal stool present in colostomy bag. R abdominal JAKUB site with SS drainage. Anal wound c/d/i.   Extremities: wwp, no calf tenderness or edema. SCDs in place. LUE PICC in place. Thigh flap incision c/d/i, no surrounding erythema. L thigh JAKUB with minimal serosanguinous output.        I&O's Detail    09 Oct 2019 07:01  -  10 Oct 2019 07:00  --------------------------------------------------------  IN:    fat emulsion (Plant Based) 20% Infusion: 231 mL    IV PiggyBack: 50 mL    TPN (Total Parenteral Nutrition): 1664 mL  Total IN: 1945 mL    OUT:    Bulb: 16.5 mL    Colostomy: 64 mL    Voided: 1575 mL  Total OUT: 1655.5 mL    Total NET: 289.5 mL      10 Oct 2019 07:01  -  10 Oct 2019 14:02  --------------------------------------------------------  IN:  Total IN: 0 mL    OUT:    Voided: 400 mL  Total OUT: 400 mL    Total NET: -400 mL            LABS:       10-10    138  |  101  |  10  ----------------------------<  190<H>  4.2   |  26  |  0.59    Ca    9.2      10 Oct 2019 07:29  Phos  3.6     10-10  Mg     2.0     10-10    TPro  6.5  /  Alb  3.2<L>  /  TBili  0.5  /  DBili  x   /  AST  39  /  ALT  77<H>  /  AlkPhos  139<H>  10-09                          10.4   10.60 )-----------( 377      ( 10 Oct 2019 07:29 )             31.7

## 2019-10-10 NOTE — PROGRESS NOTE ADULT - SUBJECTIVE AND OBJECTIVE BOX
Plastic Surgery Progress Note (pg [facility]: [id number], [facility]: [phone], [facility]: [phone])    SUBJECTIVE:  Doing well. No overnight events.     OBJECTIVE:     ** VITAL SIGNS / I&O's **    Vital Signs Last 24 Hrs  T(C): 36.7 (10 Oct 2019 05:48), Max: 37.9 (09 Oct 2019 20:55)  T(F): 98 (10 Oct 2019 05:48), Max: 100.3 (09 Oct 2019 20:55)  HR: 80 (10 Oct 2019 05:48) (80 - 103)  BP: 109/74 (10 Oct 2019 05:48) (103/61 - 128/75)  BP(mean): 86 (09 Oct 2019 16:28) (86 - 94)  RR: 16 (10 Oct 2019 05:48) (12 - 18)  SpO2: 97% (10 Oct 2019 05:48) (95% - 99%)      09 Oct 2019 07:01  -  10 Oct 2019 07:00  --------------------------------------------------------  IN:    fat emulsion (Plant Based) 20% Infusion: 231 mL    IV PiggyBack: 50 mL    TPN (Total Parenteral Nutrition): 1664 mL  Total IN: 1945 mL    OUT:    Bulb: 16.5 mL    Colostomy: 64 mL    Voided: 1575 mL  Total OUT: 1655.5 mL    Total NET: 289.5 mL          ** PHYSICAL EXAM **    -- CONSTITUTIONAL: AOx3. NAD.   -- ABDOMEN: Soft, still slightly distended  -- PERINEUM: Incision c/d/i, no collections, sutures in place  -- EXTREMITIES: L thigh incision c/d/i, no collections, JAKUB ss      **MEDS**  acetaminophen   Tablet .. 650 milliGRAM(s) Oral every 6 hours  amLODIPine   Tablet 5 milliGRAM(s) Oral daily  artificial  tears Solution 1 Drop(s) Both EYES every 4 hours PRN  atorvastatin 10 milliGRAM(s) Oral at bedtime  benzocaine 15 mG/menthol 3.6 mG (Sugar-Free) Lozenge 1 Lozenge Oral every 2 hours PRN  cefoTEtan  IVPB 1 Gram(s) IV Intermittent every 12 hours  chlorhexidine 2% Cloths 1 Application(s) Topical <User Schedule>  chlorproMAZINE    IVPB 25 milliGRAM(s) IV Intermittent every 8 hours PRN  docusate sodium Liquid 100 milliGRAM(s) Oral three times a day  enoxaparin Injectable 40 milliGRAM(s) SubCutaneous daily  famotidine Injectable 20 milliGRAM(s) IV Push daily  fat emulsion (Plant Based) 20% Infusion 0.66 Gm/kG/Day IV Continuous <Continuous>  HYDROmorphone  Injectable 0.5 milliGRAM(s) IV Push every 4 hours PRN  influenza   Vaccine 0.5 milliLiter(s) IntraMuscular once  ketorolac 0.5% Ophthalmic Solution 1 Drop(s) Left EYE every 6 hours PRN  pantoprazole  Injectable 40 milliGRAM(s) IV Push daily  Parenteral Nutrition - Adult 1 Each TPN Continuous <Continuous>  simethicone 80 milliGRAM(s) Chew daily PRN  sodium chloride 0.9% lock flush 10 milliLiter(s) IV Push every 1 hour PRN      ** LABS **                          10.5   11.82 )-----------( 371      ( 09 Oct 2019 07:19 )             31.5     09 Oct 2019 07:19    136    |  101    |  10     ----------------------------<  181    4.1     |  26     |  0.61     Ca    9.1        09 Oct 2019 07:19  Phos  3.3       09 Oct 2019 07:19  Mg     2.1       09 Oct 2019 07:19    TPro  6.5    /  Alb  3.2    /  TBili  0.5    /  DBili  x      /  AST  39     /  ALT  77     /  AlkPhos  139    09 Oct 2019 07:19      CAPILLARY BLOOD GLUCOSE

## 2019-10-11 LAB
ALBUMIN SERPL ELPH-MCNC: 3.4 G/DL — SIGNIFICANT CHANGE UP (ref 3.3–5)
ALP SERPL-CCNC: 165 U/L — HIGH (ref 40–120)
ALT FLD-CCNC: 61 U/L — HIGH (ref 10–45)
ANION GAP SERPL CALC-SCNC: 11 MMOL/L — SIGNIFICANT CHANGE UP (ref 5–17)
AST SERPL-CCNC: 34 U/L — SIGNIFICANT CHANGE UP (ref 10–40)
BILIRUB SERPL-MCNC: 0.6 MG/DL — SIGNIFICANT CHANGE UP (ref 0.2–1.2)
BUN SERPL-MCNC: 10 MG/DL — SIGNIFICANT CHANGE UP (ref 7–23)
CALCIUM SERPL-MCNC: 9.6 MG/DL — SIGNIFICANT CHANGE UP (ref 8.4–10.5)
CHLORIDE SERPL-SCNC: 102 MMOL/L — SIGNIFICANT CHANGE UP (ref 96–108)
CO2 SERPL-SCNC: 25 MMOL/L — SIGNIFICANT CHANGE UP (ref 22–31)
CREAT SERPL-MCNC: 0.55 MG/DL — SIGNIFICANT CHANGE UP (ref 0.5–1.3)
GLUCOSE SERPL-MCNC: 201 MG/DL — HIGH (ref 70–99)
HCT VFR BLD CALC: 32.1 % — LOW (ref 39–50)
HGB BLD-MCNC: 10.7 G/DL — LOW (ref 13–17)
MAGNESIUM SERPL-MCNC: 2.1 MG/DL — SIGNIFICANT CHANGE UP (ref 1.6–2.6)
MCHC RBC-ENTMCNC: 30.4 PG — SIGNIFICANT CHANGE UP (ref 27–34)
MCHC RBC-ENTMCNC: 33.3 GM/DL — SIGNIFICANT CHANGE UP (ref 32–36)
MCV RBC AUTO: 91.2 FL — SIGNIFICANT CHANGE UP (ref 80–100)
NRBC # BLD: 0 /100 WBCS — SIGNIFICANT CHANGE UP (ref 0–0)
PHOSPHATE SERPL-MCNC: 3.3 MG/DL — SIGNIFICANT CHANGE UP (ref 2.5–4.5)
PLATELET # BLD AUTO: 416 K/UL — HIGH (ref 150–400)
POTASSIUM SERPL-MCNC: 4.4 MMOL/L — SIGNIFICANT CHANGE UP (ref 3.5–5.3)
POTASSIUM SERPL-SCNC: 4.4 MMOL/L — SIGNIFICANT CHANGE UP (ref 3.5–5.3)
PROT SERPL-MCNC: 6.9 G/DL — SIGNIFICANT CHANGE UP (ref 6–8.3)
RBC # BLD: 3.52 M/UL — LOW (ref 4.2–5.8)
RBC # FLD: 13.5 % — SIGNIFICANT CHANGE UP (ref 10.3–14.5)
SODIUM SERPL-SCNC: 138 MMOL/L — SIGNIFICANT CHANGE UP (ref 135–145)
WBC # BLD: 10.71 K/UL — HIGH (ref 3.8–10.5)
WBC # FLD AUTO: 10.71 K/UL — HIGH (ref 3.8–10.5)

## 2019-10-11 PROCEDURE — 74019 RADEX ABDOMEN 2 VIEWS: CPT | Mod: 26

## 2019-10-11 PROCEDURE — 74177 CT ABD & PELVIS W/CONTRAST: CPT | Mod: 26

## 2019-10-11 RX ORDER — OXYCODONE HYDROCHLORIDE 5 MG/1
5 TABLET ORAL EVERY 4 HOURS
Refills: 0 | Status: DISCONTINUED | OUTPATIENT
Start: 2019-10-11 | End: 2019-10-14

## 2019-10-11 RX ORDER — I.V. FAT EMULSION 20 G/100ML
0.65 EMULSION INTRAVENOUS
Qty: 50 | Refills: 0 | Status: DISCONTINUED | OUTPATIENT
Start: 2019-10-11 | End: 2019-10-11

## 2019-10-11 RX ORDER — ELECTROLYTE SOLUTION,INJ
1 VIAL (ML) INTRAVENOUS
Refills: 0 | Status: DISCONTINUED | OUTPATIENT
Start: 2019-10-11 | End: 2019-10-11

## 2019-10-11 RX ORDER — IOHEXOL 300 MG/ML
30 INJECTION, SOLUTION INTRAVENOUS ONCE
Refills: 0 | Status: COMPLETED | OUTPATIENT
Start: 2019-10-11 | End: 2019-10-11

## 2019-10-11 RX ADMIN — Medication 650 MILLIGRAM(S): at 05:35

## 2019-10-11 RX ADMIN — CHLORHEXIDINE GLUCONATE 1 APPLICATION(S): 213 SOLUTION TOPICAL at 06:08

## 2019-10-11 RX ADMIN — PANTOPRAZOLE SODIUM 40 MILLIGRAM(S): 20 TABLET, DELAYED RELEASE ORAL at 12:03

## 2019-10-11 RX ADMIN — I.V. FAT EMULSION 20.83 GM/KG/DAY: 20 EMULSION INTRAVENOUS at 18:54

## 2019-10-11 RX ADMIN — OXYCODONE HYDROCHLORIDE 5 MILLIGRAM(S): 5 TABLET ORAL at 12:03

## 2019-10-11 RX ADMIN — Medication 650 MILLIGRAM(S): at 19:08

## 2019-10-11 RX ADMIN — OXYCODONE HYDROCHLORIDE 5 MILLIGRAM(S): 5 TABLET ORAL at 07:30

## 2019-10-11 RX ADMIN — Medication 650 MILLIGRAM(S): at 12:03

## 2019-10-11 RX ADMIN — Medication 1 EACH: at 18:54

## 2019-10-11 RX ADMIN — OXYCODONE HYDROCHLORIDE 5 MILLIGRAM(S): 5 TABLET ORAL at 06:30

## 2019-10-11 RX ADMIN — AMLODIPINE BESYLATE 5 MILLIGRAM(S): 2.5 TABLET ORAL at 05:35

## 2019-10-11 RX ADMIN — Medication 650 MILLIGRAM(S): at 00:50

## 2019-10-11 RX ADMIN — Medication 650 MILLIGRAM(S): at 01:50

## 2019-10-11 RX ADMIN — FAMOTIDINE 20 MILLIGRAM(S): 10 INJECTION INTRAVENOUS at 12:03

## 2019-10-11 RX ADMIN — Medication 100 MILLIGRAM(S): at 05:35

## 2019-10-11 RX ADMIN — Medication 650 MILLIGRAM(S): at 13:10

## 2019-10-11 RX ADMIN — ATORVASTATIN CALCIUM 10 MILLIGRAM(S): 80 TABLET, FILM COATED ORAL at 23:10

## 2019-10-11 RX ADMIN — ENOXAPARIN SODIUM 40 MILLIGRAM(S): 100 INJECTION SUBCUTANEOUS at 12:03

## 2019-10-11 RX ADMIN — Medication 100 MILLIGRAM(S): at 23:11

## 2019-10-11 RX ADMIN — OXYCODONE HYDROCHLORIDE 5 MILLIGRAM(S): 5 TABLET ORAL at 19:08

## 2019-10-11 RX ADMIN — Medication 650 MILLIGRAM(S): at 23:10

## 2019-10-11 RX ADMIN — IOHEXOL 30 MILLILITER(S): 300 INJECTION, SOLUTION INTRAVENOUS at 13:49

## 2019-10-11 RX ADMIN — OXYCODONE HYDROCHLORIDE 5 MILLIGRAM(S): 5 TABLET ORAL at 20:30

## 2019-10-11 RX ADMIN — Medication 650 MILLIGRAM(S): at 06:30

## 2019-10-11 RX ADMIN — OXYCODONE HYDROCHLORIDE 5 MILLIGRAM(S): 5 TABLET ORAL at 13:30

## 2019-10-11 NOTE — PROGRESS NOTE ADULT - SUBJECTIVE AND OBJECTIVE BOX
INTERVAL HPI/OVERNIGHT EVENTS: NAEO. AFVSS.    STATUS POST:  Abdominal perineal resection 9/24     SUBJECTIVE: Pt seen and examined at bedside this AM by surgery team. Pt is ambulating, pain is well controlled. Tolerating sips of juice. Denies f/n/v/cp/sob.     MEDICATIONS  (STANDING):  acetaminophen   Tablet .. 650 milliGRAM(s) Oral every 6 hours  amLODIPine   Tablet 5 milliGRAM(s) Oral daily  atorvastatin 10 milliGRAM(s) Oral at bedtime  chlorhexidine 2% Cloths 1 Application(s) Topical <User Schedule>  docusate sodium Liquid 100 milliGRAM(s) Oral three times a day  enoxaparin Injectable 40 milliGRAM(s) SubCutaneous daily  famotidine Injectable 20 milliGRAM(s) IV Push daily  fat emulsion (Plant Based) 20% Infusion 0.65 Gm/kG/Day (20.83 mL/Hr) IV Continuous <Continuous>  influenza   Vaccine 0.5 milliLiter(s) IntraMuscular once  pantoprazole  Injectable 40 milliGRAM(s) IV Push daily  Parenteral Nutrition - Adult 1 Each (71 mL/Hr) TPN Continuous <Continuous>    MEDICATIONS  (PRN):  artificial  tears Solution 1 Drop(s) Both EYES every 4 hours PRN Dry Eyes  benzocaine 15 mG/menthol 3.6 mG (Sugar-Free) Lozenge 1 Lozenge Oral every 2 hours PRN Sore Throat  chlorproMAZINE    IVPB 25 milliGRAM(s) IV Intermittent every 8 hours PRN hiccups  HYDROmorphone  Injectable 0.5 milliGRAM(s) IV Push every 4 hours PRN breakthrough pain  ketorolac 0.5% Ophthalmic Solution 1 Drop(s) Left EYE every 6 hours PRN eye pain irritation  oxyCODONE    IR 5 milliGRAM(s) Oral every 4 hours PRN Moderate Pain (4 - 6)  simethicone 80 milliGRAM(s) Chew daily PRN gas pains  sodium chloride 0.9% lock flush 10 milliLiter(s) IV Push every 1 hour PRN Pre/post blood products, medications, blood draw, and to maintain line patency      Vital Signs Last 24 Hrs  T(C): 36.7 (11 Oct 2019 05:32), Max: 37.1 (11 Oct 2019 00:50)  T(F): 98.1 (11 Oct 2019 05:32), Max: 98.8 (11 Oct 2019 00:50)  HR: 87 (11 Oct 2019 05:32) (81 - 100)  BP: 119/69 (11 Oct 2019 05:32) (100/57 - 123/68)  BP(mean): --  RR: 18 (11 Oct 2019 05:32) (16 - 26)  SpO2: 99% (11 Oct 2019 05:32) (96% - 100%)    PHYSICAL EXAM:      Constitutional: A&Ox3, NAD     Respiratory: non labored breathing, no respiratory distress    Cardiovascular: NSR, RRR    Gastrointestinal: Abdomen soft, mildly distended, mild ttp around incision sites, incision c/d/i. Ostomy w/ stoma pink and patent, gas and minimal stool present in ostomy bag. Anal wound c/d/i.     Extremities: wwp, sensory/motor intact, no calf tenderness or edema. LUE PICC in place. Thigh flap incision c/d/i, no surrounding erythema. L thigh JAKUB with minimal serosanguinous output.      I&O's Detail    10 Oct 2019 07:01  -  11 Oct 2019 07:00  --------------------------------------------------------  IN:    fat emulsion (Plant Based) 20% Infusion: 84 mL    fat emulsion (Plant Based) 20% Infusion: 249.6 mL    TPN (Total Parenteral Nutrition): 1633 mL  Total IN: 1966.6 mL    OUT:    Bulb: 10.5 mL    Colostomy: 50 mL    Voided: 2325 mL  Total OUT: 2385.5 mL    Total NET: -418.9 mL          LABS:                        10.4   10.60 )-----------( 377      ( 10 Oct 2019 07:29 )             31.7     10-10    138  |  101  |  10  ----------------------------<  190<H>  4.2   |  26  |  0.59    Ca    9.2      10 Oct 2019 07:29  Phos  3.6     10-10  Mg     2.0     10-10            RADIOLOGY & ADDITIONAL STUDIES:

## 2019-10-11 NOTE — PROGRESS NOTE ADULT - ASSESSMENT
54M with HTN, HLD, rectal cancer s/p robot assisted abdominal peritoneal resection 9/24    - Pain/nausea control  - Sips of juice only/IVF  - TPN  - Lovenox   - Protonix, famotidine  - JAKUB x 1 L thigh  - Encourage OOB/AAT  - F/u plastics recs  - PT recs home w/ home PT  - Monitor ostomy output

## 2019-10-11 NOTE — PROGRESS NOTE ADULT - SUBJECTIVE AND OBJECTIVE BOX
Plastic Surgery Progress Note (pg [facility]: [id number], [facility]: [phone], [facility]: [phone])    SUBJECTIVE:  Doing well. No overnight events. Ambulating and voiding appropriately.     OBJECTIVE:     ** VITAL SIGNS / I&O's **    Vital Signs Last 24 Hrs  T(C): 36.7 (11 Oct 2019 05:32), Max: 37.1 (11 Oct 2019 00:50)  T(F): 98.1 (11 Oct 2019 05:32), Max: 98.8 (11 Oct 2019 00:50)  HR: 87 (11 Oct 2019 05:32) (81 - 100)  BP: 119/69 (11 Oct 2019 05:32) (100/57 - 123/68)  BP(mean): --  RR: 18 (11 Oct 2019 05:32) (16 - 26)  SpO2: 99% (11 Oct 2019 05:32) (96% - 100%)      10 Oct 2019 07:01  -  11 Oct 2019 07:00  --------------------------------------------------------  IN:    fat emulsion (Plant Based) 20% Infusion: 84 mL    fat emulsion (Plant Based) 20% Infusion: 249.6 mL    TPN (Total Parenteral Nutrition): 1633 mL  Total IN: 1966.6 mL    OUT:    Bulb: 10.5 mL    Colostomy: 50 mL    Voided: 2325 mL  Total OUT: 2385.5 mL    Total NET: -418.9 mL          ** PHYSICAL EXAM **    -- CONSTITUTIONAL: AOx3. NAD.   -- ABDOMEN: distended  -- EXTREMITIES: L thigh incision c/d/i, no collections, JAKUB removed w/o issue  -- PERINEUM: Incision c/d/i, no collections, no signs of infection    **MEDS**  acetaminophen   Tablet .. 650 milliGRAM(s) Oral every 6 hours  amLODIPine   Tablet 5 milliGRAM(s) Oral daily  artificial  tears Solution 1 Drop(s) Both EYES every 4 hours PRN  atorvastatin 10 milliGRAM(s) Oral at bedtime  benzocaine 15 mG/menthol 3.6 mG (Sugar-Free) Lozenge 1 Lozenge Oral every 2 hours PRN  chlorhexidine 2% Cloths 1 Application(s) Topical <User Schedule>  chlorproMAZINE    IVPB 25 milliGRAM(s) IV Intermittent every 8 hours PRN  docusate sodium Liquid 100 milliGRAM(s) Oral three times a day  enoxaparin Injectable 40 milliGRAM(s) SubCutaneous daily  famotidine Injectable 20 milliGRAM(s) IV Push daily  fat emulsion (Plant Based) 20% Infusion 0.65 Gm/kG/Day IV Continuous <Continuous>  HYDROmorphone  Injectable 0.5 milliGRAM(s) IV Push every 4 hours PRN  influenza   Vaccine 0.5 milliLiter(s) IntraMuscular once  ketorolac 0.5% Ophthalmic Solution 1 Drop(s) Left EYE every 6 hours PRN  oxyCODONE    IR 5 milliGRAM(s) Oral every 4 hours PRN  pantoprazole  Injectable 40 milliGRAM(s) IV Push daily  Parenteral Nutrition - Adult 1 Each TPN Continuous <Continuous>  simethicone 80 milliGRAM(s) Chew daily PRN  sodium chloride 0.9% lock flush 10 milliLiter(s) IV Push every 1 hour PRN      ** LABS **                          10.4   10.60 )-----------( 377      ( 10 Oct 2019 07:29 )             31.7     10 Oct 2019 07:29    138    |  101    |  10     ----------------------------<  190    4.2     |  26     |  0.59     Ca    9.2        10 Oct 2019 07:29  Phos  3.6       10 Oct 2019 07:29  Mg     2.0       10 Oct 2019 07:29        CAPILLARY BLOOD GLUCOSE

## 2019-10-11 NOTE — PROGRESS NOTE ADULT - ASSESSMENT
53 y/o male s/p abdominal peritoneal resection with gracilis flap; Left thigh and perineum healing appropriately w/o issue  - OOB/ambulation  - Care per primary team    Joes Chavez  PGY-5  Plastic Surgery

## 2019-10-12 LAB
ALBUMIN SERPL ELPH-MCNC: 3.5 G/DL — SIGNIFICANT CHANGE UP (ref 3.3–5)
ALP SERPL-CCNC: 162 U/L — HIGH (ref 40–120)
ALT FLD-CCNC: 52 U/L — HIGH (ref 10–45)
ANION GAP SERPL CALC-SCNC: 11 MMOL/L — SIGNIFICANT CHANGE UP (ref 5–17)
AST SERPL-CCNC: 32 U/L — SIGNIFICANT CHANGE UP (ref 10–40)
BILIRUB SERPL-MCNC: 0.5 MG/DL — SIGNIFICANT CHANGE UP (ref 0.2–1.2)
BUN SERPL-MCNC: 10 MG/DL — SIGNIFICANT CHANGE UP (ref 7–23)
CALCIUM SERPL-MCNC: 9.8 MG/DL — SIGNIFICANT CHANGE UP (ref 8.4–10.5)
CHLORIDE SERPL-SCNC: 99 MMOL/L — SIGNIFICANT CHANGE UP (ref 96–108)
CO2 SERPL-SCNC: 26 MMOL/L — SIGNIFICANT CHANGE UP (ref 22–31)
CREAT SERPL-MCNC: 0.58 MG/DL — SIGNIFICANT CHANGE UP (ref 0.5–1.3)
GLUCOSE SERPL-MCNC: 207 MG/DL — HIGH (ref 70–99)
HCT VFR BLD CALC: 31.3 % — LOW (ref 39–50)
HGB BLD-MCNC: 10.3 G/DL — LOW (ref 13–17)
MAGNESIUM SERPL-MCNC: 1.9 MG/DL — SIGNIFICANT CHANGE UP (ref 1.6–2.6)
MCHC RBC-ENTMCNC: 30.4 PG — SIGNIFICANT CHANGE UP (ref 27–34)
MCHC RBC-ENTMCNC: 32.9 GM/DL — SIGNIFICANT CHANGE UP (ref 32–36)
MCV RBC AUTO: 92.3 FL — SIGNIFICANT CHANGE UP (ref 80–100)
NRBC # BLD: 0 /100 WBCS — SIGNIFICANT CHANGE UP (ref 0–0)
PHOSPHATE SERPL-MCNC: 3.4 MG/DL — SIGNIFICANT CHANGE UP (ref 2.5–4.5)
PLATELET # BLD AUTO: 432 K/UL — HIGH (ref 150–400)
POTASSIUM SERPL-MCNC: 4.1 MMOL/L — SIGNIFICANT CHANGE UP (ref 3.5–5.3)
POTASSIUM SERPL-SCNC: 4.1 MMOL/L — SIGNIFICANT CHANGE UP (ref 3.5–5.3)
PROT SERPL-MCNC: 6.9 G/DL — SIGNIFICANT CHANGE UP (ref 6–8.3)
RBC # BLD: 3.39 M/UL — LOW (ref 4.2–5.8)
RBC # FLD: 13.4 % — SIGNIFICANT CHANGE UP (ref 10.3–14.5)
SODIUM SERPL-SCNC: 136 MMOL/L — SIGNIFICANT CHANGE UP (ref 135–145)
WBC # BLD: 9.9 K/UL — SIGNIFICANT CHANGE UP (ref 3.8–10.5)
WBC # FLD AUTO: 9.9 K/UL — SIGNIFICANT CHANGE UP (ref 3.8–10.5)

## 2019-10-12 RX ORDER — IOHEXOL 300 MG/ML
30 INJECTION, SOLUTION INTRAVENOUS ONCE
Refills: 0 | Status: COMPLETED | OUTPATIENT
Start: 2019-10-12 | End: 2019-10-13

## 2019-10-12 RX ORDER — HYDROMORPHONE HYDROCHLORIDE 2 MG/ML
0.5 INJECTION INTRAMUSCULAR; INTRAVENOUS; SUBCUTANEOUS ONCE
Refills: 0 | Status: DISCONTINUED | OUTPATIENT
Start: 2019-10-12 | End: 2019-10-12

## 2019-10-12 RX ORDER — ELECTROLYTE SOLUTION,INJ
1 VIAL (ML) INTRAVENOUS
Refills: 0 | Status: DISCONTINUED | OUTPATIENT
Start: 2019-10-12 | End: 2019-10-12

## 2019-10-12 RX ORDER — I.V. FAT EMULSION 20 G/100ML
0.67 EMULSION INTRAVENOUS
Qty: 50 | Refills: 0 | Status: DISCONTINUED | OUTPATIENT
Start: 2019-10-12 | End: 2019-10-12

## 2019-10-12 RX ADMIN — OXYCODONE HYDROCHLORIDE 5 MILLIGRAM(S): 5 TABLET ORAL at 02:11

## 2019-10-12 RX ADMIN — FAMOTIDINE 20 MILLIGRAM(S): 10 INJECTION INTRAVENOUS at 13:51

## 2019-10-12 RX ADMIN — Medication 650 MILLIGRAM(S): at 05:11

## 2019-10-12 RX ADMIN — HYDROMORPHONE HYDROCHLORIDE 0.5 MILLIGRAM(S): 2 INJECTION INTRAMUSCULAR; INTRAVENOUS; SUBCUTANEOUS at 23:50

## 2019-10-12 RX ADMIN — Medication 650 MILLIGRAM(S): at 18:27

## 2019-10-12 RX ADMIN — HYDROMORPHONE HYDROCHLORIDE 0.5 MILLIGRAM(S): 2 INJECTION INTRAMUSCULAR; INTRAVENOUS; SUBCUTANEOUS at 08:55

## 2019-10-12 RX ADMIN — PANTOPRAZOLE SODIUM 40 MILLIGRAM(S): 20 TABLET, DELAYED RELEASE ORAL at 13:51

## 2019-10-12 RX ADMIN — Medication 100 MILLIGRAM(S): at 05:11

## 2019-10-12 RX ADMIN — Medication 100 MILLIGRAM(S): at 21:34

## 2019-10-12 RX ADMIN — OXYCODONE HYDROCHLORIDE 5 MILLIGRAM(S): 5 TABLET ORAL at 14:52

## 2019-10-12 RX ADMIN — ENOXAPARIN SODIUM 40 MILLIGRAM(S): 100 INJECTION SUBCUTANEOUS at 13:51

## 2019-10-12 RX ADMIN — HYDROMORPHONE HYDROCHLORIDE 0.5 MILLIGRAM(S): 2 INJECTION INTRAMUSCULAR; INTRAVENOUS; SUBCUTANEOUS at 09:19

## 2019-10-12 RX ADMIN — AMLODIPINE BESYLATE 5 MILLIGRAM(S): 2.5 TABLET ORAL at 05:12

## 2019-10-12 RX ADMIN — I.V. FAT EMULSION 20.83 GM/KG/DAY: 20 EMULSION INTRAVENOUS at 18:32

## 2019-10-12 RX ADMIN — ATORVASTATIN CALCIUM 10 MILLIGRAM(S): 80 TABLET, FILM COATED ORAL at 21:34

## 2019-10-12 RX ADMIN — Medication 1 EACH: at 18:32

## 2019-10-12 RX ADMIN — OXYCODONE HYDROCHLORIDE 5 MILLIGRAM(S): 5 TABLET ORAL at 03:00

## 2019-10-12 RX ADMIN — HYDROMORPHONE HYDROCHLORIDE 0.5 MILLIGRAM(S): 2 INJECTION INTRAMUSCULAR; INTRAVENOUS; SUBCUTANEOUS at 18:27

## 2019-10-12 RX ADMIN — Medication 100 MILLIGRAM(S): at 14:52

## 2019-10-12 RX ADMIN — Medication 650 MILLIGRAM(S): at 13:51

## 2019-10-12 RX ADMIN — CHLORHEXIDINE GLUCONATE 1 APPLICATION(S): 213 SOLUTION TOPICAL at 06:33

## 2019-10-12 NOTE — PROGRESS NOTE ADULT - ASSESSMENT
54M with HTN, HLD, rectal cancer s/p robot assisted abdominal peritoneal resection 9/24    - Pain/nausea control  - CLD/TPN  - Lovenox   - Protonix, famotidine  - Encourage OOB/AAT  - F/u plastics recs  - PT recs home w/ home PT  - Monitor ostomy output  - F/u radiology

## 2019-10-12 NOTE — PROGRESS NOTE ADULT - ASSESSMENT
55 y/o male s/p abdominal peritoneal resection with gracilis flap; Left thigh and perineum healing appropriately w/o issue. Drains removed.  - OOB/ambulation  - Care and Dispo per primary team

## 2019-10-12 NOTE — PROGRESS NOTE ADULT - SUBJECTIVE AND OBJECTIVE BOX
No acute events overnight.       EXAM-   -- CONSTITUTIONAL: AOx3. NAD.   -- ABDOMEN: distended  -- EXTREMITIES: L thigh incision c/d/i, no collections.  -- PERINEUM: Incision c/d/i, no collections, no signs of infection

## 2019-10-12 NOTE — PROGRESS NOTE ADULT - ASSESSMENT
Discussed with Dr Lafleur  Repeat CT today as recommended by radiology  Consider IR evaluation for drainage of collection if febrile or leukocytosis  Continue current care  Discussed with senior surgical resident Dr Romero

## 2019-10-12 NOTE — PROGRESS NOTE ADULT - SUBJECTIVE AND OBJECTIVE BOX
INTERVAL HPI/OVERNIGHT EVENTS: NAEO. AFVSS.    STATUS POST:  Abdominal perineal resection 9/24     SUBJECTIVE: Pt seen and examined at bedside this AM by surgery team. Pt is ambulating, still complaining of moderate pain. Tolerating sips of juice. Denies f/n/v/cp/sob.     MEDICATIONS  (STANDING):  acetaminophen   Tablet .. 650 milliGRAM(s) Oral every 6 hours  amLODIPine   Tablet 5 milliGRAM(s) Oral daily  atorvastatin 10 milliGRAM(s) Oral at bedtime  chlorhexidine 2% Cloths 1 Application(s) Topical <User Schedule>  docusate sodium Liquid 100 milliGRAM(s) Oral three times a day  enoxaparin Injectable 40 milliGRAM(s) SubCutaneous daily  famotidine Injectable 20 milliGRAM(s) IV Push daily  fat emulsion (Plant Based) 20% Infusion 0.667 Gm/kG/Day (20.83 mL/Hr) IV Continuous <Continuous>  influenza   Vaccine 0.5 milliLiter(s) IntraMuscular once  pantoprazole  Injectable 40 milliGRAM(s) IV Push daily  Parenteral Nutrition - Adult 1 Each (71 mL/Hr) TPN Continuous <Continuous>  Parenteral Nutrition - Adult 1 Each (71 mL/Hr) TPN Continuous <Continuous>    MEDICATIONS  (PRN):  artificial  tears Solution 1 Drop(s) Both EYES every 4 hours PRN Dry Eyes  benzocaine 15 mG/menthol 3.6 mG (Sugar-Free) Lozenge 1 Lozenge Oral every 2 hours PRN Sore Throat  chlorproMAZINE    IVPB 25 milliGRAM(s) IV Intermittent every 8 hours PRN hiccups  HYDROmorphone  Injectable 0.5 milliGRAM(s) IV Push every 4 hours PRN breakthrough pain  ketorolac 0.5% Ophthalmic Solution 1 Drop(s) Left EYE every 6 hours PRN eye pain irritation  oxyCODONE    IR 5 milliGRAM(s) Oral every 4 hours PRN Moderate Pain (4 - 6)  simethicone 80 milliGRAM(s) Chew daily PRN gas pains  sodium chloride 0.9% lock flush 10 milliLiter(s) IV Push every 1 hour PRN Pre/post blood products, medications, blood draw, and to maintain line patency      Vital Signs Last 24 Hrs  T(C): 37.4 (12 Oct 2019 13:45), Max: 37.7 (11 Oct 2019 21:04)  T(F): 99.3 (12 Oct 2019 13:45), Max: 99.9 (11 Oct 2019 21:04)  HR: 97 (12 Oct 2019 13:45) (84 - 97)  BP: 122/73 (12 Oct 2019 13:45) (108/63 - 122/73)  RR: 15 (12 Oct 2019 13:45) (15 - 17)  SpO2: 99% (12 Oct 2019 13:45) (96% - 99%)  PHYSICAL EXAM:      Constitutional: A&Ox3, NAD   Respiratory: non labored breathing, no respiratory distress  Cardiovascular: NSR, RRR  Gastrointestinal: Abdomen soft, mildly distended, mild ttp around incision sites, incision c/d/i. Ostomy w/ stoma pink and patent, gas and minimal stool present in ostomy bag. Anal wound c/d/i.   Extremities: wwp, sensory/motor intact, no calf tenderness or edema. LUE PICC in place. Thigh flap incision c/d/i, no surrounding erythema. L thigh JAKUB with minimal serosanguinous output.      I&O's Summary    11 Oct 2019 07:01  -  12 Oct 2019 07:00  --------------------------------------------------------  IN: 3301.8 mL / OUT: 1400 mL / NET: 1901.8 mL    12 Oct 2019 07:01  -  12 Oct 2019 16:04  --------------------------------------------------------  IN: 0 mL / OUT: 300 mL / NET: -300 mL          LABS:                10-12    136  |  99  |  10  ----------------------------<  207<H>  4.1   |  26  |  0.58    Ca    9.8      12 Oct 2019 07:03  Phos  3.4     10-12  Mg     1.9     10-12    TPro  6.9  /  Alb  3.5  /  TBili  0.5  /  DBili  x   /  AST  32  /  ALT  52<H>  /  AlkPhos  162<H>  10-12                          10.3   9.90  )-----------( 432      ( 12 Oct 2019 07:03 )             31.3 INTERVAL HPI/OVERNIGHT EVENTS: NAEO. AFVSS.    STATUS POST:  Abdominal perineal resection 9/24     SUBJECTIVE: Pt seen and examined at bedside this AM by surgery team. Pt is ambulating, still complaining of moderate pain. Tolerating sips of juice. Denies f/n/v/cp/sob.     MEDICATIONS  (STANDING):  acetaminophen   Tablet .. 650 milliGRAM(s) Oral every 6 hours  amLODIPine   Tablet 5 milliGRAM(s) Oral daily  atorvastatin 10 milliGRAM(s) Oral at bedtime  chlorhexidine 2% Cloths 1 Application(s) Topical <User Schedule>  docusate sodium Liquid 100 milliGRAM(s) Oral three times a day  enoxaparin Injectable 40 milliGRAM(s) SubCutaneous daily  famotidine Injectable 20 milliGRAM(s) IV Push daily  fat emulsion (Plant Based) 20% Infusion 0.667 Gm/kG/Day (20.83 mL/Hr) IV Continuous <Continuous>  influenza   Vaccine 0.5 milliLiter(s) IntraMuscular once  pantoprazole  Injectable 40 milliGRAM(s) IV Push daily  Parenteral Nutrition - Adult 1 Each (71 mL/Hr) TPN Continuous <Continuous>  Parenteral Nutrition - Adult 1 Each (71 mL/Hr) TPN Continuous <Continuous>    MEDICATIONS  (PRN):  artificial  tears Solution 1 Drop(s) Both EYES every 4 hours PRN Dry Eyes  benzocaine 15 mG/menthol 3.6 mG (Sugar-Free) Lozenge 1 Lozenge Oral every 2 hours PRN Sore Throat  chlorproMAZINE    IVPB 25 milliGRAM(s) IV Intermittent every 8 hours PRN hiccups  HYDROmorphone  Injectable 0.5 milliGRAM(s) IV Push every 4 hours PRN breakthrough pain  ketorolac 0.5% Ophthalmic Solution 1 Drop(s) Left EYE every 6 hours PRN eye pain irritation  oxyCODONE    IR 5 milliGRAM(s) Oral every 4 hours PRN Moderate Pain (4 - 6)  simethicone 80 milliGRAM(s) Chew daily PRN gas pains  sodium chloride 0.9% lock flush 10 milliLiter(s) IV Push every 1 hour PRN Pre/post blood products, medications, blood draw, and to maintain line patency      Vital Signs Last 24 Hrs  T(C): 37.4 (12 Oct 2019 13:45), Max: 37.7 (11 Oct 2019 21:04)  T(F): 99.3 (12 Oct 2019 13:45), Max: 99.9 (11 Oct 2019 21:04)  HR: 97 (12 Oct 2019 13:45) (84 - 97)  BP: 122/73 (12 Oct 2019 13:45) (108/63 - 122/73)  RR: 15 (12 Oct 2019 13:45) (15 - 17)  SpO2: 99% (12 Oct 2019 13:45) (96% - 99%)  PHYSICAL EXAM:      Constitutional: A&Ox3, NAD   Respiratory: non labored breathing, no respiratory distress  Cardiovascular: NSR, RRR  Gastrointestinal: Abdomen soft, mildly distended, mild ttp around incision sites, incision c/d/i. Ostomy w/ stoma pink and patent, gas and minimal stool present in ostomy bag. Anal wound c/d/i.   Extremities: wwp, sensory/motor intact, no calf tenderness or edema. LUE PICC in place. Thigh flap incision c/d/i, no surrounding erythema.      I&O's Summary    11 Oct 2019 07:01  -  12 Oct 2019 07:00  --------------------------------------------------------  IN: 3301.8 mL / OUT: 1400 mL / NET: 1901.8 mL    12 Oct 2019 07:01  -  12 Oct 2019 16:04  --------------------------------------------------------  IN: 0 mL / OUT: 300 mL / NET: -300 mL          LABS:                10-12    136  |  99  |  10  ----------------------------<  207<H>  4.1   |  26  |  0.58    Ca    9.8      12 Oct 2019 07:03  Phos  3.4     10-12  Mg     1.9     10-12    TPro  6.9  /  Alb  3.5  /  TBili  0.5  /  DBili  x   /  AST  32  /  ALT  52<H>  /  AlkPhos  162<H>  10-12                          10.3   9.90  )-----------( 432      ( 12 Oct 2019 07:03 )             31.3

## 2019-10-12 NOTE — PROGRESS NOTE ADULT - SUBJECTIVE AND OBJECTIVE BOX
CRS Coverage for Dr Lafleru  Pt seen and examined on morning rounds  No new complaints  Remains distended  Stoma with gas and stool output    ICU Vital Signs Last 24 Hrs  T(C): 36.6 (12 Oct 2019 10:01), Max: 37.7 (11 Oct 2019 21:04)  T(F): 97.9 (12 Oct 2019 10:01), Max: 99.9 (11 Oct 2019 21:04)  HR: 89 (12 Oct 2019 10:01) (84 - 89)  BP: 113/70 (12 Oct 2019 10:01) (108/63 - 113/70)  BP(mean): --  ABP: --  ABP(mean): --  RR: 17 (12 Oct 2019 10:01) (15 - 17)  SpO2: 96% (12 Oct 2019 10:01) (96% - 97%)    I&O's Summary    11 Oct 2019 07:01  -  12 Oct 2019 07:00  --------------------------------------------------------  IN: 3301.8 mL / OUT: 1400 mL / NET: 1901.8 mL    Gen NAD  Abdomen distended soft nontender                          10.3   9.90  )-----------( 432      ( 12 Oct 2019 07:03 )             31.3     10-12    136  |  99  |  10  ----------------------------<  207<H>  4.1   |  26  |  0.58    Ca    9.8      12 Oct 2019 07:03  Phos  3.4     10-12  Mg     1.9     10-12    TPro  6.9  /  Alb  3.5  /  TBili  0.5  /  DBili  x   /  AST  32  /  ALT  52<H>  /  AlkPhos  162<H>  10-12

## 2019-10-13 LAB
ANION GAP SERPL CALC-SCNC: 10 MMOL/L — SIGNIFICANT CHANGE UP (ref 5–17)
BUN SERPL-MCNC: 10 MG/DL — SIGNIFICANT CHANGE UP (ref 7–23)
CALCIUM SERPL-MCNC: 9.7 MG/DL — SIGNIFICANT CHANGE UP (ref 8.4–10.5)
CHLORIDE SERPL-SCNC: 98 MMOL/L — SIGNIFICANT CHANGE UP (ref 96–108)
CO2 SERPL-SCNC: 27 MMOL/L — SIGNIFICANT CHANGE UP (ref 22–31)
CREAT SERPL-MCNC: 0.61 MG/DL — SIGNIFICANT CHANGE UP (ref 0.5–1.3)
GLUCOSE SERPL-MCNC: 196 MG/DL — HIGH (ref 70–99)
HCT VFR BLD CALC: 30.2 % — LOW (ref 39–50)
HGB BLD-MCNC: 10.1 G/DL — LOW (ref 13–17)
MAGNESIUM SERPL-MCNC: 1.9 MG/DL — SIGNIFICANT CHANGE UP (ref 1.6–2.6)
MCHC RBC-ENTMCNC: 30.7 PG — SIGNIFICANT CHANGE UP (ref 27–34)
MCHC RBC-ENTMCNC: 33.4 GM/DL — SIGNIFICANT CHANGE UP (ref 32–36)
MCV RBC AUTO: 91.8 FL — SIGNIFICANT CHANGE UP (ref 80–100)
NRBC # BLD: 0 /100 WBCS — SIGNIFICANT CHANGE UP (ref 0–0)
PHOSPHATE SERPL-MCNC: 3.1 MG/DL — SIGNIFICANT CHANGE UP (ref 2.5–4.5)
PLATELET # BLD AUTO: 415 K/UL — HIGH (ref 150–400)
POTASSIUM SERPL-MCNC: 4.2 MMOL/L — SIGNIFICANT CHANGE UP (ref 3.5–5.3)
POTASSIUM SERPL-SCNC: 4.2 MMOL/L — SIGNIFICANT CHANGE UP (ref 3.5–5.3)
RBC # BLD: 3.29 M/UL — LOW (ref 4.2–5.8)
RBC # FLD: 13.2 % — SIGNIFICANT CHANGE UP (ref 10.3–14.5)
SODIUM SERPL-SCNC: 135 MMOL/L — SIGNIFICANT CHANGE UP (ref 135–145)
WBC # BLD: 9.07 K/UL — SIGNIFICANT CHANGE UP (ref 3.8–10.5)
WBC # FLD AUTO: 9.07 K/UL — SIGNIFICANT CHANGE UP (ref 3.8–10.5)

## 2019-10-13 PROCEDURE — 74177 CT ABD & PELVIS W/CONTRAST: CPT | Mod: 26

## 2019-10-13 PROCEDURE — 74019 RADEX ABDOMEN 2 VIEWS: CPT | Mod: 26

## 2019-10-13 RX ORDER — I.V. FAT EMULSION 20 G/100ML
0.67 EMULSION INTRAVENOUS
Qty: 50 | Refills: 0 | Status: DISCONTINUED | OUTPATIENT
Start: 2019-10-13 | End: 2019-10-13

## 2019-10-13 RX ORDER — HYDROMORPHONE HYDROCHLORIDE 2 MG/ML
0.5 INJECTION INTRAMUSCULAR; INTRAVENOUS; SUBCUTANEOUS ONCE
Refills: 0 | Status: DISCONTINUED | OUTPATIENT
Start: 2019-10-13 | End: 2019-10-13

## 2019-10-13 RX ORDER — MAGNESIUM SULFATE 500 MG/ML
1 VIAL (ML) INJECTION ONCE
Refills: 0 | Status: COMPLETED | OUTPATIENT
Start: 2019-10-13 | End: 2019-10-13

## 2019-10-13 RX ORDER — ELECTROLYTE SOLUTION,INJ
1 VIAL (ML) INTRAVENOUS
Refills: 0 | Status: DISCONTINUED | OUTPATIENT
Start: 2019-10-13 | End: 2019-10-13

## 2019-10-13 RX ADMIN — HYDROMORPHONE HYDROCHLORIDE 0.5 MILLIGRAM(S): 2 INJECTION INTRAMUSCULAR; INTRAVENOUS; SUBCUTANEOUS at 15:43

## 2019-10-13 RX ADMIN — I.V. FAT EMULSION 20.83 GM/KG/DAY: 20 EMULSION INTRAVENOUS at 18:22

## 2019-10-13 RX ADMIN — Medication 650 MILLIGRAM(S): at 18:22

## 2019-10-13 RX ADMIN — HYDROMORPHONE HYDROCHLORIDE 0.5 MILLIGRAM(S): 2 INJECTION INTRAMUSCULAR; INTRAVENOUS; SUBCUTANEOUS at 04:56

## 2019-10-13 RX ADMIN — PANTOPRAZOLE SODIUM 40 MILLIGRAM(S): 20 TABLET, DELAYED RELEASE ORAL at 11:26

## 2019-10-13 RX ADMIN — Medication 650 MILLIGRAM(S): at 11:53

## 2019-10-13 RX ADMIN — Medication 100 GRAM(S): at 11:27

## 2019-10-13 RX ADMIN — Medication 1 EACH: at 18:20

## 2019-10-13 RX ADMIN — Medication 650 MILLIGRAM(S): at 11:27

## 2019-10-13 RX ADMIN — OXYCODONE HYDROCHLORIDE 5 MILLIGRAM(S): 5 TABLET ORAL at 21:50

## 2019-10-13 RX ADMIN — AMLODIPINE BESYLATE 5 MILLIGRAM(S): 2.5 TABLET ORAL at 04:56

## 2019-10-13 RX ADMIN — IOHEXOL 30 MILLILITER(S): 300 INJECTION, SOLUTION INTRAVENOUS at 00:55

## 2019-10-13 RX ADMIN — FAMOTIDINE 20 MILLIGRAM(S): 10 INJECTION INTRAVENOUS at 11:26

## 2019-10-13 RX ADMIN — OXYCODONE HYDROCHLORIDE 5 MILLIGRAM(S): 5 TABLET ORAL at 22:59

## 2019-10-13 RX ADMIN — OXYCODONE HYDROCHLORIDE 5 MILLIGRAM(S): 5 TABLET ORAL at 07:57

## 2019-10-13 RX ADMIN — HYDROMORPHONE HYDROCHLORIDE 0.5 MILLIGRAM(S): 2 INJECTION INTRAMUSCULAR; INTRAVENOUS; SUBCUTANEOUS at 16:00

## 2019-10-13 RX ADMIN — Medication 100 MILLIGRAM(S): at 04:56

## 2019-10-13 RX ADMIN — Medication 650 MILLIGRAM(S): at 04:56

## 2019-10-13 RX ADMIN — HYDROMORPHONE HYDROCHLORIDE 0.5 MILLIGRAM(S): 2 INJECTION INTRAMUSCULAR; INTRAVENOUS; SUBCUTANEOUS at 00:33

## 2019-10-13 RX ADMIN — ENOXAPARIN SODIUM 40 MILLIGRAM(S): 100 INJECTION SUBCUTANEOUS at 11:26

## 2019-10-13 RX ADMIN — Medication 650 MILLIGRAM(S): at 05:31

## 2019-10-13 RX ADMIN — Medication 100 MILLIGRAM(S): at 14:32

## 2019-10-13 RX ADMIN — OXYCODONE HYDROCHLORIDE 5 MILLIGRAM(S): 5 TABLET ORAL at 07:38

## 2019-10-13 RX ADMIN — OXYCODONE HYDROCHLORIDE 5 MILLIGRAM(S): 5 TABLET ORAL at 11:53

## 2019-10-13 RX ADMIN — Medication 100 MILLIGRAM(S): at 21:43

## 2019-10-13 RX ADMIN — ATORVASTATIN CALCIUM 10 MILLIGRAM(S): 80 TABLET, FILM COATED ORAL at 21:43

## 2019-10-13 RX ADMIN — CHLORHEXIDINE GLUCONATE 1 APPLICATION(S): 213 SOLUTION TOPICAL at 06:00

## 2019-10-13 RX ADMIN — HYDROMORPHONE HYDROCHLORIDE 0.5 MILLIGRAM(S): 2 INJECTION INTRAMUSCULAR; INTRAVENOUS; SUBCUTANEOUS at 05:31

## 2019-10-13 RX ADMIN — OXYCODONE HYDROCHLORIDE 5 MILLIGRAM(S): 5 TABLET ORAL at 11:27

## 2019-10-13 NOTE — PROGRESS NOTE ADULT - ASSESSMENT
54M with HTN, HLD, rectal cancer s/p robot assisted abdominal peritoneal resection 9/24    - Pain/nausea control  - CLDF  - TPN  - Lovenox   - Protonix, famotidine  - Encourage OOB/AAT  - F/u plastics recs  - PT recs home w/ home PT  - Monitor ostomy output  -CT ab/pel this AM 10/13

## 2019-10-13 NOTE — PROGRESS NOTE ADULT - SUBJECTIVE AND OBJECTIVE BOX
No acute events overnight.   Was given contrast to drink for planned CT for this AM. Patient was able to drink the full container with no n/v.   intermittent complaint of abdominal pain and fullness improved overnight. Demonstrating increased stoma output this AM.     EXAM-   -- CONSTITUTIONAL: AOx3. NAD.   -- ABDOMEN: soft, less distended  -- EXTREMITIES: Left thigh incision c/d/i, no collections.  -- PERINEUM: Incision c/d/i, no collections, no signs of infection.

## 2019-10-13 NOTE — PROGRESS NOTE ADULT - SUBJECTIVE AND OBJECTIVE BOX
INTERVAL HPI/OVERNIGHT EVENTS: Overnight, pt was given contrast to drink for planned CT in AM. Pt was able to drink the full container with no n/v. He was complaining of some abdominal pain last night, which has since dissipated. He is ambulating, voiding, and endorses increased output from ostomy.      STATUS POST:  abdominal perineal resection    POST OPERATIVE DAY #: 19    MEDICATIONS  (STANDING):  acetaminophen   Tablet .. 650 milliGRAM(s) Oral every 6 hours  amLODIPine   Tablet 5 milliGRAM(s) Oral daily  atorvastatin 10 milliGRAM(s) Oral at bedtime  chlorhexidine 2% Cloths 1 Application(s) Topical <User Schedule>  docusate sodium Liquid 100 milliGRAM(s) Oral three times a day  enoxaparin Injectable 40 milliGRAM(s) SubCutaneous daily  famotidine Injectable 20 milliGRAM(s) IV Push daily  fat emulsion (Plant Based) 20% Infusion 0.667 Gm/kG/Day (20.83 mL/Hr) IV Continuous <Continuous>  influenza   Vaccine 0.5 milliLiter(s) IntraMuscular once  pantoprazole  Injectable 40 milliGRAM(s) IV Push daily  Parenteral Nutrition - Adult 1 Each (71 mL/Hr) TPN Continuous <Continuous>    MEDICATIONS  (PRN):  artificial  tears Solution 1 Drop(s) Both EYES every 4 hours PRN Dry Eyes  benzocaine 15 mG/menthol 3.6 mG (Sugar-Free) Lozenge 1 Lozenge Oral every 2 hours PRN Sore Throat  chlorproMAZINE    IVPB 25 milliGRAM(s) IV Intermittent every 8 hours PRN hiccups  HYDROmorphone  Injectable 0.5 milliGRAM(s) IV Push every 4 hours PRN breakthrough pain  ketorolac 0.5% Ophthalmic Solution 1 Drop(s) Left EYE every 6 hours PRN eye pain irritation  oxyCODONE    IR 5 milliGRAM(s) Oral every 4 hours PRN Moderate Pain (4 - 6)  simethicone 80 milliGRAM(s) Chew daily PRN gas pains  sodium chloride 0.9% lock flush 10 milliLiter(s) IV Push every 1 hour PRN Pre/post blood products, medications, blood draw, and to maintain line patency      Vital Signs Last 24 Hrs  T(C): 37.2 (13 Oct 2019 04:54), Max: 37.4 (12 Oct 2019 13:45)  T(F): 98.9 (13 Oct 2019 04:54), Max: 99.3 (12 Oct 2019 13:45)  HR: 100 (13 Oct 2019 04:54) (89 - 100)  BP: 115/70 (13 Oct 2019 04:54) (111/74 - 129/73)  BP(mean): --  RR: 19 (13 Oct 2019 04:54) (15 - 19)  SpO2: 96% (13 Oct 2019 04:54) (96% - 99%)    PHYSICAL EXAM:      Constitutional: A&Ox3    Respiratory: non labored breathing, no respiratory distress    Cardiovascular: NSR, RRR    Gastrointestinal: abdomen is soft, non-tender, moderately distended                 Incision: c/d/i, ostomy has gas and liquid green stool in bag    Extremities: (-) edema                  I&O's Detail    12 Oct 2019 07:01  -  13 Oct 2019 07:00  --------------------------------------------------------  IN:    fat emulsion (Plant Based) 20% Infusion: 249.6 mL    TPN (Total Parenteral Nutrition): 1420 mL  Total IN: 1669.6 mL    OUT:    Voided: 1000 mL  Total OUT: 1000 mL    Total NET: 669.6 mL          LABS:                        10.1   9.07  )-----------( 415      ( 13 Oct 2019 06:46 )             30.2     10-13    135  |  98  |  10  ----------------------------<  196<H>  4.2   |  27  |  0.61    Ca    9.7      13 Oct 2019 06:46  Phos  3.1     10-13  Mg     1.9     10-13    TPro  6.9  /  Alb  3.5  /  TBili  0.5  /  DBili  x   /  AST  32  /  ALT  52<H>  /  AlkPhos  162<H>  10-12          RADIOLOGY & ADDITIONAL STUDIES:

## 2019-10-13 NOTE — PROGRESS NOTE ADULT - ASSESSMENT
55 y/o male s/p abdominal peritoneal resection with gracilis flap; Left thigh and perineum healing appropriately w/o issue. Drains removed earlier this week.  Patient to undergo planned repeat interval CT Abd imaging this AM per primary.   - OOB/ambulation  - Care and Dispo per primary team

## 2019-10-14 LAB
ANION GAP SERPL CALC-SCNC: 13 MMOL/L — SIGNIFICANT CHANGE UP (ref 5–17)
BUN SERPL-MCNC: 10 MG/DL — SIGNIFICANT CHANGE UP (ref 7–23)
CALCIUM SERPL-MCNC: 10 MG/DL — SIGNIFICANT CHANGE UP (ref 8.4–10.5)
CHLORIDE SERPL-SCNC: 98 MMOL/L — SIGNIFICANT CHANGE UP (ref 96–108)
CO2 SERPL-SCNC: 27 MMOL/L — SIGNIFICANT CHANGE UP (ref 22–31)
CREAT SERPL-MCNC: 0.6 MG/DL — SIGNIFICANT CHANGE UP (ref 0.5–1.3)
GLUCOSE BLDC GLUCOMTR-MCNC: 326 MG/DL — HIGH (ref 70–99)
GLUCOSE SERPL-MCNC: 172 MG/DL — HIGH (ref 70–99)
HCT VFR BLD CALC: 30.9 % — LOW (ref 39–50)
HGB BLD-MCNC: 10.3 G/DL — LOW (ref 13–17)
MAGNESIUM SERPL-MCNC: 1.9 MG/DL — SIGNIFICANT CHANGE UP (ref 1.6–2.6)
MCHC RBC-ENTMCNC: 30.6 PG — SIGNIFICANT CHANGE UP (ref 27–34)
MCHC RBC-ENTMCNC: 33.3 GM/DL — SIGNIFICANT CHANGE UP (ref 32–36)
MCV RBC AUTO: 91.7 FL — SIGNIFICANT CHANGE UP (ref 80–100)
NRBC # BLD: 0 /100 WBCS — SIGNIFICANT CHANGE UP (ref 0–0)
PHOSPHATE SERPL-MCNC: 3.7 MG/DL — SIGNIFICANT CHANGE UP (ref 2.5–4.5)
PLATELET # BLD AUTO: 402 K/UL — HIGH (ref 150–400)
POTASSIUM SERPL-MCNC: 4.3 MMOL/L — SIGNIFICANT CHANGE UP (ref 3.5–5.3)
POTASSIUM SERPL-SCNC: 4.3 MMOL/L — SIGNIFICANT CHANGE UP (ref 3.5–5.3)
RBC # BLD: 3.37 M/UL — LOW (ref 4.2–5.8)
RBC # FLD: 13.4 % — SIGNIFICANT CHANGE UP (ref 10.3–14.5)
SODIUM SERPL-SCNC: 138 MMOL/L — SIGNIFICANT CHANGE UP (ref 135–145)
TRIGL SERPL-MCNC: 214 MG/DL — HIGH (ref 10–149)
WBC # BLD: 7.71 K/UL — SIGNIFICANT CHANGE UP (ref 3.8–10.5)
WBC # FLD AUTO: 7.71 K/UL — SIGNIFICANT CHANGE UP (ref 3.8–10.5)

## 2019-10-14 PROCEDURE — 44180 LAP ENTEROLYSIS: CPT | Mod: 78,GC

## 2019-10-14 RX ORDER — PANTOPRAZOLE SODIUM 20 MG/1
40 TABLET, DELAYED RELEASE ORAL DAILY
Refills: 0 | Status: DISCONTINUED | OUTPATIENT
Start: 2019-10-14 | End: 2019-10-15

## 2019-10-14 RX ORDER — DEXTROSE 50 % IN WATER 50 %
15 SYRINGE (ML) INTRAVENOUS ONCE
Refills: 0 | Status: DISCONTINUED | OUTPATIENT
Start: 2019-10-14 | End: 2019-10-15

## 2019-10-14 RX ORDER — ELECTROLYTE SOLUTION,INJ
1 VIAL (ML) INTRAVENOUS
Refills: 0 | Status: DISCONTINUED | OUTPATIENT
Start: 2019-10-14 | End: 2019-10-15

## 2019-10-14 RX ORDER — HYDROMORPHONE HYDROCHLORIDE 2 MG/ML
0.5 INJECTION INTRAMUSCULAR; INTRAVENOUS; SUBCUTANEOUS EVERY 4 HOURS
Refills: 0 | Status: DISCONTINUED | OUTPATIENT
Start: 2019-10-14 | End: 2019-10-15

## 2019-10-14 RX ORDER — GLUCAGON INJECTION, SOLUTION 0.5 MG/.1ML
1 INJECTION, SOLUTION SUBCUTANEOUS ONCE
Refills: 0 | Status: DISCONTINUED | OUTPATIENT
Start: 2019-10-14 | End: 2019-10-15

## 2019-10-14 RX ORDER — HYDROMORPHONE HYDROCHLORIDE 2 MG/ML
0.5 INJECTION INTRAMUSCULAR; INTRAVENOUS; SUBCUTANEOUS EVERY 6 HOURS
Refills: 0 | Status: DISCONTINUED | OUTPATIENT
Start: 2019-10-14 | End: 2019-10-15

## 2019-10-14 RX ORDER — DEXTROSE 50 % IN WATER 50 %
12.5 SYRINGE (ML) INTRAVENOUS ONCE
Refills: 0 | Status: DISCONTINUED | OUTPATIENT
Start: 2019-10-14 | End: 2019-10-15

## 2019-10-14 RX ORDER — BUPIVACAINE 13.3 MG/ML
20 INJECTION, SUSPENSION, LIPOSOMAL INFILTRATION ONCE
Refills: 0 | Status: DISCONTINUED | OUTPATIENT
Start: 2019-10-14 | End: 2019-10-14

## 2019-10-14 RX ORDER — ONDANSETRON 8 MG/1
4 TABLET, FILM COATED ORAL EVERY 6 HOURS
Refills: 0 | Status: DISCONTINUED | OUTPATIENT
Start: 2019-10-14 | End: 2019-10-15

## 2019-10-14 RX ORDER — CEFTRIAXONE 500 MG/1
1000 INJECTION, POWDER, FOR SOLUTION INTRAMUSCULAR; INTRAVENOUS EVERY 24 HOURS
Refills: 0 | Status: COMPLETED | OUTPATIENT
Start: 2019-10-14 | End: 2019-10-14

## 2019-10-14 RX ORDER — HYDROMORPHONE HYDROCHLORIDE 2 MG/ML
1 INJECTION INTRAMUSCULAR; INTRAVENOUS; SUBCUTANEOUS EVERY 4 HOURS
Refills: 0 | Status: DISCONTINUED | OUTPATIENT
Start: 2019-10-14 | End: 2019-10-15

## 2019-10-14 RX ORDER — DEXTROSE 50 % IN WATER 50 %
25 SYRINGE (ML) INTRAVENOUS ONCE
Refills: 0 | Status: DISCONTINUED | OUTPATIENT
Start: 2019-10-14 | End: 2019-10-15

## 2019-10-14 RX ORDER — SODIUM CHLORIDE 9 MG/ML
1000 INJECTION, SOLUTION INTRAVENOUS
Refills: 0 | Status: DISCONTINUED | OUTPATIENT
Start: 2019-10-14 | End: 2019-10-15

## 2019-10-14 RX ORDER — INSULIN LISPRO 100/ML
VIAL (ML) SUBCUTANEOUS
Refills: 0 | Status: DISCONTINUED | OUTPATIENT
Start: 2019-10-14 | End: 2019-10-15

## 2019-10-14 RX ORDER — ACETAMINOPHEN 500 MG
1000 TABLET ORAL ONCE
Refills: 0 | Status: COMPLETED | OUTPATIENT
Start: 2019-10-14 | End: 2019-10-14

## 2019-10-14 RX ORDER — METRONIDAZOLE 500 MG
500 TABLET ORAL EVERY 8 HOURS
Refills: 0 | Status: DISCONTINUED | OUTPATIENT
Start: 2019-10-14 | End: 2019-10-15

## 2019-10-14 RX ORDER — ENOXAPARIN SODIUM 100 MG/ML
40 INJECTION SUBCUTANEOUS EVERY 24 HOURS
Refills: 0 | Status: DISCONTINUED | OUTPATIENT
Start: 2019-10-14 | End: 2019-10-15

## 2019-10-14 RX ORDER — SODIUM CHLORIDE 9 MG/ML
500 INJECTION INTRAMUSCULAR; INTRAVENOUS; SUBCUTANEOUS ONCE
Refills: 0 | Status: COMPLETED | OUTPATIENT
Start: 2019-10-14 | End: 2019-10-14

## 2019-10-14 RX ADMIN — Medication 400 MILLIGRAM(S): at 21:01

## 2019-10-14 RX ADMIN — SODIUM CHLORIDE 100 MILLILITER(S): 9 INJECTION, SOLUTION INTRAVENOUS at 16:31

## 2019-10-14 RX ADMIN — HYDROMORPHONE HYDROCHLORIDE 1 MILLIGRAM(S): 2 INJECTION INTRAMUSCULAR; INTRAVENOUS; SUBCUTANEOUS at 17:23

## 2019-10-14 RX ADMIN — HYDROMORPHONE HYDROCHLORIDE 0.5 MILLIGRAM(S): 2 INJECTION INTRAMUSCULAR; INTRAVENOUS; SUBCUTANEOUS at 00:48

## 2019-10-14 RX ADMIN — Medication 1 EACH: at 18:25

## 2019-10-14 RX ADMIN — CHLORHEXIDINE GLUCONATE 1 APPLICATION(S): 213 SOLUTION TOPICAL at 06:43

## 2019-10-14 RX ADMIN — HYDROMORPHONE HYDROCHLORIDE 0.5 MILLIGRAM(S): 2 INJECTION INTRAMUSCULAR; INTRAVENOUS; SUBCUTANEOUS at 07:23

## 2019-10-14 RX ADMIN — SODIUM CHLORIDE 1000 MILLILITER(S): 9 INJECTION INTRAMUSCULAR; INTRAVENOUS; SUBCUTANEOUS at 23:57

## 2019-10-14 RX ADMIN — Medication 650 MILLIGRAM(S): at 05:18

## 2019-10-14 RX ADMIN — HYDROMORPHONE HYDROCHLORIDE 0.5 MILLIGRAM(S): 2 INJECTION INTRAMUSCULAR; INTRAVENOUS; SUBCUTANEOUS at 07:50

## 2019-10-14 RX ADMIN — HYDROMORPHONE HYDROCHLORIDE 0.5 MILLIGRAM(S): 2 INJECTION INTRAMUSCULAR; INTRAVENOUS; SUBCUTANEOUS at 19:15

## 2019-10-14 RX ADMIN — HYDROMORPHONE HYDROCHLORIDE 0.5 MILLIGRAM(S): 2 INJECTION INTRAMUSCULAR; INTRAVENOUS; SUBCUTANEOUS at 18:45

## 2019-10-14 RX ADMIN — Medication 650 MILLIGRAM(S): at 06:43

## 2019-10-14 RX ADMIN — ENOXAPARIN SODIUM 40 MILLIGRAM(S): 100 INJECTION SUBCUTANEOUS at 21:01

## 2019-10-14 RX ADMIN — HYDROMORPHONE HYDROCHLORIDE 1 MILLIGRAM(S): 2 INJECTION INTRAMUSCULAR; INTRAVENOUS; SUBCUTANEOUS at 17:08

## 2019-10-14 RX ADMIN — Medication 1000 MILLIGRAM(S): at 22:00

## 2019-10-14 RX ADMIN — HYDROMORPHONE HYDROCHLORIDE 0.5 MILLIGRAM(S): 2 INJECTION INTRAMUSCULAR; INTRAVENOUS; SUBCUTANEOUS at 23:40

## 2019-10-14 RX ADMIN — PANTOPRAZOLE SODIUM 40 MILLIGRAM(S): 20 TABLET, DELAYED RELEASE ORAL at 11:53

## 2019-10-14 RX ADMIN — HYDROMORPHONE HYDROCHLORIDE 0.5 MILLIGRAM(S): 2 INJECTION INTRAMUSCULAR; INTRAVENOUS; SUBCUTANEOUS at 16:45

## 2019-10-14 RX ADMIN — Medication 100 MILLIGRAM(S): at 22:19

## 2019-10-14 RX ADMIN — HYDROMORPHONE HYDROCHLORIDE 0.5 MILLIGRAM(S): 2 INJECTION INTRAMUSCULAR; INTRAVENOUS; SUBCUTANEOUS at 16:30

## 2019-10-14 RX ADMIN — HYDROMORPHONE HYDROCHLORIDE 0.5 MILLIGRAM(S): 2 INJECTION INTRAMUSCULAR; INTRAVENOUS; SUBCUTANEOUS at 00:09

## 2019-10-14 RX ADMIN — OXYCODONE HYDROCHLORIDE 5 MILLIGRAM(S): 5 TABLET ORAL at 05:17

## 2019-10-14 RX ADMIN — CEFTRIAXONE 100 MILLIGRAM(S): 500 INJECTION, POWDER, FOR SOLUTION INTRAMUSCULAR; INTRAVENOUS at 18:29

## 2019-10-14 RX ADMIN — HYDROMORPHONE HYDROCHLORIDE 1 MILLIGRAM(S): 2 INJECTION INTRAMUSCULAR; INTRAVENOUS; SUBCUTANEOUS at 22:05

## 2019-10-14 RX ADMIN — HYDROMORPHONE HYDROCHLORIDE 0.5 MILLIGRAM(S): 2 INJECTION INTRAMUSCULAR; INTRAVENOUS; SUBCUTANEOUS at 11:53

## 2019-10-14 RX ADMIN — Medication 100 MILLIGRAM(S): at 05:19

## 2019-10-14 RX ADMIN — AMLODIPINE BESYLATE 5 MILLIGRAM(S): 2.5 TABLET ORAL at 05:17

## 2019-10-14 RX ADMIN — HYDROMORPHONE HYDROCHLORIDE 1 MILLIGRAM(S): 2 INJECTION INTRAMUSCULAR; INTRAVENOUS; SUBCUTANEOUS at 22:45

## 2019-10-14 RX ADMIN — OXYCODONE HYDROCHLORIDE 5 MILLIGRAM(S): 5 TABLET ORAL at 06:43

## 2019-10-14 RX ADMIN — FAMOTIDINE 20 MILLIGRAM(S): 10 INJECTION INTRAVENOUS at 11:53

## 2019-10-14 NOTE — PACU DISCHARGE NOTE - COMMENTS
Pt met pacu criteria to be tx to telemetry. vital stable, pain controlled, no bleeding noted. Report given to ISAMAR Dickinson on 9 [place]
Pt met criteria for discharge- alert and oriented x4- follow  commands and moving all extremities- 19F Lev  drain draining 50 ml of serosanguinous drainage- Pain management in progress- NPO with NG tube in placed- report given to Helen PENN- Pt left unit via stretcher to 115

## 2019-10-14 NOTE — PROGRESS NOTE ADULT - SUBJECTIVE AND OBJECTIVE BOX
STATUS POST:  Abdominal perineal resection 9/24     SUBJECTIVE: Pt seen and examined. Pt is ambulating, still complaining of moderate pain. Tolerating sips of juice. Denies f/n/v/cp/sob.       MEDICATIONS  (STANDING):  acetaminophen   Tablet .. 650 milliGRAM(s) Oral every 6 hours  amLODIPine   Tablet 5 milliGRAM(s) Oral daily  atorvastatin 10 milliGRAM(s) Oral at bedtime  chlorhexidine 2% Cloths 1 Application(s) Topical <User Schedule>  docusate sodium Liquid 100 milliGRAM(s) Oral three times a day  enoxaparin Injectable 40 milliGRAM(s) SubCutaneous daily  famotidine Injectable 20 milliGRAM(s) IV Push daily  fat emulsion (Plant Based) 20% Infusion 0.667 Gm/kG/Day (20.83 mL/Hr) IV Continuous <Continuous>  influenza   Vaccine 0.5 milliLiter(s) IntraMuscular once  pantoprazole  Injectable 40 milliGRAM(s) IV Push daily  Parenteral Nutrition - Adult 1 Each (71 mL/Hr) TPN Continuous <Continuous>    MEDICATIONS  (PRN):  artificial  tears Solution 1 Drop(s) Both EYES every 4 hours PRN Dry Eyes  benzocaine 15 mG/menthol 3.6 mG (Sugar-Free) Lozenge 1 Lozenge Oral every 2 hours PRN Sore Throat  chlorproMAZINE    IVPB 25 milliGRAM(s) IV Intermittent every 8 hours PRN hiccups  HYDROmorphone  Injectable 0.5 milliGRAM(s) IV Push every 4 hours PRN breakthrough pain  ketorolac 0.5% Ophthalmic Solution 1 Drop(s) Left EYE every 6 hours PRN eye pain irritation  oxyCODONE    IR 5 milliGRAM(s) Oral every 4 hours PRN Moderate Pain (4 - 6)  simethicone 80 milliGRAM(s) Chew daily PRN gas pains  sodium chloride 0.9% lock flush 10 milliLiter(s) IV Push every 1 hour PRN Pre/post blood products, medications, blood draw, and to maintain line patency      Vital Signs Last 24 Hrs  T(C): 36.9 (14 Oct 2019 05:21), Max: 37.5 (14 Oct 2019 00:06)  T(F): 98.4 (14 Oct 2019 05:21), Max: 99.5 (14 Oct 2019 00:06)  HR: 101 (14 Oct 2019 05:21) (92 - 113)  BP: 118/77 (14 Oct 2019 05:21) (108/73 - 119/78)  RR: 17 (14 Oct 2019 05:21) (16 - 17)  SpO2: 100% (14 Oct 2019 05:21) (97% - 100%)      Constitutional: A&Ox3, NAD   Respiratory: non labored breathing, no respiratory distress  Cardiovascular: NSR, RRR  Gastrointestinal: Abdomen soft, mildly distended, mild ttp around incision sites, incision c/d/i. Ostomy w/ stoma pink and patent, gas and minimal stool present in ostomy bag. Anal wound c/d/i.   Extremities: wwp, sensory/motor intact, no calf tenderness or edema. LUE PICC in place. Thigh flap incision c/d/i, no surrounding erythema. L thigh JAKUB with minimal serosanguinous output.        I&O's Detail    13 Oct 2019 07:01  -  14 Oct 2019 07:00  --------------------------------------------------------  IN:    fat emulsion (Plant Based) 20% Infusion: 187.2 mL    TPN (Total Parenteral Nutrition): 1278 mL  Total IN: 1465.2 mL    OUT:    Colostomy: 50 mL    Voided: 650 mL  Total OUT: 700 mL    Total NET: 765.2 mL        LABS:              10-14    138  |  98  |  10  ----------------------------<  172<H>  4.3   |  27  |  0.60    Ca    10.0      14 Oct 2019 06:53  Phos  3.7     10-14  Mg     1.9     10-14                          10.3   7.71  )-----------( 402      ( 14 Oct 2019 06:53 )             30.9           IMAGING:  < from: CT Abdomen and Pelvis w/ Oral Cont and w/ IV Cont (10.13.19 @ 17:42) >  **Prelim read**    Impression: Partial small bowel obstruction seen with dilated bowel loops preceding collapsed small bowel. Stable presacral collection again noted.    < end of copied text > STATUS POST:  Abdominal perineal resection 9/24     SUBJECTIVE: Pt seen and examined. Pt is ambulating, still complaining of moderate pain. Tolerating sips of juice. Denies f/n/v/cp/sob.       MEDICATIONS  (STANDING):  acetaminophen   Tablet .. 650 milliGRAM(s) Oral every 6 hours  amLODIPine   Tablet 5 milliGRAM(s) Oral daily  atorvastatin 10 milliGRAM(s) Oral at bedtime  chlorhexidine 2% Cloths 1 Application(s) Topical <User Schedule>  docusate sodium Liquid 100 milliGRAM(s) Oral three times a day  enoxaparin Injectable 40 milliGRAM(s) SubCutaneous daily  famotidine Injectable 20 milliGRAM(s) IV Push daily  fat emulsion (Plant Based) 20% Infusion 0.667 Gm/kG/Day (20.83 mL/Hr) IV Continuous <Continuous>  influenza   Vaccine 0.5 milliLiter(s) IntraMuscular once  pantoprazole  Injectable 40 milliGRAM(s) IV Push daily  Parenteral Nutrition - Adult 1 Each (71 mL/Hr) TPN Continuous <Continuous>    MEDICATIONS  (PRN):  artificial  tears Solution 1 Drop(s) Both EYES every 4 hours PRN Dry Eyes  benzocaine 15 mG/menthol 3.6 mG (Sugar-Free) Lozenge 1 Lozenge Oral every 2 hours PRN Sore Throat  chlorproMAZINE    IVPB 25 milliGRAM(s) IV Intermittent every 8 hours PRN hiccups  HYDROmorphone  Injectable 0.5 milliGRAM(s) IV Push every 4 hours PRN breakthrough pain  ketorolac 0.5% Ophthalmic Solution 1 Drop(s) Left EYE every 6 hours PRN eye pain irritation  oxyCODONE    IR 5 milliGRAM(s) Oral every 4 hours PRN Moderate Pain (4 - 6)  simethicone 80 milliGRAM(s) Chew daily PRN gas pains  sodium chloride 0.9% lock flush 10 milliLiter(s) IV Push every 1 hour PRN Pre/post blood products, medications, blood draw, and to maintain line patency      Vital Signs Last 24 Hrs  T(C): 36.9 (14 Oct 2019 05:21), Max: 37.5 (14 Oct 2019 00:06)  T(F): 98.4 (14 Oct 2019 05:21), Max: 99.5 (14 Oct 2019 00:06)  HR: 101 (14 Oct 2019 05:21) (92 - 113)  BP: 118/77 (14 Oct 2019 05:21) (108/73 - 119/78)  RR: 17 (14 Oct 2019 05:21) (16 - 17)  SpO2: 100% (14 Oct 2019 05:21) (97% - 100%)      Constitutional: A&Ox3, NAD   Respiratory: non labored breathing, no respiratory distress  Cardiovascular: NSR, RRR  Gastrointestinal: Abdomen soft, mildly distended, mild ttp around incision sites, incision c/d/i. Ostomy w/ stoma pink and patent, gas and minimal stool present in ostomy bag. Anal wound c/d/i.   Extremities: wwp, sensory/motor intact, no calf tenderness or edema. LUE PICC in place. Thigh flap incision c/d/i, no surrounding erythema.         I&O's Detail    13 Oct 2019 07:01  -  14 Oct 2019 07:00  --------------------------------------------------------  IN:    fat emulsion (Plant Based) 20% Infusion: 187.2 mL    TPN (Total Parenteral Nutrition): 1278 mL  Total IN: 1465.2 mL    OUT:    Colostomy: 50 mL    Voided: 650 mL  Total OUT: 700 mL    Total NET: 765.2 mL        LABS:              10-14    138  |  98  |  10  ----------------------------<  172<H>  4.3   |  27  |  0.60    Ca    10.0      14 Oct 2019 06:53  Phos  3.7     10-14  Mg     1.9     10-14                          10.3   7.71  )-----------( 402      ( 14 Oct 2019 06:53 )             30.9           IMAGING:  < from: CT Abdomen and Pelvis w/ Oral Cont and w/ IV Cont (10.13.19 @ 17:42) >  **Prelim read**    Impression: Partial small bowel obstruction seen with dilated bowel loops preceding collapsed small bowel. Stable presacral collection again noted.    < end of copied text >

## 2019-10-14 NOTE — PROGRESS NOTE ADULT - SUBJECTIVE AND OBJECTIVE BOX
POST-OPERATIVE NOTE    Procedure: Diagnostic laparoscopy    Diagnosis/Indication: SBO    Surgeon: Dr. Lafleur    S: Pt has no complaints. Denies CP, SOB, JENNINGS, calf tenderness. Pain controlled with medication. Denies nausea, vomiting.    O:  T(C): 36.2 (10-14-19 @ 17:23), Max: 36.6 (10-14-19 @ 15:33)  T(F): 97.2 (10-14-19 @ 17:23), Max: 97.8 (10-14-19 @ 15:33)  HR: 100 (10-14-19 @ 17:23) (94 - 100)  BP: 105/73 (10-14-19 @ 17:23) (105/69 - 123/81)  RR: 13 (10-14-19 @ 17:23) (13 - 16)  SpO2: 97% (10-14-19 @ 17:23) (95% - 99%)  Wt(kg): --                        10.3   7.71  )-----------( 402      ( 14 Oct 2019 06:53 )             30.9     10-14    138  |  98  |  10  ----------------------------<  172<H>  4.3   |  27  |  0.60    Ca    10.0      14 Oct 2019 06:53  Phos  3.7     10-14  Mg     1.9     10-14    Gen: NAD, resting comfortably in bed  Head/Neck: NGT to LIWS with no output yet  C/V: NSR  Pulm: Nonlabored breathing, no respiratory distress  Abd: soft, NT/ND, incision area is clean, dry and intact, ostomy area is pink, no output yet, JAKUB to suction with serosang output  Extrem: WWP, no calf edema or tenderness, SCDs in place    A/P: 54y Male s/p above procedure  Diet: NPO  IVF: LR @ 100cc/hr  Pain/nausea control  Lovenox/SCDs/OOBA/IS  Dispo pending pain control, PO tolerance, clinical improvement

## 2019-10-14 NOTE — BRIEF OPERATIVE NOTE - OPERATION/FINDINGS
Preop Dx: Partial SBO  Postop Dx: Partial SBO  Procedure: Diagnostic Laparoscopy, Lysis of adhesions, drainage of postoperative seroma  Findings: Abdomen accessed with open cutdown technique. Laparoscopy revealed dilated loops of proximal bowel to an area of adherent bowel in the pelvis that was the transition point. This was unable to be safely freed laparoscopically so a lower midline incision was created. The bowel was freed. Pelvic seroma was evacuated. The bowel was inspected and noted to be viable. 19Fr ashly drain placed in pelvis.

## 2019-10-14 NOTE — PROGRESS NOTE ADULT - ASSESSMENT
54M with HTN, HLD, rectal cancer s/p robot assisted abdominal peritoneal resection 9/24    - Pain/nausea control  - CLD/TPN  - Lovenox   - Protonix, famotidine  - Encourage OOB/AAT  - F/u plastics recs  - PT recs home w/ home PT  - Monitor ostomy output  - F/u CT final read

## 2019-10-14 NOTE — CHART NOTE - NSCHARTNOTEFT_GEN_A_CORE
Admitting Diagnosis:   Patient is a 54y old  Male who presents with a chief complaint of elective surgery (14 Oct 2019 08:13)    PAST MEDICAL & SURGICAL HISTORY:  HLD (hyperlipidemia)  HTN (hypertension)  Colon cancer: near rectum  Other elective surgery: Right Upper Chest- Chemo Port    Current Nutrition Order:   Diet, NPO after Midnight:      NPO Start Date: 14-Oct-2019,   NPO Start Time: 23:59 (10-14-19 @ 09:39)  Diet, NPO (10-14-19 @ 09:56)    PO Intake: Good (%) [   ]  Fair (50-75%) [   ] Poor (<25%) [   ]- NPO for surgery    GI Issues: Distended, Colostomy: 50 mL output x 24hrs.     Pain: Mild abd pain per flowsheet.     Skin Integrity: Surgical Incisions    Labs:   10-14    138  |  98  |  10  ----------------------------<  172<H>  4.3   |  27  |  0.60    Ca    10.0      14 Oct 2019 06:53  Phos  3.7     10-14  Mg     1.9     10-14    CAPILLARY BLOOD GLUCOSE    Medications:  MEDICATIONS  (STANDING):  acetaminophen   Tablet .. 650 milliGRAM(s) Oral every 6 hours  amLODIPine   Tablet 5 milliGRAM(s) Oral daily  atorvastatin 10 milliGRAM(s) Oral at bedtime  BUpivacaine liposome 1.3% Injectable (no eMAR) 20 milliLiter(s) Local Injection once  chlorhexidine 2% Cloths 1 Application(s) Topical <User Schedule>  docusate sodium Liquid 100 milliGRAM(s) Oral three times a day  enoxaparin Injectable 40 milliGRAM(s) SubCutaneous daily  famotidine Injectable 20 milliGRAM(s) IV Push daily  influenza   Vaccine 0.5 milliLiter(s) IntraMuscular once  pantoprazole  Injectable 40 milliGRAM(s) IV Push daily  Parenteral Nutrition - Adult 1 Each (71 mL/Hr) TPN Continuous <Continuous>  Parenteral Nutrition - Adult 1 Each (71 mL/Hr) TPN Continuous <Continuous>    MEDICATIONS  (PRN):  artificial  tears Solution 1 Drop(s) Both EYES every 4 hours PRN Dry Eyes  benzocaine 15 mG/menthol 3.6 mG (Sugar-Free) Lozenge 1 Lozenge Oral every 2 hours PRN Sore Throat  chlorproMAZINE    IVPB 25 milliGRAM(s) IV Intermittent every 8 hours PRN hiccups  HYDROmorphone  Injectable 0.5 milliGRAM(s) IV Push every 4 hours PRN breakthrough pain  ketorolac 0.5% Ophthalmic Solution 1 Drop(s) Left EYE every 6 hours PRN eye pain irritation  oxyCODONE    IR 5 milliGRAM(s) Oral every 4 hours PRN Moderate Pain (4 - 6)  simethicone 80 milliGRAM(s) Chew daily PRN gas pains  sodium chloride 0.9% lock flush 10 milliLiter(s) IV Push every 1 hour PRN Pre/post blood products, medications, blood draw, and to maintain line patency    Weight:  9/24 77.7 kg  10/2 76.2 kg  10/3 76.7 kg  10/5 77.6 kg  10/6 78.6 kg  10/7 77.9 kg  10/14 77.0 kg    Weight Change: Weight has been consistent since admission with minimal fluctuations. Continue to weigh patient weekly to assess weight trends.     Nutrition Focused Physical Exam: Nutrition Focused Physical Exam: Completed [ X; Completed 9/26]  Not Pertinent [   ]  (From 9/26): Moderate muscle loss noted around clavicles and moderate fat loss noted around triceps.     Estimated energy needs:   Ht (9/24): 177.8cm, Wt (9/24): 80.5kg, IBW: 75.5kg +/-10%, %IBW: 107%, BMI: 25.5    ABW (80.5 kg) used to calculate energy needs due to pt's current body weight within % IBW. Needs adjusted post-op, suspected malnutrition, hypermetabolic state.     (30-35 kcal/kg): 1212-8696 kcal/day  (1.0-1.2 gm protein/kg): 80.5-96.6 g protein/day   (25-30 ml/kg): 3687-1472 ml/day      Subjective:   Follow Up assessment. 54 y.o M with hx HTN, HLD, rectal cancer now s/p robot assisted abdominal peritoneal resection (9/24). Pt reports decreased PO intake for ~1.5 months PTA, pt follows regular diet, pt reports allergies to shellfish, cherries, papaya, and apples (entered into computer system). Pt endorses ~20lb unintentional wt loss x1.5 months. Pt has been on TPN since 10/1, tolerating well with no complaints. TPN providing 2512 kcal, 95 g AA, 480 g dextrose, and 50 g 20% lipids. Pt's ostomy bag filled with gas/+F. Continues to have low ostomy output: 50 ml/24hrs. Pt was tolerating sips of juice on CLD with no complaints. Able to consume contrast for CT with no N/V and only minimal abd pain which soon dissipated after. Pt is now NPO for OR today, plan for lysis of adhesions.      Previous Nutrition Diagnosis:  Malnutrition (suspect severe) RT decreased ability to meet EER AEB NFPE, ~10% unintentional wt loss x1.5 months, pt meeting <75% EER for >1 month.    Active [ X ]  Resolved [   ]    Goal: Diet to advance within 24-48h. Pt to consistently meet>75%EER when diet advanced with good tolerance.    Recommendations:  1) Continue with goal TPN via central line: 480g Dex, 90 g AA, 50g 20% daily Lipids to provide in total: 2492 Kcal, 1.1 g protein/kg, GIR 4.14 mg/kg/min based on ABW (80.5 kg). Fluids and lytes per MD discretion.   2) Continue with CLD, recommend advancing to  to DASH/ TLC diet when medically feasible   3) Recommend checking TG weekly.   4) Check Mg, Phos, K daily and POC BG Q6hrs.   5) Recommend closely monitor lytes and aggressive repletion  6) Trend daily weights.     Education: RD to f/u on nutrition education once diet advances     Risk Level: High [ X  ] Moderate [   ] Low [   ]. Admitting Diagnosis:   Patient is a 54y old  Male who presents with a chief complaint of elective surgery (14 Oct 2019 08:13)    PAST MEDICAL & SURGICAL HISTORY:  HLD (hyperlipidemia)  HTN (hypertension)  Colon cancer: near rectum  Other elective surgery: Right Upper Chest- Chemo Port    Current Nutrition Order:   Diet, NPO after Midnight:      NPO Start Date: 14-Oct-2019,   NPO Start Time: 23:59 (10-14-19 @ 09:39)  Diet, NPO (10-14-19 @ 09:56)    PO Intake: Good (%) [   ]  Fair (50-75%) [   ] Poor (<25%) [   ]- NPO for surgery    GI Issues: Distended, Colostomy: 50 mL output x 24hrs.     Pain: Mild abd pain per flowsheet.     Skin Integrity: Surgical Incisions    Labs:   10-14    138  |  98  |  10  ----------------------------<  172<H>  4.3   |  27  |  0.60    Ca    10.0      14 Oct 2019 06:53  Phos  3.7     10-14  Mg     1.9     10-14    CAPILLARY BLOOD GLUCOSE    Medications:  MEDICATIONS  (STANDING):  acetaminophen   Tablet .. 650 milliGRAM(s) Oral every 6 hours  amLODIPine   Tablet 5 milliGRAM(s) Oral daily  atorvastatin 10 milliGRAM(s) Oral at bedtime  BUpivacaine liposome 1.3% Injectable (no eMAR) 20 milliLiter(s) Local Injection once  chlorhexidine 2% Cloths 1 Application(s) Topical <User Schedule>  docusate sodium Liquid 100 milliGRAM(s) Oral three times a day  enoxaparin Injectable 40 milliGRAM(s) SubCutaneous daily  famotidine Injectable 20 milliGRAM(s) IV Push daily  influenza   Vaccine 0.5 milliLiter(s) IntraMuscular once  pantoprazole  Injectable 40 milliGRAM(s) IV Push daily  Parenteral Nutrition - Adult 1 Each (71 mL/Hr) TPN Continuous <Continuous>  Parenteral Nutrition - Adult 1 Each (71 mL/Hr) TPN Continuous <Continuous>    MEDICATIONS  (PRN):  artificial  tears Solution 1 Drop(s) Both EYES every 4 hours PRN Dry Eyes  benzocaine 15 mG/menthol 3.6 mG (Sugar-Free) Lozenge 1 Lozenge Oral every 2 hours PRN Sore Throat  chlorproMAZINE    IVPB 25 milliGRAM(s) IV Intermittent every 8 hours PRN hiccups  HYDROmorphone  Injectable 0.5 milliGRAM(s) IV Push every 4 hours PRN breakthrough pain  ketorolac 0.5% Ophthalmic Solution 1 Drop(s) Left EYE every 6 hours PRN eye pain irritation  oxyCODONE    IR 5 milliGRAM(s) Oral every 4 hours PRN Moderate Pain (4 - 6)  simethicone 80 milliGRAM(s) Chew daily PRN gas pains  sodium chloride 0.9% lock flush 10 milliLiter(s) IV Push every 1 hour PRN Pre/post blood products, medications, blood draw, and to maintain line patency    Weight:  9/24 77.7 kg  10/2 76.2 kg  10/3 76.7 kg  10/5 77.6 kg  10/6 78.6 kg  10/7 77.9 kg  10/14 77.0 kg    Weight Change: Weight has been consistent since admission with minimal fluctuations. Continue to weigh patient weekly to assess weight trends.     Nutrition Focused Physical Exam: Nutrition Focused Physical Exam: Completed [ X; Completed 9/26]  Not Pertinent [   ]  (From 9/26): Moderate muscle loss noted around clavicles and moderate fat loss noted around triceps.     Estimated energy needs:   Ht (9/24): 177.8cm, Wt (9/24): 80.5kg, IBW: 75.5kg +/-10%, %IBW: 107%, BMI: 25.5    ABW (80.5 kg) used to calculate energy needs due to pt's current body weight within % IBW. Needs adjusted post-op, suspected malnutrition, hypermetabolic state.     (30-35 kcal/kg): 8938-4227 kcal/day  (1.0-1.2 gm protein/kg): 80.5-96.6 g protein/day   (25-30 ml/kg): 9391-3295 ml/day      Subjective:   Follow Up assessment. 54 y.o M with hx HTN, HLD, rectal cancer now s/p robot assisted abdominal peritoneal resection (9/24). Pt reports decreased PO intake for ~1.5 months PTA, pt follows regular diet, pt reports allergies to shellfish, cherries, papaya, and apples (entered into computer system). Pt endorses ~20lb unintentional wt loss x1.5 months. Pt has been on TPN since 10/1, tolerating well with no complaints. TPN providing 2512 kcal, 95 g AA, 480 g dextrose, and 50 g 20% lipids. Pt's ostomy bag filled with gas/+F. Continues to have low ostomy output: 50 ml/24hrs. Pt was tolerating sips of juice on CLD with no complaints. Able to consume contrast for CT with no N/V and only minimal abd pain which soon dissipated after. Pt is now NPO for OR today, plan for lysis of adhesions. GI Issues: Distended, Colostomy. Pain: Mild abd pain per flowsheet. Skin Integrity: Surgical Incisions. RD to follow up per protocol.     Previous Nutrition Diagnosis:  Malnutrition (suspect severe) RT decreased ability to meet EER AEB NFPE, ~10% unintentional wt loss x1.5 months, pt meeting <75% EER for >1 month.    Active [ X ]  Resolved [   ]    Goal: Diet to advance within 24-48h. Pt to consistently meet>75%EER when diet advanced with good tolerance.    Recommendations:  1) Continue with goal TPN via central line: 480g Dex, 90 g AA, 50g 20% daily Lipids to provide in total: 2492 Kcal, 1.1 g protein/kg, GIR 4.14 mg/kg/min based on ABW (80.5 kg). Fluids and lytes per MD discretion.   2) NPO for surgery, recommend restart CLD and advance to low fiber diet when medically feasible.   3) Recommend checking TG weekly.   4) Check Mg, Phos, K daily and POC BG Q6hrs.   5) Recommend closely monitor lytes and aggressive repletion  6) Trend daily weights.     Education: RD to f/u on nutrition education once diet advances     Risk Level: High [ X  ] Moderate [   ] Low [   ].

## 2019-10-14 NOTE — PROGRESS NOTE ADULT - SUBJECTIVE AND OBJECTIVE BOX
Plastic Surgery Progress Note (pg [facility]: [id number], [facility]: [phone], [facility]: [phone])    SUBJECTIVE:  Doing well. No overnight events.     OBJECTIVE:     ** VITAL SIGNS / I&O's **    Vital Signs Last 24 Hrs  T(C): 36.9 (14 Oct 2019 05:21), Max: 37.5 (14 Oct 2019 00:06)  T(F): 98.4 (14 Oct 2019 05:21), Max: 99.5 (14 Oct 2019 00:06)  HR: 101 (14 Oct 2019 05:21) (92 - 113)  BP: 118/77 (14 Oct 2019 05:21) (108/73 - 119/78)  BP(mean): --  RR: 17 (14 Oct 2019 05:21) (16 - 17)  SpO2: 100% (14 Oct 2019 05:21) (97% - 100%)      13 Oct 2019 07:01  -  14 Oct 2019 07:00  --------------------------------------------------------  IN:    fat emulsion (Plant Based) 20% Infusion: 187.2 mL    TPN (Total Parenteral Nutrition): 1278 mL  Total IN: 1465.2 mL    OUT:    Colostomy: 50 mL    Voided: 650 mL  Total OUT: 700 mL    Total NET: 765.2 mL          ** PHYSICAL EXAM **    -- CONSTITUTIONAL: AOx3. NAD.   -- ABDOMEN: Less distended, +stool/gas in bag  -- PERINEUM: Incision c/d/i, no collections, no signs of infection, every other suture removed  -- EXTREMITIES: L thigh well-healed, no collections      **MEDS**  acetaminophen   Tablet .. 650 milliGRAM(s) Oral every 6 hours  amLODIPine   Tablet 5 milliGRAM(s) Oral daily  artificial  tears Solution 1 Drop(s) Both EYES every 4 hours PRN  atorvastatin 10 milliGRAM(s) Oral at bedtime  benzocaine 15 mG/menthol 3.6 mG (Sugar-Free) Lozenge 1 Lozenge Oral every 2 hours PRN  chlorhexidine 2% Cloths 1 Application(s) Topical <User Schedule>  chlorproMAZINE    IVPB 25 milliGRAM(s) IV Intermittent every 8 hours PRN  docusate sodium Liquid 100 milliGRAM(s) Oral three times a day  enoxaparin Injectable 40 milliGRAM(s) SubCutaneous daily  famotidine Injectable 20 milliGRAM(s) IV Push daily  fat emulsion (Plant Based) 20% Infusion 0.667 Gm/kG/Day IV Continuous <Continuous>  HYDROmorphone  Injectable 0.5 milliGRAM(s) IV Push every 4 hours PRN  influenza   Vaccine 0.5 milliLiter(s) IntraMuscular once  ketorolac 0.5% Ophthalmic Solution 1 Drop(s) Left EYE every 6 hours PRN  oxyCODONE    IR 5 milliGRAM(s) Oral every 4 hours PRN  pantoprazole  Injectable 40 milliGRAM(s) IV Push daily  Parenteral Nutrition - Adult 1 Each TPN Continuous <Continuous>  simethicone 80 milliGRAM(s) Chew daily PRN  sodium chloride 0.9% lock flush 10 milliLiter(s) IV Push every 1 hour PRN      ** LABS **                          10.3   7.71  )-----------( 402      ( 14 Oct 2019 06:53 )             30.9     14 Oct 2019 06:53    138    |  98     |  10     ----------------------------<  172    4.3     |  27     |  0.60     Ca    10.0       14 Oct 2019 06:53  Phos  3.7       14 Oct 2019 06:53  Mg     1.9       14 Oct 2019 06:53        CAPILLARY BLOOD GLUCOSE

## 2019-10-14 NOTE — PROGRESS NOTE ADULT - ASSESSMENT
55 y/o male s/p abdominal peritoneal resection with gracilis flap; Left thigh and perineum healing appropriately w/o issue.   - OOB/ambulation  - Care and Dispo per primary team    Jose Chavez  PGY-5  Plastic Surgery

## 2019-10-15 LAB
ANION GAP SERPL CALC-SCNC: 10 MMOL/L — SIGNIFICANT CHANGE UP (ref 5–17)
ANION GAP SERPL CALC-SCNC: 15 MMOL/L — SIGNIFICANT CHANGE UP (ref 5–17)
ANION GAP SERPL CALC-SCNC: 7 MMOL/L — SIGNIFICANT CHANGE UP (ref 5–17)
ANION GAP SERPL CALC-SCNC: 8 MMOL/L — SIGNIFICANT CHANGE UP (ref 5–17)
ANION GAP SERPL CALC-SCNC: 9 MMOL/L — SIGNIFICANT CHANGE UP (ref 5–17)
APTT BLD: 26.9 SEC — LOW (ref 27.5–36.3)
BASE EXCESS BLDA CALC-SCNC: -0.8 MMOL/L — SIGNIFICANT CHANGE UP (ref -2–3)
BASE EXCESS BLDA CALC-SCNC: -1.7 MMOL/L — SIGNIFICANT CHANGE UP (ref -2–3)
BASE EXCESS BLDA CALC-SCNC: -2.6 MMOL/L — LOW (ref -2–3)
BASE EXCESS BLDA CALC-SCNC: -5.1 MMOL/L — LOW (ref -2–3)
BASE EXCESS BLDA CALC-SCNC: -6.4 MMOL/L — LOW (ref -2–3)
BASE EXCESS BLDV CALC-SCNC: -4.2 MMOL/L — SIGNIFICANT CHANGE UP
BASOPHILS # BLD AUTO: 0.03 K/UL — SIGNIFICANT CHANGE UP (ref 0–0.2)
BASOPHILS NFR BLD AUTO: 0.2 % — SIGNIFICANT CHANGE UP (ref 0–2)
BUN SERPL-MCNC: 18 MG/DL — SIGNIFICANT CHANGE UP (ref 7–23)
BUN SERPL-MCNC: 22 MG/DL — SIGNIFICANT CHANGE UP (ref 7–23)
BUN SERPL-MCNC: 22 MG/DL — SIGNIFICANT CHANGE UP (ref 7–23)
BUN SERPL-MCNC: 23 MG/DL — SIGNIFICANT CHANGE UP (ref 7–23)
BUN SERPL-MCNC: 25 MG/DL — HIGH (ref 7–23)
CA-I BLDA-SCNC: 1.24 MMOL/L — SIGNIFICANT CHANGE UP (ref 1.12–1.3)
CALCIUM SERPL-MCNC: 7.7 MG/DL — LOW (ref 8.4–10.5)
CALCIUM SERPL-MCNC: 8.2 MG/DL — LOW (ref 8.4–10.5)
CALCIUM SERPL-MCNC: 8.5 MG/DL — SIGNIFICANT CHANGE UP (ref 8.4–10.5)
CALCIUM SERPL-MCNC: 8.8 MG/DL — SIGNIFICANT CHANGE UP (ref 8.4–10.5)
CALCIUM SERPL-MCNC: 8.8 MG/DL — SIGNIFICANT CHANGE UP (ref 8.4–10.5)
CHLORIDE SERPL-SCNC: 106 MMOL/L — SIGNIFICANT CHANGE UP (ref 96–108)
CHLORIDE SERPL-SCNC: 109 MMOL/L — HIGH (ref 96–108)
CHLORIDE SERPL-SCNC: 96 MMOL/L — SIGNIFICANT CHANGE UP (ref 96–108)
CO2 SERPL-SCNC: 19 MMOL/L — LOW (ref 22–31)
CO2 SERPL-SCNC: 21 MMOL/L — LOW (ref 22–31)
CO2 SERPL-SCNC: 23 MMOL/L — SIGNIFICANT CHANGE UP (ref 22–31)
CO2 SERPL-SCNC: 23 MMOL/L — SIGNIFICANT CHANGE UP (ref 22–31)
CO2 SERPL-SCNC: 24 MMOL/L — SIGNIFICANT CHANGE UP (ref 22–31)
COHGB MFR BLDA: 1.5 % — SIGNIFICANT CHANGE UP
CREAT SERPL-MCNC: 0.66 MG/DL — SIGNIFICANT CHANGE UP (ref 0.5–1.3)
CREAT SERPL-MCNC: 0.74 MG/DL — SIGNIFICANT CHANGE UP (ref 0.5–1.3)
CREAT SERPL-MCNC: 0.76 MG/DL — SIGNIFICANT CHANGE UP (ref 0.5–1.3)
CREAT SERPL-MCNC: 0.8 MG/DL — SIGNIFICANT CHANGE UP (ref 0.5–1.3)
CREAT SERPL-MCNC: 1.18 MG/DL — SIGNIFICANT CHANGE UP (ref 0.5–1.3)
EOSINOPHIL # BLD AUTO: 0 K/UL — SIGNIFICANT CHANGE UP (ref 0–0.5)
EOSINOPHIL NFR BLD AUTO: 0 % — SIGNIFICANT CHANGE UP (ref 0–6)
FIBRINOGEN PPP-MCNC: 355 MG/DL — SIGNIFICANT CHANGE UP (ref 258–438)
GLUCOSE BLDC GLUCOMTR-MCNC: 158 MG/DL — HIGH (ref 70–99)
GLUCOSE BLDC GLUCOMTR-MCNC: 178 MG/DL — HIGH (ref 70–99)
GLUCOSE BLDC GLUCOMTR-MCNC: 198 MG/DL — HIGH (ref 70–99)
GLUCOSE BLDC GLUCOMTR-MCNC: 247 MG/DL — HIGH (ref 70–99)
GLUCOSE BLDC GLUCOMTR-MCNC: 282 MG/DL — HIGH (ref 70–99)
GLUCOSE SERPL-MCNC: 183 MG/DL — HIGH (ref 70–99)
GLUCOSE SERPL-MCNC: 193 MG/DL — HIGH (ref 70–99)
GLUCOSE SERPL-MCNC: 195 MG/DL — HIGH (ref 70–99)
GLUCOSE SERPL-MCNC: 242 MG/DL — HIGH (ref 70–99)
GLUCOSE SERPL-MCNC: 382 MG/DL — HIGH (ref 70–99)
HCO3 BLDA-SCNC: 20 MMOL/L — LOW (ref 21–28)
HCO3 BLDA-SCNC: 20 MMOL/L — LOW (ref 21–28)
HCO3 BLDA-SCNC: 22 MMOL/L — SIGNIFICANT CHANGE UP (ref 21–28)
HCO3 BLDA-SCNC: 23 MMOL/L — SIGNIFICANT CHANGE UP (ref 21–28)
HCO3 BLDA-SCNC: 24 MMOL/L — SIGNIFICANT CHANGE UP (ref 21–28)
HCO3 BLDV-SCNC: 23 MMOL/L — SIGNIFICANT CHANGE UP (ref 20–27)
HCT VFR BLD CALC: 20 % — CRITICAL LOW (ref 39–50)
HCT VFR BLD CALC: 24 % — LOW (ref 39–50)
HCT VFR BLD CALC: 29.7 % — LOW (ref 39–50)
HCT VFR BLD CALC: 30.4 % — LOW (ref 39–50)
HCT VFR BLD CALC: 30.6 % — LOW (ref 39–50)
HGB BLD-MCNC: 10 G/DL — LOW (ref 13–17)
HGB BLD-MCNC: 10.2 G/DL — LOW (ref 13–17)
HGB BLD-MCNC: 10.5 G/DL — LOW (ref 13–17)
HGB BLD-MCNC: 6.7 G/DL — CRITICAL LOW (ref 13–17)
HGB BLD-MCNC: 8.3 G/DL — LOW (ref 13–17)
HGB BLDA-MCNC: 10.7 G/DL — LOW (ref 13–17)
IMM GRANULOCYTES NFR BLD AUTO: 1.5 % — SIGNIFICANT CHANGE UP (ref 0–1.5)
INR BLD: 1.1 — SIGNIFICANT CHANGE UP (ref 0.88–1.16)
LACTATE SERPL-SCNC: 2.5 MMOL/L — HIGH (ref 0.5–2)
LACTATE SERPL-SCNC: 2.7 MMOL/L — HIGH (ref 0.5–2)
LACTATE SERPL-SCNC: 2.9 MMOL/L — HIGH (ref 0.5–2)
LACTATE SERPL-SCNC: 3.2 MMOL/L — HIGH (ref 0.5–2)
LACTATE SERPL-SCNC: 3.2 MMOL/L — HIGH (ref 0.5–2)
LACTATE SERPL-SCNC: 5.1 MMOL/L — CRITICAL HIGH (ref 0.5–2)
LYMPHOCYTES # BLD AUTO: 0.74 K/UL — LOW (ref 1–3.3)
LYMPHOCYTES # BLD AUTO: 4 % — LOW (ref 13–44)
MAGNESIUM SERPL-MCNC: 1.5 MG/DL — LOW (ref 1.6–2.6)
MAGNESIUM SERPL-MCNC: 1.6 MG/DL — SIGNIFICANT CHANGE UP (ref 1.6–2.6)
MAGNESIUM SERPL-MCNC: 1.8 MG/DL — SIGNIFICANT CHANGE UP (ref 1.6–2.6)
MAGNESIUM SERPL-MCNC: 2 MG/DL — SIGNIFICANT CHANGE UP (ref 1.6–2.6)
MAGNESIUM SERPL-MCNC: 2.3 MG/DL — SIGNIFICANT CHANGE UP (ref 1.6–2.6)
MCHC RBC-ENTMCNC: 29.8 PG — SIGNIFICANT CHANGE UP (ref 27–34)
MCHC RBC-ENTMCNC: 29.8 PG — SIGNIFICANT CHANGE UP (ref 27–34)
MCHC RBC-ENTMCNC: 30.3 PG — SIGNIFICANT CHANGE UP (ref 27–34)
MCHC RBC-ENTMCNC: 30.5 PG — SIGNIFICANT CHANGE UP (ref 27–34)
MCHC RBC-ENTMCNC: 31 PG — SIGNIFICANT CHANGE UP (ref 27–34)
MCHC RBC-ENTMCNC: 33.5 GM/DL — SIGNIFICANT CHANGE UP (ref 32–36)
MCHC RBC-ENTMCNC: 33.6 GM/DL — SIGNIFICANT CHANGE UP (ref 32–36)
MCHC RBC-ENTMCNC: 33.7 GM/DL — SIGNIFICANT CHANGE UP (ref 32–36)
MCHC RBC-ENTMCNC: 34.3 GM/DL — SIGNIFICANT CHANGE UP (ref 32–36)
MCHC RBC-ENTMCNC: 34.6 GM/DL — SIGNIFICANT CHANGE UP (ref 32–36)
MCV RBC AUTO: 87.6 FL — SIGNIFICANT CHANGE UP (ref 80–100)
MCV RBC AUTO: 88.4 FL — SIGNIFICANT CHANGE UP (ref 80–100)
MCV RBC AUTO: 88.9 FL — SIGNIFICANT CHANGE UP (ref 80–100)
MCV RBC AUTO: 89 FL — SIGNIFICANT CHANGE UP (ref 80–100)
MCV RBC AUTO: 92.6 FL — SIGNIFICANT CHANGE UP (ref 80–100)
METHGB MFR BLDA: 0.3 % — SIGNIFICANT CHANGE UP
MONOCYTES # BLD AUTO: 0.91 K/UL — HIGH (ref 0–0.9)
MONOCYTES NFR BLD AUTO: 4.9 % — SIGNIFICANT CHANGE UP (ref 2–14)
NEUTROPHILS # BLD AUTO: 16.54 K/UL — HIGH (ref 1.8–7.4)
NEUTROPHILS NFR BLD AUTO: 89.4 % — HIGH (ref 43–77)
NRBC # BLD: 0 /100 WBCS — SIGNIFICANT CHANGE UP (ref 0–0)
O2 CT VFR BLDA CALC: 15.2 ML/DL — SIGNIFICANT CHANGE UP (ref 15–23)
OXYHGB MFR BLDA: 98 % — SIGNIFICANT CHANGE UP (ref 94–100)
PCO2 BLDA: 37 MMHG — SIGNIFICANT CHANGE UP (ref 35–48)
PCO2 BLDA: 38 MMHG — SIGNIFICANT CHANGE UP (ref 35–48)
PCO2 BLDA: 39 MMHG — SIGNIFICANT CHANGE UP (ref 35–48)
PCO2 BLDA: 39 MMHG — SIGNIFICANT CHANGE UP (ref 35–48)
PCO2 BLDA: 40 MMHG — SIGNIFICANT CHANGE UP (ref 35–48)
PCO2 BLDV: 50 MMHG — SIGNIFICANT CHANGE UP (ref 41–51)
PH BLDA: 7.3 — LOW (ref 7.35–7.45)
PH BLDA: 7.33 — LOW (ref 7.35–7.45)
PH BLDA: 7.38 — SIGNIFICANT CHANGE UP (ref 7.35–7.45)
PH BLDA: 7.4 — SIGNIFICANT CHANGE UP (ref 7.35–7.45)
PH BLDA: 7.41 — SIGNIFICANT CHANGE UP (ref 7.35–7.45)
PH BLDV: 7.28 — LOW (ref 7.32–7.43)
PHOSPHATE SERPL-MCNC: 3.4 MG/DL — SIGNIFICANT CHANGE UP (ref 2.5–4.5)
PHOSPHATE SERPL-MCNC: 3.8 MG/DL — SIGNIFICANT CHANGE UP (ref 2.5–4.5)
PHOSPHATE SERPL-MCNC: 3.9 MG/DL — SIGNIFICANT CHANGE UP (ref 2.5–4.5)
PHOSPHATE SERPL-MCNC: 4.3 MG/DL — SIGNIFICANT CHANGE UP (ref 2.5–4.5)
PHOSPHATE SERPL-MCNC: 5 MG/DL — HIGH (ref 2.5–4.5)
PLATELET # BLD AUTO: 205 K/UL — SIGNIFICANT CHANGE UP (ref 150–400)
PLATELET # BLD AUTO: 237 K/UL — SIGNIFICANT CHANGE UP (ref 150–400)
PLATELET # BLD AUTO: 249 K/UL — SIGNIFICANT CHANGE UP (ref 150–400)
PLATELET # BLD AUTO: 260 K/UL — SIGNIFICANT CHANGE UP (ref 150–400)
PLATELET # BLD AUTO: 445 K/UL — HIGH (ref 150–400)
PO2 BLDA: 100 MMHG — SIGNIFICANT CHANGE UP (ref 83–108)
PO2 BLDA: 190 MMHG — HIGH (ref 83–108)
PO2 BLDA: 220 MMHG — HIGH (ref 83–108)
PO2 BLDA: 75 MMHG — LOW (ref 83–108)
PO2 BLDA: 86 MMHG — SIGNIFICANT CHANGE UP (ref 83–108)
PO2 BLDV: 34 MMHG — SIGNIFICANT CHANGE UP
POTASSIUM BLDA-SCNC: 5.2 MMOL/L — HIGH (ref 3.5–4.9)
POTASSIUM SERPL-MCNC: 4.7 MMOL/L — SIGNIFICANT CHANGE UP (ref 3.5–5.3)
POTASSIUM SERPL-MCNC: 5.2 MMOL/L — SIGNIFICANT CHANGE UP (ref 3.5–5.3)
POTASSIUM SERPL-MCNC: 5.2 MMOL/L — SIGNIFICANT CHANGE UP (ref 3.5–5.3)
POTASSIUM SERPL-MCNC: 6 MMOL/L — HIGH (ref 3.5–5.3)
POTASSIUM SERPL-MCNC: 6.2 MMOL/L — CRITICAL HIGH (ref 3.5–5.3)
POTASSIUM SERPL-SCNC: 4.7 MMOL/L — SIGNIFICANT CHANGE UP (ref 3.5–5.3)
POTASSIUM SERPL-SCNC: 5.2 MMOL/L — SIGNIFICANT CHANGE UP (ref 3.5–5.3)
POTASSIUM SERPL-SCNC: 5.2 MMOL/L — SIGNIFICANT CHANGE UP (ref 3.5–5.3)
POTASSIUM SERPL-SCNC: 6 MMOL/L — HIGH (ref 3.5–5.3)
POTASSIUM SERPL-SCNC: 6.2 MMOL/L — CRITICAL HIGH (ref 3.5–5.3)
PROTHROM AB SERPL-ACNC: 12.5 SEC — SIGNIFICANT CHANGE UP (ref 10–12.9)
RBC # BLD: 2.16 M/UL — LOW (ref 4.2–5.8)
RBC # BLD: 2.74 M/UL — LOW (ref 4.2–5.8)
RBC # BLD: 3.36 M/UL — LOW (ref 4.2–5.8)
RBC # BLD: 3.42 M/UL — LOW (ref 4.2–5.8)
RBC # BLD: 3.44 M/UL — LOW (ref 4.2–5.8)
RBC # FLD: 13.5 % — SIGNIFICANT CHANGE UP (ref 10.3–14.5)
RBC # FLD: 13.9 % — SIGNIFICANT CHANGE UP (ref 10.3–14.5)
RBC # FLD: 14.1 % — SIGNIFICANT CHANGE UP (ref 10.3–14.5)
RBC # FLD: 14.5 % — SIGNIFICANT CHANGE UP (ref 10.3–14.5)
RBC # FLD: 14.9 % — HIGH (ref 10.3–14.5)
SAO2 % BLDA: 100 % — SIGNIFICANT CHANGE UP (ref 95–100)
SAO2 % BLDA: 95 % — SIGNIFICANT CHANGE UP (ref 95–100)
SAO2 % BLDA: 97 % — SIGNIFICANT CHANGE UP (ref 95–100)
SAO2 % BLDA: 98 % — SIGNIFICANT CHANGE UP (ref 95–100)
SAO2 % BLDA: 99 % — SIGNIFICANT CHANGE UP (ref 95–100)
SAO2 % BLDV: 64 % — SIGNIFICANT CHANGE UP
SODIUM BLDA-SCNC: 135 MMOL/L — LOW (ref 138–146)
SODIUM SERPL-SCNC: 132 MMOL/L — LOW (ref 135–145)
SODIUM SERPL-SCNC: 136 MMOL/L — SIGNIFICANT CHANGE UP (ref 135–145)
SODIUM SERPL-SCNC: 137 MMOL/L — SIGNIFICANT CHANGE UP (ref 135–145)
SODIUM SERPL-SCNC: 138 MMOL/L — SIGNIFICANT CHANGE UP (ref 135–145)
SODIUM SERPL-SCNC: 139 MMOL/L — SIGNIFICANT CHANGE UP (ref 135–145)
WBC # BLD: 16.79 K/UL — HIGH (ref 3.8–10.5)
WBC # BLD: 18.5 K/UL — HIGH (ref 3.8–10.5)
WBC # BLD: 19.83 K/UL — HIGH (ref 3.8–10.5)
WBC # BLD: 20.13 K/UL — HIGH (ref 3.8–10.5)
WBC # BLD: 20.18 K/UL — HIGH (ref 3.8–10.5)
WBC # FLD AUTO: 16.79 K/UL — HIGH (ref 3.8–10.5)
WBC # FLD AUTO: 18.5 K/UL — HIGH (ref 3.8–10.5)
WBC # FLD AUTO: 19.83 K/UL — HIGH (ref 3.8–10.5)
WBC # FLD AUTO: 20.13 K/UL — HIGH (ref 3.8–10.5)
WBC # FLD AUTO: 20.18 K/UL — HIGH (ref 3.8–10.5)

## 2019-10-15 PROCEDURE — 71045 X-RAY EXAM CHEST 1 VIEW: CPT | Mod: 26,77

## 2019-10-15 PROCEDURE — 99291 CRITICAL CARE FIRST HOUR: CPT

## 2019-10-15 PROCEDURE — 49000 EXPLORATION OF ABDOMEN: CPT | Mod: 78,GC

## 2019-10-15 PROCEDURE — 93010 ELECTROCARDIOGRAM REPORT: CPT

## 2019-10-15 PROCEDURE — 71045 X-RAY EXAM CHEST 1 VIEW: CPT | Mod: 26

## 2019-10-15 RX ORDER — ACETAMINOPHEN 500 MG
1000 TABLET ORAL ONCE
Refills: 0 | Status: COMPLETED | OUTPATIENT
Start: 2019-10-15 | End: 2019-10-15

## 2019-10-15 RX ORDER — INSULIN LISPRO 100/ML
VIAL (ML) SUBCUTANEOUS EVERY 6 HOURS
Refills: 0 | Status: DISCONTINUED | OUTPATIENT
Start: 2019-10-15 | End: 2019-10-16

## 2019-10-15 RX ORDER — HYDROMORPHONE HYDROCHLORIDE 2 MG/ML
0.5 INJECTION INTRAMUSCULAR; INTRAVENOUS; SUBCUTANEOUS EVERY 4 HOURS
Refills: 0 | Status: DISCONTINUED | OUTPATIENT
Start: 2019-10-15 | End: 2019-10-15

## 2019-10-15 RX ORDER — MAGNESIUM SULFATE 500 MG/ML
1 VIAL (ML) INJECTION ONCE
Refills: 0 | Status: DISCONTINUED | OUTPATIENT
Start: 2019-10-15 | End: 2019-10-15

## 2019-10-15 RX ORDER — HYDROMORPHONE HYDROCHLORIDE 2 MG/ML
1.5 INJECTION INTRAMUSCULAR; INTRAVENOUS; SUBCUTANEOUS EVERY 4 HOURS
Refills: 0 | Status: DISCONTINUED | OUTPATIENT
Start: 2019-10-15 | End: 2019-10-16

## 2019-10-15 RX ORDER — ELECTROLYTE SOLUTION,INJ
1 VIAL (ML) INTRAVENOUS
Refills: 0 | Status: DISCONTINUED | OUTPATIENT
Start: 2019-10-15 | End: 2019-10-15

## 2019-10-15 RX ORDER — SODIUM CHLORIDE 9 MG/ML
1000 INJECTION INTRAMUSCULAR; INTRAVENOUS; SUBCUTANEOUS
Refills: 0 | Status: DISCONTINUED | OUTPATIENT
Start: 2019-10-15 | End: 2019-10-16

## 2019-10-15 RX ORDER — CHLORHEXIDINE GLUCONATE 213 G/1000ML
1 SOLUTION TOPICAL
Refills: 0 | Status: DISCONTINUED | OUTPATIENT
Start: 2019-10-15 | End: 2019-10-31

## 2019-10-15 RX ORDER — MAGNESIUM SULFATE 500 MG/ML
2 VIAL (ML) INJECTION ONCE
Refills: 0 | Status: COMPLETED | OUTPATIENT
Start: 2019-10-15 | End: 2019-10-15

## 2019-10-15 RX ORDER — HYDROMORPHONE HYDROCHLORIDE 2 MG/ML
1 INJECTION INTRAMUSCULAR; INTRAVENOUS; SUBCUTANEOUS EVERY 4 HOURS
Refills: 0 | Status: DISCONTINUED | OUTPATIENT
Start: 2019-10-15 | End: 2019-10-16

## 2019-10-15 RX ORDER — HYDROMORPHONE HYDROCHLORIDE 2 MG/ML
1 INJECTION INTRAMUSCULAR; INTRAVENOUS; SUBCUTANEOUS EVERY 4 HOURS
Refills: 0 | Status: DISCONTINUED | OUTPATIENT
Start: 2019-10-15 | End: 2019-10-15

## 2019-10-15 RX ORDER — ACETAMINOPHEN 500 MG
1000 TABLET ORAL ONCE
Refills: 0 | Status: DISCONTINUED | OUTPATIENT
Start: 2019-10-15 | End: 2019-10-15

## 2019-10-15 RX ORDER — SODIUM CHLORIDE 9 MG/ML
1000 INJECTION INTRAMUSCULAR; INTRAVENOUS; SUBCUTANEOUS ONCE
Refills: 0 | Status: COMPLETED | OUTPATIENT
Start: 2019-10-15 | End: 2019-10-15

## 2019-10-15 RX ORDER — SODIUM CHLORIDE 9 MG/ML
500 INJECTION INTRAMUSCULAR; INTRAVENOUS; SUBCUTANEOUS ONCE
Refills: 0 | Status: COMPLETED | OUTPATIENT
Start: 2019-10-15 | End: 2019-10-15

## 2019-10-15 RX ORDER — CALCIUM GLUCONATE 100 MG/ML
2 VIAL (ML) INTRAVENOUS ONCE
Refills: 0 | Status: COMPLETED | OUTPATIENT
Start: 2019-10-15 | End: 2019-10-15

## 2019-10-15 RX ORDER — HYDROMORPHONE HYDROCHLORIDE 2 MG/ML
0.5 INJECTION INTRAMUSCULAR; INTRAVENOUS; SUBCUTANEOUS ONCE
Refills: 0 | Status: DISCONTINUED | OUTPATIENT
Start: 2019-10-15 | End: 2019-10-15

## 2019-10-15 RX ORDER — SODIUM CHLORIDE 9 MG/ML
1000 INJECTION, SOLUTION INTRAVENOUS
Refills: 0 | Status: DISCONTINUED | OUTPATIENT
Start: 2019-10-15 | End: 2019-10-15

## 2019-10-15 RX ORDER — DEXTROSE 10 % IN WATER 10 %
1000 INTRAVENOUS SOLUTION INTRAVENOUS
Refills: 0 | Status: DISCONTINUED | OUTPATIENT
Start: 2019-10-15 | End: 2019-10-15

## 2019-10-15 RX ORDER — PIPERACILLIN AND TAZOBACTAM 4; .5 G/20ML; G/20ML
3.38 INJECTION, POWDER, LYOPHILIZED, FOR SOLUTION INTRAVENOUS EVERY 6 HOURS
Refills: 0 | Status: DISCONTINUED | OUTPATIENT
Start: 2019-10-15 | End: 2019-10-24

## 2019-10-15 RX ORDER — FAMOTIDINE 10 MG/ML
20 INJECTION INTRAVENOUS ONCE
Refills: 0 | Status: COMPLETED | OUTPATIENT
Start: 2019-10-15 | End: 2019-10-15

## 2019-10-15 RX ORDER — I.V. FAT EMULSION 20 G/100ML
0.65 EMULSION INTRAVENOUS
Qty: 50 | Refills: 0 | Status: DISCONTINUED | OUTPATIENT
Start: 2019-10-15 | End: 2019-10-15

## 2019-10-15 RX ORDER — SODIUM CHLORIDE 9 MG/ML
1000 INJECTION INTRAMUSCULAR; INTRAVENOUS; SUBCUTANEOUS ONCE
Refills: 0 | Status: DISCONTINUED | OUTPATIENT
Start: 2019-10-15 | End: 2019-10-15

## 2019-10-15 RX ORDER — SODIUM BICARBONATE 1 MEQ/ML
50 SYRINGE (ML) INTRAVENOUS ONCE
Refills: 0 | Status: COMPLETED | OUTPATIENT
Start: 2019-10-15 | End: 2019-10-15

## 2019-10-15 RX ADMIN — HYDROMORPHONE HYDROCHLORIDE 1 MILLIGRAM(S): 2 INJECTION INTRAMUSCULAR; INTRAVENOUS; SUBCUTANEOUS at 10:00

## 2019-10-15 RX ADMIN — PIPERACILLIN AND TAZOBACTAM 200 GRAM(S): 4; .5 INJECTION, POWDER, LYOPHILIZED, FOR SOLUTION INTRAVENOUS at 09:58

## 2019-10-15 RX ADMIN — Medication 50 MILLILITER(S): at 10:00

## 2019-10-15 RX ADMIN — SODIUM CHLORIDE 100 MILLILITER(S): 9 INJECTION, SOLUTION INTRAVENOUS at 04:29

## 2019-10-15 RX ADMIN — Medication 71 EACH: at 09:30

## 2019-10-15 RX ADMIN — Medication 50 GRAM(S): at 12:13

## 2019-10-15 RX ADMIN — Medication 1000 MILLIGRAM(S): at 04:14

## 2019-10-15 RX ADMIN — Medication 400 MILLIGRAM(S): at 04:01

## 2019-10-15 RX ADMIN — SODIUM CHLORIDE 100 MILLILITER(S): 9 INJECTION, SOLUTION INTRAVENOUS at 14:48

## 2019-10-15 RX ADMIN — Medication 200 GRAM(S): at 03:23

## 2019-10-15 RX ADMIN — HYDROMORPHONE HYDROCHLORIDE 0.5 MILLIGRAM(S): 2 INJECTION INTRAMUSCULAR; INTRAVENOUS; SUBCUTANEOUS at 10:00

## 2019-10-15 RX ADMIN — HYDROMORPHONE HYDROCHLORIDE 1 MILLIGRAM(S): 2 INJECTION INTRAMUSCULAR; INTRAVENOUS; SUBCUTANEOUS at 13:44

## 2019-10-15 RX ADMIN — HYDROMORPHONE HYDROCHLORIDE 1.5 MILLIGRAM(S): 2 INJECTION INTRAMUSCULAR; INTRAVENOUS; SUBCUTANEOUS at 17:04

## 2019-10-15 RX ADMIN — PANTOPRAZOLE SODIUM 40 MILLIGRAM(S): 20 TABLET, DELAYED RELEASE ORAL at 00:03

## 2019-10-15 RX ADMIN — I.V. FAT EMULSION 20.83 GM/KG/DAY: 20 EMULSION INTRAVENOUS at 22:40

## 2019-10-15 RX ADMIN — Medication 400 MILLIGRAM(S): at 10:00

## 2019-10-15 RX ADMIN — HYDROMORPHONE HYDROCHLORIDE 1.5 MILLIGRAM(S): 2 INJECTION INTRAMUSCULAR; INTRAVENOUS; SUBCUTANEOUS at 16:43

## 2019-10-15 RX ADMIN — HYDROMORPHONE HYDROCHLORIDE 1.5 MILLIGRAM(S): 2 INJECTION INTRAMUSCULAR; INTRAVENOUS; SUBCUTANEOUS at 11:43

## 2019-10-15 RX ADMIN — Medication 2: at 12:00

## 2019-10-15 RX ADMIN — HYDROMORPHONE HYDROCHLORIDE 1 MILLIGRAM(S): 2 INJECTION INTRAMUSCULAR; INTRAVENOUS; SUBCUTANEOUS at 04:52

## 2019-10-15 RX ADMIN — SODIUM CHLORIDE 2000 MILLILITER(S): 9 INJECTION INTRAMUSCULAR; INTRAVENOUS; SUBCUTANEOUS at 16:39

## 2019-10-15 RX ADMIN — Medication 1000 MILLIGRAM(S): at 11:03

## 2019-10-15 RX ADMIN — PIPERACILLIN AND TAZOBACTAM 200 GRAM(S): 4; .5 INJECTION, POWDER, LYOPHILIZED, FOR SOLUTION INTRAVENOUS at 18:00

## 2019-10-15 RX ADMIN — Medication 4: at 22:41

## 2019-10-15 RX ADMIN — FAMOTIDINE 20 MILLIGRAM(S): 10 INJECTION INTRAVENOUS at 00:24

## 2019-10-15 RX ADMIN — SODIUM CHLORIDE 2000 MILLILITER(S): 9 INJECTION INTRAMUSCULAR; INTRAVENOUS; SUBCUTANEOUS at 18:00

## 2019-10-15 RX ADMIN — Medication 50 MILLIEQUIVALENT(S): at 11:03

## 2019-10-15 RX ADMIN — Medication 50 MILLILITER(S): at 14:43

## 2019-10-15 RX ADMIN — HYDROMORPHONE HYDROCHLORIDE 0.5 MILLIGRAM(S): 2 INJECTION INTRAMUSCULAR; INTRAVENOUS; SUBCUTANEOUS at 09:30

## 2019-10-15 RX ADMIN — Medication 1000 MILLIGRAM(S): at 01:25

## 2019-10-15 RX ADMIN — HYDROMORPHONE HYDROCHLORIDE 1 MILLIGRAM(S): 2 INJECTION INTRAMUSCULAR; INTRAVENOUS; SUBCUTANEOUS at 22:13

## 2019-10-15 RX ADMIN — HYDROMORPHONE HYDROCHLORIDE 0.5 MILLIGRAM(S): 2 INJECTION INTRAMUSCULAR; INTRAVENOUS; SUBCUTANEOUS at 00:37

## 2019-10-15 RX ADMIN — Medication 2: at 18:00

## 2019-10-15 RX ADMIN — Medication 1 EACH: at 17:34

## 2019-10-15 RX ADMIN — SODIUM CHLORIDE 1000 MILLILITER(S): 9 INJECTION INTRAMUSCULAR; INTRAVENOUS; SUBCUTANEOUS at 02:20

## 2019-10-15 RX ADMIN — HYDROMORPHONE HYDROCHLORIDE 1 MILLIGRAM(S): 2 INJECTION INTRAMUSCULAR; INTRAVENOUS; SUBCUTANEOUS at 04:28

## 2019-10-15 RX ADMIN — SODIUM CHLORIDE 125 MILLILITER(S): 9 INJECTION INTRAMUSCULAR; INTRAVENOUS; SUBCUTANEOUS at 19:23

## 2019-10-15 RX ADMIN — HYDROMORPHONE HYDROCHLORIDE 1 MILLIGRAM(S): 2 INJECTION INTRAMUSCULAR; INTRAVENOUS; SUBCUTANEOUS at 21:36

## 2019-10-15 RX ADMIN — SODIUM CHLORIDE 1000 MILLILITER(S): 9 INJECTION INTRAMUSCULAR; INTRAVENOUS; SUBCUTANEOUS at 01:32

## 2019-10-15 RX ADMIN — CHLORHEXIDINE GLUCONATE 1 APPLICATION(S): 213 SOLUTION TOPICAL at 10:22

## 2019-10-15 RX ADMIN — HYDROMORPHONE HYDROCHLORIDE 1 MILLIGRAM(S): 2 INJECTION INTRAMUSCULAR; INTRAVENOUS; SUBCUTANEOUS at 09:30

## 2019-10-15 RX ADMIN — HYDROMORPHONE HYDROCHLORIDE 1 MILLIGRAM(S): 2 INJECTION INTRAMUSCULAR; INTRAVENOUS; SUBCUTANEOUS at 14:50

## 2019-10-15 RX ADMIN — HYDROMORPHONE HYDROCHLORIDE 1.5 MILLIGRAM(S): 2 INJECTION INTRAMUSCULAR; INTRAVENOUS; SUBCUTANEOUS at 12:13

## 2019-10-15 RX ADMIN — Medication 400 MILLIGRAM(S): at 00:57

## 2019-10-15 NOTE — PROGRESS NOTE ADULT - ASSESSMENT
53 y/o male s/p abdominal peritoneal resection with gracilis flap; Left thigh and perineum healing appropriately w/o issue  - Care per primary team  - Will remove remaining sutures in near future    Jose Chavez  PGY-5  Plastic Surgery

## 2019-10-15 NOTE — BRIEF OPERATIVE NOTE - NSICDXBRIEFPROCEDURE_GEN_ALL_CORE_FT
PROCEDURES:  Control of hemorrhage, abdomen, postoperative 15-Oct-2019 09:37:55  Evelyn Coyle  Abdominal washout 15-Oct-2019 09:36:39  Evelyn Coyle  Exploratory laparotomy 15-Oct-2019 09:35:58  Evelyn Coyle
PROCEDURES:  Diagnostic laparoscopy 14-Oct-2019 15:25:26  Melchor Ordoñez
PROCEDURES:  Laparoscopy-assisted abdominoperineal resection (APR) of rectum 24-Sep-2019 18:18:29  Jerilyn Pantoja

## 2019-10-15 NOTE — CONSULT NOTE ADULT - ATTENDING COMMENTS
Events noted. Pt seen post hemorrhagic shock in ICU. Monitoring bladder pressure hourly and stable. initially 20 and now 10-14 with good urine output and stable Hb.  Drain in RLQ more serosanguinous and ABG improved. TPN d/chavo with increased K and hyperglycemia and reordered with K and dextrose adjusted. Pain control. Lactate clearing.

## 2019-10-15 NOTE — PROGRESS NOTE ADULT - SUBJECTIVE AND OBJECTIVE BOX
Plastic Surgery Progress Note (pg [facility]: [id number], [facility]: [phone], [facility]: [phone])    SUBJECTIVE:  Pt now in SICU; was taken emergently to the OR this morning for active intra-abdominal bleeding. C/o pain at this time.    OBJECTIVE:     ** VITAL SIGNS / I&O's **    Vital Signs Last 24 Hrs  T(C): 36.1 (15 Oct 2019 09:11), Max: 37.2 (15 Oct 2019 03:13)  T(F): 96.9 (15 Oct 2019 09:11), Max: 99 (15 Oct 2019 03:13)  HR: 110 (15 Oct 2019 13:00) (84 - 114)  BP: 112/79 (15 Oct 2019 13:00) (72/50 - 145/93)  BP(mean): 91 (15 Oct 2019 13:00) (81 - 121)  RR: 14 (15 Oct 2019 13:00) (13 - 20)  SpO2: 97% (15 Oct 2019 13:00) (95% - 100%)      14 Oct 2019 07:01  -  15 Oct 2019 07:00  --------------------------------------------------------  IN:    IV PiggyBack: 150 mL    lactated ringers.: 350 mL    Packed Red Blood Cells: 350 mL    Sodium Chloride 0.9% IV Bolus: 2000 mL    TPN (Total Parenteral Nutrition): 1065 mL  Total IN: 3915 mL    OUT:    Bulb: 285 mL    Colostomy: 50 mL    Estimated Blood Loss: 30 mL    Indwelling Catheter - Urethral: 850 mL    Voided: 140 mL  Total OUT: 1355 mL    Total NET: 2560 mL      15 Oct 2019 07:01  -  15 Oct 2019 13:36  --------------------------------------------------------  IN:    dextrose 10%.: 200 mL    IV PiggyBack: 150 mL    Solution: 50 mL  Total IN: 400 mL    OUT:    Bulb: 380 mL    Indwelling Catheter - Urethral: 235 mL  Total OUT: 615 mL    Total NET: -215 mL          ** PHYSICAL EXAM **    -- CONSTITUTIONAL: AOx3. NAD.   -- ABDOMEN: Appropriately tender  -- PERINEUM: Incision c/d/i w/ several remaining sutures in place  -- EXTREMITIES: L thigh incision c/d/i      **MEDS**  chlorhexidine 2% Cloths 1 Application(s) Topical <User Schedule>  dextrose 10%. 1000 milliLiter(s) IV Continuous <Continuous>  fat emulsion (Plant Based) 20% Infusion 0.65 Gm/kG/Day IV Continuous <Continuous>  HYDROmorphone  Injectable 1 milliGRAM(s) IV Push every 4 hours PRN  HYDROmorphone  Injectable 1.5 milliGRAM(s) IV Push every 4 hours PRN  influenza   Vaccine 0.5 milliLiter(s) IntraMuscular once  insulin lispro (HumaLOG) corrective regimen sliding scale   SubCutaneous every 6 hours  ketorolac 0.5% Ophthalmic Solution 1 Drop(s) Left EYE every 6 hours PRN  magnesium sulfate  IVPB 1 Gram(s) IV Intermittent once  Parenteral Nutrition - Adult 1 Each TPN Continuous <Continuous>  piperacillin/tazobactam IVPB.. 3.375 Gram(s) IV Intermittent every 6 hours      ** LABS **                          10.5   20.13 )-----------( 237      ( 15 Oct 2019 11:34 )             30.6     15 Oct 2019 11:34    138    |  106    |  22     ----------------------------<  195    5.2     |  23     |  0.76     Ca    8.8        15 Oct 2019 11:34  Phos  3.9       15 Oct 2019 11:34  Mg     1.5       15 Oct 2019 11:34      PT/INR - ( 15 Oct 2019 09:25 )   PT: 12.5 sec;   INR: 1.10          PTT - ( 15 Oct 2019 09:25 )  PTT:26.9 sec  CAPILLARY BLOOD GLUCOSE      POCT Blood Glucose.: 178 mg/dL (15 Oct 2019 11:24)  POCT Blood Glucose.: 198 mg/dL (15 Oct 2019 09:36)  POCT Blood Glucose.: 282 mg/dL (15 Oct 2019 07:33)  POCT Blood Glucose.: 326 mg/dL (14 Oct 2019 23:52)

## 2019-10-15 NOTE — CONSULT NOTE ADULT - ASSESSMENT
A/P: 54M PMH HTN, HLD, rectal adenocarcinoma diagnosed 5/2018, underwent chemoXRT who presented for elective robotic assisted abdominal perineal resection (9/24), c/b SBO managed by NGT and TPN via PICC line but RTOR 10/14 for lap converted to open EDER with increasing lactate and hypotensive overnight, s/p RTOR this morning for1 L hematoma evacuation, transferred to SICU for postop monitoring.     Neuro: Pain control with Dilaudid prn  Pulm: Sating well on NC  CV: BP stable off levophed, serial CBC for post operative monitoring  GI: NPO, NGT in place to LIWS, TPN via PICC line. Continue to trend lactate  ID: Zosyn  : Marie in place  Wounds: midline wound c/d/i, Q hourly bladder pressure for monitoring of abdominal compartment syndrome  Drains: RLQ JAKUB drainx1  Lines: R TLC (10/15- ) pulled back 3 cm today, L axillary PICC line for TPN, A-line  PTOT; not ordered, will reassess tomorrow am
See above.

## 2019-10-15 NOTE — PROGRESS NOTE ADULT - ASSESSMENT
54M with HTN, HLD, rectal cancer s/p robot assisted abdominal peritoneal resection 9/24, now s/p lap converted to open, EDER on 10/14 (NGT placed intraop).    - Return to OR this AM for further evaluation given clinical status  - Pain/nausea control  - NPO/NGT/IVF/TPN  - JAKUB x 1, monitor OP  - Lovenox   - Protonix, famotidine  - SCDs, OOBA, IS  - F/u plastics recs  - PT recs home w/ home PT  - Monitor ostomy output

## 2019-10-15 NOTE — BRIEF OPERATIVE NOTE - OPERATION/FINDINGS
in progress Reopened lower midline laparotomy incision and extended it cephalad around umbilicus. Initial inspection of abdomen revealed significant clot in the lower abdomen and pelvis. Single pulsatile bleeding vessel suture ligated w/ 3-0 Vicryl. Clot evacuated and abdomen/pelvis copiously irrigated. No other bleeding vessels encountered however some oozing in pelvis noted. Hemostatic agents (SurgiFlo, Woo, and Surgicel) placed in pelvis w/ overlying drain. Fascia closed w/ #1 looped PDS. Skin closed w/ stapled. Colostomy appliance replaced at end of case.

## 2019-10-15 NOTE — BRIEF OPERATIVE NOTE - NSICDXBRIEFPOSTOP_GEN_ALL_CORE_FT
POST-OP DIAGNOSIS:  SBO (small bowel obstruction) 14-Oct-2019 15:25:46  Melchor Ordoñez
POST-OP DIAGNOSIS:  Rectal cancer 24-Sep-2019 18:18:45  Jerilyn Pantoja
POST-OP DIAGNOSIS:  Hemorrhage 15-Oct-2019 16:55:07  Evelyn Coyle

## 2019-10-15 NOTE — BRIEF OPERATIVE NOTE - NSICDXBRIEFPREOP_GEN_ALL_CORE_FT
not applicable
PRE-OP DIAGNOSIS:  SBO (small bowel obstruction) 14-Oct-2019 15:25:38  Melchor Ordoñez
PRE-OP DIAGNOSIS:  Rectal cancer 24-Sep-2019 18:18:38  Jerilyn Pantoja
PRE-OP DIAGNOSIS:  Hemorrhage 15-Oct-2019 16:54:53  Evelyn Coyle

## 2019-10-16 LAB
ANION GAP SERPL CALC-SCNC: 10 MMOL/L — SIGNIFICANT CHANGE UP (ref 5–17)
ANION GAP SERPL CALC-SCNC: 6 MMOL/L — SIGNIFICANT CHANGE UP (ref 5–17)
ANION GAP SERPL CALC-SCNC: 9 MMOL/L — SIGNIFICANT CHANGE UP (ref 5–17)
APTT BLD: 26.7 SEC — LOW (ref 27.5–36.3)
BASE EXCESS BLDA CALC-SCNC: 0.5 MMOL/L — SIGNIFICANT CHANGE UP (ref -2–3)
BASE EXCESS BLDA CALC-SCNC: 5.2 MMOL/L — HIGH (ref -2–3)
BASE EXCESS BLDV CALC-SCNC: 4.1 MMOL/L — SIGNIFICANT CHANGE UP
BUN SERPL-MCNC: 12 MG/DL — SIGNIFICANT CHANGE UP (ref 7–23)
BUN SERPL-MCNC: 15 MG/DL — SIGNIFICANT CHANGE UP (ref 7–23)
BUN SERPL-MCNC: 17 MG/DL — SIGNIFICANT CHANGE UP (ref 7–23)
CALCIUM SERPL-MCNC: 7.6 MG/DL — LOW (ref 8.4–10.5)
CALCIUM SERPL-MCNC: 7.6 MG/DL — LOW (ref 8.4–10.5)
CALCIUM SERPL-MCNC: 8 MG/DL — LOW (ref 8.4–10.5)
CHLORIDE SERPL-SCNC: 104 MMOL/L — SIGNIFICANT CHANGE UP (ref 96–108)
CHLORIDE SERPL-SCNC: 104 MMOL/L — SIGNIFICANT CHANGE UP (ref 96–108)
CHLORIDE SERPL-SCNC: 106 MMOL/L — SIGNIFICANT CHANGE UP (ref 96–108)
CO2 SERPL-SCNC: 25 MMOL/L — SIGNIFICANT CHANGE UP (ref 22–31)
CO2 SERPL-SCNC: 25 MMOL/L — SIGNIFICANT CHANGE UP (ref 22–31)
CO2 SERPL-SCNC: 30 MMOL/L — SIGNIFICANT CHANGE UP (ref 22–31)
CREAT SERPL-MCNC: 0.62 MG/DL — SIGNIFICANT CHANGE UP (ref 0.5–1.3)
CREAT SERPL-MCNC: 0.62 MG/DL — SIGNIFICANT CHANGE UP (ref 0.5–1.3)
CREAT SERPL-MCNC: 0.65 MG/DL — SIGNIFICANT CHANGE UP (ref 0.5–1.3)
GLUCOSE BLDC GLUCOMTR-MCNC: 157 MG/DL — HIGH (ref 70–99)
GLUCOSE BLDC GLUCOMTR-MCNC: 167 MG/DL — HIGH (ref 70–99)
GLUCOSE BLDC GLUCOMTR-MCNC: 213 MG/DL — HIGH (ref 70–99)
GLUCOSE BLDC GLUCOMTR-MCNC: 248 MG/DL — HIGH (ref 70–99)
GLUCOSE SERPL-MCNC: 212 MG/DL — HIGH (ref 70–99)
GLUCOSE SERPL-MCNC: 241 MG/DL — HIGH (ref 70–99)
GLUCOSE SERPL-MCNC: 247 MG/DL — HIGH (ref 70–99)
HBA1C BLD-MCNC: 5.6 % — SIGNIFICANT CHANGE UP (ref 4–5.6)
HCO3 BLDA-SCNC: 24 MMOL/L — SIGNIFICANT CHANGE UP (ref 21–28)
HCO3 BLDA-SCNC: 29 MMOL/L — HIGH (ref 21–28)
HCO3 BLDV-SCNC: 29 MMOL/L — HIGH (ref 20–27)
HCT VFR BLD CALC: 20.2 % — CRITICAL LOW (ref 39–50)
HCT VFR BLD CALC: 21.6 % — LOW (ref 39–50)
HCT VFR BLD CALC: 22.8 % — LOW (ref 39–50)
HCT VFR BLD CALC: 24.6 % — LOW (ref 39–50)
HCT VFR BLD CALC: 26.5 % — LOW (ref 39–50)
HCT VFR BLD CALC: 27.4 % — LOW (ref 39–50)
HGB BLD-MCNC: 6.7 G/DL — CRITICAL LOW (ref 13–17)
HGB BLD-MCNC: 7.3 G/DL — LOW (ref 13–17)
HGB BLD-MCNC: 7.7 G/DL — LOW (ref 13–17)
HGB BLD-MCNC: 8.3 G/DL — LOW (ref 13–17)
HGB BLD-MCNC: 9 G/DL — LOW (ref 13–17)
HGB BLD-MCNC: 9.3 G/DL — LOW (ref 13–17)
INR BLD: 1.15 — SIGNIFICANT CHANGE UP (ref 0.88–1.16)
LACTATE SERPL-SCNC: 1.7 MMOL/L — SIGNIFICANT CHANGE UP (ref 0.5–2)
LACTATE SERPL-SCNC: 1.9 MMOL/L — SIGNIFICANT CHANGE UP (ref 0.5–2)
LACTATE SERPL-SCNC: 2.3 MMOL/L — HIGH (ref 0.5–2)
MAGNESIUM SERPL-MCNC: 1.8 MG/DL — SIGNIFICANT CHANGE UP (ref 1.6–2.6)
MAGNESIUM SERPL-MCNC: 1.9 MG/DL — SIGNIFICANT CHANGE UP (ref 1.6–2.6)
MAGNESIUM SERPL-MCNC: 2.1 MG/DL — SIGNIFICANT CHANGE UP (ref 1.6–2.6)
MCHC RBC-ENTMCNC: 29.9 PG — SIGNIFICANT CHANGE UP (ref 27–34)
MCHC RBC-ENTMCNC: 29.9 PG — SIGNIFICANT CHANGE UP (ref 27–34)
MCHC RBC-ENTMCNC: 30 PG — SIGNIFICANT CHANGE UP (ref 27–34)
MCHC RBC-ENTMCNC: 30 PG — SIGNIFICANT CHANGE UP (ref 27–34)
MCHC RBC-ENTMCNC: 30.1 PG — SIGNIFICANT CHANGE UP (ref 27–34)
MCHC RBC-ENTMCNC: 30.3 PG — SIGNIFICANT CHANGE UP (ref 27–34)
MCHC RBC-ENTMCNC: 33.2 GM/DL — SIGNIFICANT CHANGE UP (ref 32–36)
MCHC RBC-ENTMCNC: 33.7 GM/DL — SIGNIFICANT CHANGE UP (ref 32–36)
MCHC RBC-ENTMCNC: 33.8 GM/DL — SIGNIFICANT CHANGE UP (ref 32–36)
MCHC RBC-ENTMCNC: 33.8 GM/DL — SIGNIFICANT CHANGE UP (ref 32–36)
MCHC RBC-ENTMCNC: 33.9 GM/DL — SIGNIFICANT CHANGE UP (ref 32–36)
MCHC RBC-ENTMCNC: 34 GM/DL — SIGNIFICANT CHANGE UP (ref 32–36)
MCV RBC AUTO: 88 FL — SIGNIFICANT CHANGE UP (ref 80–100)
MCV RBC AUTO: 88.4 FL — SIGNIFICANT CHANGE UP (ref 80–100)
MCV RBC AUTO: 88.5 FL — SIGNIFICANT CHANGE UP (ref 80–100)
MCV RBC AUTO: 89.1 FL — SIGNIFICANT CHANGE UP (ref 80–100)
MCV RBC AUTO: 89.8 FL — SIGNIFICANT CHANGE UP (ref 80–100)
MCV RBC AUTO: 90.6 FL — SIGNIFICANT CHANGE UP (ref 80–100)
NRBC # BLD: 0 /100 WBCS — SIGNIFICANT CHANGE UP (ref 0–0)
PCO2 BLDA: 36 MMHG — SIGNIFICANT CHANGE UP (ref 35–48)
PCO2 BLDA: 41 MMHG — SIGNIFICANT CHANGE UP (ref 35–48)
PCO2 BLDV: 43 MMHG — SIGNIFICANT CHANGE UP (ref 41–51)
PH BLDA: 7.45 — SIGNIFICANT CHANGE UP (ref 7.35–7.45)
PH BLDA: 7.48 — HIGH (ref 7.35–7.45)
PH BLDV: 7.44 — HIGH (ref 7.32–7.43)
PHOSPHATE SERPL-MCNC: 2 MG/DL — LOW (ref 2.5–4.5)
PHOSPHATE SERPL-MCNC: 2.2 MG/DL — LOW (ref 2.5–4.5)
PHOSPHATE SERPL-MCNC: 2.6 MG/DL — SIGNIFICANT CHANGE UP (ref 2.5–4.5)
PLATELET # BLD AUTO: 176 K/UL — SIGNIFICANT CHANGE UP (ref 150–400)
PLATELET # BLD AUTO: 187 K/UL — SIGNIFICANT CHANGE UP (ref 150–400)
PLATELET # BLD AUTO: 193 K/UL — SIGNIFICANT CHANGE UP (ref 150–400)
PLATELET # BLD AUTO: 201 K/UL — SIGNIFICANT CHANGE UP (ref 150–400)
PLATELET # BLD AUTO: 212 K/UL — SIGNIFICANT CHANGE UP (ref 150–400)
PLATELET # BLD AUTO: 236 K/UL — SIGNIFICANT CHANGE UP (ref 150–400)
PO2 BLDA: 100 MMHG — SIGNIFICANT CHANGE UP (ref 83–108)
PO2 BLDA: 68 MMHG — LOW (ref 83–108)
PO2 BLDV: 35 MMHG — SIGNIFICANT CHANGE UP
POTASSIUM SERPL-MCNC: 3.8 MMOL/L — SIGNIFICANT CHANGE UP (ref 3.5–5.3)
POTASSIUM SERPL-MCNC: 4.3 MMOL/L — SIGNIFICANT CHANGE UP (ref 3.5–5.3)
POTASSIUM SERPL-MCNC: 4.6 MMOL/L — SIGNIFICANT CHANGE UP (ref 3.5–5.3)
POTASSIUM SERPL-SCNC: 3.8 MMOL/L — SIGNIFICANT CHANGE UP (ref 3.5–5.3)
POTASSIUM SERPL-SCNC: 4.3 MMOL/L — SIGNIFICANT CHANGE UP (ref 3.5–5.3)
POTASSIUM SERPL-SCNC: 4.6 MMOL/L — SIGNIFICANT CHANGE UP (ref 3.5–5.3)
PROTHROM AB SERPL-ACNC: 13.1 SEC — HIGH (ref 10–12.9)
RBC # BLD: 2.23 M/UL — LOW (ref 4.2–5.8)
RBC # BLD: 2.44 M/UL — LOW (ref 4.2–5.8)
RBC # BLD: 2.54 M/UL — LOW (ref 4.2–5.8)
RBC # BLD: 2.76 M/UL — LOW (ref 4.2–5.8)
RBC # BLD: 3.01 M/UL — LOW (ref 4.2–5.8)
RBC # BLD: 3.1 M/UL — LOW (ref 4.2–5.8)
RBC # FLD: 14.3 % — SIGNIFICANT CHANGE UP (ref 10.3–14.5)
RBC # FLD: 14.4 % — SIGNIFICANT CHANGE UP (ref 10.3–14.5)
RBC # FLD: 14.8 % — HIGH (ref 10.3–14.5)
RBC # FLD: 15.1 % — HIGH (ref 10.3–14.5)
SAO2 % BLDA: 94 % — LOW (ref 95–100)
SAO2 % BLDA: 97 % — SIGNIFICANT CHANGE UP (ref 95–100)
SAO2 % BLDV: 69 % — SIGNIFICANT CHANGE UP
SODIUM SERPL-SCNC: 139 MMOL/L — SIGNIFICANT CHANGE UP (ref 135–145)
SODIUM SERPL-SCNC: 140 MMOL/L — SIGNIFICANT CHANGE UP (ref 135–145)
SODIUM SERPL-SCNC: 140 MMOL/L — SIGNIFICANT CHANGE UP (ref 135–145)
WBC # BLD: 13.31 K/UL — HIGH (ref 3.8–10.5)
WBC # BLD: 14.93 K/UL — HIGH (ref 3.8–10.5)
WBC # BLD: 15.13 K/UL — HIGH (ref 3.8–10.5)
WBC # BLD: 15.28 K/UL — HIGH (ref 3.8–10.5)
WBC # BLD: 15.55 K/UL — HIGH (ref 3.8–10.5)
WBC # BLD: 16.55 K/UL — HIGH (ref 3.8–10.5)
WBC # FLD AUTO: 13.31 K/UL — HIGH (ref 3.8–10.5)
WBC # FLD AUTO: 14.93 K/UL — HIGH (ref 3.8–10.5)
WBC # FLD AUTO: 15.13 K/UL — HIGH (ref 3.8–10.5)
WBC # FLD AUTO: 15.28 K/UL — HIGH (ref 3.8–10.5)
WBC # FLD AUTO: 15.55 K/UL — HIGH (ref 3.8–10.5)
WBC # FLD AUTO: 16.55 K/UL — HIGH (ref 3.8–10.5)

## 2019-10-16 PROCEDURE — 99291 CRITICAL CARE FIRST HOUR: CPT

## 2019-10-16 PROCEDURE — 71045 X-RAY EXAM CHEST 1 VIEW: CPT | Mod: 26

## 2019-10-16 RX ORDER — HUMAN INSULIN 100 [IU]/ML
10 INJECTION, SUSPENSION SUBCUTANEOUS ONCE
Refills: 0 | Status: COMPLETED | OUTPATIENT
Start: 2019-10-16 | End: 2019-10-16

## 2019-10-16 RX ORDER — DEXTROSE 50 % IN WATER 50 %
12.5 SYRINGE (ML) INTRAVENOUS ONCE
Refills: 0 | Status: DISCONTINUED | OUTPATIENT
Start: 2019-10-16 | End: 2019-10-31

## 2019-10-16 RX ORDER — CHLORPROMAZINE HCL 10 MG
12.5 TABLET ORAL EVERY 6 HOURS
Refills: 0 | Status: DISCONTINUED | OUTPATIENT
Start: 2019-10-16 | End: 2019-10-16

## 2019-10-16 RX ORDER — HYDROMORPHONE HYDROCHLORIDE 2 MG/ML
1 INJECTION INTRAMUSCULAR; INTRAVENOUS; SUBCUTANEOUS ONCE
Refills: 0 | Status: DISCONTINUED | OUTPATIENT
Start: 2019-10-16 | End: 2019-10-16

## 2019-10-16 RX ORDER — SODIUM CHLORIDE 9 MG/ML
1000 INJECTION INTRAMUSCULAR; INTRAVENOUS; SUBCUTANEOUS
Refills: 0 | Status: DISCONTINUED | OUTPATIENT
Start: 2019-10-16 | End: 2019-10-16

## 2019-10-16 RX ORDER — HYDROMORPHONE HYDROCHLORIDE 2 MG/ML
1 INJECTION INTRAMUSCULAR; INTRAVENOUS; SUBCUTANEOUS
Refills: 0 | Status: DISCONTINUED | OUTPATIENT
Start: 2019-10-16 | End: 2019-10-18

## 2019-10-16 RX ORDER — ELECTROLYTE SOLUTION,INJ
1 VIAL (ML) INTRAVENOUS
Refills: 0 | Status: DISCONTINUED | OUTPATIENT
Start: 2019-10-16 | End: 2019-10-16

## 2019-10-16 RX ORDER — INSULIN HUMAN 100 [IU]/ML
INJECTION, SOLUTION SUBCUTANEOUS EVERY 6 HOURS
Refills: 0 | Status: DISCONTINUED | OUTPATIENT
Start: 2019-10-16 | End: 2019-10-31

## 2019-10-16 RX ORDER — HYDROMORPHONE HYDROCHLORIDE 2 MG/ML
0.5 INJECTION INTRAMUSCULAR; INTRAVENOUS; SUBCUTANEOUS
Refills: 0 | Status: DISCONTINUED | OUTPATIENT
Start: 2019-10-16 | End: 2019-10-18

## 2019-10-16 RX ORDER — I.V. FAT EMULSION 20 G/100ML
0.65 EMULSION INTRAVENOUS
Qty: 50 | Refills: 0 | Status: DISCONTINUED | OUTPATIENT
Start: 2019-10-16 | End: 2019-10-16

## 2019-10-16 RX ORDER — DEXTROSE 50 % IN WATER 50 %
25 SYRINGE (ML) INTRAVENOUS ONCE
Refills: 0 | Status: DISCONTINUED | OUTPATIENT
Start: 2019-10-16 | End: 2019-10-31

## 2019-10-16 RX ORDER — ACETAMINOPHEN 500 MG
1000 TABLET ORAL ONCE
Refills: 0 | Status: COMPLETED | OUTPATIENT
Start: 2019-10-16 | End: 2019-10-16

## 2019-10-16 RX ORDER — SODIUM CHLORIDE 9 MG/ML
1000 INJECTION, SOLUTION INTRAVENOUS
Refills: 0 | Status: DISCONTINUED | OUTPATIENT
Start: 2019-10-16 | End: 2019-10-31

## 2019-10-16 RX ORDER — GLUCAGON INJECTION, SOLUTION 0.5 MG/.1ML
1 INJECTION, SOLUTION SUBCUTANEOUS ONCE
Refills: 0 | Status: DISCONTINUED | OUTPATIENT
Start: 2019-10-16 | End: 2019-10-31

## 2019-10-16 RX ORDER — MAGNESIUM SULFATE 500 MG/ML
1 VIAL (ML) INJECTION ONCE
Refills: 0 | Status: COMPLETED | OUTPATIENT
Start: 2019-10-16 | End: 2019-10-16

## 2019-10-16 RX ORDER — DEXTROSE 50 % IN WATER 50 %
15 SYRINGE (ML) INTRAVENOUS ONCE
Refills: 0 | Status: DISCONTINUED | OUTPATIENT
Start: 2019-10-16 | End: 2019-10-31

## 2019-10-16 RX ORDER — DIAZEPAM 5 MG
2 TABLET ORAL EVERY 8 HOURS
Refills: 0 | Status: DISCONTINUED | OUTPATIENT
Start: 2019-10-16 | End: 2019-10-22

## 2019-10-16 RX ADMIN — PIPERACILLIN AND TAZOBACTAM 200 GRAM(S): 4; .5 INJECTION, POWDER, LYOPHILIZED, FOR SOLUTION INTRAVENOUS at 23:45

## 2019-10-16 RX ADMIN — HYDROMORPHONE HYDROCHLORIDE 1 MILLIGRAM(S): 2 INJECTION INTRAMUSCULAR; INTRAVENOUS; SUBCUTANEOUS at 04:17

## 2019-10-16 RX ADMIN — HYDROMORPHONE HYDROCHLORIDE 1 MILLIGRAM(S): 2 INJECTION INTRAMUSCULAR; INTRAVENOUS; SUBCUTANEOUS at 05:17

## 2019-10-16 RX ADMIN — HYDROMORPHONE HYDROCHLORIDE 0.5 MILLIGRAM(S): 2 INJECTION INTRAMUSCULAR; INTRAVENOUS; SUBCUTANEOUS at 14:00

## 2019-10-16 RX ADMIN — HYDROMORPHONE HYDROCHLORIDE 1 MILLIGRAM(S): 2 INJECTION INTRAMUSCULAR; INTRAVENOUS; SUBCUTANEOUS at 18:15

## 2019-10-16 RX ADMIN — I.V. FAT EMULSION 20.83 GM/KG/DAY: 20 EMULSION INTRAVENOUS at 21:58

## 2019-10-16 RX ADMIN — HYDROMORPHONE HYDROCHLORIDE 1 MILLIGRAM(S): 2 INJECTION INTRAMUSCULAR; INTRAVENOUS; SUBCUTANEOUS at 10:16

## 2019-10-16 RX ADMIN — HYDROMORPHONE HYDROCHLORIDE 1 MILLIGRAM(S): 2 INJECTION INTRAMUSCULAR; INTRAVENOUS; SUBCUTANEOUS at 01:00

## 2019-10-16 RX ADMIN — PIPERACILLIN AND TAZOBACTAM 200 GRAM(S): 4; .5 INJECTION, POWDER, LYOPHILIZED, FOR SOLUTION INTRAVENOUS at 18:00

## 2019-10-16 RX ADMIN — Medication 2 MILLIGRAM(S): at 15:20

## 2019-10-16 RX ADMIN — HYDROMORPHONE HYDROCHLORIDE 1 MILLIGRAM(S): 2 INJECTION INTRAMUSCULAR; INTRAVENOUS; SUBCUTANEOUS at 22:48

## 2019-10-16 RX ADMIN — CHLORHEXIDINE GLUCONATE 1 APPLICATION(S): 213 SOLUTION TOPICAL at 06:17

## 2019-10-16 RX ADMIN — INSULIN HUMAN 4: 100 INJECTION, SOLUTION SUBCUTANEOUS at 12:00

## 2019-10-16 RX ADMIN — HYDROMORPHONE HYDROCHLORIDE 1 MILLIGRAM(S): 2 INJECTION INTRAMUSCULAR; INTRAVENOUS; SUBCUTANEOUS at 00:43

## 2019-10-16 RX ADMIN — INSULIN HUMAN 2: 100 INJECTION, SOLUTION SUBCUTANEOUS at 23:44

## 2019-10-16 RX ADMIN — Medication 400 MILLIGRAM(S): at 18:26

## 2019-10-16 RX ADMIN — SODIUM CHLORIDE 100 MILLILITER(S): 9 INJECTION INTRAMUSCULAR; INTRAVENOUS; SUBCUTANEOUS at 16:10

## 2019-10-16 RX ADMIN — SODIUM CHLORIDE 100 MILLILITER(S): 9 INJECTION INTRAMUSCULAR; INTRAVENOUS; SUBCUTANEOUS at 14:00

## 2019-10-16 RX ADMIN — Medication 0.5 MILLIGRAM(S): at 07:52

## 2019-10-16 RX ADMIN — PIPERACILLIN AND TAZOBACTAM 200 GRAM(S): 4; .5 INJECTION, POWDER, LYOPHILIZED, FOR SOLUTION INTRAVENOUS at 00:04

## 2019-10-16 RX ADMIN — Medication 101 MILLIGRAM(S): at 10:06

## 2019-10-16 RX ADMIN — Medication 100 GRAM(S): at 07:18

## 2019-10-16 RX ADMIN — HYDROMORPHONE HYDROCHLORIDE 1 MILLIGRAM(S): 2 INJECTION INTRAMUSCULAR; INTRAVENOUS; SUBCUTANEOUS at 22:15

## 2019-10-16 RX ADMIN — Medication 62.5 MILLIMOLE(S): at 07:34

## 2019-10-16 RX ADMIN — SODIUM CHLORIDE 100 MILLILITER(S): 9 INJECTION INTRAMUSCULAR; INTRAVENOUS; SUBCUTANEOUS at 10:00

## 2019-10-16 RX ADMIN — PIPERACILLIN AND TAZOBACTAM 200 GRAM(S): 4; .5 INJECTION, POWDER, LYOPHILIZED, FOR SOLUTION INTRAVENOUS at 12:00

## 2019-10-16 RX ADMIN — HYDROMORPHONE HYDROCHLORIDE 1 MILLIGRAM(S): 2 INJECTION INTRAMUSCULAR; INTRAVENOUS; SUBCUTANEOUS at 11:36

## 2019-10-16 RX ADMIN — HYDROMORPHONE HYDROCHLORIDE 0.5 MILLIGRAM(S): 2 INJECTION INTRAMUSCULAR; INTRAVENOUS; SUBCUTANEOUS at 15:15

## 2019-10-16 RX ADMIN — INSULIN HUMAN 2: 100 INJECTION, SOLUTION SUBCUTANEOUS at 17:24

## 2019-10-16 RX ADMIN — Medication 4: at 06:16

## 2019-10-16 RX ADMIN — PIPERACILLIN AND TAZOBACTAM 200 GRAM(S): 4; .5 INJECTION, POWDER, LYOPHILIZED, FOR SOLUTION INTRAVENOUS at 06:15

## 2019-10-16 RX ADMIN — Medication 1 EACH: at 18:00

## 2019-10-16 RX ADMIN — HUMAN INSULIN 10 UNIT(S): 100 INJECTION, SUSPENSION SUBCUTANEOUS at 10:35

## 2019-10-16 NOTE — PROGRESS NOTE ADULT - SUBJECTIVE AND OBJECTIVE BOX
Plastic Surgery Progress Note (pg [facility]: [id number], [facility]: [phone], [facility]: [phone])    SUBJECTIVE:  Doing well. No overnight events.     OBJECTIVE:     ** VITAL SIGNS / I&O's **    Vital Signs Last 24 Hrs  T(C): 37.1 (16 Oct 2019 05:32), Max: 37.6 (16 Oct 2019 01:12)  T(F): 98.7 (16 Oct 2019 05:32), Max: 99.7 (16 Oct 2019 01:12)  HR: 114 (16 Oct 2019 06:00) (84 - 128)  BP: 117/66 (16 Oct 2019 06:00) (100/61 - 145/93)  BP(mean): 84 (16 Oct 2019 06:00) (71 - 121)  RR: 15 (16 Oct 2019 06:00) (13 - 21)  SpO2: 100% (16 Oct 2019 06:00) (93% - 100%)      15 Oct 2019 07:01  -  16 Oct 2019 07:00  --------------------------------------------------------  IN:    dextrose 10%: 250 mL    dextrose 5% + sodium chloride 0.9%: 400 mL    fat emulsion (Plant Based) 20% Infusion: 208 mL    IV PiggyBack: 1200 mL    Sodium Chloride 0.9% IV Bolus: 1000 mL    sodium chloride 0.9%.: 1375 mL    Solution: 100 mL    TPN (Total Parenteral Nutrition): 994 mL  Total IN: 5527 mL    OUT:    Bulb: 655 mL    Drain: 750 mL    Indwelling Catheter - Urethral: 1880 mL  Total OUT: 3285 mL    Total NET: 2242 mL          ** PHYSICAL EXAM **    -- CONSTITUTIONAL: AOx3. NAD.   -- ABDOMEN: Soft, very tender to palpation, distended, drain sponge site saturated w/ serosang, JAKUB serosang  -- PERINEUM: Incisions intact w/ few remaining sutures ,no signs of infection  -- EXTREMITIES: L thigh incision c/d/i, no collections, no signs of infection      **MEDS**  chlorhexidine 2% Cloths 1 Application(s) Topical <User Schedule>  fat emulsion (Plant Based) 20% Infusion 0.65 Gm/kG/Day IV Continuous <Continuous>  HYDROmorphone  Injectable 1 milliGRAM(s) IV Push every 4 hours PRN  HYDROmorphone  Injectable 1.5 milliGRAM(s) IV Push every 4 hours PRN  influenza   Vaccine 0.5 milliLiter(s) IntraMuscular once  insulin lispro (HumaLOG) corrective regimen sliding scale   SubCutaneous every 6 hours  ketorolac 0.5% Ophthalmic Solution 1 Drop(s) Left EYE every 6 hours PRN  magnesium sulfate  IVPB 1 Gram(s) IV Intermittent once  Parenteral Nutrition - Adult 1 Each TPN Continuous <Continuous>  piperacillin/tazobactam IVPB.. 3.375 Gram(s) IV Intermittent every 6 hours  sodium chloride 0.9%. 1000 milliLiter(s) IV Continuous <Continuous>  sodium phosphate IVPB 15 milliMole(s) IV Intermittent once      ** LABS **                          7.7    15.55 )-----------( 212      ( 16 Oct 2019 04:05 )             22.8     16 Oct 2019 04:05    139    |  104    |  15     ----------------------------<  212    4.3     |  25     |  0.65     Ca    7.6        16 Oct 2019 04:05  Phos  2.0       16 Oct 2019 04:05  Mg     1.8       16 Oct 2019 04:05      PT/INR - ( 16 Oct 2019 04:05 )   PT: 13.1 sec;   INR: 1.15          PTT - ( 16 Oct 2019 04:05 )  PTT:26.7 sec  CAPILLARY BLOOD GLUCOSE      POCT Blood Glucose.: 213 mg/dL (16 Oct 2019 05:34)  POCT Blood Glucose.: 247 mg/dL (15 Oct 2019 22:28)  POCT Blood Glucose.: 158 mg/dL (15 Oct 2019 16:36)  POCT Blood Glucose.: 178 mg/dL (15 Oct 2019 11:24)  POCT Blood Glucose.: 198 mg/dL (15 Oct 2019 09:36)  POCT Blood Glucose.: 282 mg/dL (15 Oct 2019 07:33) Plastic Surgery Progress Note (pg [facility]: [id number], [facility]: [phone], [facility]: [phone])    SUBJECTIVE:  C/o significant abdominal pain and general discomfort s/p RTOR yesterday.     OBJECTIVE:     ** VITAL SIGNS / I&O's **    Vital Signs Last 24 Hrs  T(C): 37.1 (16 Oct 2019 05:32), Max: 37.6 (16 Oct 2019 01:12)  T(F): 98.7 (16 Oct 2019 05:32), Max: 99.7 (16 Oct 2019 01:12)  HR: 114 (16 Oct 2019 06:00) (84 - 128)  BP: 117/66 (16 Oct 2019 06:00) (100/61 - 145/93)  BP(mean): 84 (16 Oct 2019 06:00) (71 - 121)  RR: 15 (16 Oct 2019 06:00) (13 - 21)  SpO2: 100% (16 Oct 2019 06:00) (93% - 100%)      15 Oct 2019 07:01  -  16 Oct 2019 07:00  --------------------------------------------------------  IN:    dextrose 10%: 250 mL    dextrose 5% + sodium chloride 0.9%: 400 mL    fat emulsion (Plant Based) 20% Infusion: 208 mL    IV PiggyBack: 1200 mL    Sodium Chloride 0.9% IV Bolus: 1000 mL    sodium chloride 0.9%.: 1375 mL    Solution: 100 mL    TPN (Total Parenteral Nutrition): 994 mL  Total IN: 5527 mL    OUT:    Bulb: 655 mL    Drain: 750 mL    Indwelling Catheter - Urethral: 1880 mL  Total OUT: 3285 mL    Total NET: 2242 mL          ** PHYSICAL EXAM **    -- CONSTITUTIONAL: AOx3. NAD.   -- ABDOMEN: Soft, very tender to palpation, distended, drain sponge site saturated w/ serosang, JAKUB serosang  -- PERINEUM: Incisions intact w/ few remaining sutures ,no signs of infection  -- EXTREMITIES: L thigh incision c/d/i, no collections, no signs of infection      **MEDS**  chlorhexidine 2% Cloths 1 Application(s) Topical <User Schedule>  fat emulsion (Plant Based) 20% Infusion 0.65 Gm/kG/Day IV Continuous <Continuous>  HYDROmorphone  Injectable 1 milliGRAM(s) IV Push every 4 hours PRN  HYDROmorphone  Injectable 1.5 milliGRAM(s) IV Push every 4 hours PRN  influenza   Vaccine 0.5 milliLiter(s) IntraMuscular once  insulin lispro (HumaLOG) corrective regimen sliding scale   SubCutaneous every 6 hours  ketorolac 0.5% Ophthalmic Solution 1 Drop(s) Left EYE every 6 hours PRN  magnesium sulfate  IVPB 1 Gram(s) IV Intermittent once  Parenteral Nutrition - Adult 1 Each TPN Continuous <Continuous>  piperacillin/tazobactam IVPB.. 3.375 Gram(s) IV Intermittent every 6 hours  sodium chloride 0.9%. 1000 milliLiter(s) IV Continuous <Continuous>  sodium phosphate IVPB 15 milliMole(s) IV Intermittent once      ** LABS **                          7.7    15.55 )-----------( 212      ( 16 Oct 2019 04:05 )             22.8     16 Oct 2019 04:05    139    |  104    |  15     ----------------------------<  212    4.3     |  25     |  0.65     Ca    7.6        16 Oct 2019 04:05  Phos  2.0       16 Oct 2019 04:05  Mg     1.8       16 Oct 2019 04:05      PT/INR - ( 16 Oct 2019 04:05 )   PT: 13.1 sec;   INR: 1.15          PTT - ( 16 Oct 2019 04:05 )  PTT:26.7 sec  CAPILLARY BLOOD GLUCOSE      POCT Blood Glucose.: 213 mg/dL (16 Oct 2019 05:34)  POCT Blood Glucose.: 247 mg/dL (15 Oct 2019 22:28)  POCT Blood Glucose.: 158 mg/dL (15 Oct 2019 16:36)  POCT Blood Glucose.: 178 mg/dL (15 Oct 2019 11:24)  POCT Blood Glucose.: 198 mg/dL (15 Oct 2019 09:36)  POCT Blood Glucose.: 282 mg/dL (15 Oct 2019 07:33)

## 2019-10-16 NOTE — CONSULT NOTE ADULT - SUBJECTIVE AND OBJECTIVE BOX
Pain Management Consult Note - [facility] [phone]    Chief Complaint: abdominal pain/spasms    HPI: Called to evaluate 54 y.o. male c/o abdominal pain and spasms uncontrolled by Dilaudid IV. Patient had PMHx significant for rectaladenocarcinoma and is s/p resection complicated by SBO, most recently s/p lap on 10/14, and evacuation of intraperitoneal hematoma on 10/14, per SICU PA. Patient seen sleeping in bed this afternoon in [facility].       Pain is __x_ sharp ____dull ___burning ___achy ___ Intensity: ____ mild _x__mod ___severe     Location __x__surgical site ____cervical _____lumbar _x___abd ____upper ext____lower ext    Worse with _x___activity __x__movement _____physical therapy___ Rest    Improved with __x__medication ___x_rest ____physical therapy    ROS: Const:  __-_febrile   Eyes:___ENT:___CV: _-__chest pain  Resp: __-__sob  GI:___nausea ___vomiting __x_abd pain __x_npo ___clears __full diet __bm  :___ Musk: __x_pain __x_spasm  Skin:___ Neuro:  ___sedation___confusion___ numbness ___weakness ___paresth  Psych:__anxiety  Endo:___ Heme:___Allergy:_________, _x__all others reviewed and negative    PAST MEDICAL & SURGICAL HISTORY:  HLD (hyperlipidemia)  HTN (hypertension)  Colon cancer: near rectum  Other elective surgery: Right Upper Chest- Chemo Port      SH: _-__Tobacco   _-__Alcohol                          FH:FAMILY HISTORY: denies      chlorhexidine 2% Cloths 1 Application(s) Topical <User Schedule>  chlorproMAZINE    IVPB 12.5 milliGRAM(s) IV Intermittent every 6 hours PRN  dextrose 40% Gel 15 Gram(s) Oral once PRN  dextrose 5%. 1000 milliLiter(s) IV Continuous <Continuous>  dextrose 50% Injectable 12.5 Gram(s) IV Push once  dextrose 50% Injectable 25 Gram(s) IV Push once  dextrose 50% Injectable 25 Gram(s) IV Push once  fat emulsion (Plant Based) 20% Infusion 0.65 Gm/kG/Day IV Continuous <Continuous>  glucagon  Injectable 1 milliGRAM(s) IntraMuscular once PRN  HYDROmorphone  Injectable 1 milliGRAM(s) IV Push every 3 hours PRN  HYDROmorphone  Injectable 0.5 milliGRAM(s) IV Push every 3 hours PRN  influenza   Vaccine 0.5 milliLiter(s) IntraMuscular once  insulin regular  human corrective regimen sliding scale   SubCutaneous every 6 hours  ketorolac 0.5% Ophthalmic Solution 1 Drop(s) Left EYE every 6 hours PRN  Parenteral Nutrition - Adult 1 Each TPN Continuous <Continuous>  Parenteral Nutrition - Adult 1 Each TPN Continuous <Continuous>  piperacillin/tazobactam IVPB.. 3.375 Gram(s) IV Intermittent every 6 hours  sodium chloride 0.9%. 1000 milliLiter(s) IV Continuous <Continuous>      T(C): 37.7 (10-16-19 @ 09:38), Max: 37.7 (10-16-19 @ 09:38)  HR: 116 (10-16-19 @ 13:00) (106 - 128)  BP: 115/70 (10-16-19 @ 13:00) (100/61 - 147/82)  RR: 17 (10-16-19 @ 13:00) (13 - 26)  SpO2: 98% (10-16-19 @ 13:00) (93% - 100%)  Wt(kg): --    T(C): 37.7 (10-16-19 @ 09:38), Max: 37.7 (10-16-19 @ 09:38)  HR: 116 (10-16-19 @ 13:00) (106 - 128)  BP: 115/70 (10-16-19 @ 13:00) (100/61 - 147/82)  RR: 17 (10-16-19 @ 13:00) (13 - 26)  SpO2: 98% (10-16-19 @ 13:00) (93% - 100%)  Wt(kg): --    T(C): 37.7 (10-16-19 @ 09:38), Max: 37.7 (10-16-19 @ 09:38)  HR: 116 (10-16-19 @ 13:00) (106 - 128)  BP: 115/70 (10-16-19 @ 13:00) (100/61 - 147/82)  RR: 17 (10-16-19 @ 13:00) (13 - 26)  SpO2: 98% (10-16-19 @ 13:00) (93% - 100%)  Wt(kg): --    PHYSICAL EXAM:  Gen Appearance: _x__no acute distress __x_appropriate        Neuro: _x__SILT feet____ EOM Intact Psych: AAOX_3_, _x__mood/affect appropriate        Eyes: _x__conjunctiva WNL  _____ Pupils equal and round        ENT: _x_ears and nose atraumatic___ Hearing grossly intact        Neck: _x__trachea midline, no visible masses ___thyroid without palpable mass    Resp: _x__Nml WOB____No tactile fremitus ___clear to auscultation    Cardio: __x_extremities free from edema __x__pedal pulses palpable    GI/Abdomen: _x__soft _____ Nontender______Nondistended_____HSM    Lymphatic: ___no palpable nodes in neck  ___no palpable nodes calves and feet    Skin/Wound: ___Incision, _x__Dressing c/d/i,   ____surrounding tissues soft,  ___drain/chest tube present____    Muscular: EHL __5_/5  Gastrocnemius___5/5    _x__absent clubbing/cyanosis      ASSESSMENT: This is a 54y old Male with a history of   C20  Handoff  MEWS Score  HLD (hyperlipidemia)  HTN (hypertension)  Colon cancer  Rectal cancer  Hemorrhage  SBO (small bowel obstruction)  Rectal cancer  Hemorrhage  SBO (small bowel obstruction)  Rectal cancer  Control of hemorrhage, abdomen, postoperative  Abdominal washout  Washout, abdominal cavity  Exploratory laparotomy  Diagnostic laparoscopy  Laparoscopy-assisted abdominoperineal resection (APR) of rectum  Other elective surgery      Recommended Treatment PLAN:    1. Recommend Valium 2mg IV q8hr PRN muscular spasms. When patient is tolerating PO, can give Flexeril 5mg PO q8 PRN muscular spasms if still present.  2. Recommend continuing remainder of pain regimen per SICU team.   Plan discussed with Dr. Morales
Patient is a 54y old  Male who presents with a chief complaint of elective surgery (15 Oct 2019 13:36)    HPI:  54M wPMH HTN, HLD, rectal adenocarcinoma diagnosed 5/2018, underwent chemoXRT who initially presented for elective robotic assisted abdominal perineal resection (9/24), c/b SBO managed by NGT and TPN via PICC line but RTOR 10/14 for lap converted to open EDER with increasing lactate overnight and hypotensive overnight, taken back to the OR this morning with concern for intraabdominal bleeding. In OR pt found to have generalized oozing from wound bed and small vessel bleed from rectus muscle which was ligated, EBL 1L, transfused 2uRBCs,  and given 3L IVF, pt transferred to ICU extubated for postoperative monitoring.       MEDICATIONS  (STANDING):  chlorhexidine 2% Cloths 1 Application(s) Topical <User Schedule>  dextrose 5% + sodium chloride 0.9%. 1000 milliLiter(s) (100 mL/Hr) IV Continuous <Continuous>  fat emulsion (Plant Based) 20% Infusion 0.65 Gm/kG/Day (20.83 mL/Hr) IV Continuous <Continuous>  influenza   Vaccine 0.5 milliLiter(s) IntraMuscular once  insulin lispro (HumaLOG) corrective regimen sliding scale   SubCutaneous every 6 hours  Parenteral Nutrition - Adult 1 Each (71 mL/Hr) TPN Continuous <Continuous>  piperacillin/tazobactam IVPB.. 3.375 Gram(s) IV Intermittent every 6 hours    MEDICATIONS  (PRN):  HYDROmorphone  Injectable 1 milliGRAM(s) IV Push every 4 hours PRN Moderate Pain (4 - 6)  HYDROmorphone  Injectable 1.5 milliGRAM(s) IV Push every 4 hours PRN Severe Pain (7 - 10)  ketorolac 0.5% Ophthalmic Solution 1 Drop(s) Left EYE every 6 hours PRN eye pain irritation      PHYSICAL EXAM:    ICU Vital Signs Last 24 Hrs  T(C): 36.4 (15 Oct 2019 14:01), Max: 37.2 (15 Oct 2019 03:13)  T(F): 97.5 (15 Oct 2019 14:01), Max: 99 (15 Oct 2019 03:13)  HR: 112 (15 Oct 2019 15:00) (84 - 114)  BP: 111/77 (15 Oct 2019 15:00) (72/50 - 145/93)  BP(mean): 88 (15 Oct 2019 15:00) (81 - 121)  ABP: 124/68 (15 Oct 2019 15:00) (108/66 - 164/86)  ABP(mean): 86 (15 Oct 2019 15:00) (80 - 110)  RR: 17 (15 Oct 2019 15:00) (13 - 20)  SpO2: 97% (15 Oct 2019 15:00) (95% - 100%)    I&O's Summary    14 Oct 2019 07:01  -  15 Oct 2019 07:00  --------------------------------------------------------  IN: 3915 mL / OUT: 1355 mL / NET: 2560 mL    15 Oct 2019 07:01  -  15 Oct 2019 15:33  --------------------------------------------------------  IN: 550 mL / OUT: 865 mL / NET: -315 mL        GENERAL: Appears uncomfortable postop  NERVOUS SYSTEM:  Alert & Oriented X3, Motor Strength 5/5 B/L upper and lower extremities;  sensory intact  EYES: EOMI, PERRLA, conjunctiva and sclera clear  ENT: No tonsillar erythema, exudates, or enlargement; Moist mucous membranes, Good dentition, No lesions  NECK: Supple, No JVD, Normal thyroid, no enlarged nodes  CHEST/LUNG: B/L good air entry; No rhonchi or wheezing  HEART: S1S2 normal, no murmurs appreciated  ABDOMEN: Soft, moderate distention, midline wound with dressing c/d/i, minimal SS drainage. RLQ JAKUB draining 100 cc serosanguinous. NGT to LIWS with minimal output  EXTREMITIES:  2+ Peripheral Pulses, WWP, no edema  SKIN: No rashes or lesions    LABS:                        10.5   20.13 )-----------( 237      ( 15 Oct 2019 11:34 )             30.6     10-15    138  |  106  |  22  ----------------------------<  195<H>  5.2   |  23  |  0.76    Ca    8.8      15 Oct 2019 11:34  Phos  3.9     10-15  Mg     1.5     10-15      PT/INR - ( 15 Oct 2019 09:25 )   PT: 12.5 sec;   INR: 1.10          PTT - ( 15 Oct 2019 09:25 )  PTT:26.9 sec    CAPILLARY BLOOD GLUCOSE      POCT Blood Glucose.: 178 mg/dL (15 Oct 2019 11:24)  POCT Blood Glucose.: 198 mg/dL (15 Oct 2019 09:36)  POCT Blood Glucose.: 282 mg/dL (15 Oct 2019 07:33)  POCT Blood Glucose.: 326 mg/dL (14 Oct 2019 23:52)    ABG - ( 15 Oct 2019 11:34 )  pH, Arterial: 7.38  pH, Blood: x     /  pCO2: 39    /  pO2: 100   / HCO3: 22    / Base Excess: -2.6  /  SaO2: 98                  RADIOLOGY & ADDITIONAL TESTS:    Consultant(s) Notes Reviewed:  [x ] YES  [ ] NO    Care Discussed with Consultants/Other Providers [ x] YES  [ ] NO
Vascular Access Service Consult Note    54yMaleHEALTH ISSUES - PROBLEM Dx:             Diagnosis: rectal cancer    Indications for Vascular Access (Check all that apply)  [  ]  Antibiotic Therapy       Antibiotic Prescribed:              [  ]  IV Hydration  [ x ]  Total Parenteral Nutrition  [  ]  Chemotherapy  [  ]  Difficult Venous Access  [  ]  CVP monitoring  [  ]  Medications with high potential for tissue necrosis on extravasation  [  ]  Other    Screening (Check all that apply)  Previous Radiation to chest  [  ] Yes      [ x ]  No  Breast Cancer                          [  ] Left     [  ]  Right    [ x ]  No  Lymph Node Dissection         [  ] Left     [  ]  Right    [ x ]  No  Pacemaker or ICD                   [  ] Left     [  ]  Right    [x  ]  No  Upper Extremity DVT             [  ] Left     [  ]  Right    [ x ]  No  Chronic Kidney Disease         [  ]  Yes     [x  ]  No  Hemodialysis                           [  ]  Yes     [x  ]  No  AV Fistula/ Graft                     [  ]  Left    [  ]  Right    [  x]  No  Temp>101F in past 24 H       [  ]  Yes     [ x ]  No  H/O PICC/Midline                   [  ]  Yes     [ x ]  No  **Port in R upper Chest**    Lab data:                        11.1   11.41 )-----------( 344      ( 01 Oct 2019 06:27 )             34.3     10-01    141  |  104  |  24<H>  ----------------------------<  124<H>  4.0   |  27  |  0.78    Ca    9.3      01 Oct 2019 06:27  Phos  3.2     10-01  Mg     2.1     10-01        no blood cultures        I have reviewed the chart, interviewed and examined the patient and determined that this patient:  [ x ] Is a candidate for a PICC line  [  ] Is a candidate for a Midline  [  ] Is not a candidate for vascular access device (reason)    Lumens:    [  ] Single  [x  ] Double
55 yo M POD2 s/p abdominal peritoneal resection.    Attempted ophthalmology consult at 4:15pm today. Pt in significant distress 2/2 NGT, did not want to submit to eye exam at that time 2/2 pain. Spoke with nurse, who reported pt had had eye swelling and pain the day prior which had resolved and that he had not reported any eye complaints today.     Spoke with NILS Olivarez-- given pt with other significant issues taking priority and without significant eye complaints at this time, consult was aborted.    Please re-consult tomorrow or in future if pt has any further eye symptoms warranting ophthalmology consultation while inpatient.    Thank you,  Jessica Vernon MD

## 2019-10-16 NOTE — PROGRESS NOTE ADULT - ASSESSMENT
A/P: 54M PMH HTN, HLD, rectal adenocarcinoma diagnosed 5/2018, underwent chemoXRT who presented for elective robotic assisted abdominal perineal resection (9/24), c/b SBO managed by NGT and TPN via PICC line but RTOR 10/14 for lap converted to open EDER with increasing lactate and hypotensive overnight, s/p RTOR 10/15 for1 L hematoma evacuation, transferred to SICU for postop monitoring.     Neuro: Pain control with Dilaudid prn. Thorazine 12.5 IV for spasmodic abdominal wall pain and hiccups, pain management consult today  Pulm: Sating well on NC  CV: BP stable off levophed, serial CBC for post operative monitoring  GI: NPO, NGT in place to LIWS, TPN via PICC line. Continue to trend lactate, improved this am 2.3  ID: Zosyn  : Marie in place  PPx: holding HSQ today, SCDs for DVT prophylaxis  Wounds: midline wound c/d/i, Bladder pressure check every 4 hrs for monitoring of abdominal compartment syndrome  Drains: RLQ JAKUB drainx1  Lines: R TLC (10/15- ),  L axillary PICC line for TPN, A-line  PTOT; PT ordered A/P: 54M PMH HTN, HLD, rectal adenocarcinoma diagnosed 5/2018, underwent chemoXRT who presented for elective robotic assisted abdominal perineal resection (9/24), c/b SBO managed by NGT and TPN via PICC line but RTOR 10/14 for lap converted to open EDER with increasing lactate and hypotensive overnight, s/p RTOR 10/15 for1 L hematoma evacuation, transferred to SICU for postop monitoring.     Neuro: Pain control with Dilaudid prn. Thorazine 12.5 IV for spasmodic abdominal wall pain and hiccups, pain management consult today  Pulm: Sating well on NC  CV: BP stable off levophed, serial CBC for post operative monitoring  GI: NPO, NGT in place to LIWS, TPN via PICC line. Continue to trend lactate, improved this am 2.3  ID: Zosyn  : Marie in place  Endo; Hyperglycemic overnight, given 10 NPH today  PPx: holding HSQ today, SCDs for DVT prophylaxis  Wounds: midline wound c/d/i, Bladder pressure check every 4 hrs for monitoring of abdominal compartment syndrome  Drains: RLQ JAKUB drainx1  Lines: R TLC (10/15- ),  L axillary PICC line for TPN, A-line  PTOT; PT ordered

## 2019-10-16 NOTE — PROGRESS NOTE ADULT - SUBJECTIVE AND OBJECTIVE BOX
Patient is a 54y old  Male who presents with a chief complaint of elective surgery (15 Oct 2019 15:32)      INTERVAL HPI/OVERNIGHT EVENTS: Afebrile, VSS overnight. Hemoglobin stable and downtrending lactate, bladder pressures within normal limits. Abdominal pain well controlled, making appropriate UO.    MEDICATIONS  (STANDING):  chlorhexidine 2% Cloths 1 Application(s) Topical <User Schedule>  fat emulsion (Plant Based) 20% Infusion 0.65 Gm/kG/Day (20.83 mL/Hr) IV Continuous <Continuous>  influenza   Vaccine 0.5 milliLiter(s) IntraMuscular once  insulin lispro (HumaLOG) corrective regimen sliding scale   SubCutaneous every 6 hours  Parenteral Nutrition - Adult 1 Each (71 mL/Hr) TPN Continuous <Continuous>  piperacillin/tazobactam IVPB.. 3.375 Gram(s) IV Intermittent every 6 hours  sodium chloride 0.9%. 1000 milliLiter(s) (125 mL/Hr) IV Continuous <Continuous>    MEDICATIONS  (PRN):  HYDROmorphone  Injectable 1 milliGRAM(s) IV Push every 4 hours PRN Moderate Pain (4 - 6)  HYDROmorphone  Injectable 1.5 milliGRAM(s) IV Push every 4 hours PRN Severe Pain (7 - 10)  ketorolac 0.5% Ophthalmic Solution 1 Drop(s) Left EYE every 6 hours PRN eye pain irritation      PHYSICAL EXAM:    ICU Vital Signs Last 24 Hrs  T(C): 37.1 (16 Oct 2019 05:32), Max: 37.6 (16 Oct 2019 01:12)  T(F): 98.7 (16 Oct 2019 05:32), Max: 99.7 (16 Oct 2019 01:12)  HR: 122 (16 Oct 2019 05:00) (84 - 128)  BP: 111/65 (16 Oct 2019 05:00) (81/50 - 145/93)  BP(mean): 81 (16 Oct 2019 05:00) (71 - 121)  ABP: 118/52 (16 Oct 2019 05:00) (108/66 - 164/86)  ABP(mean): 70 (16 Oct 2019 05:00) (66 - 110)  RR: 16 (16 Oct 2019 05:00) (13 - 21)  SpO2: 100% (16 Oct 2019 05:00) (93% - 100%)    I&O's Summary    14 Oct 2019 07:01  -  15 Oct 2019 07:00  --------------------------------------------------------  IN: 3915 mL / OUT: 1355 mL / NET: 2560 mL    15 Oct 2019 07:01  -  16 Oct 2019 06:17  --------------------------------------------------------  IN: 5343.4 mL / OUT: 2715 mL / NET: 2628.4 mL      GENERAL: Appears uncomfortable postop  NERVOUS SYSTEM:  Alert & Oriented X3, Motor Strength 5/5 B/L upper and lower extremities;  sensory intact  EYES: EOMI, PERRLA, conjunctiva and sclera clear  ENT: No tonsillar erythema, exudates, or enlargement; Moist mucous membranes, Good dentition, No lesions  NECK: Supple, No JVD, Normal thyroid, no enlarged nodes  CHEST/LUNG: B/L good air entry; No rhonchi or wheezing  HEART: S1S2 normal, no murmurs appreciated  ABDOMEN: Soft, moderate distention, midline wound with dressing c/d/i, minimal SS drainage. RLQ JAKUB draining 100 cc serosanguinous. NGT to LIWS with minimal output  EXTREMITIES:  2+ Peripheral Pulses, WWP, no edema  SKIN: No rashes or lesions    LABS:                        7.7    15.55 )-----------( 212      ( 16 Oct 2019 04:05 )             22.8     10-16    139  |  104  |  15  ----------------------------<  212<H>  4.3   |  25  |  0.65    Ca    7.6<L>      16 Oct 2019 04:05  Phos  2.0     10-16  Mg     1.8     10-16      PT/INR - ( 16 Oct 2019 04:05 )   PT: 13.1 sec;   INR: 1.15          PTT - ( 16 Oct 2019 04:05 )  PTT:26.7 sec    CAPILLARY BLOOD GLUCOSE      POCT Blood Glucose.: 213 mg/dL (16 Oct 2019 05:34)  POCT Blood Glucose.: 247 mg/dL (15 Oct 2019 22:28)  POCT Blood Glucose.: 158 mg/dL (15 Oct 2019 16:36)  POCT Blood Glucose.: 178 mg/dL (15 Oct 2019 11:24)  POCT Blood Glucose.: 198 mg/dL (15 Oct 2019 09:36)  POCT Blood Glucose.: 282 mg/dL (15 Oct 2019 07:33)    ABG - ( 16 Oct 2019 04:05 )  pH, Arterial: 7.48  pH, Blood: x     /  pCO2: 41    /  pO2: 68    / HCO3: 29    / Base Excess: 5.2   /  SaO2: 94                  RADIOLOGY & ADDITIONAL TESTS:    Consultant(s) Notes Reviewed:  [x ] YES  [ ] NO    Care Discussed with Consultants/Other Providers [ x] YES  [ ] NO Patient is a 54y old  Male who presents with a chief complaint of elective surgery (15 Oct 2019 15:32)      INTERVAL HPI/OVERNIGHT EVENTS: Afebrile, BP stable overnight, sinus tachycardia. Hemoglobin stable overnight with downtrending lactate-most recent 2.3, bladder pressures within normal limits. Abdominal pain well controlled but still complaining of abdominal wall spasms poorly controlled with dilaudid, making appropriate UO.    MEDICATIONS  (STANDING):  chlorhexidine 2% Cloths 1 Application(s) Topical <User Schedule>  fat emulsion (Plant Based) 20% Infusion 0.65 Gm/kG/Day (20.83 mL/Hr) IV Continuous <Continuous>  influenza   Vaccine 0.5 milliLiter(s) IntraMuscular once  insulin lispro (HumaLOG) corrective regimen sliding scale   SubCutaneous every 6 hours  Parenteral Nutrition - Adult 1 Each (71 mL/Hr) TPN Continuous <Continuous>  piperacillin/tazobactam IVPB.. 3.375 Gram(s) IV Intermittent every 6 hours  sodium chloride 0.9%. 1000 milliLiter(s) (125 mL/Hr) IV Continuous <Continuous>    MEDICATIONS  (PRN):  HYDROmorphone  Injectable 1 milliGRAM(s) IV Push every 4 hours PRN Moderate Pain (4 - 6)  HYDROmorphone  Injectable 1.5 milliGRAM(s) IV Push every 4 hours PRN Severe Pain (7 - 10)  ketorolac 0.5% Ophthalmic Solution 1 Drop(s) Left EYE every 6 hours PRN eye pain irritation      PHYSICAL EXAM:    ICU Vital Signs Last 24 Hrs  T(C): 37.1 (16 Oct 2019 05:32), Max: 37.6 (16 Oct 2019 01:12)  T(F): 98.7 (16 Oct 2019 05:32), Max: 99.7 (16 Oct 2019 01:12)  HR: 122 (16 Oct 2019 05:00) (84 - 128)  BP: 111/65 (16 Oct 2019 05:00) (81/50 - 145/93)  BP(mean): 81 (16 Oct 2019 05:00) (71 - 121)  ABP: 118/52 (16 Oct 2019 05:00) (108/66 - 164/86)  ABP(mean): 70 (16 Oct 2019 05:00) (66 - 110)  RR: 16 (16 Oct 2019 05:00) (13 - 21)  SpO2: 100% (16 Oct 2019 05:00) (93% - 100%)    I&O's Summary    14 Oct 2019 07:01  -  15 Oct 2019 07:00  --------------------------------------------------------  IN: 3915 mL / OUT: 1355 mL / NET: 2560 mL    15 Oct 2019 07:01  -  16 Oct 2019 06:17  --------------------------------------------------------  IN: 5343.4 mL / OUT: 2715 mL / NET: 2628.4 mL      GENERAL: Appears uncomfortable postop  NERVOUS SYSTEM:  Alert & Oriented X3, Motor Strength 5/5 B/L upper and lower extremities;  sensory intact  EYES: EOMI, PERRLA, conjunctiva and sclera clear  ENT: No tonsillar erythema, exudates, or enlargement; Moist mucous membranes, Good dentition, No lesions  NECK: Supple, No JVD, Normal thyroid, no enlarged nodes  CHEST/LUNG: B/L good air entry; No rhonchi or wheezing  HEART: S1S2 normal, no murmurs appreciated  ABDOMEN: Soft, moderate distention slightly improved since yesterday, midline wound with dressing c/d/i, mild SS drainage. RLQ JAKUB draining 100 cc/hr serosanguinous. NGT to LIWS with minimal output  EXTREMITIES:  2+ Peripheral Pulses, WWP, no edema  SKIN: No rashes or lesions    LABS:                        7.7    15.55 )-----------( 212      ( 16 Oct 2019 04:05 )             22.8     10-16    139  |  104  |  15  ----------------------------<  212<H>  4.3   |  25  |  0.65    Ca    7.6<L>      16 Oct 2019 04:05  Phos  2.0     10-16  Mg     1.8     10-16      PT/INR - ( 16 Oct 2019 04:05 )   PT: 13.1 sec;   INR: 1.15          PTT - ( 16 Oct 2019 04:05 )  PTT:26.7 sec    CAPILLARY BLOOD GLUCOSE      POCT Blood Glucose.: 213 mg/dL (16 Oct 2019 05:34)  POCT Blood Glucose.: 247 mg/dL (15 Oct 2019 22:28)  POCT Blood Glucose.: 158 mg/dL (15 Oct 2019 16:36)  POCT Blood Glucose.: 178 mg/dL (15 Oct 2019 11:24)  POCT Blood Glucose.: 198 mg/dL (15 Oct 2019 09:36)  POCT Blood Glucose.: 282 mg/dL (15 Oct 2019 07:33)    ABG - ( 16 Oct 2019 04:05 )  pH, Arterial: 7.48  pH, Blood: x     /  pCO2: 41    /  pO2: 68    / HCO3: 29    / Base Excess: 5.2   /  SaO2: 94                  RADIOLOGY & ADDITIONAL TESTS:    Consultant(s) Notes Reviewed:  [x ] YES  [ ] NO    Care Discussed with Consultants/Other Providers [ x] YES  [ ] NO

## 2019-10-16 NOTE — PROGRESS NOTE ADULT - SUBJECTIVE AND OBJECTIVE BOX
Patient seen multiple times during the day.    Complains of persistent intermittent abdominal wall spasms.    Remains hemodynamically stable with -115  Bp 120-50/80  Excellent urine output /hour    abdomen- soft, distended, tender at incison, stoma- viable    JAKUB - 80/cc serosang draiange /8 hours.    A/P Stable  Continue with close surveillance and monitoring.  Repeat cbc.  NGT   TPN    No indication of active continued bleeding.    Discussed plan with ICU staff

## 2019-10-16 NOTE — PROGRESS NOTE ADULT - ASSESSMENT
55 y/o male s/p abdominal peritoneal resection with gracilis flap; Left thigh and perineum healing appropriately w/o issue  - Care per primary team  - Will remove remaining sutures in near future    Jose Chavez  PGY-5  Plastic Surgery

## 2019-10-16 NOTE — PROGRESS NOTE ADULT - ASSESSMENT
54M with HTN, HLD, rectal cancer s/p robot assisted abdominal peritoneal resection 9/24, now s/p lap converted to open, EDER on 10/14 (NGT placed intraop), s/p RTOR for hematoma evacuation on 10/15.    - Hgb remains stable, continue trending  - Serial abdominal exams  - Continue TPN  - Supportive care per ICU

## 2019-10-16 NOTE — PROGRESS NOTE ADULT - SUBJECTIVE AND OBJECTIVE BOX
24 hr events:  ON: Hgb stable at 8.3, lactate 2.3 from 3.2; bladder pressure ok, changed to q2h @12AM  10/15: S/P RTOR for bleeding, 1L hematoma evacuated, 2 arterial bleed ligated w/placement of surgisil. Post op labs stable, K 6.2, given Na bicarbonate, no EKG changes repeat 5.4. ET tube pushed in 2 cm, xray confirmed placement. 11am and 4pm labs, lactate equivocal, given 1L bolus, Central line pulled back 3 cm, xray confirmed placement. PT consult for early mobility.    SUBJECTIVE:  Reports feeling exactly the same as yesterday. Spasm in his abdomen has not improved. Overall feels tired. Denies N/V. Denies CP/SOB.    Vital Signs Last 24 Hrs  T(C): 37.7 (16 Oct 2019 09:38), Max: 37.7 (16 Oct 2019 09:38)  T(F): 99.8 (16 Oct 2019 09:38), Max: 99.8 (16 Oct 2019 09:38)  HR: 116 (16 Oct 2019 12:00) (106 - 128)  BP: 111/53 (16 Oct 2019 12:00) (100/61 - 147/82)  BP(mean): 68 (16 Oct 2019 12:00) (62 - 110)  RR: 18 (16 Oct 2019 12:00) (13 - 26)  SpO2: 97% (16 Oct 2019 12:00) (93% - 100%)    Physical Exam:  General: NAD  HEENT: NGT in place, 450cc output overnight  Pulmonary: Nonlabored breathing, no respiratory distress  Abdominal: soft, appropriately tender, improved distension from yesterday's exam; midline dressing in place, dry/intact; RLQ dressing saturated with serosanguinous fluid; JAKUB output sanguinous to serosanguinous  Neuro: A/O x3    I&O's Summary  15 Oct 2019 07:01  -  16 Oct 2019 07:00  --------------------------------------------------------  IN: 5727 mL / OUT: 3385 mL / NET: 2342 mL    16 Oct 2019 07:01  -  16 Oct 2019 12:19  --------------------------------------------------------  IN: 1332.4 mL / OUT: 575 mL / NET: 757.4 mL    LABS:                    7.3    14.93 )-----------( 201      ( 16 Oct 2019 08:23 )             21.6     10-16  139  |  104  |  15  ----------------------------<  212<H>  4.3   |  25  |  0.65    Ca    7.6<L>      16 Oct 2019 04:05  Phos  2.0     10-16  Mg     1.8     10-16    PT/INR - ( 16 Oct 2019 04:05 )   PT: 13.1 sec;   INR: 1.15     PTT - ( 16 Oct 2019 04:05 )  PTT:26.7 sec    CAPILLARY BLOOD GLUCOSE  POCT Blood Glucose.: 248 mg/dL (16 Oct 2019 11:20)  POCT Blood Glucose.: 213 mg/dL (16 Oct 2019 05:34)  POCT Blood Glucose.: 247 mg/dL (15 Oct 2019 22:28)  POCT Blood Glucose.: 158 mg/dL (15 Oct 2019 16:36)

## 2019-10-16 NOTE — CHART NOTE - NSCHARTNOTEFT_GEN_A_CORE
Admitting Diagnosis:   Patient is a 54y old  Male who presents with a chief complaint of elective surgery (16 Oct 2019 13:25)    PAST MEDICAL & SURGICAL HISTORY:  HLD (hyperlipidemia)  HTN (hypertension)  Colon cancer: near rectum  Other elective surgery: Right Upper Chest- Chemo Port    Current Nutrition Order: NPO with TPN via central venous line: 110gm AA, 346gm Dex, 50gm 20% Lipid to provide 2116.4kcal total, ~1.4gm protein/kg, 21.8 nonprotein kcal/kg, GIR=3.12)    PO Intake: Good (%) [   ]  Fair (50-75%) [   ] Poor (<25%) [   ]-N/A as pt on TPN    GI Issues: no N/V noted, no documentation for colostomy output in flow sheet, abdomen distended/firm, +NGT LIWS (750mL x24h)    Pain: +pain earlier this AM, no apparent pain/distress this afternoon (pt sleeping at time of assessment)    Skin Integrity: Hector score 18, midline wound c/d/i    Labs:   10-16    140  |  104  |  12  ----------------------------<  247<H>  3.8   |  30  |  0.62    Ca    7.6<L>      16 Oct 2019 12:12  Phos  2.2     10-16  Mg     2.1     10-16    CAPILLARY BLOOD GLUCOSE    POCT Blood Glucose.: 248 mg/dL (16 Oct 2019 11:20)  POCT Blood Glucose.: 213 mg/dL (16 Oct 2019 05:34)  POCT Blood Glucose.: 247 mg/dL (15 Oct 2019 22:28)  POCT Blood Glucose.: 158 mg/dL (15 Oct 2019 16:36)    Medications:  MEDICATIONS  (STANDING):  chlorhexidine 2% Cloths 1 Application(s) Topical <User Schedule>  dextrose 5%. 1000 milliLiter(s) (50 mL/Hr) IV Continuous <Continuous>  dextrose 50% Injectable 12.5 Gram(s) IV Push once  dextrose 50% Injectable 25 Gram(s) IV Push once  dextrose 50% Injectable 25 Gram(s) IV Push once  fat emulsion (Plant Based) 20% Infusion 0.65 Gm/kG/Day (20.83 mL/Hr) IV Continuous <Continuous>  influenza   Vaccine 0.5 milliLiter(s) IntraMuscular once  insulin regular  human corrective regimen sliding scale   SubCutaneous every 6 hours  Parenteral Nutrition - Adult 1 Each (71 mL/Hr) TPN Continuous <Continuous>  Parenteral Nutrition - Adult 1 Each (100 mL/Hr) TPN Continuous <Continuous>  piperacillin/tazobactam IVPB.. 3.375 Gram(s) IV Intermittent every 6 hours  sodium chloride 0.9%. 1000 milliLiter(s) (100 mL/Hr) IV Continuous <Continuous>    MEDICATIONS  (PRN):  chlorproMAZINE    IVPB 12.5 milliGRAM(s) IV Intermittent every 6 hours PRN hiccups  dextrose 40% Gel 15 Gram(s) Oral once PRN Blood Glucose LESS THAN 70 milliGRAM(s)/deciliter  glucagon  Injectable 1 milliGRAM(s) IntraMuscular once PRN Glucose LESS THAN 70 milligrams/deciliter  HYDROmorphone  Injectable 1 milliGRAM(s) IV Push every 3 hours PRN Severe Pain (7 - 10)  HYDROmorphone  Injectable 0.5 milliGRAM(s) IV Push every 3 hours PRN Moderate Pain (4 - 6)  ketorolac 0.5% Ophthalmic Solution 1 Drop(s) Left EYE every 6 hours PRN eye pain irritation    Weight:  9/24 77.7 kg  10/2 76.2 kg  10/3 76.7 kg  10/5 77.6 kg  10/6 78.6 kg  10/7 77.9 kg  10/14 77.0 kg    Weight Change: Weight has been consistent since admission with minimal fluctuations. Continue to weigh patient weekly to assess weight trends.     Nutrition Focused Physical Exam: Nutrition Focused Physical Exam: Completed [ X; Completed 9/26]  Not Pertinent [   ]  (From 9/26): Moderate muscle loss noted around clavicles and moderate fat loss noted around triceps. Suspect severe malnutrition based on NFPE, ~10% unintentional wt loss x1.5 months, pt meeting <75% EER for >1 month    Estimated energy needs:   Ht (9/24): 177.8cm, Wt (9/24): 80.5kg, IBW: 75.5kg +/-10%, %IBW: 107%, BMI: 25.5    ABW used to calculate energy needs due to pt's current body weight within % IBW. Needs adjusted post-op, suspected malnutrition, hypermetabolic state.     (30-35 kcal/kg): 2310-695 kcal/day  (1.2-1.4 gm protein/kg):  g protein/day   Fluid needs per team while on TPN    Subjective: 54 y.o M with hx HTN, HLD, rectal cancer now s/p robot assisted abdominal peritoneal resection (9/24). c/b SBO managed by NGT and TPN via PICC line but RTOR 10/14 for lap converted to open EDER with increasing lactate and hypotensive overnight, s/p RTOR 10/15 for1 L hematoma evacuation, transferred to SICU for postop monitoring. BP stable off levophed. Pt has been on TPN since 10/1. Last TG checked was 214 on 10/14. Pt sleeping at time of assessment. See below for recommended changes to TPN to best meet pt's needs at this time-d/w team. RD to monitor and f/u per protocol.    Previous Nutrition Diagnosis:  Malnutrition (suspect severe) RT decreased ability to meet EER AEB NFPE, ~10% unintentional wt loss x1.5 months, pt meeting <75% EER for >1 month.  Active [ X ]  Resolved [   ]    Goal: Meet>75%EER via most appropriate route with good tolerance    Recommendations:  1) Recommend goal TPN via central venous line: 410g Dex, 107g AA, 50g 20% Lipids daily to provide in total: 2322 Kcal, 1.4g protein/kg, GIR 3.7 mg/kg/min based on ABW (77 kg). Fluids and lytes per MD discretion.   >>Recommend checking TG daily until stable. Once stable can check TG weekly. Monitor Mg, Phos, K daily and POC BG Q6hrs. Replete electrolytes prn.  2) Trend daily weights.   3) Recommend obtain 24-hour urine sample for nitrogen balance   4) Adjust insulin regimen for improved glycemic control    Education: N/A at this time, pt sleeping    Risk Level: High [ X ]  Moderate [   ] Low [   ]

## 2019-10-17 LAB
ANION GAP SERPL CALC-SCNC: 9 MMOL/L — SIGNIFICANT CHANGE UP (ref 5–17)
BUN SERPL-MCNC: 9 MG/DL — SIGNIFICANT CHANGE UP (ref 7–23)
CALCIUM SERPL-MCNC: 8.4 MG/DL — SIGNIFICANT CHANGE UP (ref 8.4–10.5)
CHLORIDE SERPL-SCNC: 100 MMOL/L — SIGNIFICANT CHANGE UP (ref 96–108)
CO2 SERPL-SCNC: 30 MMOL/L — SIGNIFICANT CHANGE UP (ref 22–31)
CREAT SERPL-MCNC: 0.52 MG/DL — SIGNIFICANT CHANGE UP (ref 0.5–1.3)
GLUCOSE BLDC GLUCOMTR-MCNC: 166 MG/DL — HIGH (ref 70–99)
GLUCOSE BLDC GLUCOMTR-MCNC: 174 MG/DL — HIGH (ref 70–99)
GLUCOSE BLDC GLUCOMTR-MCNC: 176 MG/DL — HIGH (ref 70–99)
GLUCOSE BLDC GLUCOMTR-MCNC: 182 MG/DL — HIGH (ref 70–99)
GLUCOSE SERPL-MCNC: 185 MG/DL — HIGH (ref 70–99)
HCT VFR BLD CALC: 27 % — LOW (ref 39–50)
HGB BLD-MCNC: 9.3 G/DL — LOW (ref 13–17)
MAGNESIUM SERPL-MCNC: 1.9 MG/DL — SIGNIFICANT CHANGE UP (ref 1.6–2.6)
MCHC RBC-ENTMCNC: 30.3 PG — SIGNIFICANT CHANGE UP (ref 27–34)
MCHC RBC-ENTMCNC: 34.4 GM/DL — SIGNIFICANT CHANGE UP (ref 32–36)
MCV RBC AUTO: 87.9 FL — SIGNIFICANT CHANGE UP (ref 80–100)
NRBC # BLD: 0 /100 WBCS — SIGNIFICANT CHANGE UP (ref 0–0)
PHOSPHATE SERPL-MCNC: 2.1 MG/DL — LOW (ref 2.5–4.5)
PLATELET # BLD AUTO: 192 K/UL — SIGNIFICANT CHANGE UP (ref 150–400)
POTASSIUM SERPL-MCNC: 3.3 MMOL/L — LOW (ref 3.5–5.3)
POTASSIUM SERPL-SCNC: 3.3 MMOL/L — LOW (ref 3.5–5.3)
RBC # BLD: 3.07 M/UL — LOW (ref 4.2–5.8)
RBC # FLD: 14.4 % — SIGNIFICANT CHANGE UP (ref 10.3–14.5)
SODIUM SERPL-SCNC: 139 MMOL/L — SIGNIFICANT CHANGE UP (ref 135–145)
WBC # BLD: 14.71 K/UL — HIGH (ref 3.8–10.5)
WBC # FLD AUTO: 14.71 K/UL — HIGH (ref 3.8–10.5)

## 2019-10-17 PROCEDURE — 71045 X-RAY EXAM CHEST 1 VIEW: CPT | Mod: 26

## 2019-10-17 PROCEDURE — 99233 SBSQ HOSP IP/OBS HIGH 50: CPT | Mod: GC

## 2019-10-17 RX ORDER — ELECTROLYTE SOLUTION,INJ
1 VIAL (ML) INTRAVENOUS
Refills: 0 | Status: DISCONTINUED | OUTPATIENT
Start: 2019-10-17 | End: 2019-10-17

## 2019-10-17 RX ORDER — PANTOPRAZOLE SODIUM 20 MG/1
40 TABLET, DELAYED RELEASE ORAL DAILY
Refills: 0 | Status: DISCONTINUED | OUTPATIENT
Start: 2019-10-17 | End: 2019-10-31

## 2019-10-17 RX ORDER — I.V. FAT EMULSION 20 G/100ML
0.65 EMULSION INTRAVENOUS
Qty: 50 | Refills: 0 | Status: DISCONTINUED | OUTPATIENT
Start: 2019-10-17 | End: 2019-10-17

## 2019-10-17 RX ORDER — ACETAMINOPHEN 500 MG
1000 TABLET ORAL ONCE
Refills: 0 | Status: COMPLETED | OUTPATIENT
Start: 2019-10-17 | End: 2019-10-17

## 2019-10-17 RX ORDER — POTASSIUM PHOSPHATE, MONOBASIC POTASSIUM PHOSPHATE, DIBASIC 236; 224 MG/ML; MG/ML
21 INJECTION, SOLUTION INTRAVENOUS ONCE
Refills: 0 | Status: COMPLETED | OUTPATIENT
Start: 2019-10-17 | End: 2019-10-17

## 2019-10-17 RX ORDER — DIAZEPAM 5 MG
2 TABLET ORAL ONCE
Refills: 0 | Status: DISCONTINUED | OUTPATIENT
Start: 2019-10-17 | End: 2019-10-17

## 2019-10-17 RX ORDER — POTASSIUM CHLORIDE 20 MEQ
10 PACKET (EA) ORAL
Refills: 0 | Status: DISCONTINUED | OUTPATIENT
Start: 2019-10-17 | End: 2019-10-17

## 2019-10-17 RX ADMIN — HYDROMORPHONE HYDROCHLORIDE 0.5 MILLIGRAM(S): 2 INJECTION INTRAMUSCULAR; INTRAVENOUS; SUBCUTANEOUS at 03:34

## 2019-10-17 RX ADMIN — POTASSIUM PHOSPHATE, MONOBASIC POTASSIUM PHOSPHATE, DIBASIC 62.5 MILLIMOLE(S): 236; 224 INJECTION, SOLUTION INTRAVENOUS at 09:58

## 2019-10-17 RX ADMIN — HYDROMORPHONE HYDROCHLORIDE 0.5 MILLIGRAM(S): 2 INJECTION INTRAMUSCULAR; INTRAVENOUS; SUBCUTANEOUS at 04:10

## 2019-10-17 RX ADMIN — HYDROMORPHONE HYDROCHLORIDE 1 MILLIGRAM(S): 2 INJECTION INTRAMUSCULAR; INTRAVENOUS; SUBCUTANEOUS at 11:00

## 2019-10-17 RX ADMIN — INSULIN HUMAN 2: 100 INJECTION, SOLUTION SUBCUTANEOUS at 23:57

## 2019-10-17 RX ADMIN — Medication 2 MILLIGRAM(S): at 10:50

## 2019-10-17 RX ADMIN — I.V. FAT EMULSION 20.83 GM/KG/DAY: 20 EMULSION INTRAVENOUS at 22:24

## 2019-10-17 RX ADMIN — Medication 1 EACH: at 18:23

## 2019-10-17 RX ADMIN — INSULIN HUMAN 2: 100 INJECTION, SOLUTION SUBCUTANEOUS at 12:17

## 2019-10-17 RX ADMIN — PANTOPRAZOLE SODIUM 40 MILLIGRAM(S): 20 TABLET, DELAYED RELEASE ORAL at 10:01

## 2019-10-17 RX ADMIN — Medication 1000 MILLIGRAM(S): at 18:00

## 2019-10-17 RX ADMIN — Medication 400 MILLIGRAM(S): at 18:23

## 2019-10-17 RX ADMIN — HYDROMORPHONE HYDROCHLORIDE 1 MILLIGRAM(S): 2 INJECTION INTRAMUSCULAR; INTRAVENOUS; SUBCUTANEOUS at 15:59

## 2019-10-17 RX ADMIN — Medication 2 MILLIGRAM(S): at 05:57

## 2019-10-17 RX ADMIN — HYDROMORPHONE HYDROCHLORIDE 1 MILLIGRAM(S): 2 INJECTION INTRAMUSCULAR; INTRAVENOUS; SUBCUTANEOUS at 20:57

## 2019-10-17 RX ADMIN — INSULIN HUMAN 2: 100 INJECTION, SOLUTION SUBCUTANEOUS at 18:23

## 2019-10-17 RX ADMIN — HYDROMORPHONE HYDROCHLORIDE 1 MILLIGRAM(S): 2 INJECTION INTRAMUSCULAR; INTRAVENOUS; SUBCUTANEOUS at 06:41

## 2019-10-17 RX ADMIN — PIPERACILLIN AND TAZOBACTAM 200 GRAM(S): 4; .5 INJECTION, POWDER, LYOPHILIZED, FOR SOLUTION INTRAVENOUS at 12:18

## 2019-10-17 RX ADMIN — CHLORHEXIDINE GLUCONATE 1 APPLICATION(S): 213 SOLUTION TOPICAL at 05:45

## 2019-10-17 RX ADMIN — HYDROMORPHONE HYDROCHLORIDE 0.5 MILLIGRAM(S): 2 INJECTION INTRAMUSCULAR; INTRAVENOUS; SUBCUTANEOUS at 12:54

## 2019-10-17 RX ADMIN — PIPERACILLIN AND TAZOBACTAM 200 GRAM(S): 4; .5 INJECTION, POWDER, LYOPHILIZED, FOR SOLUTION INTRAVENOUS at 05:46

## 2019-10-17 RX ADMIN — HYDROMORPHONE HYDROCHLORIDE 1 MILLIGRAM(S): 2 INJECTION INTRAMUSCULAR; INTRAVENOUS; SUBCUTANEOUS at 10:51

## 2019-10-17 RX ADMIN — PIPERACILLIN AND TAZOBACTAM 200 GRAM(S): 4; .5 INJECTION, POWDER, LYOPHILIZED, FOR SOLUTION INTRAVENOUS at 18:23

## 2019-10-17 RX ADMIN — HYDROMORPHONE HYDROCHLORIDE 1 MILLIGRAM(S): 2 INJECTION INTRAMUSCULAR; INTRAVENOUS; SUBCUTANEOUS at 01:15

## 2019-10-17 RX ADMIN — PIPERACILLIN AND TAZOBACTAM 200 GRAM(S): 4; .5 INJECTION, POWDER, LYOPHILIZED, FOR SOLUTION INTRAVENOUS at 23:53

## 2019-10-17 RX ADMIN — INSULIN HUMAN 2: 100 INJECTION, SOLUTION SUBCUTANEOUS at 05:46

## 2019-10-17 RX ADMIN — HYDROMORPHONE HYDROCHLORIDE 0.5 MILLIGRAM(S): 2 INJECTION INTRAMUSCULAR; INTRAVENOUS; SUBCUTANEOUS at 23:58

## 2019-10-17 RX ADMIN — HYDROMORPHONE HYDROCHLORIDE 0.5 MILLIGRAM(S): 2 INJECTION INTRAMUSCULAR; INTRAVENOUS; SUBCUTANEOUS at 13:00

## 2019-10-17 RX ADMIN — Medication 2 MILLIGRAM(S): at 00:05

## 2019-10-17 RX ADMIN — Medication 2 MILLIGRAM(S): at 20:43

## 2019-10-17 RX ADMIN — HYDROMORPHONE HYDROCHLORIDE 1 MILLIGRAM(S): 2 INJECTION INTRAMUSCULAR; INTRAVENOUS; SUBCUTANEOUS at 16:00

## 2019-10-17 NOTE — PROGRESS NOTE ADULT - ASSESSMENT
A/P: 54M PMH HTN, HLD, rectal adenocarcinoma diagnosed 5/2018, underwent chemoXRT who presented for elective robotic assisted abdominal perineal resection (9/24), c/b SBO managed by NGT and TPN via PICC line but RTOR 10/14 for lap converted to open EDER with increasing lactate and hypotensive overnight, s/p RTOR 10/15 for1 L hematoma evacuation, transferred to SICU for postop monitoring.     Neuro: Pain control with Dilaudid prn, valium IV prn for abdominal spasms  Pulm: Sating well on NC  CV: stable  GI: NPO, NGT in place to LIWS, TPN with 20units insulin via PICC line.   : DC kim, follow up TOV   ID: Azalea  Endo; ISS & insulin in tpn  PPx: holding HSQ today, SCDs for DVT prophylaxis  Wounds: midline wound c/d/i  Drains: RLQ JAKUB drainx1  Lines: L axillary PICC line for TPN, // d/c R TLC (10/15- 10/17),  a-line  PTOT; PT ordered

## 2019-10-17 NOTE — PROGRESS NOTE ADULT - SUBJECTIVE AND OBJECTIVE BOX
Patient is a 54y old  Male who presents with a chief complaint of elective surgery (17 Oct 2019 07:16)      INTERVAL HPI/OVERNIGHT EVENTS: Afebrile, VSS overnight. Pt awake and alert, pain better controlled with Valium IV per pain management recommendations. Hg stable overnight, making appropriate UO.       MEDICATIONS  (STANDING):  chlorhexidine 2% Cloths 1 Application(s) Topical <User Schedule>  dextrose 5%. 1000 milliLiter(s) (50 mL/Hr) IV Continuous <Continuous>  dextrose 50% Injectable 12.5 Gram(s) IV Push once  dextrose 50% Injectable 25 Gram(s) IV Push once  dextrose 50% Injectable 25 Gram(s) IV Push once  fat emulsion (Plant Based) 20% Infusion 0.65 Gm/kG/Day (20.83 mL/Hr) IV Continuous <Continuous>  influenza   Vaccine 0.5 milliLiter(s) IntraMuscular once  insulin regular  human corrective regimen sliding scale   SubCutaneous every 6 hours  pantoprazole  Injectable 40 milliGRAM(s) IV Push daily  Parenteral Nutrition - Adult 1 Each (100 mL/Hr) TPN Continuous <Continuous>  piperacillin/tazobactam IVPB.. 3.375 Gram(s) IV Intermittent every 6 hours  potassium phosphate IVPB 21 milliMole(s) IV Intermittent once    MEDICATIONS  (PRN):  dextrose 40% Gel 15 Gram(s) Oral once PRN Blood Glucose LESS THAN 70 milliGRAM(s)/deciliter  diazepam  Injectable 2 milliGRAM(s) IV Push every 8 hours PRN Severe muscle spasms  glucagon  Injectable 1 milliGRAM(s) IntraMuscular once PRN Glucose LESS THAN 70 milligrams/deciliter  HYDROmorphone  Injectable 1 milliGRAM(s) IV Push every 3 hours PRN Severe Pain (7 - 10)  HYDROmorphone  Injectable 0.5 milliGRAM(s) IV Push every 3 hours PRN Moderate Pain (4 - 6)  ketorolac 0.5% Ophthalmic Solution 1 Drop(s) Left EYE every 6 hours PRN eye pain irritation      PHYSICAL EXAM:    ICU Vital Signs Last 24 Hrs  T(C): 37.3 (17 Oct 2019 06:43), Max: 37.7 (16 Oct 2019 09:38)  T(F): 99.2 (17 Oct 2019 06:43), Max: 99.8 (16 Oct 2019 09:38)  HR: 102 (17 Oct 2019 05:00) (100 - 126)  BP: 153/91 (17 Oct 2019 05:00) (102/51 - 154/84)  BP(mean): 111 (17 Oct 2019 05:00) (62 - 111)  ABP: --  ABP(mean): --  RR: 15 (17 Oct 2019 05:00) (14 - 22)  SpO2: 98% (17 Oct 2019 05:00) (95% - 100%)    I&O's Summary    16 Oct 2019 07:01  -  17 Oct 2019 07:00  --------------------------------------------------------  IN: 4558.6 mL / OUT: 4970 mL / NET: -411.4 mL    GENERAL: Appears uncomfortable postop  NERVOUS SYSTEM:  Alert & Oriented X3, Motor Strength 5/5 B/L upper and lower extremities;  sensory intact  EYES: EOMI, PERRLA, conjunctiva and sclera clear  ENT: No tonsillar erythema, exudates, or enlargement; Moist mucous membranes, Good dentition, No lesions  NECK: Supple, No JVD, Normal thyroid, no enlarged nodes  CHEST/LUNG: B/L good air entry; No rhonchi or wheezing  HEART: S1S2 normal, no murmurs appreciated  ABDOMEN: Soft, moderate distention slightly improved since yesterday, midline wound with dressing c/d/i, mild SS drainage. RLQ JAKUB draining 100 cc/hr serosanguinous. NGT to LIWS with minimal output  EXTREMITIES:  2+ Peripheral Pulses, WWP, no edema  SKIN: No rashes or lesions      LABS:                        9.3    14.71 )-----------( 192      ( 17 Oct 2019 05:21 )             27.0     10-17    139  |  100  |  9   ----------------------------<  185<H>  3.3<L>   |  30  |  0.52    Ca    8.4      17 Oct 2019 05:21  Phos  2.1     10-17  Mg     1.9     10-17      PT/INR - ( 16 Oct 2019 04:05 )   PT: 13.1 sec;   INR: 1.15          PTT - ( 16 Oct 2019 04:05 )  PTT:26.7 sec    CAPILLARY BLOOD GLUCOSE      POCT Blood Glucose.: 182 mg/dL (17 Oct 2019 05:15)  POCT Blood Glucose.: 167 mg/dL (16 Oct 2019 23:00)  POCT Blood Glucose.: 157 mg/dL (16 Oct 2019 16:33)  POCT Blood Glucose.: 248 mg/dL (16 Oct 2019 11:20)    ABG - ( 16 Oct 2019 04:05 )  pH, Arterial: 7.48  pH, Blood: x     /  pCO2: 41    /  pO2: 68    / HCO3: 29    / Base Excess: 5.2   /  SaO2: 94                  RADIOLOGY & ADDITIONAL TESTS:    Consultant(s) Notes Reviewed:  [x ] YES  [ ] NO    Care Discussed with Consultants/Other Providers [ x] YES  [ ] NO

## 2019-10-17 NOTE — PROGRESS NOTE ADULT - SUBJECTIVE AND OBJECTIVE BOX
SUBJECTIVE:  Reports continued pain/discomfort. Just received Valium in AM and will see how he feels. Denies N/V. Denies CP/SOB.    Vital Signs Last 24 Hrs  T(C): 37.3 (17 Oct 2019 06:43), Max: 37.7 (16 Oct 2019 09:38)  T(F): 99.2 (17 Oct 2019 06:43), Max: 99.8 (16 Oct 2019 09:38)  HR: 102 (17 Oct 2019 07:00) (100 - 126)  BP: 152/87 (17 Oct 2019 07:00) (102/51 - 154/84)  BP(mean): 104 (17 Oct 2019 07:00) (62 - 111)  RR: 21 (17 Oct 2019 07:00) (14 - 24)  SpO2: 97% (17 Oct 2019 07:00) (95% - 100%)    Physical Exam:  General: NAD  Pulmonary: Nonlabored breathing, no respiratory distress  Cardiovascular: NSR  Abdominal: soft, appropriately tender, unchanged moderate distension; dressing c/d/i; ostomy with bowel sweat; JAKUB in place, clearing serosanguinous with minimal clot  Neuro: A/O x3    I&O's Summary  16 Oct 2019 07:01  -  17 Oct 2019 07:00  --------------------------------------------------------  IN: 4558.6 mL / OUT: 4970 mL / NET: -411.4 mL    17 Oct 2019 07:01  -  17 Oct 2019 08:49  --------------------------------------------------------  IN: 120.8 mL / OUT: 250 mL / NET: -129.2 mL    LABS:                     9.3    14.71 )-----------( 192      ( 17 Oct 2019 05:21 )             27.0     10-17  139  |  100  |  9   ----------------------------<  185<H>  3.3<L>   |  30  |  0.52    Ca    8.4      17 Oct 2019 05:21  Phos  2.1     10-17  Mg     1.9     10-17    PT/INR - ( 16 Oct 2019 04:05 )   PT: 13.1 sec;   INR: 1.15     PTT - ( 16 Oct 2019 04:05 )  PTT:26.7 sec    CAPILLARY BLOOD GLUCOSE  POCT Blood Glucose.: 182 mg/dL (17 Oct 2019 05:15)  POCT Blood Glucose.: 167 mg/dL (16 Oct 2019 23:00)  POCT Blood Glucose.: 157 mg/dL (16 Oct 2019 16:33)  POCT Blood Glucose.: 248 mg/dL (16 Oct 2019 11:20)

## 2019-10-17 NOTE — PROGRESS NOTE ADULT - ASSESSMENT
54M with HTN, HLD, rectal cancer s/p robot assisted abdominal peritoneal resection 9/24, now s/p lap converted to open, EDER on 10/14 (NGT placed intraop), s/p RTOR for hematoma evacuation on 10/15.    - Hgb remains stable after 2U pRBC yesterday, continue trending  - Serial abdominal exams  - SDU today  - Discussed with Chief Resident

## 2019-10-17 NOTE — PROGRESS NOTE ADULT - SUBJECTIVE AND OBJECTIVE BOX
Plastic Surgery Progress Note (pg [facility]: [id number], [facility]: [phone], [facility]: [phone])    SUBJECTIVE:  Pt c/o significant abdominal discomfort.    OBJECTIVE:     ** VITAL SIGNS / I&O's **    Vital Signs Last 24 Hrs  T(C): 37.3 (17 Oct 2019 06:43), Max: 37.7 (16 Oct 2019 09:38)  T(F): 99.2 (17 Oct 2019 06:43), Max: 99.8 (16 Oct 2019 09:38)  HR: 102 (17 Oct 2019 05:00) (100 - 126)  BP: 153/91 (17 Oct 2019 05:00) (102/51 - 154/84)  BP(mean): 111 (17 Oct 2019 05:00) (62 - 111)  RR: 15 (17 Oct 2019 05:00) (14 - 22)  SpO2: 98% (17 Oct 2019 05:00) (95% - 100%)      16 Oct 2019 07:01  -  17 Oct 2019 07:00  --------------------------------------------------------  IN:    fat emulsion (Plant Based) 20% Infusion: 62.4 mL    fat emulsion (Plant Based) 20% Infusion: 395.2 mL    IV PiggyBack: 415 mL    Packed Red Blood Cells: 600 mL    sodium chloride 0.9%: 605 mL    sodium chloride 0.9%: 250 mL    Solution: 150 mL    TPN (Total Parenteral Nutrition): 2081 mL  Total IN: 4558.6 mL    OUT:    Bulb: 290 mL    Drain: 850 mL    Indwelling Catheter - Urethral: 3830 mL  Total OUT: 4970 mL    Total NET: -411.4 mL          ** PHYSICAL EXAM **    -- CONSTITUTIONAL: AOx3. NAD.   -- ABDOMEN: Severely distended, appropriately tender, +stool in device; JAKUB ss  -- PERINEUM: Incision c/d/i, no collections, no signs of infection, few remaining sutures  -- EXTREMITIES: L thigh incision c/d/i, no collection      **MEDS**  chlorhexidine 2% Cloths 1 Application(s) Topical <User Schedule>  dextrose 40% Gel 15 Gram(s) Oral once PRN  dextrose 5%. 1000 milliLiter(s) IV Continuous <Continuous>  dextrose 50% Injectable 12.5 Gram(s) IV Push once  dextrose 50% Injectable 25 Gram(s) IV Push once  dextrose 50% Injectable 25 Gram(s) IV Push once  diazepam  Injectable 2 milliGRAM(s) IV Push every 8 hours PRN  fat emulsion (Plant Based) 20% Infusion 0.65 Gm/kG/Day IV Continuous <Continuous>  glucagon  Injectable 1 milliGRAM(s) IntraMuscular once PRN  HYDROmorphone  Injectable 1 milliGRAM(s) IV Push every 3 hours PRN  HYDROmorphone  Injectable 0.5 milliGRAM(s) IV Push every 3 hours PRN  influenza   Vaccine 0.5 milliLiter(s) IntraMuscular once  insulin regular  human corrective regimen sliding scale   SubCutaneous every 6 hours  ketorolac 0.5% Ophthalmic Solution 1 Drop(s) Left EYE every 6 hours PRN  pantoprazole  Injectable 40 milliGRAM(s) IV Push daily  Parenteral Nutrition - Adult 1 Each TPN Continuous <Continuous>  piperacillin/tazobactam IVPB.. 3.375 Gram(s) IV Intermittent every 6 hours  potassium phosphate IVPB 21 milliMole(s) IV Intermittent once      ** LABS **                          9.3    14.71 )-----------( 192      ( 17 Oct 2019 05:21 )             27.0     17 Oct 2019 05:21    139    |  100    |  9      ----------------------------<  185    3.3     |  30     |  0.52     Ca    8.4        17 Oct 2019 05:21  Phos  2.1       17 Oct 2019 05:21  Mg     1.9       17 Oct 2019 05:21      PT/INR - ( 16 Oct 2019 04:05 )   PT: 13.1 sec;   INR: 1.15          PTT - ( 16 Oct 2019 04:05 )  PTT:26.7 sec  CAPILLARY BLOOD GLUCOSE      POCT Blood Glucose.: 182 mg/dL (17 Oct 2019 05:15)  POCT Blood Glucose.: 167 mg/dL (16 Oct 2019 23:00)  POCT Blood Glucose.: 157 mg/dL (16 Oct 2019 16:33)  POCT Blood Glucose.: 248 mg/dL (16 Oct 2019 11:20)

## 2019-10-18 LAB
ANION GAP SERPL CALC-SCNC: 10 MMOL/L — SIGNIFICANT CHANGE UP (ref 5–17)
BUN SERPL-MCNC: 8 MG/DL — SIGNIFICANT CHANGE UP (ref 7–23)
CALCIUM SERPL-MCNC: 8.4 MG/DL — SIGNIFICANT CHANGE UP (ref 8.4–10.5)
CHLORIDE SERPL-SCNC: 100 MMOL/L — SIGNIFICANT CHANGE UP (ref 96–108)
CO2 SERPL-SCNC: 29 MMOL/L — SIGNIFICANT CHANGE UP (ref 22–31)
CREAT SERPL-MCNC: 0.53 MG/DL — SIGNIFICANT CHANGE UP (ref 0.5–1.3)
GLUCOSE BLDC GLUCOMTR-MCNC: 133 MG/DL — HIGH (ref 70–99)
GLUCOSE BLDC GLUCOMTR-MCNC: 164 MG/DL — HIGH (ref 70–99)
GLUCOSE BLDC GLUCOMTR-MCNC: 181 MG/DL — HIGH (ref 70–99)
GLUCOSE BLDC GLUCOMTR-MCNC: 196 MG/DL — HIGH (ref 70–99)
GLUCOSE SERPL-MCNC: 192 MG/DL — HIGH (ref 70–99)
HCT VFR BLD CALC: 30.3 % — LOW (ref 39–50)
HGB BLD-MCNC: 10 G/DL — LOW (ref 13–17)
MAGNESIUM SERPL-MCNC: 2 MG/DL — SIGNIFICANT CHANGE UP (ref 1.6–2.6)
MCHC RBC-ENTMCNC: 29.9 PG — SIGNIFICANT CHANGE UP (ref 27–34)
MCHC RBC-ENTMCNC: 33 GM/DL — SIGNIFICANT CHANGE UP (ref 32–36)
MCV RBC AUTO: 90.4 FL — SIGNIFICANT CHANGE UP (ref 80–100)
NRBC # BLD: 0 /100 WBCS — SIGNIFICANT CHANGE UP (ref 0–0)
PHOSPHATE SERPL-MCNC: 3.1 MG/DL — SIGNIFICANT CHANGE UP (ref 2.5–4.5)
PLATELET # BLD AUTO: 227 K/UL — SIGNIFICANT CHANGE UP (ref 150–400)
POTASSIUM SERPL-MCNC: 3.1 MMOL/L — LOW (ref 3.5–5.3)
POTASSIUM SERPL-SCNC: 3.1 MMOL/L — LOW (ref 3.5–5.3)
RBC # BLD: 3.35 M/UL — LOW (ref 4.2–5.8)
RBC # FLD: 13.9 % — SIGNIFICANT CHANGE UP (ref 10.3–14.5)
SODIUM SERPL-SCNC: 139 MMOL/L — SIGNIFICANT CHANGE UP (ref 135–145)
WBC # BLD: 12.04 K/UL — HIGH (ref 3.8–10.5)
WBC # FLD AUTO: 12.04 K/UL — HIGH (ref 3.8–10.5)

## 2019-10-18 RX ORDER — HEPARIN SODIUM 5000 [USP'U]/ML
5000 INJECTION INTRAVENOUS; SUBCUTANEOUS EVERY 8 HOURS
Refills: 0 | Status: DISCONTINUED | OUTPATIENT
Start: 2019-10-18 | End: 2019-10-31

## 2019-10-18 RX ORDER — MORPHINE SULFATE 50 MG/1
30 CAPSULE, EXTENDED RELEASE ORAL
Refills: 0 | Status: DISCONTINUED | OUTPATIENT
Start: 2019-10-18 | End: 2019-10-25

## 2019-10-18 RX ORDER — ACETAMINOPHEN 500 MG
1000 TABLET ORAL ONCE
Refills: 0 | Status: COMPLETED | OUTPATIENT
Start: 2019-10-19 | End: 2019-10-19

## 2019-10-18 RX ORDER — ELECTROLYTE SOLUTION,INJ
1 VIAL (ML) INTRAVENOUS
Refills: 0 | Status: DISCONTINUED | OUTPATIENT
Start: 2019-10-18 | End: 2019-10-18

## 2019-10-18 RX ORDER — POTASSIUM CHLORIDE 20 MEQ
20 PACKET (EA) ORAL
Refills: 0 | Status: COMPLETED | OUTPATIENT
Start: 2019-10-18 | End: 2019-10-18

## 2019-10-18 RX ORDER — ACETAMINOPHEN 500 MG
1000 TABLET ORAL ONCE
Refills: 0 | Status: COMPLETED | OUTPATIENT
Start: 2019-10-18 | End: 2019-10-18

## 2019-10-18 RX ORDER — POTASSIUM CHLORIDE 20 MEQ
20 PACKET (EA) ORAL ONCE
Refills: 0 | Status: DISCONTINUED | OUTPATIENT
Start: 2019-10-18 | End: 2019-10-18

## 2019-10-18 RX ORDER — NALOXONE HYDROCHLORIDE 4 MG/.1ML
0.1 SPRAY NASAL
Refills: 0 | Status: DISCONTINUED | OUTPATIENT
Start: 2019-10-18 | End: 2019-10-25

## 2019-10-18 RX ORDER — MORPHINE SULFATE 50 MG/1
4 CAPSULE, EXTENDED RELEASE ORAL
Refills: 0 | Status: DISCONTINUED | OUTPATIENT
Start: 2019-10-18 | End: 2019-10-20

## 2019-10-18 RX ORDER — I.V. FAT EMULSION 20 G/100ML
0.64 EMULSION INTRAVENOUS
Qty: 50 | Refills: 0 | Status: DISCONTINUED | OUTPATIENT
Start: 2019-10-18 | End: 2019-10-18

## 2019-10-18 RX ORDER — MORPHINE SULFATE 50 MG/1
30 CAPSULE, EXTENDED RELEASE ORAL
Refills: 0 | Status: DISCONTINUED | OUTPATIENT
Start: 2019-10-18 | End: 2019-10-18

## 2019-10-18 RX ADMIN — HYDROMORPHONE HYDROCHLORIDE 0.5 MILLIGRAM(S): 2 INJECTION INTRAMUSCULAR; INTRAVENOUS; SUBCUTANEOUS at 00:28

## 2019-10-18 RX ADMIN — HYDROMORPHONE HYDROCHLORIDE 1 MILLIGRAM(S): 2 INJECTION INTRAMUSCULAR; INTRAVENOUS; SUBCUTANEOUS at 10:44

## 2019-10-18 RX ADMIN — HYDROMORPHONE HYDROCHLORIDE 0.5 MILLIGRAM(S): 2 INJECTION INTRAMUSCULAR; INTRAVENOUS; SUBCUTANEOUS at 06:27

## 2019-10-18 RX ADMIN — Medication 100 MILLIEQUIVALENT(S): at 13:26

## 2019-10-18 RX ADMIN — HYDROMORPHONE HYDROCHLORIDE 0.5 MILLIGRAM(S): 2 INJECTION INTRAMUSCULAR; INTRAVENOUS; SUBCUTANEOUS at 02:59

## 2019-10-18 RX ADMIN — CHLORHEXIDINE GLUCONATE 1 APPLICATION(S): 213 SOLUTION TOPICAL at 06:26

## 2019-10-18 RX ADMIN — Medication 1000 MILLIGRAM(S): at 10:30

## 2019-10-18 RX ADMIN — PIPERACILLIN AND TAZOBACTAM 200 GRAM(S): 4; .5 INJECTION, POWDER, LYOPHILIZED, FOR SOLUTION INTRAVENOUS at 06:26

## 2019-10-18 RX ADMIN — PIPERACILLIN AND TAZOBACTAM 200 GRAM(S): 4; .5 INJECTION, POWDER, LYOPHILIZED, FOR SOLUTION INTRAVENOUS at 12:24

## 2019-10-18 RX ADMIN — Medication 400 MILLIGRAM(S): at 10:03

## 2019-10-18 RX ADMIN — Medication 1 EACH: at 17:48

## 2019-10-18 RX ADMIN — HYDROMORPHONE HYDROCHLORIDE 1 MILLIGRAM(S): 2 INJECTION INTRAMUSCULAR; INTRAVENOUS; SUBCUTANEOUS at 14:35

## 2019-10-18 RX ADMIN — MORPHINE SULFATE 30 MILLILITER(S): 50 CAPSULE, EXTENDED RELEASE ORAL at 21:59

## 2019-10-18 RX ADMIN — Medication 400 MILLIGRAM(S): at 23:44

## 2019-10-18 RX ADMIN — PANTOPRAZOLE SODIUM 40 MILLIGRAM(S): 20 TABLET, DELAYED RELEASE ORAL at 12:24

## 2019-10-18 RX ADMIN — Medication 2 MILLIGRAM(S): at 14:48

## 2019-10-18 RX ADMIN — Medication 1000 MILLIGRAM(S): at 18:10

## 2019-10-18 RX ADMIN — PIPERACILLIN AND TAZOBACTAM 200 GRAM(S): 4; .5 INJECTION, POWDER, LYOPHILIZED, FOR SOLUTION INTRAVENOUS at 17:49

## 2019-10-18 RX ADMIN — INSULIN HUMAN 2: 100 INJECTION, SOLUTION SUBCUTANEOUS at 06:26

## 2019-10-18 RX ADMIN — Medication 2 MILLIGRAM(S): at 05:04

## 2019-10-18 RX ADMIN — HEPARIN SODIUM 5000 UNIT(S): 5000 INJECTION INTRAVENOUS; SUBCUTANEOUS at 13:26

## 2019-10-18 RX ADMIN — HEPARIN SODIUM 5000 UNIT(S): 5000 INJECTION INTRAVENOUS; SUBCUTANEOUS at 22:06

## 2019-10-18 RX ADMIN — Medication 400 MILLIGRAM(S): at 17:48

## 2019-10-18 RX ADMIN — HYDROMORPHONE HYDROCHLORIDE 1 MILLIGRAM(S): 2 INJECTION INTRAMUSCULAR; INTRAVENOUS; SUBCUTANEOUS at 11:00

## 2019-10-18 RX ADMIN — HYDROMORPHONE HYDROCHLORIDE 0.5 MILLIGRAM(S): 2 INJECTION INTRAMUSCULAR; INTRAVENOUS; SUBCUTANEOUS at 03:29

## 2019-10-18 RX ADMIN — INSULIN HUMAN 2: 100 INJECTION, SOLUTION SUBCUTANEOUS at 13:26

## 2019-10-18 RX ADMIN — MORPHINE SULFATE 4 MILLIGRAM(S): 50 CAPSULE, EXTENDED RELEASE ORAL at 19:36

## 2019-10-18 RX ADMIN — I.V. FAT EMULSION 20.83 GM/KG/DAY: 20 EMULSION INTRAVENOUS at 22:06

## 2019-10-18 RX ADMIN — MORPHINE SULFATE 4 MILLIGRAM(S): 50 CAPSULE, EXTENDED RELEASE ORAL at 20:06

## 2019-10-18 RX ADMIN — HYDROMORPHONE HYDROCHLORIDE 1 MILLIGRAM(S): 2 INJECTION INTRAMUSCULAR; INTRAVENOUS; SUBCUTANEOUS at 14:21

## 2019-10-18 RX ADMIN — Medication 100 MILLIEQUIVALENT(S): at 11:10

## 2019-10-18 NOTE — PROGRESS NOTE ADULT - ASSESSMENT
A/P: 54M PMH HTN, HLD, rectal adenocarcinoma diagnosed 5/2018, underwent chemoXRT who presented for elective robotic assisted abdominal perineal resection (9/24), c/b SBO managed by NGT and TPN via PICC line but RTOR 10/14 for lap converted to open EDER with increasing lactate and hypotensive overnight, s/p RTOR 10/15 for1 L hematoma evacuation    - Pain/nausea control with Dilaudid prn, valium IV prn for abdominal spasms  - TPN w/ 20U insulin  - NPO/NGT/ISS  - Abx: Zosyn	  - holding HSQ; SCDs for DVT prophylaxis  - RLQ JAKUB drainx1

## 2019-10-18 NOTE — PROGRESS NOTE ADULT - ASSESSMENT
Plan  - care per primary team  - keep perineum clean and dry  - activity as tolerated  - dvt ppx  - will cont to follow

## 2019-10-18 NOTE — PROGRESS NOTE ADULT - SUBJECTIVE AND OBJECTIVE BOX
Pain Management Progress Note - [facility] [phone]      HPI: Patient seen and examined today. Patient is a 54 year old male, with pmh HTN, HLD adenocarcinoma, s/p abdominal perineal resection, now s/p lap conversion to open, EDER. Patient NPO, reporting diffuse abdominal pain and surgical site pain. Patient receiving Dilaudid IVP for breakthrough pain and Diazepam IVP for muscle spasms. Patient reporting poor pain relief with DIlaudid IVP and reporting feeling "total body spasms" after receiving  Dilaudid IVP. Reviewed pain medication regimen with patient and Dr. Morales at bedside. Discussed plan to d/c Dilaudid IVp and start Morphine PCA, reviewed PCA use and side effects with patient. Patient verbalized understanding.       Pain is __x_ sharp ____dull ___burning ___achy ___ Intensity: ____ mild _x__mod ___severe     Location __x__surgical site ____cervical _____lumbar _x___abd ____upper ext____lower ext    Worse with _x___activity __x__movement _____physical therapy___ Rest    Improved with __x__medication ___x_rest ____physical therapy      cefoTEtan  IVPB  lactated ringers.  enoxaparin Injectable  ketorolac 0.5% Ophthalmic Solution  neomycin/BACItracin/polymyxin Ointment  artificial  tears Solution  HYDROmorphone  Injectable  acetaminophen  IVPB ..  alvimopan  acetaminophen  IVPB ..  famotidine  IVPB  lactated ringers Bolus  pantoprazole  Injectable  acetaminophen  IVPB ..  metoclopramide Injectable  acetaminophen  IVPB ..  lactated ringers Bolus  docusate sodium Liquid  potassium chloride  10 mEq/100 mL IVPB  potassium chloride  10 mEq/100 mL IVPB  ketorolac   Injectable  potassium phosphate IVPB  artificial  tears Solution  iohexol 300 mG (iodine)/mL Oral Solution  acetaminophen  IVPB ..  Parenteral Nutrition - Adult  famotidine Injectable  sodium chloride 0.9% lock flush  chlorhexidine 2% Cloths  chlorproMAZINE    IVPB  famotidine Injectable  famotidine Injectable  benzocaine 15 mG/menthol 3.6 mG Lozenge  Parenteral Nutrition - Adult  Parenteral Nutrition - Adult  benzocaine 15 mG/menthol 3.6 mG (Sugar-Free) Lozenge  HYDROmorphone  Injectable  HYDROmorphone  Injectable  acetaminophen   Tablet ..  Parenteral Nutrition - Adult  fat emulsion (Plant Based) 20% Infusion  docusate sodium Liquid  Parenteral Nutrition - Adult  fat emulsion (Plant Based) 20% Infusion  acetaminophen    Suspension ..  Parenteral Nutrition - Adult  fat emulsion (Plant Based) 20% Infusion  simethicone  Parenteral Nutrition - Adult  fat emulsion (Plant Based) 20% Infusion  Parenteral Nutrition - Adult  acetaminophen   Tablet ..  HYDROmorphone  Injectable  Parenteral Nutrition - Adult  fat emulsion (Plant Based) 20% Infusion  HYDROmorphone  Injectable  Parenteral Nutrition - Adult  Parenteral Nutrition - Adult  fat emulsion (Plant Based) 20% Infusion  Parenteral Nutrition - Adult  fat emulsion (Plant Based) 20% Infusion  oxyCODONE    5 mG/acetaminophen 325 mG  oxyCODONE    IR  Parenteral Nutrition - Adult  fat emulsion (Plant Based) 20% Infusion  iohexol 300 mG (iodine)/mL Oral Solution  Parenteral Nutrition - Adult  fat emulsion (Plant Based) 20% Infusion  iohexol 300 mG (iodine)/mL Oral Solution  HYDROmorphone  Injectable  HYDROmorphone  Injectable  Parenteral Nutrition - Adult  fat emulsion (Plant Based) 20% Infusion  magnesium sulfate  IVPB  Parenteral Nutrition - Adult  BUpivacaine liposome 1.3% Injectable (no eMAR)  pantoprazole  Injectable  lactated ringers.  HYDROmorphone  Injectable  HYDROmorphone  Injectable  ondansetron Injectable  enoxaparin Injectable  cefTRIAXone   IVPB  metroNIDAZOLE  IVPB  acetaminophen  IVPB ..  HYDROmorphone  Injectable  HYDROmorphone  Injectable  sodium chloride 0.9% Bolus  insulin lispro (HumaLOG) corrective regimen sliding scale  dextrose 5%.  dextrose 40% Gel  dextrose 50% Injectable  glucagon  Injectable  acetaminophen  IVPB ..  famotidine Injectable  sodium chloride 0.9% Bolus  sodium chloride 0.9% Bolus  calcium gluconate IVPB  sodium chloride 0.9% Bolus  acetaminophen  IVPB ..  acetaminophen  IVPB ..  HYDROmorphone  Injectable  HYDROmorphone  Injectable  dextrose 10%.  piperacillin/tazobactam IVPB..  insulin lispro (HumaLOG) corrective regimen sliding scale  HYDROmorphone  Injectable  HYDROmorphone  Injectable  HYDROmorphone  Injectable  chlorhexidine 2% Cloths  acetaminophen  IVPB ..  sodium bicarbonate  Injectable  Parenteral Nutrition - Adult  fat emulsion (Plant Based) 20% Infusion  magnesium sulfate  IVPB  magnesium sulfate  IVPB  dextrose 5% + sodium chloride 0.9%.  sodium chloride 0.9% Bolus  sodium chloride 0.9% Bolus  sodium chloride 0.9%.  HYDROmorphone  Injectable  magnesium sulfate  IVPB  sodium phosphate IVPB  HYDROmorphone  Injectable  HYDROmorphone  Injectable  LORazepam   Injectable  Parenteral Nutrition - Adult  fat emulsion (Plant Based) 20% Infusion  insulin NPH human recombinant  chlorproMAZINE    IVPB  Parenteral Nutrition - Adult  fat emulsion (Plant Based) 20% Infusion  sodium chloride 0.9%.  insulin regular  human corrective regimen sliding scale  dextrose 5%.  dextrose 40% Gel  dextrose 50% Injectable  glucagon  Injectable  diazepam  Injectable  acetaminophen  IVPB ..  diazepam  Injectable  pantoprazole  Injectable  potassium phosphate IVPB  Parenteral Nutrition - Adult  fat emulsion (Plant Based) 20% Infusion  potassium chloride  10 mEq/100 mL IVPB  acetaminophen  IVPB ..  heparin  Injectable  acetaminophen  IVPB ..  potassium chloride  20 mEq/100 mL IVPB  potassium chloride  20 mEq/100 mL IVPB  Parenteral Nutrition - Adult  fat emulsion (Plant Based) 20% Infusion      ROS: Const:  __-_febrile   Eyes:___ENT:___CV: _-__chest pain  Resp: __-__sob  GI:___nausea ___vomiting __x_abd pain __x_npo ___clears __full diet __bm  :___ Musk: __x_pain __x_spasm  Skin:___ Neuro:  ___sedation___confusion___ numbness ___weakness ___paresth  Psych:__anxiety  Endo:___ Heme:___Allergy:_________, _x__all others reviewed and negative      PAST MEDICAL & SURGICAL HISTORY:  HLD (hyperlipidemia)  HTN (hypertension)  Colon cancer: near rectum  Other elective surgery: Right Upper Chest- Chemo Port      10-18 @ 05:30463 mL/min/1.73M2      Hemoglobin: 10.0 g/dL (10-18 @ 05:39)  Hemoglobin: 9.3 g/dL (10-17 @ 05:21)  Hemoglobin: 9.3 g/dL (10-16 @ 23:11)  Hemoglobin: 9.0 g/dL (10-16 @ 16:54)        T(C): 37.4 (10-18-19 @ 11:09), Max: 37.4 (10-17-19 @ 13:57)  HR: 90 (10-18-19 @ 12:26) (88 - 96)  BP: 131/87 (10-18-19 @ 12:26) (119/80 - 142/78)  RR: 14 (10-18-19 @ 12:26) (13 - 17)  SpO2: 97% (10-18-19 @ 12:26) (94% - 99%)  Wt(kg): --      PHYSICAL EXAM:  Gen Appearance: _x__no acute distress __x_appropriate        Neuro: _x__SILT feet____ EOM Intact Psych: AAOX_3_, _x__mood/affect appropriate        Eyes: _x__conjunctiva WNL  x_____ Pupils equal and round        ENT: _x_ears and nose atraumatic__x_ Hearing grossly intact        Neck: _x__trachea midline, no visible masses ___thyroid without palpable mass    Resp: _x__Nml WOB____No tactile fremitus ___clear to auscultation    Cardio: __x_extremities free from edema __x__pedal pulses palpable    GI/Abdomen: _x__soft _____ Nontender______Nondistended_____HSM    Lymphatic: ___no palpable nodes in neck  ___no palpable nodes calves and feet    Skin/Wound: ___Incision, _x__Dressing c/d/i,   ____surrounding tissues soft,  ___drain/chest tube present____    Muscular: EHL __5_/5  Gastrocnemius___5/5    _x__absent clubbing/cyanosis        ASSESSMENT: Patient is a 54 year old male, with pmh HTN, HLD adenocarcinoma, s/p abdominal perineal resection, now s/p lap conversion to open, EDER.    Recommended Treatment PLAN:  1. Continue Valium 2mg IV q8hr PRN muscular spasms.   2. Recommend Morphine PCA 1mg Q10 minutes,  34mg 4 hour max  3. Morphine 5mg Q2h IVP prn breakthrough pain  Plan discussed with Dr. Morales at bedside Pain Management Progress Note - [facility] [phone]      HPI: Patient seen and examined today. Patient is a 54 year old male, with pmh HTN, HLD adenocarcinoma, s/p abdominal perineal resection, now s/p lap conversion to open, EDER. Patient NPO, reporting diffuse abdominal pain and surgical site pain. Patient receiving Dilaudid IVP for breakthrough pain and Diazepam IVP for muscle spasms. Patient reporting poor pain relief with DIlaudid IVP and reporting feeling "total body spasms" after receiving  Dilaudid IVP. Reviewed pain medication regimen with patient and Dr. Morales at bedside. Discussed plan to d/c Dilaudid IVp and start Morphine PCA, reviewed PCA use and side effects with patient. Patient verbalized understanding.       Pain is __x_ sharp ____dull ___burning ___achy ___ Intensity: ____ mild _x__mod ___severe     Location __x__surgical site ____cervical _____lumbar _x___abd ____upper ext____lower ext    Worse with _x___activity __x__movement _____physical therapy___ Rest    Improved with __x__medication ___x_rest ____physical therapy      cefoTEtan  IVPB  lactated ringers.  enoxaparin Injectable  ketorolac 0.5% Ophthalmic Solution  neomycin/BACItracin/polymyxin Ointment  artificial  tears Solution  HYDROmorphone  Injectable  acetaminophen  IVPB ..  alvimopan  acetaminophen  IVPB ..  famotidine  IVPB  lactated ringers Bolus  pantoprazole  Injectable  acetaminophen  IVPB ..  metoclopramide Injectable  acetaminophen  IVPB ..  lactated ringers Bolus  docusate sodium Liquid  potassium chloride  10 mEq/100 mL IVPB  potassium chloride  10 mEq/100 mL IVPB  ketorolac   Injectable  potassium phosphate IVPB  artificial  tears Solution  iohexol 300 mG (iodine)/mL Oral Solution  acetaminophen  IVPB ..  Parenteral Nutrition - Adult  famotidine Injectable  sodium chloride 0.9% lock flush  chlorhexidine 2% Cloths  chlorproMAZINE    IVPB  famotidine Injectable  famotidine Injectable  benzocaine 15 mG/menthol 3.6 mG Lozenge  Parenteral Nutrition - Adult  Parenteral Nutrition - Adult  benzocaine 15 mG/menthol 3.6 mG (Sugar-Free) Lozenge  HYDROmorphone  Injectable  HYDROmorphone  Injectable  acetaminophen   Tablet ..  Parenteral Nutrition - Adult  fat emulsion (Plant Based) 20% Infusion  docusate sodium Liquid  Parenteral Nutrition - Adult  fat emulsion (Plant Based) 20% Infusion  acetaminophen    Suspension ..  Parenteral Nutrition - Adult  fat emulsion (Plant Based) 20% Infusion  simethicone  Parenteral Nutrition - Adult  fat emulsion (Plant Based) 20% Infusion  Parenteral Nutrition - Adult  acetaminophen   Tablet ..  HYDROmorphone  Injectable  Parenteral Nutrition - Adult  fat emulsion (Plant Based) 20% Infusion  HYDROmorphone  Injectable  Parenteral Nutrition - Adult  Parenteral Nutrition - Adult  fat emulsion (Plant Based) 20% Infusion  Parenteral Nutrition - Adult  fat emulsion (Plant Based) 20% Infusion  oxyCODONE    5 mG/acetaminophen 325 mG  oxyCODONE    IR  Parenteral Nutrition - Adult  fat emulsion (Plant Based) 20% Infusion  iohexol 300 mG (iodine)/mL Oral Solution  Parenteral Nutrition - Adult  fat emulsion (Plant Based) 20% Infusion  iohexol 300 mG (iodine)/mL Oral Solution  HYDROmorphone  Injectable  HYDROmorphone  Injectable  Parenteral Nutrition - Adult  fat emulsion (Plant Based) 20% Infusion  magnesium sulfate  IVPB  Parenteral Nutrition - Adult  BUpivacaine liposome 1.3% Injectable (no eMAR)  pantoprazole  Injectable  lactated ringers.  HYDROmorphone  Injectable  HYDROmorphone  Injectable  ondansetron Injectable  enoxaparin Injectable  cefTRIAXone   IVPB  metroNIDAZOLE  IVPB  acetaminophen  IVPB ..  HYDROmorphone  Injectable  HYDROmorphone  Injectable  sodium chloride 0.9% Bolus  insulin lispro (HumaLOG) corrective regimen sliding scale  dextrose 5%.  dextrose 40% Gel  dextrose 50% Injectable  glucagon  Injectable  acetaminophen  IVPB ..  famotidine Injectable  sodium chloride 0.9% Bolus  sodium chloride 0.9% Bolus  calcium gluconate IVPB  sodium chloride 0.9% Bolus  acetaminophen  IVPB ..  acetaminophen  IVPB ..  HYDROmorphone  Injectable  HYDROmorphone  Injectable  dextrose 10%.  piperacillin/tazobactam IVPB..  insulin lispro (HumaLOG) corrective regimen sliding scale  HYDROmorphone  Injectable  HYDROmorphone  Injectable  HYDROmorphone  Injectable  chlorhexidine 2% Cloths  acetaminophen  IVPB ..  sodium bicarbonate  Injectable  Parenteral Nutrition - Adult  fat emulsion (Plant Based) 20% Infusion  magnesium sulfate  IVPB  magnesium sulfate  IVPB  dextrose 5% + sodium chloride 0.9%.  sodium chloride 0.9% Bolus  sodium chloride 0.9% Bolus  sodium chloride 0.9%.  HYDROmorphone  Injectable  magnesium sulfate  IVPB  sodium phosphate IVPB  HYDROmorphone  Injectable  HYDROmorphone  Injectable  LORazepam   Injectable  Parenteral Nutrition - Adult  fat emulsion (Plant Based) 20% Infusion  insulin NPH human recombinant  chlorproMAZINE    IVPB  Parenteral Nutrition - Adult  fat emulsion (Plant Based) 20% Infusion  sodium chloride 0.9%.  insulin regular  human corrective regimen sliding scale  dextrose 5%.  dextrose 40% Gel  dextrose 50% Injectable  glucagon  Injectable  diazepam  Injectable  acetaminophen  IVPB ..  diazepam  Injectable  pantoprazole  Injectable  potassium phosphate IVPB  Parenteral Nutrition - Adult  fat emulsion (Plant Based) 20% Infusion  potassium chloride  10 mEq/100 mL IVPB  acetaminophen  IVPB ..  heparin  Injectable  acetaminophen  IVPB ..  potassium chloride  20 mEq/100 mL IVPB  potassium chloride  20 mEq/100 mL IVPB  Parenteral Nutrition - Adult  fat emulsion (Plant Based) 20% Infusion      ROS: Const:  __-_febrile   Eyes:___ENT:___CV: _-__chest pain  Resp: __-__sob  GI:___nausea ___vomiting __x_abd pain __x_npo ___clears __full diet __bm  :___ Musk: __x_pain __x_spasm  Skin:___ Neuro:  ___sedation___confusion___ numbness ___weakness ___paresth  Psych:__anxiety  Endo:___ Heme:___Allergy:_________, _x__all others reviewed and negative      PAST MEDICAL & SURGICAL HISTORY:  HLD (hyperlipidemia)  HTN (hypertension)  Colon cancer: near rectum  Other elective surgery: Right Upper Chest- Chemo Port      10-18 @ 05:02424 mL/min/1.73M2      Hemoglobin: 10.0 g/dL (10-18 @ 05:39)  Hemoglobin: 9.3 g/dL (10-17 @ 05:21)  Hemoglobin: 9.3 g/dL (10-16 @ 23:11)  Hemoglobin: 9.0 g/dL (10-16 @ 16:54)        T(C): 37.4 (10-18-19 @ 11:09), Max: 37.4 (10-17-19 @ 13:57)  HR: 90 (10-18-19 @ 12:26) (88 - 96)  BP: 131/87 (10-18-19 @ 12:26) (119/80 - 142/78)  RR: 14 (10-18-19 @ 12:26) (13 - 17)  SpO2: 97% (10-18-19 @ 12:26) (94% - 99%)  Wt(kg): --      PHYSICAL EXAM:  Gen Appearance: _x__no acute distress __x_appropriate        Neuro: _x__SILT feet____ EOM Intact Psych: AAOX_3_, _x__mood/affect appropriate        Eyes: _x__conjunctiva WNL  x_____ Pupils equal and round        ENT: _x_ears and nose atraumatic__x_ Hearing grossly intact        Neck: _x__trachea midline, no visible masses ___thyroid without palpable mass    Resp: _x__Nml WOB____No tactile fremitus ___clear to auscultation    Cardio: __x_extremities free from edema __x__pedal pulses palpable    GI/Abdomen: _x__soft _____ Nontender______Nondistended_____HSM    Lymphatic: ___no palpable nodes in neck  ___no palpable nodes calves and feet    Skin/Wound: ___Incision, _x__Dressing c/d/i,   ____surrounding tissues soft,  ___drain/chest tube present____    Muscular: EHL __5_/5  Gastrocnemius___5/5    _x__absent clubbing/cyanosis        ASSESSMENT: Patient is a 54 year old male, with pmh HTN, HLD adenocarcinoma, s/p abdominal perineal resection, now s/p lap conversion to open, EDER.    Recommended Treatment PLAN:  1. Continue Valium 2mg IV q8hr PRN muscular spasms.   2. Recommend Morphine PCA 1mg Q10 minutes,  34mg 4 hour max  3. Morphine 5mg Q2h IVP prn breakthrough pain  4. Tylenol 1000mg IV Q8h standing  Plan discussed with Dr. Morales at bedside

## 2019-10-18 NOTE — PROGRESS NOTE ADULT - SUBJECTIVE AND OBJECTIVE BOX
Pt seen and examined. Doing well. No acute events o/n. In stepdown.    T(C): 37.4 (10-18-19 @ 11:09), Max: 37.4 (10-17-19 @ 13:57)  T(F): 99.4 (10-18-19 @ 11:09), Max: 99.4 (10-17-19 @ 13:57)  HR: 88 (10-18-19 @ 10:50) (88 - 98)  BP: 138/85 (10-18-19 @ 10:50) (119/80 - 158/78)  RR: 16 (10-18-19 @ 10:50) (12 - 21)  SpO2: 96% (10-18-19 @ 10:50) (94% - 99%)      17 Oct 2019 07:01  -  18 Oct 2019 07:00  --------------------------------------------------------  IN:    fat emulsion (Plant Based) 20% Infusion: 20.8 mL    fat emulsion (Plant Based) 20% Infusion: 145.6 mL    IV PiggyBack: 550 mL    TPN (Total Parenteral Nutrition): 2000 mL  Total IN: 2716.4 mL    OUT:    Bulb: 210 mL    Drain: 550 mL    Indwelling Catheter - Urethral: 2125 mL    Voided: 2000 mL  Total OUT: 4885 mL    Total NET: -2168.6 mL          Exam:  Abdomen softer, less distended  Ostomy with minimal output  Perineal incision c/d/i  Thigh soft, incision c/d/i  JPs serosanguinous.                           10.0   12.04 )-----------( 227      ( 18 Oct 2019 05:39 )             30.3     10-18    139  |  100  |  8   ----------------------------<  192<H>  3.1<L>   |  29  |  0.53    Ca    8.4      18 Oct 2019 05:39  Phos  3.1     10-18  Mg     2.0     10-18

## 2019-10-18 NOTE — PROGRESS NOTE ADULT - SUBJECTIVE AND OBJECTIVE BOX
Post-Op Day #: 4    Procedure/Dx/Injuries: lap converted to open lysis of adhesions (10/14), hematoma evacuation (10/15)      Events of past 24 hours:      Vitals: T(F): 99 (10-18-19 @ 05:00), Max: 99.4 (10-17-19 @ 13:57)  HR: 90 (10-17-19 @ 21:12)  BP: 137/81 (10-17-19 @ 21:12)  RR: 16 (10-17-19 @ 21:12)  SpO2: 99% (10-17-19 @ 21:12)        Diet, NPO      10-16-19 @ 07:01  -  10-17-19 @ 07:00  --------------------------------------------------------  IN:    fat emulsion (Plant Based) 20% Infusion: 395.2 mL    fat emulsion (Plant Based) 20% Infusion: 62.4 mL    IV PiggyBack: 415 mL    Packed Red Blood Cells: 600 mL    sodium chloride 0.9%: 605 mL    sodium chloride 0.9%: 250 mL    Solution: 150 mL    TPN (Total Parenteral Nutrition): 2081 mL  Total IN: 4558.6 mL    OUT:    Bulb: 290 mL    Drain: 850 mL    Indwelling Catheter - Urethral: 3830 mL  Total OUT: 4970 mL    Total NET: -411.4 mL      PHYSICAL EXAM  General: NAD. Resting comfortably.  Pulmonary: Non-labored breathing. No respiratory distress.  Abdomen: Soft, non-tender, non-distended.  : No Marie  Extremities: WWP. No C/C/E      MEDICATIONS  (STANDING):  chlorhexidine 2% Cloths 1 Application(s) Topical <User Schedule>  dextrose 5%. 1000 milliLiter(s) (50 mL/Hr) IV Continuous <Continuous>  dextrose 50% Injectable 12.5 Gram(s) IV Push once  dextrose 50% Injectable 25 Gram(s) IV Push once  dextrose 50% Injectable 25 Gram(s) IV Push once  fat emulsion (Plant Based) 20% Infusion 0.65 Gm/kG/Day (20.83 mL/Hr) IV Continuous <Continuous>  influenza   Vaccine 0.5 milliLiter(s) IntraMuscular once  insulin regular  human corrective regimen sliding scale   SubCutaneous every 6 hours  pantoprazole  Injectable 40 milliGRAM(s) IV Push daily  Parenteral Nutrition - Adult 1 Each (100 mL/Hr) TPN Continuous <Continuous>  piperacillin/tazobactam IVPB.. 3.375 Gram(s) IV Intermittent every 6 hours    MEDICATIONS  (PRN):  dextrose 40% Gel 15 Gram(s) Oral once PRN Blood Glucose LESS THAN 70 milliGRAM(s)/deciliter  diazepam  Injectable 2 milliGRAM(s) IV Push every 8 hours PRN Severe muscle spasms  glucagon  Injectable 1 milliGRAM(s) IntraMuscular once PRN Glucose LESS THAN 70 milligrams/deciliter  HYDROmorphone  Injectable 1 milliGRAM(s) IV Push every 3 hours PRN Severe Pain (7 - 10)  HYDROmorphone  Injectable 0.5 milliGRAM(s) IV Push every 3 hours PRN Moderate Pain (4 - 6)  ketorolac 0.5% Ophthalmic Solution 1 Drop(s) Left EYE every 6 hours PRN eye pain irritation        LAB/STUDIES:    CAPILLARY BLOOD GLUCOSE  POCT Blood Glucose.: 181 mg/dL (18 Oct 2019 06:13)  POCT Blood Glucose.: 174 mg/dL (17 Oct 2019 23:38)  POCT Blood Glucose.: 166 mg/dL (17 Oct 2019 17:41)  POCT Blood Glucose.: 176 mg/dL (17 Oct 2019 12:09)                          10.0   12.04 )-----------( 227      ( 18 Oct 2019 05:39 )             30.3     10-18    139  |  100  |  8   ----------------------------<  192<H>  3.1<L>   |  29  |  0.53    Ca    8.4      18 Oct 2019 05:39  Phos  3.1     10-18  Mg     2.0     10-18 Post-Op Day #: 4    Procedure/Dx/Injuries: lap converted to open lysis of adhesions (10/14), hematoma evacuation (10/15)      Events of past 24 hours: Pt stepped down last night. Marie DCed, passed TOV. Pt c/o continued pain. No nausea, vomiting, chest pain, shortness of breath.      Vitals: T(F): 99 (10-18-19 @ 05:00), Max: 99.4 (10-17-19 @ 13:57)  HR: 90 (10-17-19 @ 21:12)  BP: 137/81 (10-17-19 @ 21:12)  RR: 16 (10-17-19 @ 21:12)  SpO2: 99% (10-17-19 @ 21:12)        Diet, NPO      10-16-19 @ 07:01  -  10-17-19 @ 07:00  --------------------------------------------------------  IN:    fat emulsion (Plant Based) 20% Infusion: 395.2 mL    fat emulsion (Plant Based) 20% Infusion: 62.4 mL    IV PiggyBack: 415 mL    Packed Red Blood Cells: 600 mL    sodium chloride 0.9%: 605 mL    sodium chloride 0.9%: 250 mL    Solution: 150 mL    TPN (Total Parenteral Nutrition): 2081 mL  Total IN: 4558.6 mL    OUT:    Bulb: 290 mL    Drain: 850 mL    Indwelling Catheter - Urethral: 3830 mL  Total OUT: 4970 mL    Total NET: -411.4 mL      PHYSICAL EXAM  General: NAD. Resting comfortably.  Pulmonary: Non-labored breathing. No respiratory distress.  Abdomen: Soft, appropriately tender to palpation, minimally distended. JPx1 in place R abdomen, dressings with serosanguinous drainage.  : No Marie  Extremities: WWP. No C/C/E      MEDICATIONS  (STANDING):  chlorhexidine 2% Cloths 1 Application(s) Topical <User Schedule>  dextrose 5%. 1000 milliLiter(s) (50 mL/Hr) IV Continuous <Continuous>  dextrose 50% Injectable 12.5 Gram(s) IV Push once  dextrose 50% Injectable 25 Gram(s) IV Push once  dextrose 50% Injectable 25 Gram(s) IV Push once  fat emulsion (Plant Based) 20% Infusion 0.65 Gm/kG/Day (20.83 mL/Hr) IV Continuous <Continuous>  influenza   Vaccine 0.5 milliLiter(s) IntraMuscular once  insulin regular  human corrective regimen sliding scale   SubCutaneous every 6 hours  pantoprazole  Injectable 40 milliGRAM(s) IV Push daily  Parenteral Nutrition - Adult 1 Each (100 mL/Hr) TPN Continuous <Continuous>  piperacillin/tazobactam IVPB.. 3.375 Gram(s) IV Intermittent every 6 hours    MEDICATIONS  (PRN):  dextrose 40% Gel 15 Gram(s) Oral once PRN Blood Glucose LESS THAN 70 milliGRAM(s)/deciliter  diazepam  Injectable 2 milliGRAM(s) IV Push every 8 hours PRN Severe muscle spasms  glucagon  Injectable 1 milliGRAM(s) IntraMuscular once PRN Glucose LESS THAN 70 milligrams/deciliter  HYDROmorphone  Injectable 1 milliGRAM(s) IV Push every 3 hours PRN Severe Pain (7 - 10)  HYDROmorphone  Injectable 0.5 milliGRAM(s) IV Push every 3 hours PRN Moderate Pain (4 - 6)  ketorolac 0.5% Ophthalmic Solution 1 Drop(s) Left EYE every 6 hours PRN eye pain irritation        LAB/STUDIES:    CAPILLARY BLOOD GLUCOSE  POCT Blood Glucose.: 181 mg/dL (18 Oct 2019 06:13)  POCT Blood Glucose.: 174 mg/dL (17 Oct 2019 23:38)  POCT Blood Glucose.: 166 mg/dL (17 Oct 2019 17:41)  POCT Blood Glucose.: 176 mg/dL (17 Oct 2019 12:09)                          10.0   12.04 )-----------( 227      ( 18 Oct 2019 05:39 )             30.3     10-18    139  |  100  |  8   ----------------------------<  192<H>  3.1<L>   |  29  |  0.53    Ca    8.4      18 Oct 2019 05:39  Phos  3.1     10-18  Mg     2.0     10-18

## 2019-10-19 LAB
ANION GAP SERPL CALC-SCNC: 10 MMOL/L — SIGNIFICANT CHANGE UP (ref 5–17)
BUN SERPL-MCNC: 9 MG/DL — SIGNIFICANT CHANGE UP (ref 7–23)
CALCIUM SERPL-MCNC: 8.8 MG/DL — SIGNIFICANT CHANGE UP (ref 8.4–10.5)
CHLORIDE SERPL-SCNC: 103 MMOL/L — SIGNIFICANT CHANGE UP (ref 96–108)
CO2 SERPL-SCNC: 27 MMOL/L — SIGNIFICANT CHANGE UP (ref 22–31)
CREAT SERPL-MCNC: 0.56 MG/DL — SIGNIFICANT CHANGE UP (ref 0.5–1.3)
GLUCOSE BLDC GLUCOMTR-MCNC: 142 MG/DL — HIGH (ref 70–99)
GLUCOSE BLDC GLUCOMTR-MCNC: 155 MG/DL — HIGH (ref 70–99)
GLUCOSE BLDC GLUCOMTR-MCNC: 176 MG/DL — HIGH (ref 70–99)
GLUCOSE BLDC GLUCOMTR-MCNC: 180 MG/DL — HIGH (ref 70–99)
GLUCOSE SERPL-MCNC: 187 MG/DL — HIGH (ref 70–99)
HCT VFR BLD CALC: 30.6 % — LOW (ref 39–50)
HGB BLD-MCNC: 10.3 G/DL — LOW (ref 13–17)
MAGNESIUM SERPL-MCNC: 2 MG/DL — SIGNIFICANT CHANGE UP (ref 1.6–2.6)
MCHC RBC-ENTMCNC: 30.4 PG — SIGNIFICANT CHANGE UP (ref 27–34)
MCHC RBC-ENTMCNC: 33.7 GM/DL — SIGNIFICANT CHANGE UP (ref 32–36)
MCV RBC AUTO: 90.3 FL — SIGNIFICANT CHANGE UP (ref 80–100)
NRBC # BLD: 0 /100 WBCS — SIGNIFICANT CHANGE UP (ref 0–0)
PHOSPHATE SERPL-MCNC: 3.8 MG/DL — SIGNIFICANT CHANGE UP (ref 2.5–4.5)
PLATELET # BLD AUTO: 265 K/UL — SIGNIFICANT CHANGE UP (ref 150–400)
POTASSIUM SERPL-MCNC: 3.4 MMOL/L — LOW (ref 3.5–5.3)
POTASSIUM SERPL-SCNC: 3.4 MMOL/L — LOW (ref 3.5–5.3)
RBC # BLD: 3.39 M/UL — LOW (ref 4.2–5.8)
RBC # FLD: 14 % — SIGNIFICANT CHANGE UP (ref 10.3–14.5)
SODIUM SERPL-SCNC: 140 MMOL/L — SIGNIFICANT CHANGE UP (ref 135–145)
WBC # BLD: 9.18 K/UL — SIGNIFICANT CHANGE UP (ref 3.8–10.5)
WBC # FLD AUTO: 9.18 K/UL — SIGNIFICANT CHANGE UP (ref 3.8–10.5)

## 2019-10-19 RX ORDER — ELECTROLYTE SOLUTION,INJ
1 VIAL (ML) INTRAVENOUS
Refills: 0 | Status: DISCONTINUED | OUTPATIENT
Start: 2019-10-19 | End: 2019-10-19

## 2019-10-19 RX ORDER — POTASSIUM CHLORIDE 20 MEQ
10 PACKET (EA) ORAL
Refills: 0 | Status: COMPLETED | OUTPATIENT
Start: 2019-10-19 | End: 2019-10-19

## 2019-10-19 RX ORDER — I.V. FAT EMULSION 20 G/100ML
0.64 EMULSION INTRAVENOUS
Qty: 50 | Refills: 0 | Status: DISCONTINUED | OUTPATIENT
Start: 2019-10-19 | End: 2019-10-19

## 2019-10-19 RX ADMIN — Medication 400 MILLIGRAM(S): at 06:30

## 2019-10-19 RX ADMIN — Medication 2 MILLIGRAM(S): at 17:30

## 2019-10-19 RX ADMIN — PIPERACILLIN AND TAZOBACTAM 200 GRAM(S): 4; .5 INJECTION, POWDER, LYOPHILIZED, FOR SOLUTION INTRAVENOUS at 17:31

## 2019-10-19 RX ADMIN — PIPERACILLIN AND TAZOBACTAM 200 GRAM(S): 4; .5 INJECTION, POWDER, LYOPHILIZED, FOR SOLUTION INTRAVENOUS at 11:20

## 2019-10-19 RX ADMIN — INSULIN HUMAN 2: 100 INJECTION, SOLUTION SUBCUTANEOUS at 11:46

## 2019-10-19 RX ADMIN — HEPARIN SODIUM 5000 UNIT(S): 5000 INJECTION INTRAVENOUS; SUBCUTANEOUS at 14:00

## 2019-10-19 RX ADMIN — HEPARIN SODIUM 5000 UNIT(S): 5000 INJECTION INTRAVENOUS; SUBCUTANEOUS at 06:52

## 2019-10-19 RX ADMIN — PANTOPRAZOLE SODIUM 40 MILLIGRAM(S): 20 TABLET, DELAYED RELEASE ORAL at 11:21

## 2019-10-19 RX ADMIN — Medication 1 EACH: at 17:34

## 2019-10-19 RX ADMIN — Medication 100 MILLIEQUIVALENT(S): at 13:59

## 2019-10-19 RX ADMIN — PIPERACILLIN AND TAZOBACTAM 200 GRAM(S): 4; .5 INJECTION, POWDER, LYOPHILIZED, FOR SOLUTION INTRAVENOUS at 00:15

## 2019-10-19 RX ADMIN — INSULIN HUMAN 2: 100 INJECTION, SOLUTION SUBCUTANEOUS at 06:20

## 2019-10-19 RX ADMIN — HEPARIN SODIUM 5000 UNIT(S): 5000 INJECTION INTRAVENOUS; SUBCUTANEOUS at 22:22

## 2019-10-19 RX ADMIN — Medication 1000 MILLIGRAM(S): at 00:30

## 2019-10-19 RX ADMIN — Medication 100 MILLIEQUIVALENT(S): at 14:00

## 2019-10-19 RX ADMIN — Medication 400 MILLIGRAM(S): at 11:20

## 2019-10-19 RX ADMIN — INSULIN HUMAN 2: 100 INJECTION, SOLUTION SUBCUTANEOUS at 00:15

## 2019-10-19 RX ADMIN — Medication 1000 MILLIGRAM(S): at 11:59

## 2019-10-19 RX ADMIN — Medication 100 MILLIEQUIVALENT(S): at 15:58

## 2019-10-19 RX ADMIN — CHLORHEXIDINE GLUCONATE 1 APPLICATION(S): 213 SOLUTION TOPICAL at 06:19

## 2019-10-19 RX ADMIN — I.V. FAT EMULSION 20.83 GM/KG/DAY: 20 EMULSION INTRAVENOUS at 22:23

## 2019-10-19 RX ADMIN — Medication 2 MILLIGRAM(S): at 00:16

## 2019-10-19 RX ADMIN — Medication 1000 MILLIGRAM(S): at 07:00

## 2019-10-19 RX ADMIN — PIPERACILLIN AND TAZOBACTAM 200 GRAM(S): 4; .5 INJECTION, POWDER, LYOPHILIZED, FOR SOLUTION INTRAVENOUS at 08:59

## 2019-10-19 NOTE — PROGRESS NOTE ADULT - ASSESSMENT
Doing well.  GI function returning.  If puts out more stool will clamp NG and then if residual post clamping is low will d/c  Otherwise as before.  Continue TPN

## 2019-10-19 NOTE — PROGRESS NOTE ADULT - SUBJECTIVE AND OBJECTIVE BOX
S/p exploratory lap for SBO and then another for anemia and bleeding a day later.  Initially had APR    Has NG tube in place.  Last shift output 250 ml  On TPN  Voiding well.  Feels pretty good overall.  Doesnt like NG    Vital Signs Last 24 Hrs  T(C): 37.2 (19 Oct 2019 10:00), Max: 37.2 (18 Oct 2019 22:04)  T(F): 98.9 (19 Oct 2019 10:00), Max: 98.9 (18 Oct 2019 22:04)  HR: 86 (19 Oct 2019 12:18) (78 - 92)  BP: 127/83 (19 Oct 2019 12:18) (127/83 - 157/87)  BP(mean): 98 (19 Oct 2019 12:18) (98 - 113)  RR: 16 (19 Oct 2019 12:18) (14 - 19)  SpO2: 100% (19 Oct 2019 12:18) (96% - 100%)    lungs clear  cor S1S2  abd: not distended, good BS with NG clamped  stoma with some stool at opening.  incision intact, no drainage.    ext: without calf tenderness                          10.3   9.18  )-----------( 265      ( 19 Oct 2019 05:57 )             30.6   10-19    140  |  103  |  9   ----------------------------<  187<H>  3.4<L>   |  27  |  0.56    Ca    8.8      19 Oct 2019 05:57  Phos  3.8     10-19  Mg     2.0     10-19

## 2019-10-19 NOTE — PROGRESS NOTE ADULT - ASSESSMENT
Plan  - keep incisions clean & dry  - OOB as tolerated  - diet/NG per primary team  - dvt ppx  - will cont to follow

## 2019-10-19 NOTE — PROGRESS NOTE ADULT - SUBJECTIVE AND OBJECTIVE BOX
Pt seen and examined. Doing well. No acute events o/n. Ambulating.    T(C): 37.2 (10-19-19 @ 10:00), Max: 37.4 (10-18-19 @ 11:09)  T(F): 98.9 (10-19-19 @ 10:00), Max: 99.4 (10-18-19 @ 11:09)  HR: 82 (10-19-19 @ 08:47) (78 - 92)  BP: 136/92 (10-19-19 @ 08:47) (131/85 - 157/87)  RR: 17 (10-19-19 @ 08:47) (14 - 19)  SpO2: 100% (10-19-19 @ 08:47) (96% - 100%)      18 Oct 2019 07:01  -  19 Oct 2019 07:00  --------------------------------------------------------  IN:    Solution: 100 mL    Solution: 200 mL    TPN (Total Parenteral Nutrition): 1700 mL  Total IN: 2000 mL    OUT:    Bulb: 185 mL    Colostomy: 3 mL    Drain: 700 mL    Voided: 2725 mL  Total OUT: 3613 mL    Total NET: -1613 mL          Exam:  Abdomen soft, non-distended.  Ostomy bag with minimal output  NG to sxn  Perineum well-healed, soft  Thighs soft                          10.3   9.18  )-----------( 265      ( 19 Oct 2019 05:57 )             30.6     10-19    140  |  103  |  9   ----------------------------<  187<H>  3.4<L>   |  27  |  0.56    Ca    8.8      19 Oct 2019 05:57  Phos  3.8     10-19  Mg     2.0     10-19

## 2019-10-19 NOTE — PROGRESS NOTE ADULT - SUBJECTIVE AND OBJECTIVE BOX
INTERVAL HPI/OVERNIGHT EVENTS: Overnight, pain was controlled. No other events. This am pt ostomy noted to be leaking, ostomy was changed at bedside, dressings were changed. Pt states the NGT is his only complaint. Denies n/v/cp/sob.    STATUS POST:  abdominal perineal resection/lap converted to open EDER/hematoma evacuation    POST OPERATIVE DAY #: 25/5/4    MEDICATIONS  (STANDING):  acetaminophen  IVPB .. 1000 milliGRAM(s) IV Intermittent once  chlorhexidine 2% Cloths 1 Application(s) Topical <User Schedule>  dextrose 5%. 1000 milliLiter(s) (50 mL/Hr) IV Continuous <Continuous>  dextrose 50% Injectable 12.5 Gram(s) IV Push once  dextrose 50% Injectable 25 Gram(s) IV Push once  dextrose 50% Injectable 25 Gram(s) IV Push once  fat emulsion (Plant Based) 20% Infusion 0.64 Gm/kG/Day (20.83 mL/Hr) IV Continuous <Continuous>  heparin  Injectable 5000 Unit(s) SubCutaneous every 8 hours  influenza   Vaccine 0.5 milliLiter(s) IntraMuscular once  insulin regular  human corrective regimen sliding scale   SubCutaneous every 6 hours  morphine PCA (1 mG/mL) 30 milliLiter(s) PCA Continuous PCA Continuous  pantoprazole  Injectable 40 milliGRAM(s) IV Push daily  Parenteral Nutrition - Adult 1 Each (100 mL/Hr) TPN Continuous <Continuous>  piperacillin/tazobactam IVPB.. 3.375 Gram(s) IV Intermittent every 6 hours    MEDICATIONS  (PRN):  dextrose 40% Gel 15 Gram(s) Oral once PRN Blood Glucose LESS THAN 70 milliGRAM(s)/deciliter  diazepam  Injectable 2 milliGRAM(s) IV Push every 8 hours PRN Severe muscle spasms  glucagon  Injectable 1 milliGRAM(s) IntraMuscular once PRN Glucose LESS THAN 70 milligrams/deciliter  ketorolac 0.5% Ophthalmic Solution 1 Drop(s) Left EYE every 6 hours PRN eye pain irritation  morphine  - Injectable 4 milliGRAM(s) IV Push every 2 hours PRN for breakthrough  naloxone Injectable 0.1 milliGRAM(s) IV Push every 3 minutes PRN For ANY of the following changes in patient status:  A. RR LESS THAN 10 breaths per minute, B. Oxygen saturation LESS THAN 90%, C. Sedation score of 6      Vital Signs Last 24 Hrs  T(C): 36.7 (19 Oct 2019 05:00), Max: 37.4 (18 Oct 2019 11:09)  T(F): 98.1 (19 Oct 2019 05:00), Max: 99.4 (18 Oct 2019 11:09)  HR: 80 (19 Oct 2019 03:33) (78 - 92)  BP: 131/85 (19 Oct 2019 03:33) (131/85 - 157/87)  BP(mean): 104 (19 Oct 2019 03:33) (98 - 113)  RR: 18 (19 Oct 2019 03:33) (14 - 19)  SpO2: 96% (19 Oct 2019 03:33) (96% - 100%)    PHYSICAL EXAM:      Constitutional: A&Ox3    Respiratory: non labored breathing, no respiratory distress    Cardiovascular: NSR, RRR    Gastrointestinal: abdomen is soft, tender to palpation near incision sites, mildly distended. Ostomy in place with some dark liquid output. JAKUB in place with ss output.                  Incision: c/d/i    Extremities: (-) edema                  I&O's Detail    18 Oct 2019 07:01  -  19 Oct 2019 07:00  --------------------------------------------------------  IN:    Solution: 100 mL    Solution: 200 mL    TPN (Total Parenteral Nutrition): 1300 mL  Total IN: 1600 mL    OUT:    Bulb: 185 mL    Colostomy: 3 mL    Drain: 700 mL    Voided: 2725 mL  Total OUT: 3613 mL    Total NET: -2013 mL          LABS:                        10.3   9.18  )-----------( 265      ( 19 Oct 2019 05:57 )             30.6     10-19    140  |  103  |  9   ----------------------------<  187<H>  3.4<L>   |  27  |  0.56    Ca    8.8      19 Oct 2019 05:57  Phos  3.8     10-19  Mg     2.0     10-19            RADIOLOGY & ADDITIONAL STUDIES:

## 2019-10-19 NOTE — PROGRESS NOTE ADULT - ASSESSMENT
A/P: 54M PMH HTN, HLD, rectal adenocarcinoma diagnosed 5/2018, underwent chemoXRT who presented for elective robotic assisted abdominal perineal resection (9/24), c/b SBO managed by NGT and TPN via PICC line but RTOR 10/14 for lap converted to open EDER with increasing lactate and hypotensive overnight, s/p RTOR 10/15 for1 L hematoma evacuation    - pain/nausea control  - PCA  - NPO/NGT  - TPN with 20units insulin via PICC line.   - Abx: Zosyn  - RLQ JAKUB drainx1  - L axillary PICC line for TPN  - PT/OT; PT ordered

## 2019-10-20 LAB
ANION GAP SERPL CALC-SCNC: 11 MMOL/L — SIGNIFICANT CHANGE UP (ref 5–17)
BUN SERPL-MCNC: 14 MG/DL — SIGNIFICANT CHANGE UP (ref 7–23)
CALCIUM SERPL-MCNC: 9.2 MG/DL — SIGNIFICANT CHANGE UP (ref 8.4–10.5)
CHLORIDE SERPL-SCNC: 103 MMOL/L — SIGNIFICANT CHANGE UP (ref 96–108)
CO2 SERPL-SCNC: 23 MMOL/L — SIGNIFICANT CHANGE UP (ref 22–31)
CREAT SERPL-MCNC: 0.71 MG/DL — SIGNIFICANT CHANGE UP (ref 0.5–1.3)
GLUCOSE BLDC GLUCOMTR-MCNC: 153 MG/DL — HIGH (ref 70–99)
GLUCOSE BLDC GLUCOMTR-MCNC: 160 MG/DL — HIGH (ref 70–99)
GLUCOSE BLDC GLUCOMTR-MCNC: 168 MG/DL — HIGH (ref 70–99)
GLUCOSE BLDC GLUCOMTR-MCNC: 168 MG/DL — HIGH (ref 70–99)
GLUCOSE SERPL-MCNC: 165 MG/DL — HIGH (ref 70–99)
HCT VFR BLD CALC: 32.6 % — LOW (ref 39–50)
HGB BLD-MCNC: 10.9 G/DL — LOW (ref 13–17)
MAGNESIUM SERPL-MCNC: 2 MG/DL — SIGNIFICANT CHANGE UP (ref 1.6–2.6)
MCHC RBC-ENTMCNC: 30.2 PG — SIGNIFICANT CHANGE UP (ref 27–34)
MCHC RBC-ENTMCNC: 33.4 GM/DL — SIGNIFICANT CHANGE UP (ref 32–36)
MCV RBC AUTO: 90.3 FL — SIGNIFICANT CHANGE UP (ref 80–100)
NRBC # BLD: 0 /100 WBCS — SIGNIFICANT CHANGE UP (ref 0–0)
PHOSPHATE SERPL-MCNC: 3.9 MG/DL — SIGNIFICANT CHANGE UP (ref 2.5–4.5)
PLATELET # BLD AUTO: 310 K/UL — SIGNIFICANT CHANGE UP (ref 150–400)
POTASSIUM SERPL-MCNC: 4.1 MMOL/L — SIGNIFICANT CHANGE UP (ref 3.5–5.3)
POTASSIUM SERPL-SCNC: 4.1 MMOL/L — SIGNIFICANT CHANGE UP (ref 3.5–5.3)
RBC # BLD: 3.61 M/UL — LOW (ref 4.2–5.8)
RBC # FLD: 14.9 % — HIGH (ref 10.3–14.5)
SODIUM SERPL-SCNC: 137 MMOL/L — SIGNIFICANT CHANGE UP (ref 135–145)
WBC # BLD: 10.71 K/UL — HIGH (ref 3.8–10.5)
WBC # FLD AUTO: 10.71 K/UL — HIGH (ref 3.8–10.5)

## 2019-10-20 RX ORDER — I.V. FAT EMULSION 20 G/100ML
0.65 EMULSION INTRAVENOUS
Qty: 50 | Refills: 0 | Status: DISCONTINUED | OUTPATIENT
Start: 2019-10-20 | End: 2019-10-20

## 2019-10-20 RX ORDER — ACETAMINOPHEN 500 MG
1000 TABLET ORAL ONCE
Refills: 0 | Status: COMPLETED | OUTPATIENT
Start: 2019-10-20 | End: 2019-10-22

## 2019-10-20 RX ORDER — MORPHINE SULFATE 50 MG/1
5 CAPSULE, EXTENDED RELEASE ORAL
Refills: 0 | Status: DISCONTINUED | OUTPATIENT
Start: 2019-10-20 | End: 2019-10-25

## 2019-10-20 RX ORDER — ELECTROLYTE SOLUTION,INJ
1 VIAL (ML) INTRAVENOUS
Refills: 0 | Status: DISCONTINUED | OUTPATIENT
Start: 2019-10-20 | End: 2019-10-20

## 2019-10-20 RX ADMIN — MORPHINE SULFATE 30 MILLILITER(S): 50 CAPSULE, EXTENDED RELEASE ORAL at 06:55

## 2019-10-20 RX ADMIN — PIPERACILLIN AND TAZOBACTAM 200 GRAM(S): 4; .5 INJECTION, POWDER, LYOPHILIZED, FOR SOLUTION INTRAVENOUS at 00:15

## 2019-10-20 RX ADMIN — Medication 100 EACH: at 17:24

## 2019-10-20 RX ADMIN — INSULIN HUMAN 2: 100 INJECTION, SOLUTION SUBCUTANEOUS at 06:38

## 2019-10-20 RX ADMIN — INSULIN HUMAN 2: 100 INJECTION, SOLUTION SUBCUTANEOUS at 23:34

## 2019-10-20 RX ADMIN — CHLORHEXIDINE GLUCONATE 1 APPLICATION(S): 213 SOLUTION TOPICAL at 04:46

## 2019-10-20 RX ADMIN — HEPARIN SODIUM 5000 UNIT(S): 5000 INJECTION INTRAVENOUS; SUBCUTANEOUS at 13:57

## 2019-10-20 RX ADMIN — PIPERACILLIN AND TAZOBACTAM 200 GRAM(S): 4; .5 INJECTION, POWDER, LYOPHILIZED, FOR SOLUTION INTRAVENOUS at 11:52

## 2019-10-20 RX ADMIN — INSULIN HUMAN 2: 100 INJECTION, SOLUTION SUBCUTANEOUS at 17:24

## 2019-10-20 RX ADMIN — HEPARIN SODIUM 5000 UNIT(S): 5000 INJECTION INTRAVENOUS; SUBCUTANEOUS at 22:55

## 2019-10-20 RX ADMIN — MORPHINE SULFATE 30 MILLILITER(S): 50 CAPSULE, EXTENDED RELEASE ORAL at 16:51

## 2019-10-20 RX ADMIN — PIPERACILLIN AND TAZOBACTAM 200 GRAM(S): 4; .5 INJECTION, POWDER, LYOPHILIZED, FOR SOLUTION INTRAVENOUS at 05:01

## 2019-10-20 RX ADMIN — PIPERACILLIN AND TAZOBACTAM 200 GRAM(S): 4; .5 INJECTION, POWDER, LYOPHILIZED, FOR SOLUTION INTRAVENOUS at 17:24

## 2019-10-20 RX ADMIN — INSULIN HUMAN 2: 100 INJECTION, SOLUTION SUBCUTANEOUS at 00:15

## 2019-10-20 RX ADMIN — HEPARIN SODIUM 5000 UNIT(S): 5000 INJECTION INTRAVENOUS; SUBCUTANEOUS at 05:01

## 2019-10-20 RX ADMIN — PIPERACILLIN AND TAZOBACTAM 200 GRAM(S): 4; .5 INJECTION, POWDER, LYOPHILIZED, FOR SOLUTION INTRAVENOUS at 23:33

## 2019-10-20 RX ADMIN — PANTOPRAZOLE SODIUM 40 MILLIGRAM(S): 20 TABLET, DELAYED RELEASE ORAL at 11:52

## 2019-10-20 RX ADMIN — I.V. FAT EMULSION 20.85 GM/KG/DAY: 20 EMULSION INTRAVENOUS at 23:36

## 2019-10-20 RX ADMIN — Medication 2 MILLIGRAM(S): at 13:57

## 2019-10-20 RX ADMIN — INSULIN HUMAN 2: 100 INJECTION, SOLUTION SUBCUTANEOUS at 11:52

## 2019-10-20 NOTE — PROGRESS NOTE ADULT - ASSESSMENT
Small amount of drainage via stoma.  NG residuals low   NG removed  Patient to remain NPO  Follow exam and output  Continues on TPN for time being.

## 2019-10-20 NOTE — PROGRESS NOTE ADULT - SUBJECTIVE AND OBJECTIVE BOX
INTERVAL HPI/OVERNIGHT EVENTS: Patient still complaining of some pain. No acute issues overnight, mildly tachycardic but otherwise stable.     STATUS POST:  abdominal perineal resection/lap converted to open EDER/hematoma evacuation    POST OPERATIVE DAY #: 26/6/5    MEDICATIONS  (STANDING):  chlorhexidine 2% Cloths 1 Application(s) Topical <User Schedule>  dextrose 5%. 1000 milliLiter(s) (50 mL/Hr) IV Continuous <Continuous>  dextrose 50% Injectable 12.5 Gram(s) IV Push once  dextrose 50% Injectable 25 Gram(s) IV Push once  dextrose 50% Injectable 25 Gram(s) IV Push once  fat emulsion (Plant Based) 20% Infusion 0.64 Gm/kG/Day (20.83 mL/Hr) IV Continuous <Continuous>  heparin  Injectable 5000 Unit(s) SubCutaneous every 8 hours  influenza   Vaccine 0.5 milliLiter(s) IntraMuscular once  insulin regular  human corrective regimen sliding scale   SubCutaneous every 6 hours  morphine PCA (1 mG/mL) 30 milliLiter(s) PCA Continuous PCA Continuous  pantoprazole  Injectable 40 milliGRAM(s) IV Push daily  Parenteral Nutrition - Adult 1 Each (100 mL/Hr) TPN Continuous <Continuous>  piperacillin/tazobactam IVPB.. 3.375 Gram(s) IV Intermittent every 6 hours    MEDICATIONS  (PRN):  dextrose 40% Gel 15 Gram(s) Oral once PRN Blood Glucose LESS THAN 70 milliGRAM(s)/deciliter  diazepam  Injectable 2 milliGRAM(s) IV Push every 8 hours PRN Severe muscle spasms  glucagon  Injectable 1 milliGRAM(s) IntraMuscular once PRN Glucose LESS THAN 70 milligrams/deciliter  ketorolac 0.5% Ophthalmic Solution 1 Drop(s) Left EYE every 6 hours PRN eye pain irritation  morphine  - Injectable 4 milliGRAM(s) IV Push every 2 hours PRN for breakthrough  naloxone Injectable 0.1 milliGRAM(s) IV Push every 3 minutes PRN For ANY of the following changes in patient status:  A. RR LESS THAN 10 breaths per minute, B. Oxygen saturation LESS THAN 90%, C. Sedation score of 6      Vital Signs Last 24 Hrs  T(C): 37.1 (20 Oct 2019 05:34), Max: 38 (19 Oct 2019 21:38)  T(F): 98.8 (20 Oct 2019 05:34), Max: 100.4 (19 Oct 2019 21:38)  HR: 102 (20 Oct 2019 04:10) (82 - 104)  BP: 108/67 (20 Oct 2019 04:10) (108/67 - 136/92)  BP(mean): 81 (20 Oct 2019 04:10) (81 - 110)  RR: 19 (20 Oct 2019 04:10) (16 - 19)  SpO2: 94% (20 Oct 2019 04:10) (94% - 100%)      PHYSICAL EXAM:    Constitutional: A&Ox3  HEENT: NGT in place with minimal output  Respiratory: non labored breathing, no respiratory distress  Cardiovascular: NSR, RRR  Gastrointestinal: Abdomen is soft, tender to palpation near incision sites, mildly distended. Ostomy in place with some dark liquid output. JAKUB in place with ss output, dressing with serosanguinous drainage, changed at bedside. Incisions c/d/i.  Extremities: WWP, (-) edema, SCDs in place.        I&O's Detail    19 Oct 2019 07:01  -  20 Oct 2019 07:00  --------------------------------------------------------  IN:    fat emulsion (Plant Based) 20% Infusion: 270.4 mL    fat emulsion (Plant Based) 20% Infusion: 62.4 mL    Solution: 200 mL    TPN (Total Parenteral Nutrition): 2500 mL  Total IN: 3032.8 mL    OUT:    Bulb: 130 mL    Voided: 1800 mL  Total OUT: 1930 mL    Total NET: 1102.8 mL        LABS:                   10-19    140  |  103  |  9   ----------------------------<  187<H>  3.4<L>   |  27  |  0.56    Ca    8.8      19 Oct 2019 05:57  Phos  3.8     10-19  Mg     2.0     10-19                          10.3   9.18  )-----------( 265      ( 19 Oct 2019 05:57 )             30.6         RADIOLOGY & ADDITIONAL STUDIES:

## 2019-10-20 NOTE — PROGRESS NOTE ADULT - SUBJECTIVE AND OBJECTIVE BOX
NG put out 200 ml after clamping x most of day yesterday and then after clamping overnight only 100 ml this AM.  Pt feels ok.  Hears some BS at times.  Voiding well  Some incisional pain.  NPO    Vital Signs Last 24 Hrs  T(C): 37.8 (20 Oct 2019 09:00), Max: 38 (19 Oct 2019 21:38)  T(F): 100 (20 Oct 2019 09:00), Max: 100.4 (19 Oct 2019 21:38)  HR: 108 (20 Oct 2019 08:25) (86 - 108)  BP: 109/70 (20 Oct 2019 08:25) (108/67 - 132/90)  BP(mean): 84 (20 Oct 2019 08:25) (81 - 104)  RR: 17 (20 Oct 2019 08:25) (16 - 19)  SpO2: 94% (20 Oct 2019 08:25) (94% - 100%)  lungs clear  Cor S1S2  abd: mild soft distension, BS+, active, stoma is fine with about 10 ml of liquid stool in it  incision clean and intact  ext: without calf tenderness                          10.9   10.71 )-----------( 310      ( 20 Oct 2019 08:09 )             32.6   10-20    137  |  103  |  14  ----------------------------<  165<H>  4.1   |  23  |  0.71    Ca    9.2      20 Oct 2019 08:09  Phos  3.9     10-20  Mg     2.0     10-20    UO adequate

## 2019-10-21 LAB
ALBUMIN SERPL ELPH-MCNC: 2.8 G/DL — LOW (ref 3.3–5)
ALP SERPL-CCNC: 170 U/L — HIGH (ref 40–120)
ALT FLD-CCNC: 33 U/L — SIGNIFICANT CHANGE UP (ref 10–45)
ANION GAP SERPL CALC-SCNC: 8 MMOL/L — SIGNIFICANT CHANGE UP (ref 5–17)
AST SERPL-CCNC: 27 U/L — SIGNIFICANT CHANGE UP (ref 10–40)
BILIRUB SERPL-MCNC: 1 MG/DL — SIGNIFICANT CHANGE UP (ref 0.2–1.2)
BUN SERPL-MCNC: 12 MG/DL — SIGNIFICANT CHANGE UP (ref 7–23)
CALCIUM SERPL-MCNC: 9.3 MG/DL — SIGNIFICANT CHANGE UP (ref 8.4–10.5)
CHLORIDE SERPL-SCNC: 103 MMOL/L — SIGNIFICANT CHANGE UP (ref 96–108)
CO2 SERPL-SCNC: 27 MMOL/L — SIGNIFICANT CHANGE UP (ref 22–31)
CREAT SERPL-MCNC: 0.68 MG/DL — SIGNIFICANT CHANGE UP (ref 0.5–1.3)
GLUCOSE BLDC GLUCOMTR-MCNC: 126 MG/DL — HIGH (ref 70–99)
GLUCOSE BLDC GLUCOMTR-MCNC: 149 MG/DL — HIGH (ref 70–99)
GLUCOSE BLDC GLUCOMTR-MCNC: 157 MG/DL — HIGH (ref 70–99)
GLUCOSE BLDC GLUCOMTR-MCNC: 165 MG/DL — HIGH (ref 70–99)
GLUCOSE SERPL-MCNC: 170 MG/DL — HIGH (ref 70–99)
HCT VFR BLD CALC: 32.4 % — LOW (ref 39–50)
HGB BLD-MCNC: 10.4 G/DL — LOW (ref 13–17)
MAGNESIUM SERPL-MCNC: 2.1 MG/DL — SIGNIFICANT CHANGE UP (ref 1.6–2.6)
MCHC RBC-ENTMCNC: 30.1 PG — SIGNIFICANT CHANGE UP (ref 27–34)
MCHC RBC-ENTMCNC: 32.1 GM/DL — SIGNIFICANT CHANGE UP (ref 32–36)
MCV RBC AUTO: 93.6 FL — SIGNIFICANT CHANGE UP (ref 80–100)
NRBC # BLD: 0 /100 WBCS — SIGNIFICANT CHANGE UP (ref 0–0)
PHOSPHATE SERPL-MCNC: 4.6 MG/DL — HIGH (ref 2.5–4.5)
PLATELET # BLD AUTO: 339 K/UL — SIGNIFICANT CHANGE UP (ref 150–400)
POTASSIUM SERPL-MCNC: 4.4 MMOL/L — SIGNIFICANT CHANGE UP (ref 3.5–5.3)
POTASSIUM SERPL-SCNC: 4.4 MMOL/L — SIGNIFICANT CHANGE UP (ref 3.5–5.3)
PREALB SERPL-MCNC: 21 MG/DL — SIGNIFICANT CHANGE UP (ref 20–40)
PROT SERPL-MCNC: 6.3 G/DL — SIGNIFICANT CHANGE UP (ref 6–8.3)
RBC # BLD: 3.46 M/UL — LOW (ref 4.2–5.8)
RBC # FLD: 14.7 % — HIGH (ref 10.3–14.5)
SODIUM SERPL-SCNC: 138 MMOL/L — SIGNIFICANT CHANGE UP (ref 135–145)
TRIGL SERPL-MCNC: 140 MG/DL — SIGNIFICANT CHANGE UP (ref 10–149)
WBC # BLD: 9.83 K/UL — SIGNIFICANT CHANGE UP (ref 3.8–10.5)
WBC # FLD AUTO: 9.83 K/UL — SIGNIFICANT CHANGE UP (ref 3.8–10.5)

## 2019-10-21 RX ORDER — ELECTROLYTE SOLUTION,INJ
1 VIAL (ML) INTRAVENOUS
Refills: 0 | Status: DISCONTINUED | OUTPATIENT
Start: 2019-10-21 | End: 2019-10-21

## 2019-10-21 RX ORDER — I.V. FAT EMULSION 20 G/100ML
0.65 EMULSION INTRAVENOUS
Qty: 50 | Refills: 0 | Status: DISCONTINUED | OUTPATIENT
Start: 2019-10-21 | End: 2019-10-21

## 2019-10-21 RX ADMIN — CHLORHEXIDINE GLUCONATE 1 APPLICATION(S): 213 SOLUTION TOPICAL at 05:13

## 2019-10-21 RX ADMIN — MORPHINE SULFATE 30 MILLILITER(S): 50 CAPSULE, EXTENDED RELEASE ORAL at 01:39

## 2019-10-21 RX ADMIN — HEPARIN SODIUM 5000 UNIT(S): 5000 INJECTION INTRAVENOUS; SUBCUTANEOUS at 13:45

## 2019-10-21 RX ADMIN — MORPHINE SULFATE 30 MILLILITER(S): 50 CAPSULE, EXTENDED RELEASE ORAL at 14:44

## 2019-10-21 RX ADMIN — PIPERACILLIN AND TAZOBACTAM 200 GRAM(S): 4; .5 INJECTION, POWDER, LYOPHILIZED, FOR SOLUTION INTRAVENOUS at 12:26

## 2019-10-21 RX ADMIN — HEPARIN SODIUM 5000 UNIT(S): 5000 INJECTION INTRAVENOUS; SUBCUTANEOUS at 05:36

## 2019-10-21 RX ADMIN — HEPARIN SODIUM 5000 UNIT(S): 5000 INJECTION INTRAVENOUS; SUBCUTANEOUS at 22:55

## 2019-10-21 RX ADMIN — I.V. FAT EMULSION 20.83 GM/KG/DAY: 20 EMULSION INTRAVENOUS at 22:55

## 2019-10-21 RX ADMIN — PANTOPRAZOLE SODIUM 40 MILLIGRAM(S): 20 TABLET, DELAYED RELEASE ORAL at 12:26

## 2019-10-21 RX ADMIN — PIPERACILLIN AND TAZOBACTAM 200 GRAM(S): 4; .5 INJECTION, POWDER, LYOPHILIZED, FOR SOLUTION INTRAVENOUS at 05:11

## 2019-10-21 RX ADMIN — Medication 100 EACH: at 18:58

## 2019-10-21 RX ADMIN — INSULIN HUMAN 2: 100 INJECTION, SOLUTION SUBCUTANEOUS at 12:26

## 2019-10-21 RX ADMIN — PIPERACILLIN AND TAZOBACTAM 200 GRAM(S): 4; .5 INJECTION, POWDER, LYOPHILIZED, FOR SOLUTION INTRAVENOUS at 17:44

## 2019-10-21 NOTE — PROGRESS NOTE ADULT - ASSESSMENT
A/P: 54M s/p APR reconstruction w/ L gracilis flap c/b SBO s/p EDER c/b intra-abdominal hematoma now recovering well (POD27)  - care per primary team  - keep perineum clean and dry  - activity as tolerated  - dvt ppx  - will cont to follow    Jose Chavez  PGY-5  Plastic Surgery

## 2019-10-21 NOTE — PROGRESS NOTE ADULT - SUBJECTIVE AND OBJECTIVE BOX
INTERVAL HPI/OVERNIGHT EVENTS: Pt still sinus tachy overnight.     STATUS POST:  abdominal perineal resection/lap converted to open EDER/hematoma evacuation    POST OPERATIVE DAY #: 27/7/6    MEDICATIONS  (STANDING):  acetaminophen  IVPB .. 1000 milliGRAM(s) IV Intermittent once  chlorhexidine 2% Cloths 1 Application(s) Topical <User Schedule>  fat emulsion (Plant Based) 20% Infusion 0.65 Gm/kG/Day (20.854 mL/Hr) IV Continuous <Continuous>  heparin  Injectable 5000 Unit(s) SubCutaneous every 8 hours  influenza   Vaccine 0.5 milliLiter(s) IntraMuscular once  insulin regular  human corrective regimen sliding scale   SubCutaneous every 6 hours  morphine PCA (1 mG/mL) 30 milliLiter(s) PCA Continuous PCA Continuous  pantoprazole  Injectable 40 milliGRAM(s) IV Push daily  Parenteral Nutrition - Adult 1 Each (100 mL/Hr) TPN Continuous <Continuous>  piperacillin/tazobactam IVPB.. 3.375 Gram(s) IV Intermittent every 6 hours    MEDICATIONS  (PRN):  dextrose 40% Gel 15 Gram(s) Oral once PRN Blood Glucose LESS THAN 70 milliGRAM(s)/deciliter  diazepam  Injectable 2 milliGRAM(s) IV Push every 8 hours PRN Severe muscle spasms  glucagon  Injectable 1 milliGRAM(s) IntraMuscular once PRN Glucose LESS THAN 70 milligrams/deciliter  ketorolac 0.5% Ophthalmic Solution 1 Drop(s) Left EYE every 6 hours PRN eye pain irritation  morphine  - Injectable 5 milliGRAM(s) IV Push every 2 hours PRN for breakthrough  naloxone Injectable 0.1 milliGRAM(s) IV Push every 3 minutes PRN For ANY of the following changes in patient status:  A. RR LESS THAN 10 breaths per minute, B. Oxygen saturation LESS THAN 90%, C. Sedation score of 6      Vital Signs Last 24 Hrs  T(C): 37.5 (21 Oct 2019 05:00), Max: 37.8 (20 Oct 2019 09:00)  T(F): 99.5 (21 Oct 2019 05:00), Max: 100 (20 Oct 2019 09:00)  HR: 98 (21 Oct 2019 04:57) (98 - 108)  BP: 117/75 (21 Oct 2019 04:57) (109/70 - 124/72)  BP(mean): 91 (21 Oct 2019 04:57) (84 - 91)  RR: 18 (21 Oct 2019 04:57) (17 - 18)  SpO2: 96% (21 Oct 2019 04:57) (94% - 98%)      PHYSICAL EXAM:  Constitutional: A&Ox3  HEENT: NGT in place with minimal output  Respiratory: non labored breathing, no respiratory distress  Cardiovascular: sinus tachycardia  Gastrointestinal: Abdomen is soft, tender to palpation near incision sites, mildly distended. Ostomy in place with some dark liquid output. JAKUB in place with ss output, dressing with serosanguinous drainage, changed at bedside. Incisions c/d/i.  Extremities: WWP, (-) edema, SCDs in place.          I&O's Detail    19 Oct 2019 07:01  -  20 Oct 2019 07:00  --------------------------------------------------------  IN:    fat emulsion (Plant Based) 20% Infusion: 62.4 mL    fat emulsion (Plant Based) 20% Infusion: 270.4 mL    Solution: 200 mL    TPN (Total Parenteral Nutrition): 2500 mL  Total IN: 3032.8 mL    OUT:    Bulb: 130 mL    Voided: 1800 mL  Total OUT: 1930 mL    Total NET: 1102.8 mL      20 Oct 2019 07:01  -  21 Oct 2019 06:26  --------------------------------------------------------  IN:    fat emulsion (Plant Based) 20% Infusion: 41.6 mL    fat emulsion (Plant Based) 20% Infusion: 189 mL    Solution: 400 mL    TPN (Total Parenteral Nutrition): 2300 mL  Total IN: 2930.6 mL    OUT:    Bulb: 130 mL    Colostomy: 100 mL    Voided: 1450 mL  Total OUT: 1680 mL    Total NET: 1250.6 mL        LABS:  10-21    138  |  103  |  12  ----------------------------<  170<H>  4.4   |  27  |  0.68    Ca    9.3      21 Oct 2019 05:37  Phos  4.6     10-21  Mg     2.1     10-21    TPro  6.3  /  Alb  2.8<L>  /  TBili  1.0  /  DBili  x   /  AST  27  /  ALT  33  /  AlkPhos  170<H>  10-21                        10.4   9.83  )-----------( 339      ( 21 Oct 2019 05:37 )             32.4 INTERVAL HPI/OVERNIGHT EVENTS: Pt still sinus tachy overnight however states he is doing well, pain still present but improved. No nausea, vomiting, chest pain, SOB.    STATUS POST:  abdominal perineal resection/lap converted to open EDER/hematoma evacuation    POST OPERATIVE DAY #: 27/7/6    MEDICATIONS  (STANDING):  acetaminophen  IVPB .. 1000 milliGRAM(s) IV Intermittent once  chlorhexidine 2% Cloths 1 Application(s) Topical <User Schedule>  fat emulsion (Plant Based) 20% Infusion 0.65 Gm/kG/Day (20.854 mL/Hr) IV Continuous <Continuous>  heparin  Injectable 5000 Unit(s) SubCutaneous every 8 hours  influenza   Vaccine 0.5 milliLiter(s) IntraMuscular once  insulin regular  human corrective regimen sliding scale   SubCutaneous every 6 hours  morphine PCA (1 mG/mL) 30 milliLiter(s) PCA Continuous PCA Continuous  pantoprazole  Injectable 40 milliGRAM(s) IV Push daily  Parenteral Nutrition - Adult 1 Each (100 mL/Hr) TPN Continuous <Continuous>  piperacillin/tazobactam IVPB.. 3.375 Gram(s) IV Intermittent every 6 hours    MEDICATIONS  (PRN):  dextrose 40% Gel 15 Gram(s) Oral once PRN Blood Glucose LESS THAN 70 milliGRAM(s)/deciliter  diazepam  Injectable 2 milliGRAM(s) IV Push every 8 hours PRN Severe muscle spasms  glucagon  Injectable 1 milliGRAM(s) IntraMuscular once PRN Glucose LESS THAN 70 milligrams/deciliter  ketorolac 0.5% Ophthalmic Solution 1 Drop(s) Left EYE every 6 hours PRN eye pain irritation  morphine  - Injectable 5 milliGRAM(s) IV Push every 2 hours PRN for breakthrough  naloxone Injectable 0.1 milliGRAM(s) IV Push every 3 minutes PRN For ANY of the following changes in patient status:  A. RR LESS THAN 10 breaths per minute, B. Oxygen saturation LESS THAN 90%, C. Sedation score of 6      Vital Signs Last 24 Hrs  T(C): 37.5 (21 Oct 2019 05:00), Max: 37.8 (20 Oct 2019 09:00)  T(F): 99.5 (21 Oct 2019 05:00), Max: 100 (20 Oct 2019 09:00)  HR: 98 (21 Oct 2019 04:57) (98 - 108)  BP: 117/75 (21 Oct 2019 04:57) (109/70 - 124/72)  BP(mean): 91 (21 Oct 2019 04:57) (84 - 91)  RR: 18 (21 Oct 2019 04:57) (17 - 18)  SpO2: 96% (21 Oct 2019 04:57) (94% - 98%)      PHYSICAL EXAM:  Constitutional: A&Ox3. Patient resting comfortably  Respiratory: non labored breathing, no respiratory distress  Cardiovascular: sinus tachycardia  Gastrointestinal: Abdomen is soft, tender to palpation near incision sites, mildly distended. Ostomy in place with some dark liquid output. JAKUB in place with ss output, dressing with minimal serosanguinous drainage. Midline incision stapled, c/d/i.  Extremities: WWP, (-) edema, SCDs in place.          I&O's Detail    19 Oct 2019 07:01  -  20 Oct 2019 07:00  --------------------------------------------------------  IN:    fat emulsion (Plant Based) 20% Infusion: 62.4 mL    fat emulsion (Plant Based) 20% Infusion: 270.4 mL    Solution: 200 mL    TPN (Total Parenteral Nutrition): 2500 mL  Total IN: 3032.8 mL    OUT:    Bulb: 130 mL    Voided: 1800 mL  Total OUT: 1930 mL    Total NET: 1102.8 mL      20 Oct 2019 07:01  -  21 Oct 2019 06:26  --------------------------------------------------------  IN:    fat emulsion (Plant Based) 20% Infusion: 41.6 mL    fat emulsion (Plant Based) 20% Infusion: 189 mL    Solution: 400 mL    TPN (Total Parenteral Nutrition): 2300 mL  Total IN: 2930.6 mL    OUT:    Bulb: 130 mL    Colostomy: 100 mL    Voided: 1450 mL  Total OUT: 1680 mL    Total NET: 1250.6 mL        LABS:  10-21    138  |  103  |  12  ----------------------------<  170<H>  4.4   |  27  |  0.68    Ca    9.3      21 Oct 2019 05:37  Phos  4.6     10-21  Mg     2.1     10-21    TPro  6.3  /  Alb  2.8<L>  /  TBili  1.0  /  DBili  x   /  AST  27  /  ALT  33  /  AlkPhos  170<H>  10-21                        10.4   9.83  )-----------( 339      ( 21 Oct 2019 05:37 )             32.4

## 2019-10-21 NOTE — PROGRESS NOTE ADULT - SUBJECTIVE AND OBJECTIVE BOX
Pain Management Progress Note - [facility] [phone]    HPI: Patient seen during morning rounds, in NAD. Continues to c/o abdominal pain, states pain is well managed on current regimen.       Pertinent PMH: Pain at: ___Back ___Neck___Knee ___Hip ___Shoulder ___ Opioid tolerance __x__Abdomen    Pain is __x_ sharp ____dull ___burning _x__achy ___ Intensity: ____ mild __x__mod ____severe     Location __x___surgical site _____cervical _____lumbar __x__abd _____upper ext____lower ext    Worse with __x__activity __x__movement _____physical therapy___ Rest    Improved with __x__medication _x___rest ____physical therapy    docusate sodium Liquid  potassium chloride  10 mEq/100 mL IVPB  ketorolac   Injectable  potassium phosphate IVPB  artificial  tears Solution  iohexol 300 mG (iodine)/mL Oral Solution  acetaminophen  IVPB ..  Parenteral Nutrition - Adult  famotidine Injectable  sodium chloride 0.9% lock flush  chlorhexidine 2% Cloths  chlorproMAZINE    IVPB  famotidine Injectable  famotidine Injectable  benzocaine 15 mG/menthol 3.6 mG Lozenge  Parenteral Nutrition - Adult  Parenteral Nutrition - Adult  benzocaine 15 mG/menthol 3.6 mG (Sugar-Free) Lozenge  HYDROmorphone  Injectable  HYDROmorphone  Injectable  acetaminophen   Tablet ..  Parenteral Nutrition - Adult  fat emulsion (Plant Based) 20% Infusion  docusate sodium Liquid  Parenteral Nutrition - Adult  fat emulsion (Plant Based) 20% Infusion  acetaminophen    Suspension ..  Parenteral Nutrition - Adult  fat emulsion (Plant Based) 20% Infusion  simethicone  Parenteral Nutrition - Adult  fat emulsion (Plant Based) 20% Infusion  Parenteral Nutrition - Adult  (ADM OVERRIDE)  acetaminophen   Tablet ..  HYDROmorphone  Injectable  Parenteral Nutrition - Adult  fat emulsion (Plant Based) 20% Infusion  HYDROmorphone  Injectable  ondansetron Injectable  enoxaparin Injectable  cefTRIAXone   IVPB  metroNIDAZOLE  IVPB  acetaminophen  IVPB ..    ROS: Const:  _-__febrile   Eyes:___ENT:___CV: __-_chest pain  Resp: _-___sob  GI:___nausea ___vomiting __x__abd pain ___npo ___clears ___full diet __bm  :___ [place]: __x_pain ___spasm  Skin:___ Neuro:  ___sedation___confusion____ numbness ___weakness ___paresthesia  Psych:___anxiety  Endo:___ Heme:___Allergy:___  __x__ all other systems reviewed and negative       10-21 @ 05:19678 mL/min/1.73M2      Hemoglobin: 10.4 g/dL (10-21 @ 05:37)  Hemoglobin: 10.9 g/dL (10-20 @ 08:09)      T(C): 36.8 (10-21-19 @ 09:07), Max: 37.5 (10-21-19 @ 05:00)  HR: 106 (10-21-19 @ 12:22) (94 - 106)  BP: 104/73 (10-21-19 @ 12:22) (104/73 - 124/72)  RR: 17 (10-21-19 @ 12:22) (17 - 18)  SpO2: 95% (10-21-19 @ 12:22) (94% - 98%)  Wt(kg): --     PHYSICAL EXAM:  Gen Appearance: _x__no acute distress _x__appropriate       Neuro: _x__SILT feet____ EOM Intact Psych: AAOX_3_, __x_mood/affect appropriate        Eyes: _x__conjunctiva WNL  _____ Pupils equal and round        ENT: __x_ears and nose atraumatic__x_ Hearing grossly intact        Neck: __x_trachea midline, no visible masses ___thyroid without palpable mass    Resp: _x__Nml WOB____No tactile fremitus ___clear to auscultation    Cardio: __x_extremities free from edema _x___pedal pulses palpable    GI/Abdomen: _x__soft ___x__ Nontender____x__Nondistended_____HSM    Lymphatic: ___no palpable nodes in neck  ___no palpable nodes calves and feet    Skin/Wound: _x__mid line Incision clean with staples, _x__Dressing c/d/i,   __x__surrounding tissues soft,  ___drain/chest tube present____ __x__choleostomy bag intact    Muscular: EHL _x__/5  Gastrocnemius__x_/5    __x_absent clubbing/cyanosis         ASSESSMENT:  This is a 54y old Male with a history of:  C20  Handoff  MEWS Score  HLD (hyperlipidemia)  HTN (hypertension)  Colon cancer  Rectal cancer  Hemorrhage  SBO (small bowel obstruction)  Rectal cancer  Hemorrhage  SBO (small bowel obstruction)  Rectal cancer  Control of hemorrhage, abdomen, postoperative  Abdominal washout  Washout, abdominal cavity  Exploratory laparotomy  Diagnostic laparoscopy  Laparoscopy-assisted abdominoperineal resection (APR) of rectum  Other elective surgery        Recommended Treatment PLAN:    1. Continue current regimen as patient is tolerating and responding well.   Plan discussed with Dr. Morales    Patient seen, evaluated and examined by attending physician, Dr. Morales.  PA active as scribe.

## 2019-10-21 NOTE — PROGRESS NOTE ADULT - SUBJECTIVE AND OBJECTIVE BOX
Plastic Surgery Progress Note (pg [facility]: [id number], [facility]: [phone], [facility]: [phone])    SUBJECTIVE:  Doing well. No overnight events. Feeling better.     OBJECTIVE:     ** VITAL SIGNS / I&O's **    Vital Signs Last 24 Hrs  T(C): 37.5 (21 Oct 2019 05:00), Max: 37.8 (20 Oct 2019 09:00)  T(F): 99.5 (21 Oct 2019 05:00), Max: 100 (20 Oct 2019 09:00)  HR: 98 (21 Oct 2019 04:57) (98 - 108)  BP: 117/75 (21 Oct 2019 04:57) (109/70 - 124/72)  BP(mean): 91 (21 Oct 2019 04:57) (84 - 91)  RR: 18 (21 Oct 2019 04:57) (17 - 18)  SpO2: 96% (21 Oct 2019 04:57) (94% - 98%)      20 Oct 2019 07:01  -  21 Oct 2019 07:00  --------------------------------------------------------  IN:    fat emulsion (Plant Based) 20% Infusion: 189 mL    fat emulsion (Plant Based) 20% Infusion: 41.6 mL    Solution: 400 mL    TPN (Total Parenteral Nutrition): 2300 mL  Total IN: 2930.6 mL    OUT:    Bulb: 130 mL    Colostomy: 100 mL    Voided: 1450 mL  Total OUT: 1680 mL    Total NET: 1250.6 mL          ** PHYSICAL EXAM **    -- CONSTITUTIONAL: AOx3. NAD.   -- ABDOMEN: Less distended, incision c/d/i w/ staples in place, JAKUB ss  -- PERINEUM: incision c/d/i, no collections, no signs of infection  -- EXTREMITIES: L thigh incision c/d/i, no collections      **MEDS**  acetaminophen  IVPB .. 1000 milliGRAM(s) IV Intermittent once  chlorhexidine 2% Cloths 1 Application(s) Topical <User Schedule>  dextrose 40% Gel 15 Gram(s) Oral once PRN  dextrose 5%. 1000 milliLiter(s) IV Continuous <Continuous>  dextrose 50% Injectable 12.5 Gram(s) IV Push once  dextrose 50% Injectable 25 Gram(s) IV Push once  dextrose 50% Injectable 25 Gram(s) IV Push once  diazepam  Injectable 2 milliGRAM(s) IV Push every 8 hours PRN  fat emulsion (Plant Based) 20% Infusion 0.65 Gm/kG/Day IV Continuous <Continuous>  glucagon  Injectable 1 milliGRAM(s) IntraMuscular once PRN  heparin  Injectable 5000 Unit(s) SubCutaneous every 8 hours  influenza   Vaccine 0.5 milliLiter(s) IntraMuscular once  insulin regular  human corrective regimen sliding scale   SubCutaneous every 6 hours  ketorolac 0.5% Ophthalmic Solution 1 Drop(s) Left EYE every 6 hours PRN  morphine  - Injectable 5 milliGRAM(s) IV Push every 2 hours PRN  morphine PCA (1 mG/mL) 30 milliLiter(s) PCA Continuous PCA Continuous  naloxone Injectable 0.1 milliGRAM(s) IV Push every 3 minutes PRN  pantoprazole  Injectable 40 milliGRAM(s) IV Push daily  Parenteral Nutrition - Adult 1 Each TPN Continuous <Continuous>  piperacillin/tazobactam IVPB.. 3.375 Gram(s) IV Intermittent every 6 hours      ** LABS **                          10.4   9.83  )-----------( 339      ( 21 Oct 2019 05:37 )             32.4     21 Oct 2019 05:37    138    |  103    |  12     ----------------------------<  170    4.4     |  27     |  0.68     Ca    9.3        21 Oct 2019 05:37  Phos  4.6       21 Oct 2019 05:37  Mg     2.1       21 Oct 2019 05:37    TPro  6.3    /  Alb  2.8    /  TBili  1.0    /  DBili  x      /  AST  27     /  ALT  33     /  AlkPhos  170    21 Oct 2019 05:37      CAPILLARY BLOOD GLUCOSE      POCT Blood Glucose.: 165 mg/dL (21 Oct 2019 06:51)  POCT Blood Glucose.: 160 mg/dL (20 Oct 2019 16:43)  POCT Blood Glucose.: 168 mg/dL (20 Oct 2019 11:19)

## 2019-10-21 NOTE — PROGRESS NOTE ADULT - ASSESSMENT
A/P: 54M PMH HTN, HLD, rectal adenocarcinoma diagnosed 5/2018, underwent chemoXRT who presented for elective robotic assisted abdominal perineal resection (9/24), c/b SBO managed by NGT and TPN via PICC line but RTOR 10/14 for lap converted to open EDER with increasing lactate and hypotensive overnight, s/p RTOR 10/15 for1 L hematoma evacuation    - pain/nausea control  - PCA  - NPO/NGT  - TPN with 20units insulin via PICC line.   - Abx: Zosyn  - RLQ JAKUB drainx1  - L axillary PICC line for TPN  - PT/OT; PT ordered A/P: 54M PMH HTN, HLD, rectal adenocarcinoma diagnosed 5/2018, underwent chemoXRT who presented for elective robotic assisted abdominal perineal resection (9/24), c/b SBO managed by NGT and TPN via PICC line but RTOR 10/14 for lap converted to open EDER with increasing lactate and hypotensive overnight, s/p RTOR 10/15 for1 L hematoma evacuation    - pain/nausea control  - PCA  - NPO, poss advance later today  - TPN with 20units insulin via PICC line.   - Abx: Zosyn  - RLQ JAKUB drainx1  - L axillary PICC line for TPN  - PT/OT; PT ordered

## 2019-10-22 LAB
ANION GAP SERPL CALC-SCNC: 10 MMOL/L — SIGNIFICANT CHANGE UP (ref 5–17)
BUN SERPL-MCNC: 13 MG/DL — SIGNIFICANT CHANGE UP (ref 7–23)
CALCIUM SERPL-MCNC: 9.1 MG/DL — SIGNIFICANT CHANGE UP (ref 8.4–10.5)
CHLORIDE SERPL-SCNC: 102 MMOL/L — SIGNIFICANT CHANGE UP (ref 96–108)
CO2 SERPL-SCNC: 25 MMOL/L — SIGNIFICANT CHANGE UP (ref 22–31)
CREAT SERPL-MCNC: 0.57 MG/DL — SIGNIFICANT CHANGE UP (ref 0.5–1.3)
GLUCOSE BLDC GLUCOMTR-MCNC: 143 MG/DL — HIGH (ref 70–99)
GLUCOSE BLDC GLUCOMTR-MCNC: 159 MG/DL — HIGH (ref 70–99)
GLUCOSE BLDC GLUCOMTR-MCNC: 176 MG/DL — HIGH (ref 70–99)
GLUCOSE SERPL-MCNC: 165 MG/DL — HIGH (ref 70–99)
HCT VFR BLD CALC: 33 % — LOW (ref 39–50)
HGB BLD-MCNC: 10.8 G/DL — LOW (ref 13–17)
MAGNESIUM SERPL-MCNC: 2 MG/DL — SIGNIFICANT CHANGE UP (ref 1.6–2.6)
MCHC RBC-ENTMCNC: 30.4 PG — SIGNIFICANT CHANGE UP (ref 27–34)
MCHC RBC-ENTMCNC: 32.7 GM/DL — SIGNIFICANT CHANGE UP (ref 32–36)
MCV RBC AUTO: 93 FL — SIGNIFICANT CHANGE UP (ref 80–100)
NRBC # BLD: 0 /100 WBCS — SIGNIFICANT CHANGE UP (ref 0–0)
PHOSPHATE SERPL-MCNC: 3.4 MG/DL — SIGNIFICANT CHANGE UP (ref 2.5–4.5)
PLATELET # BLD AUTO: 364 K/UL — SIGNIFICANT CHANGE UP (ref 150–400)
POTASSIUM SERPL-MCNC: 4.2 MMOL/L — SIGNIFICANT CHANGE UP (ref 3.5–5.3)
POTASSIUM SERPL-SCNC: 4.2 MMOL/L — SIGNIFICANT CHANGE UP (ref 3.5–5.3)
RBC # BLD: 3.55 M/UL — LOW (ref 4.2–5.8)
RBC # FLD: 14.6 % — HIGH (ref 10.3–14.5)
SODIUM SERPL-SCNC: 137 MMOL/L — SIGNIFICANT CHANGE UP (ref 135–145)
TRIGL SERPL-MCNC: 161 MG/DL — HIGH (ref 10–149)
WBC # BLD: 9.42 K/UL — SIGNIFICANT CHANGE UP (ref 3.8–10.5)
WBC # FLD AUTO: 9.42 K/UL — SIGNIFICANT CHANGE UP (ref 3.8–10.5)

## 2019-10-22 RX ORDER — CYCLOBENZAPRINE HYDROCHLORIDE 10 MG/1
5 TABLET, FILM COATED ORAL EVERY 8 HOURS
Refills: 0 | Status: DISCONTINUED | OUTPATIENT
Start: 2019-10-22 | End: 2019-10-24

## 2019-10-22 RX ORDER — ELECTROLYTE SOLUTION,INJ
1 VIAL (ML) INTRAVENOUS
Refills: 0 | Status: DISCONTINUED | OUTPATIENT
Start: 2019-10-22 | End: 2019-10-22

## 2019-10-22 RX ORDER — I.V. FAT EMULSION 20 G/100ML
0.65 EMULSION INTRAVENOUS
Qty: 50 | Refills: 0 | Status: DISCONTINUED | OUTPATIENT
Start: 2019-10-22 | End: 2019-10-22

## 2019-10-22 RX ADMIN — PIPERACILLIN AND TAZOBACTAM 200 GRAM(S): 4; .5 INJECTION, POWDER, LYOPHILIZED, FOR SOLUTION INTRAVENOUS at 00:28

## 2019-10-22 RX ADMIN — Medication 400 MILLIGRAM(S): at 00:42

## 2019-10-22 RX ADMIN — INSULIN HUMAN 2: 100 INJECTION, SOLUTION SUBCUTANEOUS at 07:18

## 2019-10-22 RX ADMIN — I.V. FAT EMULSION 20.85 GM/KG/DAY: 20 EMULSION INTRAVENOUS at 22:00

## 2019-10-22 RX ADMIN — PANTOPRAZOLE SODIUM 40 MILLIGRAM(S): 20 TABLET, DELAYED RELEASE ORAL at 11:17

## 2019-10-22 RX ADMIN — CHLORHEXIDINE GLUCONATE 1 APPLICATION(S): 213 SOLUTION TOPICAL at 07:19

## 2019-10-22 RX ADMIN — MORPHINE SULFATE 30 MILLILITER(S): 50 CAPSULE, EXTENDED RELEASE ORAL at 15:26

## 2019-10-22 RX ADMIN — CYCLOBENZAPRINE HYDROCHLORIDE 5 MILLIGRAM(S): 10 TABLET, FILM COATED ORAL at 11:18

## 2019-10-22 RX ADMIN — HEPARIN SODIUM 5000 UNIT(S): 5000 INJECTION INTRAVENOUS; SUBCUTANEOUS at 21:54

## 2019-10-22 RX ADMIN — MORPHINE SULFATE 30 MILLILITER(S): 50 CAPSULE, EXTENDED RELEASE ORAL at 07:19

## 2019-10-22 RX ADMIN — HEPARIN SODIUM 5000 UNIT(S): 5000 INJECTION INTRAVENOUS; SUBCUTANEOUS at 07:19

## 2019-10-22 RX ADMIN — PIPERACILLIN AND TAZOBACTAM 200 GRAM(S): 4; .5 INJECTION, POWDER, LYOPHILIZED, FOR SOLUTION INTRAVENOUS at 17:26

## 2019-10-22 RX ADMIN — PIPERACILLIN AND TAZOBACTAM 200 GRAM(S): 4; .5 INJECTION, POWDER, LYOPHILIZED, FOR SOLUTION INTRAVENOUS at 07:19

## 2019-10-22 RX ADMIN — HEPARIN SODIUM 5000 UNIT(S): 5000 INJECTION INTRAVENOUS; SUBCUTANEOUS at 13:57

## 2019-10-22 RX ADMIN — PIPERACILLIN AND TAZOBACTAM 200 GRAM(S): 4; .5 INJECTION, POWDER, LYOPHILIZED, FOR SOLUTION INTRAVENOUS at 11:17

## 2019-10-22 RX ADMIN — INSULIN HUMAN 2: 100 INJECTION, SOLUTION SUBCUTANEOUS at 12:01

## 2019-10-22 RX ADMIN — Medication 1 EACH: at 17:26

## 2019-10-22 NOTE — PROGRESS NOTE ADULT - SUBJECTIVE AND OBJECTIVE BOX
INTERVAL HPI/OVERNIGHT EVENTS: Pt still sinus tachy overnight however states he is doing well, pain still present but improved. No nausea, vomiting, chest pain, SOB.    STATUS POST:  abdominal perineal resection/lap converted to open EDER/hematoma evacuation    POST OPERATIVE DAY #: 28/8/7      MEDICATIONS  (STANDING):  chlorhexidine 2% Cloths 1 Application(s) Topical <User Schedule>  dextrose 5%. 1000 milliLiter(s) (50 mL/Hr) IV Continuous <Continuous>  dextrose 50% Injectable 12.5 Gram(s) IV Push once  dextrose 50% Injectable 25 Gram(s) IV Push once  dextrose 50% Injectable 25 Gram(s) IV Push once  fat emulsion (Plant Based) 20% Infusion 0.65 Gm/kG/Day (20.83 mL/Hr) IV Continuous <Continuous>  heparin  Injectable 5000 Unit(s) SubCutaneous every 8 hours  influenza   Vaccine 0.5 milliLiter(s) IntraMuscular once  insulin regular  human corrective regimen sliding scale   SubCutaneous every 6 hours  morphine PCA (1 mG/mL) 30 milliLiter(s) PCA Continuous PCA Continuous  pantoprazole  Injectable 40 milliGRAM(s) IV Push daily  Parenteral Nutrition - Adult 1 Each (100 mL/Hr) TPN Continuous <Continuous>  piperacillin/tazobactam IVPB.. 3.375 Gram(s) IV Intermittent every 6 hours    MEDICATIONS  (PRN):  dextrose 40% Gel 15 Gram(s) Oral once PRN Blood Glucose LESS THAN 70 milliGRAM(s)/deciliter  diazepam  Injectable 2 milliGRAM(s) IV Push every 8 hours PRN Severe muscle spasms  glucagon  Injectable 1 milliGRAM(s) IntraMuscular once PRN Glucose LESS THAN 70 milligrams/deciliter  ketorolac 0.5% Ophthalmic Solution 1 Drop(s) Left EYE every 6 hours PRN eye pain irritation  morphine  - Injectable 5 milliGRAM(s) IV Push every 2 hours PRN for breakthrough  naloxone Injectable 0.1 milliGRAM(s) IV Push every 3 minutes PRN For ANY of the following changes in patient status:  A. RR LESS THAN 10 breaths per minute, B. Oxygen saturation LESS THAN 90%, C. Sedation score of 6      Vital Signs Last 24 Hrs  T(C): 36.7 (22 Oct 2019 04:31), Max: 37.2 (21 Oct 2019 17:00)  T(F): 98.1 (22 Oct 2019 04:31), Max: 99 (21 Oct 2019 22:21)  HR: 88 (22 Oct 2019 03:29) (88 - 106)  BP: 128/73 (22 Oct 2019 03:29) (104/73 - 131/72)  BP(mean): 91 (22 Oct 2019 03:29) (85 - 99)  RR: 18 (22 Oct 2019 03:29) (17 - 20)  SpO2: 95% (22 Oct 2019 03:29) (95% - 97%)      PHYSICAL EXAM:  Constitutional: A&Ox3. Patient resting comfortably  Respiratory: non labored breathing, no respiratory distress  Cardiovascular: sinus tachycardia  Gastrointestinal: Abdomen is soft, tender to palpation near incision sites, mildly distended. Ostomy in place with some dark liquid output. JAKUB in place with ss output, dressing with minimal serosanguinous drainage. Midline incision stapled, c/d/i.  Extremities: WWP, (-) edema, SCDs in place.          I&O's Detail    20 Oct 2019 07:01  -  21 Oct 2019 07:00  --------------------------------------------------------  IN:    fat emulsion (Plant Based) 20% Infusion: 189 mL    fat emulsion (Plant Based) 20% Infusion: 41.6 mL    Solution: 400 mL    TPN (Total Parenteral Nutrition): 2300 mL  Total IN: 2930.6 mL    OUT:    Bulb: 130 mL    Colostomy: 100 mL    Voided: 1450 mL  Total OUT: 1680 mL    Total NET: 1250.6 mL      21 Oct 2019 07:01  -  22 Oct 2019 06:13  --------------------------------------------------------  IN:    fat emulsion (Plant Based) 20% Infusion: 62.4 mL    Solution: 100 mL    TPN (Total Parenteral Nutrition): 1200 mL  Total IN: 1362.4 mL    OUT:    Bulb: 50 mL    Colostomy: 30 mL    Voided: 1550 mL  Total OUT: 1630 mL    Total NET: -267.6 mL            LABS: INTERVAL HPI/OVERNIGHT EVENTS: Pt doing well overnight, however pain slightly worse this morning. No nausea, vomiting, chest pain, SOB.    STATUS POST:  abdominal perineal resection/lap converted to open EDER/hematoma evacuation    POST OPERATIVE DAY #: 28/8/7      MEDICATIONS  (STANDING):  chlorhexidine 2% Cloths 1 Application(s) Topical <User Schedule>  dextrose 5%. 1000 milliLiter(s) (50 mL/Hr) IV Continuous <Continuous>  dextrose 50% Injectable 12.5 Gram(s) IV Push once  dextrose 50% Injectable 25 Gram(s) IV Push once  dextrose 50% Injectable 25 Gram(s) IV Push once  fat emulsion (Plant Based) 20% Infusion 0.65 Gm/kG/Day (20.83 mL/Hr) IV Continuous <Continuous>  heparin  Injectable 5000 Unit(s) SubCutaneous every 8 hours  influenza   Vaccine 0.5 milliLiter(s) IntraMuscular once  insulin regular  human corrective regimen sliding scale   SubCutaneous every 6 hours  morphine PCA (1 mG/mL) 30 milliLiter(s) PCA Continuous PCA Continuous  pantoprazole  Injectable 40 milliGRAM(s) IV Push daily  Parenteral Nutrition - Adult 1 Each (100 mL/Hr) TPN Continuous <Continuous>  piperacillin/tazobactam IVPB.. 3.375 Gram(s) IV Intermittent every 6 hours    MEDICATIONS  (PRN):  dextrose 40% Gel 15 Gram(s) Oral once PRN Blood Glucose LESS THAN 70 milliGRAM(s)/deciliter  diazepam  Injectable 2 milliGRAM(s) IV Push every 8 hours PRN Severe muscle spasms  glucagon  Injectable 1 milliGRAM(s) IntraMuscular once PRN Glucose LESS THAN 70 milligrams/deciliter  ketorolac 0.5% Ophthalmic Solution 1 Drop(s) Left EYE every 6 hours PRN eye pain irritation  morphine  - Injectable 5 milliGRAM(s) IV Push every 2 hours PRN for breakthrough  naloxone Injectable 0.1 milliGRAM(s) IV Push every 3 minutes PRN For ANY of the following changes in patient status:  A. RR LESS THAN 10 breaths per minute, B. Oxygen saturation LESS THAN 90%, C. Sedation score of 6      Vital Signs Last 24 Hrs  T(C): 36.7 (22 Oct 2019 04:31), Max: 37.2 (21 Oct 2019 17:00)  T(F): 98.1 (22 Oct 2019 04:31), Max: 99 (21 Oct 2019 22:21)  HR: 88 (22 Oct 2019 03:29) (88 - 106)  BP: 128/73 (22 Oct 2019 03:29) (104/73 - 131/72)  BP(mean): 91 (22 Oct 2019 03:29) (85 - 99)  RR: 18 (22 Oct 2019 03:29) (17 - 20)  SpO2: 95% (22 Oct 2019 03:29) (95% - 97%)      PHYSICAL EXAM:  Constitutional: A&Ox3. Patient resting comfortably  Respiratory: non labored breathing, no respiratory distress  Cardiovascular: sinus tachycardia  Gastrointestinal: Abdomen is soft, tender to palpation near incision sites, mildly distended. Ostomy in place with some dark liquid output. JAKUB in place with ss output, dressing with minimal serosanguinous drainage. Midline incision c/d/i.  Extremities: WWP, (-) edema, SCDs in place.        I&O's Detail    20 Oct 2019 07:01  -  21 Oct 2019 07:00  --------------------------------------------------------  IN:    fat emulsion (Plant Based) 20% Infusion: 189 mL    fat emulsion (Plant Based) 20% Infusion: 41.6 mL    Solution: 400 mL    TPN (Total Parenteral Nutrition): 2300 mL  Total IN: 2930.6 mL    OUT:    Bulb: 130 mL    Colostomy: 100 mL    Voided: 1450 mL  Total OUT: 1680 mL    Total NET: 1250.6 mL      21 Oct 2019 07:01  -  22 Oct 2019 06:13  --------------------------------------------------------  IN:    fat emulsion (Plant Based) 20% Infusion: 62.4 mL    Solution: 100 mL    TPN (Total Parenteral Nutrition): 1200 mL  Total IN: 1362.4 mL    OUT:    Bulb: 50 mL    Colostomy: 30 mL    Voided: 1550 mL  Total OUT: 1630 mL    Total NET: -267.6 mL            LABS:  10-21    138  |  103  |  12  ----------------------------<  170<H>  4.4   |  27  |  0.68    Ca    9.3      21 Oct 2019 05:37  Phos  4.6     10-21  Mg     2.1     10-21    TPro  6.3  /  Alb  2.8<L>  /  TBili  1.0  /  DBili  x   /  AST  27  /  ALT  33  /  AlkPhos  170<H>  10-21                            10.4   9.83  )-----------( 339      ( 21 Oct 2019 05:37 )             32.4 INTERVAL HPI/OVERNIGHT EVENTS: Pt doing well overnight, however pain slightly worse this morning. No nausea, vomiting, chest pain, SOB.    STATUS POST:  abdominal perineal resection/lap converted to open EDER/hematoma evacuation    POST OPERATIVE DAY #: 28/8/7      MEDICATIONS  (STANDING):  chlorhexidine 2% Cloths 1 Application(s) Topical <User Schedule>  dextrose 5%. 1000 milliLiter(s) (50 mL/Hr) IV Continuous <Continuous>  dextrose 50% Injectable 12.5 Gram(s) IV Push once  dextrose 50% Injectable 25 Gram(s) IV Push once  dextrose 50% Injectable 25 Gram(s) IV Push once  fat emulsion (Plant Based) 20% Infusion 0.65 Gm/kG/Day (20.83 mL/Hr) IV Continuous <Continuous>  heparin  Injectable 5000 Unit(s) SubCutaneous every 8 hours  influenza   Vaccine 0.5 milliLiter(s) IntraMuscular once  insulin regular  human corrective regimen sliding scale   SubCutaneous every 6 hours  morphine PCA (1 mG/mL) 30 milliLiter(s) PCA Continuous PCA Continuous  pantoprazole  Injectable 40 milliGRAM(s) IV Push daily  Parenteral Nutrition - Adult 1 Each (100 mL/Hr) TPN Continuous <Continuous>  piperacillin/tazobactam IVPB.. 3.375 Gram(s) IV Intermittent every 6 hours    MEDICATIONS  (PRN):  dextrose 40% Gel 15 Gram(s) Oral once PRN Blood Glucose LESS THAN 70 milliGRAM(s)/deciliter  diazepam  Injectable 2 milliGRAM(s) IV Push every 8 hours PRN Severe muscle spasms  glucagon  Injectable 1 milliGRAM(s) IntraMuscular once PRN Glucose LESS THAN 70 milligrams/deciliter  ketorolac 0.5% Ophthalmic Solution 1 Drop(s) Left EYE every 6 hours PRN eye pain irritation  morphine  - Injectable 5 milliGRAM(s) IV Push every 2 hours PRN for breakthrough  naloxone Injectable 0.1 milliGRAM(s) IV Push every 3 minutes PRN For ANY of the following changes in patient status:  A. RR LESS THAN 10 breaths per minute, B. Oxygen saturation LESS THAN 90%, C. Sedation score of 6      Vital Signs Last 24 Hrs  T(C): 36.7 (22 Oct 2019 04:31), Max: 37.2 (21 Oct 2019 17:00)  T(F): 98.1 (22 Oct 2019 04:31), Max: 99 (21 Oct 2019 22:21)  HR: 88 (22 Oct 2019 03:29) (88 - 106)  BP: 128/73 (22 Oct 2019 03:29) (104/73 - 131/72)  BP(mean): 91 (22 Oct 2019 03:29) (85 - 99)  RR: 18 (22 Oct 2019 03:29) (17 - 20)  SpO2: 95% (22 Oct 2019 03:29) (95% - 97%)      PHYSICAL EXAM:  Constitutional: A&Ox3. Patient resting comfortably  Respiratory: non labored breathing, no respiratory distress  Cardiovascular: sinus tachycardia  Gastrointestinal: Abdomen is soft, tender to palpation near incision sites, mildly distended. Ostomy in place with some dark liquid output. JAKUB in place with ss output, dressing with minimal serosanguinous drainage. Midline incision c/d/i.  Extremities: WWP, (-) edema, SCDs in place.        I&O's Detail    20 Oct 2019 07:01  -  21 Oct 2019 07:00  --------------------------------------------------------  IN:    fat emulsion (Plant Based) 20% Infusion: 189 mL    fat emulsion (Plant Based) 20% Infusion: 41.6 mL    Solution: 400 mL    TPN (Total Parenteral Nutrition): 2300 mL  Total IN: 2930.6 mL    OUT:    Bulb: 130 mL    Colostomy: 100 mL    Voided: 1450 mL  Total OUT: 1680 mL    Total NET: 1250.6 mL      21 Oct 2019 07:01  -  22 Oct 2019 06:13  --------------------------------------------------------  IN:    fat emulsion (Plant Based) 20% Infusion: 62.4 mL    Solution: 100 mL    TPN (Total Parenteral Nutrition): 1200 mL  Total IN: 1362.4 mL    OUT:    Bulb: 50 mL    Colostomy: 30 mL    Voided: 1550 mL  Total OUT: 1630 mL    Total NET: -267.6 mL            LABS:  10-22    137  |  102  |  13  ----------------------------<  165<H>  4.2   |  25  |  0.57    Ca    9.1      22 Oct 2019 07:49  Phos  3.4     10-22  Mg     2.0     10-22    TPro  6.3  /  Alb  2.8<L>  /  TBili  1.0  /  DBili  x   /  AST  27  /  ALT  33  /  AlkPhos  170<H>  10-21                          10.8   9.42  )-----------( 364      ( 22 Oct 2019 07:50 )             33.0

## 2019-10-22 NOTE — PROGRESS NOTE ADULT - ASSESSMENT
A/P: 54M PMH HTN, HLD, rectal adenocarcinoma diagnosed 5/2018, underwent chemoXRT who presented for elective robotic assisted abdominal perineal resection (9/24), c/b SBO managed by NGT and TPN via PICC line but RTOR 10/14 for lap converted to open EDER with increasing lactate and hypotensive overnight, s/p RTOR 10/15 for1 L hematoma evacuation    - pain/nausea control  - PCA  - NPO, poss advance later today  - TPN with 20units insulin via PICC line.   - Abx: Zosyn  - RLQ JAKUB drainx1  - L axillary PICC line for TPN  - PT/OT; PT ordered A/P: 54M PMH HTN, HLD, rectal adenocarcinoma diagnosed 5/2018, underwent chemoXRT who presented for elective robotic assisted abdominal perineal resection (9/24), c/b SBO managed by NGT and TPN via PICC line but RTOR 10/14 for lap converted to open EDER with increasing lactate and hypotensive overnight, s/p RTOR 10/15 for1 L hematoma evacuation    - pain/nausea control  - PCA  - sips & chips  - TPN with 20units insulin via PICC line.   - Abx: Zosyn  - RLQ JAKUB drainx1  - L axillary PICC line for TPN  - PT/OT; PT ordered A/P: 54M PMH HTN, HLD, rectal adenocarcinoma diagnosed 5/2018, underwent chemoXRT who presented for elective robotic assisted abdominal perineal resection (9/24), c/b SBO managed by NGT and TPN via PICC line but RTOR 10/14 for lap converted to open EDER with increasing lactate and hypotensive overnight, s/p RTOR 10/15 for1 L hematoma evacuation    - pain/nausea control  - PCA  - adv diet to CLD  - TPN with 20units insulin via PICC line.   - Abx: Zosyn  - RLQ JAKUB drainx1  - L axillary PICC line for TPN  - PT/OT; PT ordered

## 2019-10-22 NOTE — PROGRESS NOTE ADULT - SUBJECTIVE AND OBJECTIVE BOX
Pain Management Progress Note - [facility] [phone]      HPI: Patient seen and examined today. Patient is a 54 year old male, with pmh HTN, HLD adenocarcinoma, s/p abdominal perineal resection, now s/p lap conversion to open, EDER. Patient NPO with ice chips, reporting diffuse abdominal pain and surgical site pain. Patient reporting pain relief with Morphine PCA. Patient Axox3, denies, n,v, no s/s of oversedation. Midline abdomen incision closed with staples. C,D,I.       Pain is __x_ sharp ____dull ___burning ___achy ___ Intensity: ____ mild _x__mod ___severe     Location __x__surgical site ____cervical _____lumbar _x___abd ____upper ext____lower ext    Worse with _x___activity __x__movement _____physical therapy___ Rest    Improved with __x__medication ___x_rest ____physical therapy      potassium chloride  10 mEq/100 mL IVPB  potassium chloride  10 mEq/100 mL IVPB  ketorolac   Injectable  potassium phosphate IVPB  artificial  tears Solution  iohexol 300 mG (iodine)/mL Oral Solution  acetaminophen  IVPB ..  Parenteral Nutrition - Adult  famotidine Injectable  sodium chloride 0.9% lock flush  chlorhexidine 2% Cloths  chlorproMAZINE    IVPB  famotidine Injectable  famotidine Injectable  benzocaine 15 mG/menthol 3.6 mG Lozenge  Parenteral Nutrition - Adult  Parenteral Nutrition - Adult  benzocaine 15 mG/menthol 3.6 mG (Sugar-Free) Lozenge  HYDROmorphone  Injectable  HYDROmorphone  Injectable  acetaminophen   Tablet ..  Parenteral Nutrition - Adult  fat emulsion (Plant Based) 20% Infusion  docusate sodium Liquid  Parenteral Nutrition - Adult  fat emulsion (Plant Based) 20% Infusion  acetaminophen    Suspension ..  Parenteral Nutrition - Adult  fat emulsion (Plant Based) 20% Infusion  simethicone  Parenteral Nutrition - Adult  fat emulsion (Plant Based) 20% Infusion  Parenteral Nutrition - Adult  acetaminophen   Tablet ..  HYDROmorphone  Injectable  Parenteral Nutrition - Adult  fat emulsion (Plant Based) 20% Infusion  HYDROmorphone  Injectable  Parenteral Nutrition - Adult  Parenteral Nutrition - Adult  fat emulsion (Plant Based) 20% Infusion  Parenteral Nutrition - Adult  fat emulsion (Plant Based) 20% Infusion  oxyCODONE    5 mG/acetaminophen 325 mG  oxyCODONE    IR  Parenteral Nutrition - Adult  fat emulsion (Plant Based) 20% Infusion  iohexol 300 mG (iodine)/mL Oral Solution  Parenteral Nutrition - Adult  fat emulsion (Plant Based) 20% Infusion  iohexol 300 mG (iodine)/mL Oral Solution  HYDROmorphone  Injectable  HYDROmorphone  Injectable  Parenteral Nutrition - Adult  fat emulsion (Plant Based) 20% Infusion  magnesium sulfate  IVPB  Parenteral Nutrition - Adult  BUpivacaine liposome 1.3% Injectable (no eMAR)  pantoprazole  Injectable  lactated ringers.  HYDROmorphone  Injectable  HYDROmorphone  Injectable  ondansetron Injectable  enoxaparin Injectable  cefTRIAXone   IVPB  metroNIDAZOLE  IVPB  acetaminophen  IVPB ..  HYDROmorphone  Injectable  HYDROmorphone  Injectable  sodium chloride 0.9% Bolus  insulin lispro (HumaLOG) corrective regimen sliding scale  dextrose 5%.  dextrose 40% Gel  dextrose 50% Injectable  glucagon  Injectable  acetaminophen  IVPB ..  famotidine Injectable  sodium chloride 0.9% Bolus  sodium chloride 0.9% Bolus  calcium gluconate IVPB  sodium chloride 0.9% Bolus  acetaminophen  IVPB ..  acetaminophen  IVPB ..  (Floorstock)  HYDROmorphone  Injectable  HYDROmorphone  Injectable  dextrose 10%.  piperacillin/tazobactam IVPB..  insulin lispro (HumaLOG) corrective regimen sliding scale  HYDROmorphone  Injectable  chlorhexidine 2% Cloths  acetaminophen  IVPB ..  sodium bicarbonate  Injectable  Parenteral Nutrition - Adult  fat emulsion (Plant Based) 20% Infusion  magnesium sulfate  IVPB  magnesium sulfate  IVPB  dextrose 5% + sodium chloride 0.9%.  sodium chloride 0.9% Bolus  sodium chloride 0.9% Bolus  sodium chloride 0.9%.  HYDROmorphone  Injectable  magnesium sulfate  IVPB  sodium phosphate IVPB  HYDROmorphone  Injectable  HYDROmorphone  Injectable  LORazepam   Injectable  Parenteral Nutrition - Adult  fat emulsion (Plant Based) 20% Infusion  insulin NPH human recombinant  chlorproMAZINE    IVPB  Parenteral Nutrition - Adult  fat emulsion (Plant Based) 20% Infusion  sodium chloride 0.9%.  insulin regular  human corrective regimen sliding scale  dextrose 5%.  dextrose 40% Gel  dextrose 50% Injectable  glucagon  Injectable  diazepam  Injectable  acetaminophen  IVPB ..  diazepam  Injectable  pantoprazole  Injectable  potassium phosphate IVPB  Parenteral Nutrition - Adult  fat emulsion (Plant Based) 20% Infusion  acetaminophen  IVPB ..  heparin  Injectable  acetaminophen  IVPB ..  potassium chloride  20 mEq/100 mL IVPB  potassium chloride  20 mEq/100 mL IVPB  Parenteral Nutrition - Adult  fat emulsion (Plant Based) 20% Infusion  acetaminophen  IVPB ..  morphine PCA (5 mG/mL)  naloxone Injectable  acetaminophen  IVPB ..  morphine  - Injectable  morphine PCA (1 mG/mL)  Parenteral Nutrition - Adult  fat emulsion (Plant Based) 20% Infusion  potassium chloride  10 mEq/100 mL IVPB  Parenteral Nutrition - Adult  fat emulsion (Plant Based) 20% Infusion  morphine  - Injectable  acetaminophen  IVPB ..  Parenteral Nutrition - Adult  fat emulsion (Plant Based) 20% Infusion  cyclobenzaprine      ROS: Const:  __-_febrile   Eyes:___ENT:___CV: _-__chest pain  Resp: __-__sob  GI:___nausea ___vomiting __x_abd pain __x_npo ___clears __full diet __bm  :___ [place]: __x_pain __x_spasm  Skin:___ Neuro:  ___sedation___confusion___ numbness ___weakness ___paresth  Psych:__anxiety  Endo:___ Heme:___Allergy:_________, _x__all others reviewed and negative      PAST MEDICAL & SURGICAL HISTORY:  HLD (hyperlipidemia)  HTN (hypertension)  Colon cancer: near rectum  Other elective surgery: Right Upper Chest- Chemo Port      10-22 @ [id number] mL/min/1.73M2      Hemoglobin: 10.8 g/dL (10-22 @ 07:50)  Hemoglobin: 10.4 g/dL (10-21 @ 05:37)        T(C): 36.7 (10-22-19 @ 04:31), Max: 37.2 (10-21-19 @ 17:00)  HR: 88 (10-22-19 @ 03:29) (88 - 106)  BP: 128/73 (10-22-19 @ 03:29) (104/73 - 131/72)  RR: 18 (10-22-19 @ 03:29) (17 - 20)  SpO2: 95% (10-22-19 @ 03:29) (95% - 97%)  Wt(kg): --      PHYSICAL EXAM:  Gen Appearance: _x__no acute distress __x_appropriate        Neuro: _x__SILT feet____ EOM Intact Psych: AAOX_3_, _x__mood/affect appropriate        Eyes: _x__conjunctiva WNL  x_____ Pupils equal and round        ENT: _x_ears and nose atraumatic__x_ Hearing grossly intact        Neck: _x__trachea midline, no visible masses ___thyroid without palpable mass    Resp: _x__Nml WOB____No tactile fremitus ___clear to auscultation    Cardio: __x_extremities free from edema __x__pedal pulses palpable    GI/Abdomen: _x__soft _____ Nontender______Nondistended_____HSM    Lymphatic: ___no palpable nodes in neck  ___no palpable nodes calves and feet    Skin/Wound: ___Incision, _x__Dressing c/d/i,   ____surrounding tissues soft,  ___drain/chest tube present____    Muscular: EHL __5_/5  Gastrocnemius___5/5    _x__absent clubbing/cyanosis        ASSESSMENT: Patient is a 54 year old male, with pmh HTN, HLD adenocarcinoma, s/p abdominal perineal resection, now s/p lap conversion to open, EDER, pain managed with Morphine PCA.     Recommended Treatment PLAN:  1. Continue Valium 2mg IV q8hr PRN muscular spasms.   2. Continue Morphine PCA 1mg Q10 minutes,  34mg 4 hour max  3. Morphine 5mg Q2h IVP prn breakthrough pain  4. Tylenol 1000mg IV Q8h standing  Plan discussed with Dr. Morales at bedside

## 2019-10-23 LAB
ALBUMIN SERPL ELPH-MCNC: 3 G/DL — LOW (ref 3.3–5)
ALP SERPL-CCNC: 206 U/L — HIGH (ref 40–120)
ALT FLD-CCNC: 36 U/L — SIGNIFICANT CHANGE UP (ref 10–45)
ANION GAP SERPL CALC-SCNC: 9 MMOL/L — SIGNIFICANT CHANGE UP (ref 5–17)
AST SERPL-CCNC: 23 U/L — SIGNIFICANT CHANGE UP (ref 10–40)
BILIRUB SERPL-MCNC: 0.7 MG/DL — SIGNIFICANT CHANGE UP (ref 0.2–1.2)
BUN SERPL-MCNC: 12 MG/DL — SIGNIFICANT CHANGE UP (ref 7–23)
CALCIUM SERPL-MCNC: 9.3 MG/DL — SIGNIFICANT CHANGE UP (ref 8.4–10.5)
CHLORIDE SERPL-SCNC: 101 MMOL/L — SIGNIFICANT CHANGE UP (ref 96–108)
CO2 SERPL-SCNC: 26 MMOL/L — SIGNIFICANT CHANGE UP (ref 22–31)
CREAT SERPL-MCNC: 0.68 MG/DL — SIGNIFICANT CHANGE UP (ref 0.5–1.3)
GLUCOSE BLDC GLUCOMTR-MCNC: 148 MG/DL — HIGH (ref 70–99)
GLUCOSE BLDC GLUCOMTR-MCNC: 149 MG/DL — HIGH (ref 70–99)
GLUCOSE BLDC GLUCOMTR-MCNC: 153 MG/DL — HIGH (ref 70–99)
GLUCOSE BLDC GLUCOMTR-MCNC: 193 MG/DL — HIGH (ref 70–99)
GLUCOSE SERPL-MCNC: 194 MG/DL — HIGH (ref 70–99)
HCT VFR BLD CALC: 31.6 % — LOW (ref 39–50)
HGB BLD-MCNC: 10.3 G/DL — LOW (ref 13–17)
MAGNESIUM SERPL-MCNC: 2 MG/DL — SIGNIFICANT CHANGE UP (ref 1.6–2.6)
MCHC RBC-ENTMCNC: 30.2 PG — SIGNIFICANT CHANGE UP (ref 27–34)
MCHC RBC-ENTMCNC: 32.6 GM/DL — SIGNIFICANT CHANGE UP (ref 32–36)
MCV RBC AUTO: 92.7 FL — SIGNIFICANT CHANGE UP (ref 80–100)
NRBC # BLD: 0 /100 WBCS — SIGNIFICANT CHANGE UP (ref 0–0)
PHOSPHATE SERPL-MCNC: 3.7 MG/DL — SIGNIFICANT CHANGE UP (ref 2.5–4.5)
PLATELET # BLD AUTO: 385 K/UL — SIGNIFICANT CHANGE UP (ref 150–400)
POTASSIUM SERPL-MCNC: 4.2 MMOL/L — SIGNIFICANT CHANGE UP (ref 3.5–5.3)
POTASSIUM SERPL-SCNC: 4.2 MMOL/L — SIGNIFICANT CHANGE UP (ref 3.5–5.3)
PROT SERPL-MCNC: 6.5 G/DL — SIGNIFICANT CHANGE UP (ref 6–8.3)
RBC # BLD: 3.41 M/UL — LOW (ref 4.2–5.8)
RBC # FLD: 14.5 % — SIGNIFICANT CHANGE UP (ref 10.3–14.5)
SODIUM SERPL-SCNC: 136 MMOL/L — SIGNIFICANT CHANGE UP (ref 135–145)
WBC # BLD: 7.38 K/UL — SIGNIFICANT CHANGE UP (ref 3.8–10.5)
WBC # FLD AUTO: 7.38 K/UL — SIGNIFICANT CHANGE UP (ref 3.8–10.5)

## 2019-10-23 PROCEDURE — 74019 RADEX ABDOMEN 2 VIEWS: CPT | Mod: 26

## 2019-10-23 RX ORDER — ELECTROLYTE SOLUTION,INJ
1 VIAL (ML) INTRAVENOUS
Refills: 0 | Status: DISCONTINUED | OUTPATIENT
Start: 2019-10-23 | End: 2019-10-23

## 2019-10-23 RX ORDER — I.V. FAT EMULSION 20 G/100ML
0.65 EMULSION INTRAVENOUS
Qty: 50 | Refills: 0 | Status: DISCONTINUED | OUTPATIENT
Start: 2019-10-23 | End: 2019-10-23

## 2019-10-23 RX ADMIN — PIPERACILLIN AND TAZOBACTAM 200 GRAM(S): 4; .5 INJECTION, POWDER, LYOPHILIZED, FOR SOLUTION INTRAVENOUS at 00:37

## 2019-10-23 RX ADMIN — INSULIN HUMAN 2: 100 INJECTION, SOLUTION SUBCUTANEOUS at 17:23

## 2019-10-23 RX ADMIN — MORPHINE SULFATE 30 MILLILITER(S): 50 CAPSULE, EXTENDED RELEASE ORAL at 04:32

## 2019-10-23 RX ADMIN — Medication 100 EACH: at 18:22

## 2019-10-23 RX ADMIN — HEPARIN SODIUM 5000 UNIT(S): 5000 INJECTION INTRAVENOUS; SUBCUTANEOUS at 05:38

## 2019-10-23 RX ADMIN — PIPERACILLIN AND TAZOBACTAM 200 GRAM(S): 4; .5 INJECTION, POWDER, LYOPHILIZED, FOR SOLUTION INTRAVENOUS at 05:38

## 2019-10-23 RX ADMIN — PANTOPRAZOLE SODIUM 40 MILLIGRAM(S): 20 TABLET, DELAYED RELEASE ORAL at 11:27

## 2019-10-23 RX ADMIN — INSULIN HUMAN 2: 100 INJECTION, SOLUTION SUBCUTANEOUS at 11:50

## 2019-10-23 RX ADMIN — I.V. FAT EMULSION 20.85 GM/KG/DAY: 20 EMULSION INTRAVENOUS at 22:24

## 2019-10-23 RX ADMIN — HEPARIN SODIUM 5000 UNIT(S): 5000 INJECTION INTRAVENOUS; SUBCUTANEOUS at 13:17

## 2019-10-23 RX ADMIN — PIPERACILLIN AND TAZOBACTAM 200 GRAM(S): 4; .5 INJECTION, POWDER, LYOPHILIZED, FOR SOLUTION INTRAVENOUS at 11:27

## 2019-10-23 RX ADMIN — PIPERACILLIN AND TAZOBACTAM 200 GRAM(S): 4; .5 INJECTION, POWDER, LYOPHILIZED, FOR SOLUTION INTRAVENOUS at 18:21

## 2019-10-23 RX ADMIN — MORPHINE SULFATE 30 MILLILITER(S): 50 CAPSULE, EXTENDED RELEASE ORAL at 16:57

## 2019-10-23 RX ADMIN — HEPARIN SODIUM 5000 UNIT(S): 5000 INJECTION INTRAVENOUS; SUBCUTANEOUS at 21:35

## 2019-10-23 NOTE — PROGRESS NOTE ADULT - SUBJECTIVE AND OBJECTIVE BOX
SUBJECTIVE:  No overnight events. Pt has not had gas in over 24 hrs. Complains of pain.    OBJECTIVE:     ** VITAL SIGNS / I&O's **    Vital Signs Last 24 Hrs  T(C): 36.9 (23 Oct 2019 09:00), Max: 37.2 (22 Oct 2019 22:46)  T(F): 98.5 (23 Oct 2019 09:00), Max: 99 (22 Oct 2019 22:46)  HR: 94 (23 Oct 2019 08:00) (94 - 106)  BP: 106/64 (23 Oct 2019 08:00) (101/60 - 130/89)  BP(mean): 81 (23 Oct 2019 08:00) (74 - 104)  RR: 16 (23 Oct 2019 08:00) (16 - 18)  SpO2: 97% (23 Oct 2019 08:00) (95% - 99%)      22 Oct 2019 07:01  -  23 Oct 2019 07:00  --------------------------------------------------------  IN:    fat emulsion (Plant Based) 20% Infusion: 166.4 mL    TPN (Total Parenteral Nutrition): 2200 mL  Total IN: 2366.4 mL    OUT:    Bulb: 85 mL    Voided: 1700 mL  Total OUT: 1785 mL    Total NET: 581.4 mL      23 Oct 2019 07:01  -  23 Oct 2019 10:50  --------------------------------------------------------  IN:    fat emulsion (Plant Based) 20% Infusion: 41.6 mL    TPN (Total Parenteral Nutrition): 300 mL  Total IN: 341.6 mL    OUT:    Voided: 300 mL  Total OUT: 300 mL    Total NET: 41.6 mL          ** PHYSICAL EXAM **    -- CONSTITUTIONAL: Alert, Awake. NAD.   -- RESPIRATORY: unlabored breathing, no respiratory distress  -- ABDOMEN: +distended, + TTP      ** LABS **                          10.3   7.38  )-----------( 385      ( 23 Oct 2019 06:26 )             31.6     23 Oct 2019 06:26    136    |  101    |  12     ----------------------------<  194    4.2     |  26     |  0.68     Ca    9.3        23 Oct 2019 06:26  Phos  3.7       23 Oct 2019 06:26  Mg     2.0       23 Oct 2019 06:26    TPro  6.5    /  Alb  3.0    /  TBili  0.7    /  DBili  x      /  AST  23     /  ALT  36     /  AlkPhos  206    23 Oct 2019 06:26      CAPILLARY BLOOD GLUCOSE      POCT Blood Glucose.: 148 mg/dL (23 Oct 2019 07:01)  POCT Blood Glucose.: 149 mg/dL (23 Oct 2019 00:28)  POCT Blood Glucose.: 143 mg/dL (22 Oct 2019 17:18)  POCT Blood Glucose.: 159 mg/dL (22 Oct 2019 11:44)

## 2019-10-23 NOTE — PROGRESS NOTE ADULT - SUBJECTIVE AND OBJECTIVE BOX
Pain Management Progress Note - [facility] [phone]      HPI: Patient seen and examined today. Patient is a 54 year old male, with pmh HTN, HLD adenocarcinoma, s/p abdominal perineal resection, now s/p lap conversion to open, EDER. Patient on a clear liquid diet, patient reports he is tolerating a few sips of liquids today. Patient reporting diffuse abdominal pain and surgical site pain. Patient reporting pain relief with Morphine PCA. Patient Axox3, denies, n,v, no s/s of oversedation. Midline abdomininal incision closed with staples. C,D,I.        Pain is __x_ sharp ____dull ___burning ___achy ___ Intensity: ____ mild _x__mod ___severe     Location __x__surgical site ____cervical _____lumbar _x___abd ____upper ext____lower ext    Worse with _x___activity __x__movement _____physical therapy___ Rest    Improved with __x__medication ___x_rest ____physical therapy      potassium chloride  10 mEq/100 mL IVPB  potassium chloride  10 mEq/100 mL IVPB  ketorolac   Injectable  potassium phosphate IVPB  artificial  tears Solution  iohexol 300 mG (iodine)/mL Oral Solution  acetaminophen  IVPB ..  Parenteral Nutrition - Adult  famotidine Injectable  sodium chloride 0.9% lock flush  chlorhexidine 2% Cloths  chlorproMAZINE    IVPB  famotidine Injectable  famotidine Injectable  benzocaine 15 mG/menthol 3.6 mG Lozenge  Parenteral Nutrition - Adult  Parenteral Nutrition - Adult  benzocaine 15 mG/menthol 3.6 mG (Sugar-Free) Lozenge  HYDROmorphone  Injectable  HYDROmorphone  Injectable  acetaminophen   Tablet ..  Parenteral Nutrition - Adult  fat emulsion (Plant Based) 20% Infusion  docusate sodium Liquid  Parenteral Nutrition - Adult  fat emulsion (Plant Based) 20% Infusion  acetaminophen    Suspension ..  Parenteral Nutrition - Adult  fat emulsion (Plant Based) 20% Infusion  simethicone  Parenteral Nutrition - Adult  fat emulsion (Plant Based) 20% Infusion  Parenteral Nutrition - Adult  acetaminophen   Tablet ..  HYDROmorphone  Injectable  Parenteral Nutrition - Adult  fat emulsion (Plant Based) 20% Infusion  HYDROmorphone  Injectable  Parenteral Nutrition - Adult  Parenteral Nutrition - Adult  fat emulsion (Plant Based) 20% Infusion  Parenteral Nutrition - Adult  fat emulsion (Plant Based) 20% Infusion  oxyCODONE    5 mG/acetaminophen 325 mG  oxyCODONE    IR  Parenteral Nutrition - Adult  fat emulsion (Plant Based) 20% Infusion  iohexol 300 mG (iodine)/mL Oral Solution  Parenteral Nutrition - Adult  fat emulsion (Plant Based) 20% Infusion  iohexol 300 mG (iodine)/mL Oral Solution  HYDROmorphone  Injectable  HYDROmorphone  Injectable  Parenteral Nutrition - Adult  fat emulsion (Plant Based) 20% Infusion  magnesium sulfate  IVPB  Parenteral Nutrition - Adult  BUpivacaine liposome 1.3% Injectable (no eMAR)  (Floorstock)  pantoprazole  Injectable  lactated ringers.  HYDROmorphone  Injectable  HYDROmorphone  Injectable  ondansetron Injectable  enoxaparin Injectable  cefTRIAXone   IVPB  metroNIDAZOLE  IVPB  acetaminophen  IVPB ..  HYDROmorphone  Injectable  HYDROmorphone  Injectable  sodium chloride 0.9% Bolus  insulin lispro (HumaLOG) corrective regimen sliding scale  dextrose 5%.  dextrose 40% Gel  dextrose 50% Injectable  dextrose 50% Injectable  dextrose 50% Injectable  glucagon  Injectable  (ADM OVERRIDE)  acetaminophen  IVPB ..  famotidine Injectable  sodium chloride 0.9% Bolus  sodium chloride 0.9% Bolus  calcium gluconate IVPB  sodium chloride 0.9% Bolus  acetaminophen  IVPB ..  acetaminophen  IVPB ..  HYDROmorphone  Injectable  HYDROmorphone  Injectable  dextrose 10%.  piperacillin/tazobactam IVPB..  insulin lispro (HumaLOG) corrective regimen sliding scale  HYDROmorphone  Injectable  HYDROmorphone  Injectable  HYDROmorphone  Injectable  chlorhexidine 2% Cloths  acetaminophen  IVPB ..  sodium bicarbonate  Injectable  Parenteral Nutrition - Adult  fat emulsion (Plant Based) 20% Infusion  magnesium sulfate  IVPB  magnesium sulfate  IVPB  dextrose 5% + sodium chloride 0.9%.  sodium chloride 0.9% Bolus  sodium chloride 0.9% Bolus  sodium chloride 0.9%.  magnesium sulfate  IVPB  sodium phosphate IVPB  HYDROmorphone  Injectable  HYDROmorphone  Injectable  LORazepam   Injectable  Parenteral Nutrition - Adult  fat emulsion (Plant Based) 20% Infusion  insulin NPH human recombinant  chlorproMAZINE    IVPB  Parenteral Nutrition - Adult  fat emulsion (Plant Based) 20% Infusion  sodium chloride 0.9%.  insulin regular  human corrective regimen sliding scale  dextrose 5%.  dextrose 40% Gel  dextrose 50% Injectable  dextrose 50% Injectable  dextrose 50% Injectable  glucagon  Injectable  diazepam  Injectable  acetaminophen  IVPB ..  diazepam  Injectable  pantoprazole  Injectable  potassium phosphate IVPB  Parenteral Nutrition - Adult  fat emulsion (Plant Based) 20% Infusion  potassium chloride  10 mEq/100 mL IVPB  heparin  Injectable  acetaminophen  IVPB ..  potassium chloride  20 mEq/100 mL IVPB  potassium chloride  20 mEq/100 mL IVPB  Parenteral Nutrition - Adult  fat emulsion (Plant Based) 20% Infusion  morphine PCA (5 mG/mL)  naloxone Injectable  acetaminophen  IVPB ..  acetaminophen  IVPB ..  acetaminophen  IVPB ..  morphine  - Injectable  morphine PCA (1 mG/mL)  Parenteral Nutrition - Adult  fat emulsion (Plant Based) 20% Infusion  potassium chloride  10 mEq/100 mL IVPB  Parenteral Nutrition - Adult  fat emulsion (Plant Based) 20% Infusion  morphine  - Injectable  Parenteral Nutrition - Adult  fat emulsion (Plant Based) 20% Infusion  cyclobenzaprine  Parenteral Nutrition - Adult  fat emulsion (Plant Based) 20% Infusion      ROS: Const:  __-_febrile   Eyes:___ENT:___CV: _-__chest pain  Resp: __-__sob  GI:___nausea ___vomiting __x_abd pain ___npo ___clears _x_full diet __bm  :___ [place]: __x_pain __x_spasm  Skin:___ Neuro:  ___sedation___confusion___ numbness ___weakness ___paresth  Psych:__anxiety  Endo:___ Heme:___Allergy:_________, _x__all others reviewed and negative      PAST MEDICAL & SURGICAL HISTORY:  HLD (hyperlipidemia)  HTN (hypertension)  Colon cancer: near rectum  Other elective surgery: Right Upper Chest- Chemo Port      10-23 @ [id number]:65343 mL/min/1.73M2      Hemoglobin: 10.3 g/dL (10-23 @ 06:26)  Hemoglobin: 10.8 g/dL (10-22 @ 07:50)        T(C): 36.9 (10-23-19 @ 09:00), Max: 37.2 (10-22-19 @ 22:46)  HR: 94 (10-23-19 @ 08:00) (94 - 106)  BP: 106/64 (10-23-19 @ 08:00) (101/60 - 130/89)  RR: 16 (10-23-19 @ 08:00) (16 - 18)  SpO2: 97% (10-23-19 @ 08:00) (95% - 99%)  Wt(kg): --     PHYSICAL EXAM:  Gen Appearance: _x__no acute distress __x_appropriate        Neuro: _x__SILT feet____ EOM Intact Psych: AAOX_3_, _x__mood/affect appropriate        Eyes: _x__conjunctiva WNL  x_____ Pupils equal and round        ENT: _x_ears and nose atraumatic__x_ Hearing grossly intact        Neck: _x__trachea midline, no visible masses ___thyroid without palpable mass    Resp: _x__Nml WOB____No tactile fremitus ___clear to auscultation    Cardio: __x_extremities free from edema __x__pedal pulses palpable    GI/Abdomen: _x__soft _____ Nontender______Nondistended_____HSM    Lymphatic: ___no palpable nodes in neck  ___no palpable nodes calves and feet    Skin/Wound: ___Incision, _x__Dressing c/d/i,   ____surrounding tissues soft,  ___drain/chest tube present____    Muscular: EHL __5_/5  Gastrocnemius___5/5    _x__absent clubbing/cyanosis        ASSESSMENT: Patient is a 54 year old male, with pmh HTN, HLD adenocarcinoma, s/p abdominal perineal resection, now s/p lap conversion to open, EDER, pain managed with Morphine PCA.     Recommended Treatment PLAN:  1. Continue Valium 2mg IV q8hr PRN muscular spasms.   2. Continue Morphine PCA 1mg Q10 minutes,  34mg 4 hour max  3. Morphine 5mg Q2h IVP prn breakthrough pain  4. Tylenol 1000mg IV Q8h standing  Plan discussed with Dr. Morales at bedside

## 2019-10-23 NOTE — PROGRESS NOTE ADULT - SUBJECTIVE AND OBJECTIVE BOX
Post-Op Day #: 29/9/8  Procedure/Dx/Injuries: abdominal perineal resection/lap converted to open EDER/hematoma evacuation      Events of past 24 hours: Pt reports pain slightly worse this morning, poor ostomy o/p and feels distended. No nausea, vomiting, chest pain, SOB.      MEDICATIONS  (STANDING):  chlorhexidine 2% Cloths 1 Application(s) Topical <User Schedule>  dextrose 5%. 1000 milliLiter(s) (50 mL/Hr) IV Continuous <Continuous>  dextrose 50% Injectable 12.5 Gram(s) IV Push once  dextrose 50% Injectable 25 Gram(s) IV Push once  dextrose 50% Injectable 25 Gram(s) IV Push once  fat emulsion (Plant Based) 20% Infusion 0.65 Gm/kG/Day (20.854 mL/Hr) IV Continuous <Continuous>  heparin  Injectable 5000 Unit(s) SubCutaneous every 8 hours  influenza   Vaccine 0.5 milliLiter(s) IntraMuscular once  insulin regular  human corrective regimen sliding scale   SubCutaneous every 6 hours  morphine PCA (1 mG/mL) 30 milliLiter(s) PCA Continuous PCA Continuous  pantoprazole  Injectable 40 milliGRAM(s) IV Push daily  Parenteral Nutrition - Adult 1 Each (100 mL/Hr) TPN Continuous <Continuous>  Parenteral Nutrition - Adult 1 Each (100 mL/Hr) TPN Continuous <Continuous>  piperacillin/tazobactam IVPB.. 3.375 Gram(s) IV Intermittent every 6 hours    MEDICATIONS  (PRN):  cyclobenzaprine 5 milliGRAM(s) Oral every 8 hours PRN Muscle Spasm  dextrose 40% Gel 15 Gram(s) Oral once PRN Blood Glucose LESS THAN 70 milliGRAM(s)/deciliter  glucagon  Injectable 1 milliGRAM(s) IntraMuscular once PRN Glucose LESS THAN 70 milligrams/deciliter  ketorolac 0.5% Ophthalmic Solution 1 Drop(s) Left EYE every 6 hours PRN eye pain irritation  morphine  - Injectable 5 milliGRAM(s) IV Push every 2 hours PRN for breakthrough  naloxone Injectable 0.1 milliGRAM(s) IV Push every 3 minutes PRN For ANY of the following changes in patient status:  A. RR LESS THAN 10 breaths per minute, B. Oxygen saturation LESS THAN 90%, C. Sedation score of 6        Vitals: T(F): 98.5 (10-23-19 @ 09:00), Max: 99 (10-22-19 @ 22:46)  HR: 94 (10-23-19 @ 08:00)  BP: 106/64 (10-23-19 @ 08:00)  RR: 16 (10-23-19 @ 08:00)  SpO2: 97% (10-23-19 @ 08:00)      Diet, Clear Liquid:   Low Fat (LOWFAT)      10-22-19 @ 07:01  -  10-23-19 @ 07:00  --------------------------------------------------------  IN:    fat emulsion (Plant Based) 20% Infusion: 166.4 mL    TPN (Total Parenteral Nutrition): 2200 mL  Total IN: 2366.4 mL    OUT:    Bulb: 85 mL    Voided: 1700 mL  Total OUT: 1785 mL    Total NET: 581.4 mL            PHYSICAL EXAM  Constitutional: A&Ox3. Patient resting comfortably  Respiratory: non labored breathing, no respiratory distress  Cardiovascular: sinus tachycardia  Gastrointestinal: Abdomen is soft, tender to palpation near incision sites, moderately distended. Ostomy in place with minimal output. JAKUB in place with ss output, dressing with minimal serosanguinous drainage. Midline incision c/d/i.  Extremities: WWP, (-) edema, SCDs in place.        LAB/STUDIES:  CAPILLARY BLOOD GLUCOSE    POCT Blood Glucose.: 148 mg/dL (23 Oct 2019 07:01)  POCT Blood Glucose.: 149 mg/dL (23 Oct 2019 00:28)  POCT Blood Glucose.: 143 mg/dL (22 Oct 2019 17:18)  POCT Blood Glucose.: 159 mg/dL (22 Oct 2019 11:44)                          10.3   7.38  )-----------( 385      ( 23 Oct 2019 06:26 )             31.6     10-23    136  |  101  |  12  ----------------------------<  194<H>  4.2   |  26  |  0.68    Ca    9.3      23 Oct 2019 06:26  Phos  3.7     10-23  Mg     2.0     10-23    TPro  6.5  /  Alb  3.0<L>  /  TBili  0.7  /  DBili  x   /  AST  23  /  ALT  36  /  AlkPhos  206<H>  10-23               6.5  | 0.7  | 23       ------------------[206     ( 23 Oct 2019 06:26 )  3.0  | x    | 36

## 2019-10-23 NOTE — PROGRESS NOTE ADULT - ASSESSMENT
55 y/o male s/p abdominal peritoneal resection with gracilis flap  - continue to observe  - prn analgesia  - care per primary team

## 2019-10-23 NOTE — PROGRESS NOTE ADULT - ASSESSMENT
A/P: 54M PMH HTN, HLD, rectal adenocarcinoma diagnosed 5/2018, underwent chemoXRT who presented for elective robotic assisted abdominal perineal resection (9/24), c/b SBO managed by NGT and TPN via PICC line but RTOR 10/14 for lap converted to open EDER with increasing lactate and hypotensive overnight, s/p RTOR 10/15 for1 L hematoma evacuation    - pain/nausea control  - PCA  - CLD   - TPN with 20units insulin via PICC line.   - Abx: Zosyn  - RLQ JAKUB drainx1  - L axillary PICC line for TPN  - PT/OT

## 2019-10-24 LAB
ANION GAP SERPL CALC-SCNC: 11 MMOL/L — SIGNIFICANT CHANGE UP (ref 5–17)
BUN SERPL-MCNC: 11 MG/DL — SIGNIFICANT CHANGE UP (ref 7–23)
CALCIUM SERPL-MCNC: 9.1 MG/DL — SIGNIFICANT CHANGE UP (ref 8.4–10.5)
CHLORIDE SERPL-SCNC: 104 MMOL/L — SIGNIFICANT CHANGE UP (ref 96–108)
CO2 SERPL-SCNC: 24 MMOL/L — SIGNIFICANT CHANGE UP (ref 22–31)
CREAT SERPL-MCNC: 0.64 MG/DL — SIGNIFICANT CHANGE UP (ref 0.5–1.3)
GLUCOSE BLDC GLUCOMTR-MCNC: 151 MG/DL — HIGH (ref 70–99)
GLUCOSE BLDC GLUCOMTR-MCNC: 155 MG/DL — HIGH (ref 70–99)
GLUCOSE BLDC GLUCOMTR-MCNC: 158 MG/DL — HIGH (ref 70–99)
GLUCOSE BLDC GLUCOMTR-MCNC: 181 MG/DL — HIGH (ref 70–99)
GLUCOSE SERPL-MCNC: 179 MG/DL — HIGH (ref 70–99)
HCT VFR BLD CALC: 31.8 % — LOW (ref 39–50)
HGB BLD-MCNC: 10.1 G/DL — LOW (ref 13–17)
MAGNESIUM SERPL-MCNC: 2 MG/DL — SIGNIFICANT CHANGE UP (ref 1.6–2.6)
MCHC RBC-ENTMCNC: 30 PG — SIGNIFICANT CHANGE UP (ref 27–34)
MCHC RBC-ENTMCNC: 31.8 GM/DL — LOW (ref 32–36)
MCV RBC AUTO: 94.4 FL — SIGNIFICANT CHANGE UP (ref 80–100)
NRBC # BLD: 0 /100 WBCS — SIGNIFICANT CHANGE UP (ref 0–0)
PHOSPHATE SERPL-MCNC: 3.6 MG/DL — SIGNIFICANT CHANGE UP (ref 2.5–4.5)
PLATELET # BLD AUTO: 431 K/UL — HIGH (ref 150–400)
POTASSIUM SERPL-MCNC: 4.4 MMOL/L — SIGNIFICANT CHANGE UP (ref 3.5–5.3)
POTASSIUM SERPL-SCNC: 4.4 MMOL/L — SIGNIFICANT CHANGE UP (ref 3.5–5.3)
RBC # BLD: 3.37 M/UL — LOW (ref 4.2–5.8)
RBC # FLD: 14.4 % — SIGNIFICANT CHANGE UP (ref 10.3–14.5)
SODIUM SERPL-SCNC: 139 MMOL/L — SIGNIFICANT CHANGE UP (ref 135–145)
WBC # BLD: 7.35 K/UL — SIGNIFICANT CHANGE UP (ref 3.8–10.5)
WBC # FLD AUTO: 7.35 K/UL — SIGNIFICANT CHANGE UP (ref 3.8–10.5)

## 2019-10-24 RX ORDER — DIAZEPAM 5 MG
2 TABLET ORAL EVERY 8 HOURS
Refills: 0 | Status: DISCONTINUED | OUTPATIENT
Start: 2019-10-24 | End: 2019-10-25

## 2019-10-24 RX ORDER — DIAZEPAM 5 MG
2 TABLET ORAL EVERY 8 HOURS
Refills: 0 | Status: DISCONTINUED | OUTPATIENT
Start: 2019-10-24 | End: 2019-10-24

## 2019-10-24 RX ORDER — ELECTROLYTE SOLUTION,INJ
1 VIAL (ML) INTRAVENOUS
Refills: 0 | Status: DISCONTINUED | OUTPATIENT
Start: 2019-10-24 | End: 2019-10-24

## 2019-10-24 RX ORDER — I.V. FAT EMULSION 20 G/100ML
0.65 EMULSION INTRAVENOUS
Qty: 50 | Refills: 0 | Status: DISCONTINUED | OUTPATIENT
Start: 2019-10-24 | End: 2019-10-24

## 2019-10-24 RX ADMIN — PANTOPRAZOLE SODIUM 40 MILLIGRAM(S): 20 TABLET, DELAYED RELEASE ORAL at 12:21

## 2019-10-24 RX ADMIN — I.V. FAT EMULSION 20.83 GM/KG/DAY: 20 EMULSION INTRAVENOUS at 21:55

## 2019-10-24 RX ADMIN — PIPERACILLIN AND TAZOBACTAM 200 GRAM(S): 4; .5 INJECTION, POWDER, LYOPHILIZED, FOR SOLUTION INTRAVENOUS at 05:50

## 2019-10-24 RX ADMIN — INSULIN HUMAN 2: 100 INJECTION, SOLUTION SUBCUTANEOUS at 12:21

## 2019-10-24 RX ADMIN — CYCLOBENZAPRINE HYDROCHLORIDE 5 MILLIGRAM(S): 10 TABLET, FILM COATED ORAL at 14:20

## 2019-10-24 RX ADMIN — INSULIN HUMAN 2: 100 INJECTION, SOLUTION SUBCUTANEOUS at 00:51

## 2019-10-24 RX ADMIN — HEPARIN SODIUM 5000 UNIT(S): 5000 INJECTION INTRAVENOUS; SUBCUTANEOUS at 13:31

## 2019-10-24 RX ADMIN — MORPHINE SULFATE 30 MILLILITER(S): 50 CAPSULE, EXTENDED RELEASE ORAL at 10:25

## 2019-10-24 RX ADMIN — Medication 2 MILLIGRAM(S): at 17:45

## 2019-10-24 RX ADMIN — INSULIN HUMAN 2: 100 INJECTION, SOLUTION SUBCUTANEOUS at 07:18

## 2019-10-24 RX ADMIN — CHLORHEXIDINE GLUCONATE 1 APPLICATION(S): 213 SOLUTION TOPICAL at 05:53

## 2019-10-24 RX ADMIN — HEPARIN SODIUM 5000 UNIT(S): 5000 INJECTION INTRAVENOUS; SUBCUTANEOUS at 21:55

## 2019-10-24 RX ADMIN — Medication 100 EACH: at 18:27

## 2019-10-24 RX ADMIN — PIPERACILLIN AND TAZOBACTAM 200 GRAM(S): 4; .5 INJECTION, POWDER, LYOPHILIZED, FOR SOLUTION INTRAVENOUS at 00:46

## 2019-10-24 RX ADMIN — HEPARIN SODIUM 5000 UNIT(S): 5000 INJECTION INTRAVENOUS; SUBCUTANEOUS at 05:50

## 2019-10-24 NOTE — PROGRESS NOTE ADULT - ASSESSMENT
A/P: 54M PMH HTN, HLD, rectal adenocarcinoma diagnosed 5/2018, underwent chemoXRT who presented for elective robotic assisted abdominal perineal resection (9/24), c/b SBO managed by NGT and TPN via PICC line but RTOR 10/14 for lap converted to open EDER with increasing lactate and hypotensive overnight, s/p RTOR 10/15 for1 L hematoma evacuation    - pain/nausea control  - PCA  - Continue CLD   - TPN with 20units insulin via PICC line.   - DC Abx  - RLQ JAKUB drainx1  - L axillary PICC line for TPN  - PT/OT A/P: 54M PMH HTN, HLD, rectal adenocarcinoma diagnosed 5/2018, underwent chemoXRT who presented for elective robotic assisted abdominal perineal resection (9/24), c/b SBO managed by NGT and TPN via PICC line but RTOR 10/14 for lap converted to open EDER with increasing lactate and hypotensive overnight, s/p RTOR 10/15 for 1L hematoma evacuation    - pain/nausea control  - PCA  - Continue CLD   - TPN with 20units insulin via PICC line.   - DC Abx  - RLQ JAKUB drainx1  - L axillary PICC line for TPN  - PT/OT

## 2019-10-24 NOTE — CHART NOTE - NSCHARTNOTEFT_GEN_A_CORE
Admitting Diagnosis:   Patient is a 54y old  Male who presents with a chief complaint of elective surgery (24 Oct 2019 11:43)      PAST MEDICAL & SURGICAL HISTORY:  HLD (hyperlipidemia)  HTN (hypertension)  Colon cancer: near rectum  Other elective surgery: Right Upper Chest- Chemo Port      Current Nutrition Order:  Adv to Clear Liquids, low Fat (10/22)  TPN 2.4L bag infusing @100ml/hr x24hrs via PICC providing 110g AA, 410g Dex, 50gm 20% IL (2322kcal, 1.4g pro/kg, GIR=3.7)    PO Intake: Good (%) [   ]  Fair (50-75%) [   ] Poor (<25%) [   ]  Had sips of broth and tea. Wanting more savory options.    GI Issues:   Denies N/V, adbominal discomfort, bloating/distension  First ostomy output 50ml today  Passing flatus  NGT d/c'd 10/20    Pain:   Denying pain at present    Skin Integrity:   Hector score 20  Midline surgical incision    Labs:   10-24    139  |  104  |  11  ----------------------------<  179<H>  4.4   |  24  |  0.64    Ca    9.1      24 Oct 2019 05:53  Phos  3.6     10-24  Mg     2.0     10-24    TPro  6.5  /  Alb  3.0<L>  /  TBili  0.7  /  DBili  x   /  AST  23  /  ALT  36  /  AlkPhos  206<H>  10-23    CAPILLARY BLOOD GLUCOSE      POCT Blood Glucose.: 181 mg/dL (24 Oct 2019 11:40)  POCT Blood Glucose.: 158 mg/dL (24 Oct 2019 06:58)  POCT Blood Glucose.: 155 mg/dL (24 Oct 2019 00:20)  POCT Blood Glucose.: 153 mg/dL (23 Oct 2019 16:29)      Medications:  MEDICATIONS  (STANDING):  chlorhexidine 2% Cloths 1 Application(s) Topical <User Schedule>  dextrose 5%. 1000 milliLiter(s) (50 mL/Hr) IV Continuous <Continuous>  dextrose 50% Injectable 12.5 Gram(s) IV Push once  dextrose 50% Injectable 25 Gram(s) IV Push once  dextrose 50% Injectable 25 Gram(s) IV Push once  fat emulsion (Plant Based) 20% Infusion 0.65 Gm/kG/Day (20.83 mL/Hr) IV Continuous <Continuous>  heparin  Injectable 5000 Unit(s) SubCutaneous every 8 hours  influenza   Vaccine 0.5 milliLiter(s) IntraMuscular once  insulin regular  human corrective regimen sliding scale   SubCutaneous every 6 hours  morphine PCA (1 mG/mL) 30 milliLiter(s) PCA Continuous PCA Continuous  pantoprazole  Injectable 40 milliGRAM(s) IV Push daily  Parenteral Nutrition - Adult 1 Each (100 mL/Hr) TPN Continuous <Continuous>  Parenteral Nutrition - Adult 1 Each (100 mL/Hr) TPN Continuous <Continuous>    MEDICATIONS  (PRN):  cyclobenzaprine 5 milliGRAM(s) Oral every 8 hours PRN Muscle Spasm  dextrose 40% Gel 15 Gram(s) Oral once PRN Blood Glucose LESS THAN 70 milliGRAM(s)/deciliter  glucagon  Injectable 1 milliGRAM(s) IntraMuscular once PRN Glucose LESS THAN 70 milligrams/deciliter  ketorolac 0.5% Ophthalmic Solution 1 Drop(s) Left EYE every 6 hours PRN eye pain irritation  morphine  - Injectable 5 milliGRAM(s) IV Push every 2 hours PRN for breakthrough  naloxone Injectable 0.1 milliGRAM(s) IV Push every 3 minutes PRN For ANY of the following changes in patient status:  A. RR LESS THAN 10 breaths per minute, B. Oxygen saturation LESS THAN 90%, C. Sedation score of 6    Weight: 170lbs  Height: 5'10", IBW 166lbs+/-10%, %%, BMI 25.5    77.7 kg  10/2 76.2 kg  10/3 76.7 kg  10/5 77.6 kg  10/6 78.6 kg  10/7 77.9 kg  10/14 77.0 kg  10/21 82.3kg    Weight Change:   Weight has been consistent since admission with minimal fluctuations. Continue to weigh patient weekly to assess weight trends.     Nutrition Focused Physical Exam: Nutrition Focused Physical Exam: Completed [ X; Completed ]  Not Pertinent [   ]  (From ): Moderate muscle loss noted around clavicles and moderate fat loss noted around triceps. Suspect severe malnutrition based on NFPE, ~10% unintentional wt loss x1.5 months, pt meeting <75% EER for >1 month    Estimated energy needs:   ABW (77.2kg) used to calculate energy needs due to pt's current body weight within % IBW.   Needs adjusted post-op, suspected malnutrition, hypermetabolic state.   2000-0104 kcal/day (30-35kcal/kg)  92-107g protein/day (1.2-1.4g pro/kg)  2310-2695ml fluid/day (30-35ml/kg)    Subjective:   55yo M PMH HTN, HLD, rectal adenocarcinoma diagnosed 2018, underwent chemoXRT who presented for elective robotic assisted abdominal perineal resection (), c/b SBO managed by NGT and TPN via PICC line but RTOR 10/14 for lap converted to open EDER with increasing lactate and hypotensive overnight, s/p RTOR 10/15 for1 L hematoma evacuation. Pt has been on TPN since 10/1. NGT was d/c'd 10/20. Now on CLD and tolerating PO. Anxious about begining to have PO meds as he has  been NPO for so long. Pt seen in room, resting in bed. Remains understanding of PN being primary form of nutrition support. POCT , 158, 155; Lytes WNL except phos 4.6; T (10/24), 140 (10/21), 214mg/dL (10/14). Recommend advancing to fulls w/ EnsureEnlive once deemed medically feasible. Please continue w/ PN until pt is capable of meeting >60% EER PO. RD to follow.     Previous Nutrition Diagnosis:  Malnutrition (suspect severe) RT decreased ability to meet EER AEB NFPE, ~10% unintentional wt loss x1.5 months, pt meeting <75% EER for >1 month.  Active [ X ]  Resolved [   ]    Goal: Pt to consistently meet % of estimated needs via most appropriate route    Recommendations:  1) Recommend  410g Dex, 107g AA, 50g 20% Lipids daily to provide in total: 2322 Kcal, 1.4g protein/kg, GIR 3.7 mg/kg/min based on ABW (77 kg). Fluids and lytes per MD discretion.   >>Check TG weekly. Monitor Mg, Phos, K daily and POC BG Q6hrs. Replete electrolytes prn.  2) Trend daily weights.   3) Insulin regimen per team  4) Trend weights  5) Continue w/ PN until pt is capable of meeting >60% EER PO.     Education: Pt understanding of meeting needs via PN for the time being.     Risk Level: High [ X ]  Moderate [   ] Low [   ].

## 2019-10-24 NOTE — PROGRESS NOTE ADULT - SUBJECTIVE AND OBJECTIVE BOX
Pain Management Progress Note - [facility] [phone]      HPI: Patient seen and examined today. Patient is a 54 year old male, with pmh HTN, HLD adenocarcinoma, s/p abdominal perineal resection, now s/p lap conversion to open, EDER. Patient on a clear liquid diet, reporting tolerating clear liquids for breakfast. but not tolerating PO yet.  Patient reporting diffuse abdominal pain and surgical site pain, pain managed with Morphine PCA. Patient Axox3, denies, n,v, no s/s of oversedation. Midline abdomininal incision closed with staples. C,D,I.        Pain is __x_ sharp ____dull ___burning ___achy ___ Intensity: ____ mild _x__mod ___severe     Location __x__surgical site ____cervical _____lumbar _x___abd ____upper ext____lower ext    Worse with _x___activity __x__movement _____physical therapy___ Rest    Improved with __x__medication ___x_rest ____physical therapy      artificial  tears Solution  iohexol 300 mG (iodine)/mL Oral Solution  acetaminophen  IVPB ..  Parenteral Nutrition - Adult  famotidine Injectable  sodium chloride 0.9% lock flush  chlorhexidine 2% Cloths  chlorproMAZINE    IVPB  famotidine Injectable  famotidine Injectable  benzocaine 15 mG/menthol 3.6 mG Lozenge  Parenteral Nutrition - Adult  Parenteral Nutrition - Adult  benzocaine 15 mG/menthol 3.6 mG (Sugar-Free) Lozenge  HYDROmorphone  Injectable  HYDROmorphone  Injectable  acetaminophen   Tablet ..  Parenteral Nutrition - Adult  fat emulsion (Plant Based) 20% Infusion  docusate sodium Liquid  Parenteral Nutrition - Adult  fat emulsion (Plant Based) 20% Infusion  acetaminophen    Suspension ..  Parenteral Nutrition - Adult  fat emulsion (Plant Based) 20% Infusion  simethicone  Parenteral Nutrition - Adult  fat emulsion (Plant Based) 20% Infusion  Parenteral Nutrition - Adult  (ADM OVERRIDE)  acetaminophen   Tablet ..  HYDROmorphone  Injectable  Parenteral Nutrition - Adult  fat emulsion (Plant Based) 20% Infusion  HYDROmorphone  Injectable  Parenteral Nutrition - Adult  Parenteral Nutrition - Adult  fat emulsion (Plant Based) 20% Infusion  Parenteral Nutrition - Adult  fat emulsion (Plant Based) 20% Infusion  oxyCODONE    5 mG/acetaminophen 325 mG  oxyCODONE    IR  Parenteral Nutrition - Adult  fat emulsion (Plant Based) 20% Infusion  iohexol 300 mG (iodine)/mL Oral Solution  Parenteral Nutrition - Adult  fat emulsion (Plant Based) 20% Infusion  iohexol 300 mG (iodine)/mL Oral Solution  HYDROmorphone  Injectable  HYDROmorphone  Injectable  Parenteral Nutrition - Adult  fat emulsion (Plant Based) 20% Infusion  magnesium sulfate  IVPB  Parenteral Nutrition - Adult  BUpivacaine liposome 1.3% Injectable (no eMAR)  pantoprazole  Injectable  lactated ringers.  HYDROmorphone  Injectable  HYDROmorphone  Injectable  ondansetron Injectable  enoxaparin Injectable  cefTRIAXone   IVPB  metroNIDAZOLE  IVPB  acetaminophen  IVPB ..  HYDROmorphone  Injectable  HYDROmorphone  Injectable  sodium chloride 0.9% Bolus  insulin lispro (HumaLOG) corrective regimen sliding scale  dextrose 5%.  dextrose 40% Gel  dextrose 50% Injectable  acetaminophen  IVPB ..  famotidine Injectable  sodium chloride 0.9% Bolus  calcium gluconate IVPB  sodium chloride 0.9% Bolus  acetaminophen  IVPB ..  ([place]oor[place])  HYDROmorphone  Injectable  HYDROmorphone  Injectable  dextrose 10%.  piperacillin/tazobactam IVPB..  insulin lispro (HumaLOG) corrective regimen sliding scale  HYDROmorphone  Injectable  chlorhexidine 2% Cloths  acetaminophen  IVPB ..  sodium bicarbonate  Injectable  (ADM OVERRIDE)  Parenteral Nutrition - Adult  fat emulsion (Plant Based) 20% Infusion  magnesium sulfate  IVPB  magnesium sulfate  IVPB  dextrose 5% + sodium chloride 0.9%.  sodium chloride 0.9% Bolus  sodium chloride 0.9%.  HYDROmorphone  Injectable  magnesium sulfate  IVPB  sodium phosphate IVPB  HYDROmorphone  Injectable  HYDROmorphone  Injectable  LORazepam   Injectable  Parenteral Nutrition - Adult  fat emulsion (Plant Based) 20% Infusion  insulin NPH human recombinant  chlorproMAZINE    IVPB  Parenteral Nutrition - Adult  fat emulsion (Plant Based) 20% Infusion  sodium chloride 0.9%.  insulin regular  human corrective regimen sliding scale  dextrose 5%.  dextrose 40% Gel  dextrose 50% Injectable  glucagon  Injectable  diazepam  Injectable  acetaminophen  IVPB ..  diazepam  Injectable  pantoprazole  Injectable  potassium phosphate IVPB  Parenteral Nutrition - Adult  fat emulsion (Plant Based) 20% Infusion  potassium chloride  10 mEq/100 mL IVPB  acetaminophen  IVPB ..  heparin  Injectable  acetaminophen  IVPB ..  potassium chloride  20 mEq/100 mL IVPB  potassium chloride  20 mEq/100 mL IVPB  Parenteral Nutrition - Adult  fat emulsion (Plant Based) 20% Infusion  acetaminophen  IVPB ..  morphine PCA (5 mG/mL)  naloxone Injectable  acetaminophen  IVPB ..  morphine  - Injectable  morphine PCA (1 mG/mL)  Parenteral Nutrition - Adult  fat emulsion (Plant Based) 20% Infusion  potassium chloride  10 mEq/100 mL IVPB  Parenteral Nutrition - Adult  fat emulsion (Plant Based) 20% Infusion  morphine  - Injectable  acetaminophen  IVPB ..  Parenteral Nutrition - Adult  fat emulsion (Plant Based) 20% Infusion  cyclobenzaprine  Parenteral Nutrition - Adult  fat emulsion (Plant Based) 20% Infusion  Parenteral Nutrition - Adult  fat emulsion (Plant Based) 20% Infusion  Parenteral Nutrition - Adult  fat emulsion (Plant Based) 20% Infusion      ROS: Const:  __-_febrile   Eyes:___ENT:___CV: _-__chest pain  Resp: __-__sob  GI:___nausea ___vomiting __x_abd pain ___npo ___clears _x_full diet __bm  :___ Musk: __x_pain __x_spasm  Skin:___ Neuro:  ___sedation___confusion___ numbness ___weakness ___paresth  Psych:__anxiety  Endo:___ Heme:___Allergy:_________, _x__all others reviewed and negative      PAST MEDICAL & SURGICAL HISTORY:  HLD (hyperlipidemia)  HTN (hypertension)  Colon cancer: near rectum  Other elective surgery: Right Upper Chest- Chemo Port      10-24 @ 05:88920 mL/min/1.73M2      Hemoglobin: 10.1 g/dL (10-24 @ 05:53)  Hemoglobin: 10.3 g/dL (10-23 @ 06:26)        T(C): 37.2 (10-24-19 @ 09:24), Max: 37.3 (10-23-19 @ 22:08)  HR: 94 (10-24-19 @ 08:30) (94 - 98)  BP: 119/64 (10-24-19 @ 08:30) (104/58 - 120/62)  RR: 17 (10-24-19 @ 08:30) (17 - 18)  SpO2: 98% (10-24-19 @ 08:30) (96% - 98%)  Wt(kg): --       PHYSICAL EXAM:  Gen Appearance: _x__no acute distress __x_appropriate        Neuro: _x__SILT feet____ EOM Intact Psych: AAOX_3_, _x__mood/affect appropriate        Eyes: _x__conjunctiva WNL  x_____ Pupils equal and round        ENT: _x_ears and nose atraumatic__x_ Hearing grossly intact        Neck: _x__trachea midline, no visible masses ___thyroid without palpable mass    Resp: _x__Nml WOB____No tactile fremitus ___clear to auscultation    Cardio: __x_extremities free from edema __x__pedal pulses palpable    GI/Abdomen: _x__soft ___x__ Nontender___x___Nondistended_____HSM    Lymphatic: ___no palpable nodes in neck  ___no palpable nodes calves and feet    Skin/Wound: ___Incision, _x__Dressing c/d/i,   ____surrounding tissues soft,  ___drain/chest tube present____    Muscular: EHL __5_/5  Gastrocnemius___5/5    _x__absent clubbing/cyanosis        ASSESSMENT: Patient is a 54 year old male, with pmh HTN, HLD adenocarcinoma, s/p abdominal perineal resection, now s/p lap conversion to open, EDER, pain managed with Morphine PCA.     Recommended Treatment PLAN:  1. Continue Valium 2mg IV q8hr PRN muscular spasms.   2. Continue Morphine PCA 1mg Q10 minutes,  34mg 4 hour max  3. Morphine 5mg Q2h IVP prn breakthrough pain  4. Tylenol 1000mg IV Q8h standing  Plan discussed with Dr. Morales at bedside

## 2019-10-24 NOTE — PROGRESS NOTE ADULT - SUBJECTIVE AND OBJECTIVE BOX
Post-Op Day #: 30/10/9     Procedure/Dx/Injuries: abdominal perineal resection/lap converted to open EDER/hematoma evacuation      Events of past 24 hours: NAEON. Pain improved since yesterday. + stool, gas ostomy output. OOBA. No acute complaints.    MEDICATIONS  (STANDING):  chlorhexidine 2% Cloths 1 Application(s) Topical <User Schedule>  dextrose 5%. 1000 milliLiter(s) (50 mL/Hr) IV Continuous <Continuous>  fat emulsion (Plant Based) 20% Infusion 0.65 Gm/kG/Day (20.854 mL/Hr) IV Continuous <Continuous>  heparin  Injectable 5000 Unit(s) SubCutaneous every 8 hours  influenza   Vaccine 0.5 milliLiter(s) IntraMuscular once  insulin regular  human corrective regimen sliding scale   SubCutaneous every 6 hours  morphine PCA (1 mG/mL) 30 milliLiter(s) PCA Continuous PCA Continuous  pantoprazole  Injectable 40 milliGRAM(s) IV Push daily  Parenteral Nutrition - Adult 1 Each (100 mL/Hr) TPN Continuous <Continuous>    MEDICATIONS  (PRN):  cyclobenzaprine 5 milliGRAM(s) Oral every 8 hours PRN Muscle Spasm  dextrose 40% Gel 15 Gram(s) Oral once PRN Blood Glucose LESS THAN 70 milliGRAM(s)/deciliter  glucagon  Injectable 1 milliGRAM(s) IntraMuscular once PRN Glucose LESS THAN 70 milligrams/deciliter  ketorolac 0.5% Ophthalmic Solution 1 Drop(s) Left EYE every 6 hours PRN eye pain irritation  morphine  - Injectable 5 milliGRAM(s) IV Push every 2 hours PRN for breakthrough  naloxone Injectable 0.1 milliGRAM(s) IV Push every 3 minutes PRN For ANY of the following changes in patient status:  A. RR LESS THAN 10 breaths per minute, B. Oxygen saturation LESS THAN 90%, C. Sedation score of 6        Vitals: T(F): 98.7 (10-24-19 @ 05:35), Max: 99.2 (10-23-19 @ 22:08)  HR: 94 (10-24-19 @ 08:30)  BP: 119/64 (10-24-19 @ 08:30)  RR: 17 (10-24-19 @ 08:30)  SpO2: 98% (10-24-19 @ 08:30)        Diet, Clear Liquid:   Low Fat (LOWFAT)      10-23-19 @ 07:01  -  10-24-19 @ 07:00  --------------------------------------------------------  IN:    fat emulsion (Plant Based) 20% Infusion: 83.2 mL    fat emulsion (Plant Based) 20% Infusion: 41.6 mL    Oral Fluid: 300 mL    TPN (Total Parenteral Nutrition): 1800 mL  Total IN: 2224.8 mL    OUT:    Bulb: 30 mL    Bulb: 40 mL    Voided: 300 mL    Voided: 1650 mL  Total OUT: 2020 mL    Total NET: 204.8 mL            PHYSICAL EXAM  Constitutional: A&Ox3. Patient resting comfortably  Respiratory: non labored breathing, no respiratory distress  Cardiovascular: sinus tachycardia  Gastrointestinal: Abdomen is soft, tender to palpation near incision sites, mildly distended. Ostomy in place with stool, gas output. JAKUB in place with ss output, dressing with minimal serosanguinous drainage. Midline incision c/d/i.  Extremities: WWP, (-) edema, SCDs in place.          LAB/STUDIES:  CAPILLARY BLOOD GLUCOSE    POCT Blood Glucose.: 158 mg/dL (24 Oct 2019 06:58)  POCT Blood Glucose.: 155 mg/dL (24 Oct 2019 00:20)  POCT Blood Glucose.: 153 mg/dL (23 Oct 2019 16:29)  POCT Blood Glucose.: 193 mg/dL (23 Oct 2019 11:35)                          10.1   7.35  )-----------( 431      ( 24 Oct 2019 05:53 )             31.8     10-24    139  |  104  |  11  ----------------------------<  179<H>  4.4   |  24  |  0.64    Ca    9.1      24 Oct 2019 05:53  Phos  3.6     10-24  Mg     2.0     10-24    TPro  6.5  /  Alb  3.0<L>  /  TBili  0.7  /  DBili  x   /  AST  23  /  ALT  36  /  AlkPhos  206<H>  10-23               6.5  | 0.7  | 23       ------------------[206     ( 23 Oct 2019 06:26 )  3.0  | x    | 36

## 2019-10-25 LAB
ANION GAP SERPL CALC-SCNC: 10 MMOL/L — SIGNIFICANT CHANGE UP (ref 5–17)
BUN SERPL-MCNC: 11 MG/DL — SIGNIFICANT CHANGE UP (ref 7–23)
CALCIUM SERPL-MCNC: 9.6 MG/DL — SIGNIFICANT CHANGE UP (ref 8.4–10.5)
CHLORIDE SERPL-SCNC: 102 MMOL/L — SIGNIFICANT CHANGE UP (ref 96–108)
CO2 SERPL-SCNC: 25 MMOL/L — SIGNIFICANT CHANGE UP (ref 22–31)
CREAT SERPL-MCNC: 0.58 MG/DL — SIGNIFICANT CHANGE UP (ref 0.5–1.3)
GLUCOSE BLDC GLUCOMTR-MCNC: 113 MG/DL — HIGH (ref 70–99)
GLUCOSE BLDC GLUCOMTR-MCNC: 160 MG/DL — HIGH (ref 70–99)
GLUCOSE BLDC GLUCOMTR-MCNC: 168 MG/DL — HIGH (ref 70–99)
GLUCOSE BLDC GLUCOMTR-MCNC: 169 MG/DL — HIGH (ref 70–99)
GLUCOSE SERPL-MCNC: 186 MG/DL — HIGH (ref 70–99)
HCT VFR BLD CALC: 32.6 % — LOW (ref 39–50)
HGB BLD-MCNC: 10.5 G/DL — LOW (ref 13–17)
MAGNESIUM SERPL-MCNC: 1.9 MG/DL — SIGNIFICANT CHANGE UP (ref 1.6–2.6)
MCHC RBC-ENTMCNC: 30.3 PG — SIGNIFICANT CHANGE UP (ref 27–34)
MCHC RBC-ENTMCNC: 32.2 GM/DL — SIGNIFICANT CHANGE UP (ref 32–36)
MCV RBC AUTO: 94.2 FL — SIGNIFICANT CHANGE UP (ref 80–100)
NRBC # BLD: 0 /100 WBCS — SIGNIFICANT CHANGE UP (ref 0–0)
PHOSPHATE SERPL-MCNC: 3.8 MG/DL — SIGNIFICANT CHANGE UP (ref 2.5–4.5)
PLATELET # BLD AUTO: 454 K/UL — HIGH (ref 150–400)
POTASSIUM SERPL-MCNC: 4.3 MMOL/L — SIGNIFICANT CHANGE UP (ref 3.5–5.3)
POTASSIUM SERPL-SCNC: 4.3 MMOL/L — SIGNIFICANT CHANGE UP (ref 3.5–5.3)
RBC # BLD: 3.46 M/UL — LOW (ref 4.2–5.8)
RBC # FLD: 14.5 % — SIGNIFICANT CHANGE UP (ref 10.3–14.5)
SODIUM SERPL-SCNC: 137 MMOL/L — SIGNIFICANT CHANGE UP (ref 135–145)
WBC # BLD: 6.86 K/UL — SIGNIFICANT CHANGE UP (ref 3.8–10.5)
WBC # FLD AUTO: 6.86 K/UL — SIGNIFICANT CHANGE UP (ref 3.8–10.5)

## 2019-10-25 PROCEDURE — 74019 RADEX ABDOMEN 2 VIEWS: CPT | Mod: 26

## 2019-10-25 RX ORDER — OXYCODONE HYDROCHLORIDE 5 MG/1
10 TABLET ORAL EVERY 6 HOURS
Refills: 0 | Status: DISCONTINUED | OUTPATIENT
Start: 2019-10-25 | End: 2019-10-31

## 2019-10-25 RX ORDER — ACETAMINOPHEN 500 MG
975 TABLET ORAL EVERY 8 HOURS
Refills: 0 | Status: DISCONTINUED | OUTPATIENT
Start: 2019-10-25 | End: 2019-10-31

## 2019-10-25 RX ORDER — OXYCODONE HYDROCHLORIDE 5 MG/1
5 TABLET ORAL EVERY 6 HOURS
Refills: 0 | Status: DISCONTINUED | OUTPATIENT
Start: 2019-10-25 | End: 2019-10-31

## 2019-10-25 RX ORDER — I.V. FAT EMULSION 20 G/100ML
0.64 EMULSION INTRAVENOUS
Qty: 50 | Refills: 0 | Status: DISCONTINUED | OUTPATIENT
Start: 2019-10-25 | End: 2019-10-25

## 2019-10-25 RX ORDER — ELECTROLYTE SOLUTION,INJ
1 VIAL (ML) INTRAVENOUS
Refills: 0 | Status: DISCONTINUED | OUTPATIENT
Start: 2019-10-25 | End: 2019-10-25

## 2019-10-25 RX ORDER — MORPHINE SULFATE 50 MG/1
2 CAPSULE, EXTENDED RELEASE ORAL
Refills: 0 | Status: DISCONTINUED | OUTPATIENT
Start: 2019-10-25 | End: 2019-10-31

## 2019-10-25 RX ORDER — DIAZEPAM 5 MG
2 TABLET ORAL EVERY 8 HOURS
Refills: 0 | Status: DISCONTINUED | OUTPATIENT
Start: 2019-10-25 | End: 2019-10-31

## 2019-10-25 RX ADMIN — INSULIN HUMAN 2: 100 INJECTION, SOLUTION SUBCUTANEOUS at 06:19

## 2019-10-25 RX ADMIN — Medication 100 EACH: at 17:27

## 2019-10-25 RX ADMIN — CHLORHEXIDINE GLUCONATE 1 APPLICATION(S): 213 SOLUTION TOPICAL at 06:32

## 2019-10-25 RX ADMIN — HEPARIN SODIUM 5000 UNIT(S): 5000 INJECTION INTRAVENOUS; SUBCUTANEOUS at 23:08

## 2019-10-25 RX ADMIN — INSULIN HUMAN 2: 100 INJECTION, SOLUTION SUBCUTANEOUS at 00:31

## 2019-10-25 RX ADMIN — HEPARIN SODIUM 5000 UNIT(S): 5000 INJECTION INTRAVENOUS; SUBCUTANEOUS at 14:42

## 2019-10-25 RX ADMIN — OXYCODONE HYDROCHLORIDE 10 MILLIGRAM(S): 5 TABLET ORAL at 20:52

## 2019-10-25 RX ADMIN — PANTOPRAZOLE SODIUM 40 MILLIGRAM(S): 20 TABLET, DELAYED RELEASE ORAL at 11:15

## 2019-10-25 RX ADMIN — MORPHINE SULFATE 30 MILLILITER(S): 50 CAPSULE, EXTENDED RELEASE ORAL at 07:33

## 2019-10-25 RX ADMIN — HEPARIN SODIUM 5000 UNIT(S): 5000 INJECTION INTRAVENOUS; SUBCUTANEOUS at 06:20

## 2019-10-25 RX ADMIN — I.V. FAT EMULSION 20.83 GM/KG/DAY: 20 EMULSION INTRAVENOUS at 22:08

## 2019-10-25 RX ADMIN — Medication 2 MILLIGRAM(S): at 18:49

## 2019-10-25 RX ADMIN — Medication 2 MILLIGRAM(S): at 01:45

## 2019-10-25 RX ADMIN — INSULIN HUMAN 2: 100 INJECTION, SOLUTION SUBCUTANEOUS at 11:54

## 2019-10-25 RX ADMIN — OXYCODONE HYDROCHLORIDE 10 MILLIGRAM(S): 5 TABLET ORAL at 21:22

## 2019-10-25 NOTE — PROVIDER CONTACT NOTE (MEDICATION) - ACTION/TREATMENT ORDERED:
Carrie WRAY confirmed over the phone that Its ok that the medication can be started in the morning. pharmacy was notified.

## 2019-10-25 NOTE — PROGRESS NOTE ADULT - SUBJECTIVE AND OBJECTIVE BOX
Plastic Surgery Progress Note (pg [facility]: [id number], [facility]: [phone], [facility]: [phone])    SUBJECTIVE:  Doing well. No overnight events. Feeling better today, tolerating CLD.     OBJECTIVE:     ** VITAL SIGNS / I&O's **    Vital Signs Last 24 Hrs  T(C): 37.1 (25 Oct 2019 05:08), Max: 37.2 (24 Oct 2019 09:24)  T(F): 98.8 (25 Oct 2019 05:08), Max: 98.9 (24 Oct 2019 09:24)  HR: 100 (25 Oct 2019 04:58) (90 - 102)  BP: 111/65 (25 Oct 2019 04:58) (111/65 - 123/73)  BP(mean): 81 (25 Oct 2019 04:58) (81 - 91)  RR: 18 (25 Oct 2019 04:58) (17 - 18)  SpO2: 97% (25 Oct 2019 04:58) (97% - 97%)      24 Oct 2019 07:01  -  25 Oct 2019 07:00  --------------------------------------------------------  IN:    fat emulsion (Plant Based) 20% Infusion: 124.8 mL    fat emulsion (Plant Based) 20% Infusion: 62.4 mL    Oral Fluid: 360 mL    TPN (Total Parenteral Nutrition): 2000 mL  Total IN: 2547.2 mL    OUT:    Bulb: 50 mL    Colostomy: 110 mL    Voided: 1900 mL  Total OUT: 2060 mL    Total NET: 487.2 mL          ** PHYSICAL EXAM **    -- CONSTITUTIONAL: AOx3. NAD.   -- ABDOMEN: less distended, JAKUB ss, stool in device  -- PERINEUM: incision c/d/i, no collections, no signs of infection, few remaining sutures in place  -- EXTREMITIES: L thigh donor site c/d/i, no collections      **MEDS**  chlorhexidine 2% Cloths 1 Application(s) Topical <User Schedule>  dextrose 40% Gel 15 Gram(s) Oral once PRN  dextrose 5%. 1000 milliLiter(s) IV Continuous <Continuous>  dextrose 50% Injectable 12.5 Gram(s) IV Push once  dextrose 50% Injectable 25 Gram(s) IV Push once  dextrose 50% Injectable 25 Gram(s) IV Push once  diazepam  Injectable 2 milliGRAM(s) IV Push every 8 hours PRN  fat emulsion (Plant Based) 20% Infusion 0.65 Gm/kG/Day IV Continuous <Continuous>  glucagon  Injectable 1 milliGRAM(s) IntraMuscular once PRN  heparin  Injectable 5000 Unit(s) SubCutaneous every 8 hours  influenza   Vaccine 0.5 milliLiter(s) IntraMuscular once  insulin regular  human corrective regimen sliding scale   SubCutaneous every 6 hours  ketorolac 0.5% Ophthalmic Solution 1 Drop(s) Left EYE every 6 hours PRN  morphine  - Injectable 5 milliGRAM(s) IV Push every 2 hours PRN  morphine PCA (1 mG/mL) 30 milliLiter(s) PCA Continuous PCA Continuous  naloxone Injectable 0.1 milliGRAM(s) IV Push every 3 minutes PRN  pantoprazole  Injectable 40 milliGRAM(s) IV Push daily  Parenteral Nutrition - Adult 1 Each TPN Continuous <Continuous>      ** LABS **                          10.5   6.86  )-----------( 454      ( 25 Oct 2019 05:51 )             32.6     25 Oct 2019 05:51    137    |  102    |  11     ----------------------------<  186    4.3     |  25     |  0.58     Ca    9.6        25 Oct 2019 05:51  Phos  3.8       25 Oct 2019 05:51  Mg     1.9       25 Oct 2019 05:51        CAPILLARY BLOOD GLUCOSE      POCT Blood Glucose.: 160 mg/dL (25 Oct 2019 06:13)  POCT Blood Glucose.: 168 mg/dL (25 Oct 2019 00:15)  POCT Blood Glucose.: 151 mg/dL (24 Oct 2019 16:07)  POCT Blood Glucose.: 181 mg/dL (24 Oct 2019 11:40)

## 2019-10-25 NOTE — PROVIDER CONTACT NOTE (MEDICATION) - SITUATION
The pharmacy wants to know if the Eltrombopag med can be started now or tomorrow morning. Carrie WRAY notified.

## 2019-10-25 NOTE — PROVIDER CONTACT NOTE (MEDICATION) - BACKGROUND
[facility] Pharmacist called about Eltrombopag medication that was ordered during the day, but medication was not available at the time of ordered. However medication is not active on EMR.

## 2019-10-25 NOTE — PROGRESS NOTE ADULT - SUBJECTIVE AND OBJECTIVE BOX
Pain Management Progress Note - [facility] [phone]      HPI: Patient seen and examined today. Patient is a 54 year old male, with pmh HTN, HLD adenocarcinoma, s/p abdominal perineal resection, now s/p lap conversion to open, EDER. Patient on a clear liquid diet,. Patient reporting diffuse abdominal pain and surgical site pain, pain managed with Morphine PCA. Patient Axox3, denies, n,v, no s/s of oversedation. Midline abdomininal incision closed with staples. C,D,I.        Pain is __x_ sharp ____dull ___burning ___achy ___ Intensity: ____ mild _x__mod ___severe     Location __x__surgical site ____cervical _____lumbar _x___abd ____upper ext____lower ext    Worse with _x___activity __x__movement _____physical therapy___ Rest    Improved with __x__medication ___x_rest ____physical therapy      benzocaine 15 mG/menthol 3.6 mG Lozenge  Parenteral Nutrition - Adult  Parenteral Nutrition - Adult  benzocaine 15 mG/menthol 3.6 mG (Sugar-Free) Lozenge  HYDROmorphone  Injectable  HYDROmorphone  Injectable  acetaminophen   Tablet ..  Parenteral Nutrition - Adult  fat emulsion (Plant Based) 20% Infusion  docusate sodium Liquid  Parenteral Nutrition - Adult  fat emulsion (Plant Based) 20% Infusion  acetaminophen    Suspension ..  Parenteral Nutrition - Adult  fat emulsion (Plant Based) 20% Infusion  simethicone  Parenteral Nutrition - Adult  fat emulsion (Plant Based) 20% Infusion  Parenteral Nutrition - Adult  acetaminophen   Tablet ..  HYDROmorphone  Injectable  Parenteral Nutrition - Adult  fat emulsion (Plant Based) 20% Infusion  HYDROmorphone  Injectable  Parenteral Nutrition - Adult  Parenteral Nutrition - Adult  fat emulsion (Plant Based) 20% Infusion  Parenteral Nutrition - Adult  fat emulsion (Plant Based) 20% Infusion  oxyCODONE    5 mG/acetaminophen 325 mG  oxyCODONE    IR  Parenteral Nutrition - Adult  fat emulsion (Plant Based) 20% Infusion  iohexol 300 mG (iodine)/mL Oral Solution  Parenteral Nutrition - Adult  fat emulsion (Plant Based) 20% Infusion  iohexol 300 mG (iodine)/mL Oral Solution  HYDROmorphone  Injectable  HYDROmorphone  Injectable  Parenteral Nutrition - Adult  fat emulsion (Plant Based) 20% Infusion  magnesium sulfate  IVPB  Parenteral Nutrition - Adult  BUpivacaine liposome 1.3% Injectable (no eMAR)  pantoprazole  Injectable  lactated ringers.  HYDROmorphone  Injectable  HYDROmorphone  Injectable  ondansetron Injectable  enoxaparin Injectable  cefTRIAXone   IVPB  metroNIDAZOLE  IVPB  acetaminophen  IVPB ..  HYDROmorphone  Injectable  HYDROmorphone  Injectable  sodium chloride 0.9% Bolus  insulin lispro (HumaLOG) corrective regimen sliding scale  dextrose 5%.  dextrose 40% Gel  dextrose 50% Injectable  glucagon  Injectable  acetaminophen  IVPB ..  famotidine Injectable  sodium chloride 0.9% Bolus  sodium chloride 0.9% Bolus  calcium gluconate IVPB  sodium chloride 0.9% Bolus  acetaminophen  IVPB ..  acetaminophen  IVPB ..  HYDROmorphone  Injectable  HYDROmorphone  Injectable  dextrose 10%.  piperacillin/tazobactam IVPB..  insulin lispro (HumaLOG) corrective regimen sliding scale  HYDROmorphone  Injectable  HYDROmorphone  Injectable  HYDROmorphone  Injectable  chlorhexidine 2% Cloths  acetaminophen  IVPB ..  sodium bicarbonate  Injectable  Parenteral Nutrition - Adult  fat emulsion (Plant Based) 20% Infusion  magnesium sulfate  IVPB  magnesium sulfate  IVPB  dextrose 5% + sodium chloride 0.9%.  sodium chloride 0.9% Bolus  sodium chloride 0.9% Bolus  sodium chloride 0.9%.  HYDROmorphone  Injectable  magnesium sulfate  IVPB  sodium phosphate IVPB  HYDROmorphone  Injectable  HYDROmorphone  Injectable  LORazepam   Injectable  Parenteral Nutrition - Adult  fat emulsion (Plant Based) 20% Infusion  insulin NPH human recombinant  chlorproMAZINE    IVPB  Parenteral Nutrition - Adult  fat emulsion (Plant Based) 20% Infusion  sodium chloride 0.9%.  insulin regular  human corrective regimen sliding scale  dextrose 5%.  dextrose 40% Gel  dextrose 50% Injectable  glucagon  Injectable  diazepam  Injectable  acetaminophen  IVPB ..  diazepam  Injectable  pantoprazole  Injectable  potassium phosphate IVPB  Parenteral Nutrition - Adult  fat emulsion (Plant Based) 20% Infusion  potassium chloride  10 mEq/100 mL IVPB  acetaminophen  IVPB ..  heparin  Injectable  acetaminophen  IVPB ..  potassium chloride  20 mEq/100 mL IVPB  potassium chloride  20 mEq/100 mL IVPB  Parenteral Nutrition - Adult  fat emulsion (Plant Based) 20% Infusion  acetaminophen  IVPB ..  (ADM OVERRIDE)  morphine PCA (5 mG/mL)  naloxone Injectable  acetaminophen  IVPB ..  morphine  - Injectable  morphine PCA (1 mG/mL)  Parenteral Nutrition - Adult  fat emulsion (Plant Based) 20% Infusion  potassium chloride  10 mEq/100 mL IVPB  Parenteral Nutrition - Adult  fat emulsion (Plant Based) 20% Infusion  morphine  - Injectable  acetaminophen  IVPB ..  Parenteral Nutrition - Adult  fat emulsion (Plant Based) 20% Infusion  cyclobenzaprine  Parenteral Nutrition - Adult  fat emulsion (Plant Based) 20% Infusion  Parenteral Nutrition - Adult  fat emulsion (Plant Based) 20% Infusion  Parenteral Nutrition - Adult  fat emulsion (Plant Based) 20% Infusion  diazepam  Injectable  diazepam  Injectable  Parenteral Nutrition - Adult  fat emulsion (Plant Based) 20% Infusion      ROS: Const:  __-_febrile   Eyes:___ENT:___CV: _-__chest pain  Resp: __-__sob  GI:___nausea ___vomiting __x_abd pain ___npo ___clears _x_full diet __bm  :___ Musk: __x_pain __x_spasm  Skin:___ Neuro:  ___sedation___confusion___ numbness ___weakness ___paresth  Psych:__anxiety  Endo:___ Heme:___Allergy:_________, _x__all others reviewed and negative      PAST MEDICAL & SURGICAL HISTORY:  HLD (hyperlipidemia)  HTN (hypertension)  Colon cancer: near rectum  Other elective surgery: Right Upper Chest- Chemo Port      10-25 @ 05:24783 mL/min/1.73M2      Hemoglobin: 10.5 g/dL (10-25 @ 05:51)  Hemoglobin: 10.1 g/dL (10-24 @ 05:53)        T(C): 36.9 (10-25-19 @ 13:09), Max: 37.1 (10-25-19 @ 05:08)  HR: 102 (10-25-19 @ 15:25) (96 - 102)  BP: 109/68 (10-25-19 @ 15:25) (109/68 - 123/73)  RR: 18 (10-25-19 @ 15:25) (17 - 18)  SpO2: 100% (10-25-19 @ 15:25) (95% - 100%)  Wt(kg): --        PHYSICAL EXAM:  Gen Appearance: _x__no acute distress __x_appropriate        Neuro: _x__SILT feet____ EOM Intact Psych: AAOX_3_, _x__mood/affect appropriate        Eyes: _x__conjunctiva WNL  x_____ Pupils equal and round        ENT: _x_ears and nose atraumatic__x_ Hearing grossly intact        Neck: _x__trachea midline, no visible masses ___thyroid without palpable mass    Resp: _x__Nml WOB____No tactile fremitus ___clear to auscultation    Cardio: __x_extremities free from edema __x__pedal pulses palpable    GI/Abdomen: _x__soft ___x__ Nontender___x___Nondistended_____HSM    Lymphatic: ___no palpable nodes in neck  ___no palpable nodes calves and feet    Skin/Wound: ___Incision, _x__Dressing c/d/i,   ____surrounding tissues soft,  ___drain/chest tube present____    Muscular: EHL __5_/5  Gastrocnemius___5/5    _x__absent clubbing/cyanosis        ASSESSMENT: Patient is a 54 year old male, with pmh HTN, HLD adenocarcinoma, s/p abdominal perineal resection, now s/p lap conversion to open, EDER, pain managed with Morphine PCA.     Recommended Treatment PLAN:  1. Continue Valium 2mg IV q8hr PRN muscular spasms.   2. Continue Morphine PCA 1mg Q10 minutes,  34mg 4 hour max  3. Morphine 5mg Q2h IVP prn breakthrough pain  4. Tylenol 1000mg IV Q8h standing  5. D/C PCA when patient can tolerate PO    Oral Pain regimen  1. Oxycodone 5-10mg PO q4h prn moderate to severe pain   2. Morphine 2mg IVP Q2h prn breakthrough pain  3. tylenol 975mg PO q8h prn muscle spasms  4. Diazepam 2mg Po Q8h prn muscle spasms  Plan discussed with Dr. Morales

## 2019-10-25 NOTE — PROGRESS NOTE ADULT - ASSESSMENT
55 y/o male s/p abdominal peritoneal resection with gracilis flap (POD 31)    - Will remove remaining sutures today  - continue to observe  - prn analgesia  - care per primary team    Jose Chavez  PGY-5  Plastic Surgery

## 2019-10-25 NOTE — PROGRESS NOTE ADULT - SUBJECTIVE AND OBJECTIVE BOX
Post-Op Day #: 31/11/10    Procedure/Dx/Injuries: abdominal perineal resection/lap converted to open EDER/hematoma evacuation      Events of past 24 hours: NAEON. Pain controlled. + stool, gas ostomy output. OOBA. No acute complaints.  MEDICATIONS  (STANDING):  chlorhexidine 2% Cloths 1 Application(s) Topical <User Schedule>  dextrose 5%. 1000 milliLiter(s) (50 mL/Hr) IV Continuous <Continuous>  dextrose 50% Injectable 12.5 Gram(s) IV Push once  dextrose 50% Injectable 25 Gram(s) IV Push once  dextrose 50% Injectable 25 Gram(s) IV Push once  fat emulsion (Plant Based) 20% Infusion 0.64 Gm/kG/Day (20.83 mL/Hr) IV Continuous <Continuous>  heparin  Injectable 5000 Unit(s) SubCutaneous every 8 hours  influenza   Vaccine 0.5 milliLiter(s) IntraMuscular once  insulin regular  human corrective regimen sliding scale   SubCutaneous every 6 hours  morphine PCA (1 mG/mL) 30 milliLiter(s) PCA Continuous PCA Continuous  pantoprazole  Injectable 40 milliGRAM(s) IV Push daily  Parenteral Nutrition - Adult 1 Each (100 mL/Hr) TPN Continuous <Continuous>  Parenteral Nutrition - Adult 1 Each (100 mL/Hr) TPN Continuous <Continuous>    MEDICATIONS  (PRN):  dextrose 40% Gel 15 Gram(s) Oral once PRN Blood Glucose LESS THAN 70 milliGRAM(s)/deciliter  diazepam  Injectable 2 milliGRAM(s) IV Push every 8 hours PRN Abdominal spasms  glucagon  Injectable 1 milliGRAM(s) IntraMuscular once PRN Glucose LESS THAN 70 milligrams/deciliter  ketorolac 0.5% Ophthalmic Solution 1 Drop(s) Left EYE every 6 hours PRN eye pain irritation  morphine  - Injectable 5 milliGRAM(s) IV Push every 2 hours PRN for breakthrough  naloxone Injectable 0.1 milliGRAM(s) IV Push every 3 minutes PRN For ANY of the following changes in patient status:  A. RR LESS THAN 10 breaths per minute, B. Oxygen saturation LESS THAN 90%, C. Sedation score of 6          Vital Signs Last 24 Hrs  T(C): 36.9 (25 Oct 2019 13:09), Max: 37.1 (25 Oct 2019 05:08)  T(F): 98.4 (25 Oct 2019 13:09), Max: 98.8 (25 Oct 2019 05:08)  HR: 98 (25 Oct 2019 11:21) (96 - 102)  BP: 110/77 (25 Oct 2019 11:21) (110/77 - 123/73)  BP(mean): 88 (25 Oct 2019 11:21) (81 - 91)  RR: 18 (25 Oct 2019 11:21) (17 - 18)  SpO2: 98% (25 Oct 2019 11:21) (95% - 98%)      Diet, Clear Liquid:   Low Fat (LOWFAT)      I&O's Detail    24 Oct 2019 07:01  -  25 Oct 2019 07:00  --------------------------------------------------------  IN:    fat emulsion (Plant Based) 20% Infusion: 124.8 mL    fat emulsion (Plant Based) 20% Infusion: 62.4 mL    Oral Fluid: 360 mL    TPN (Total Parenteral Nutrition): 2000 mL  Total IN: 2547.2 mL    OUT:    Bulb: 50 mL    Colostomy: 110 mL    Voided: 1900 mL  Total OUT: 2060 mL    Total NET: 487.2 mL      25 Oct 2019 07:01  -  25 Oct 2019 14:26  --------------------------------------------------------  IN:    TPN (Total Parenteral Nutrition): 700 mL  Total IN: 700 mL    OUT:    Bulb: 20 mL    Voided: 300 mL  Total OUT: 320 mL    Total NET: 380 mL                PHYSICAL EXAM  Constitutional: A&Ox3. Patient resting comfortably  Respiratory: non labored breathing, no respiratory distress  Gastrointestinal: Abdomen is soft, tender to palpation near incision sites, mildly distended. Ostomy in place with stool, gas output. JAKUB in place with ss output, dressing with minimal serosanguinous drainage. Midline incision c/d/i.  Extremities: WWP, (-) edema, SCDs in place.          LAB/STUDIES:  CAPILLARY BLOOD GLUCOSE      POCT Blood Glucose.: 169 mg/dL (25 Oct 2019 11:24)  POCT Blood Glucose.: 160 mg/dL (25 Oct 2019 06:13)  POCT Blood Glucose.: 168 mg/dL (25 Oct 2019 00:15)  POCT Blood Glucose.: 151 mg/dL (24 Oct 2019 16:07)                      10-25    137  |  102  |  11  ----------------------------<  186<H>  4.3   |  25  |  0.58    Ca    9.6      25 Oct 2019 05:51  Phos  3.8     10-25  Mg     1.9     10-25                          10.5   6.86  )-----------( 454      ( 25 Oct 2019 05:51 )             32.6

## 2019-10-25 NOTE — PROGRESS NOTE ADULT - ASSESSMENT
A/P: 54M PMH HTN, HLD, rectal adenocarcinoma diagnosed 5/2018, underwent chemoXRT who presented for elective robotic assisted abdominal perineal resection (9/24), c/b SBO managed by NGT and TPN via PICC line but RTOR 10/14 for lap converted to open EDER with increasing lactate and hypotensive overnight, s/p RTOR 10/15 for 1L hematoma evacuation    - pain/nausea control  - PCA  - AXR- showing improved ileus  - Continue CLD   - TPN with 20units insulin via PICC line.   - DC Abx  - RLQ JAKUB drainx1  - L axillary PICC line for TPN  - PT/OT

## 2019-10-26 LAB
ANION GAP SERPL CALC-SCNC: 10 MMOL/L — SIGNIFICANT CHANGE UP (ref 5–17)
BUN SERPL-MCNC: 12 MG/DL — SIGNIFICANT CHANGE UP (ref 7–23)
CALCIUM SERPL-MCNC: 9.7 MG/DL — SIGNIFICANT CHANGE UP (ref 8.4–10.5)
CHLORIDE SERPL-SCNC: 104 MMOL/L — SIGNIFICANT CHANGE UP (ref 96–108)
CO2 SERPL-SCNC: 25 MMOL/L — SIGNIFICANT CHANGE UP (ref 22–31)
CREAT SERPL-MCNC: 0.61 MG/DL — SIGNIFICANT CHANGE UP (ref 0.5–1.3)
GLUCOSE BLDC GLUCOMTR-MCNC: 139 MG/DL — HIGH (ref 70–99)
GLUCOSE BLDC GLUCOMTR-MCNC: 145 MG/DL — HIGH (ref 70–99)
GLUCOSE BLDC GLUCOMTR-MCNC: 148 MG/DL — HIGH (ref 70–99)
GLUCOSE BLDC GLUCOMTR-MCNC: 164 MG/DL — HIGH (ref 70–99)
GLUCOSE BLDC GLUCOMTR-MCNC: 176 MG/DL — HIGH (ref 70–99)
GLUCOSE SERPL-MCNC: 191 MG/DL — HIGH (ref 70–99)
HCT VFR BLD CALC: 34 % — LOW (ref 39–50)
HGB BLD-MCNC: 10.9 G/DL — LOW (ref 13–17)
MAGNESIUM SERPL-MCNC: 1.9 MG/DL — SIGNIFICANT CHANGE UP (ref 1.6–2.6)
MCHC RBC-ENTMCNC: 30.2 PG — SIGNIFICANT CHANGE UP (ref 27–34)
MCHC RBC-ENTMCNC: 32.1 GM/DL — SIGNIFICANT CHANGE UP (ref 32–36)
MCV RBC AUTO: 94.2 FL — SIGNIFICANT CHANGE UP (ref 80–100)
NRBC # BLD: 0 /100 WBCS — SIGNIFICANT CHANGE UP (ref 0–0)
PHOSPHATE SERPL-MCNC: 3.6 MG/DL — SIGNIFICANT CHANGE UP (ref 2.5–4.5)
PLATELET # BLD AUTO: 457 K/UL — HIGH (ref 150–400)
POTASSIUM SERPL-MCNC: 4.5 MMOL/L — SIGNIFICANT CHANGE UP (ref 3.5–5.3)
POTASSIUM SERPL-SCNC: 4.5 MMOL/L — SIGNIFICANT CHANGE UP (ref 3.5–5.3)
RBC # BLD: 3.61 M/UL — LOW (ref 4.2–5.8)
RBC # FLD: 14.3 % — SIGNIFICANT CHANGE UP (ref 10.3–14.5)
SODIUM SERPL-SCNC: 139 MMOL/L — SIGNIFICANT CHANGE UP (ref 135–145)
WBC # BLD: 8.23 K/UL — SIGNIFICANT CHANGE UP (ref 3.8–10.5)
WBC # FLD AUTO: 8.23 K/UL — SIGNIFICANT CHANGE UP (ref 3.8–10.5)

## 2019-10-26 RX ORDER — ELECTROLYTE SOLUTION,INJ
1 VIAL (ML) INTRAVENOUS
Refills: 0 | Status: DISCONTINUED | OUTPATIENT
Start: 2019-10-26 | End: 2019-10-26

## 2019-10-26 RX ORDER — I.V. FAT EMULSION 20 G/100ML
0.64 EMULSION INTRAVENOUS
Qty: 50 | Refills: 0 | Status: DISCONTINUED | OUTPATIENT
Start: 2019-10-26 | End: 2019-10-26

## 2019-10-26 RX ADMIN — MORPHINE SULFATE 2 MILLIGRAM(S): 50 CAPSULE, EXTENDED RELEASE ORAL at 23:48

## 2019-10-26 RX ADMIN — OXYCODONE HYDROCHLORIDE 10 MILLIGRAM(S): 5 TABLET ORAL at 05:49

## 2019-10-26 RX ADMIN — OXYCODONE HYDROCHLORIDE 10 MILLIGRAM(S): 5 TABLET ORAL at 13:39

## 2019-10-26 RX ADMIN — Medication 1 EACH: at 19:22

## 2019-10-26 RX ADMIN — HEPARIN SODIUM 5000 UNIT(S): 5000 INJECTION INTRAVENOUS; SUBCUTANEOUS at 13:39

## 2019-10-26 RX ADMIN — I.V. FAT EMULSION 20.83 GM/KG/DAY: 20 EMULSION INTRAVENOUS at 22:36

## 2019-10-26 RX ADMIN — PANTOPRAZOLE SODIUM 40 MILLIGRAM(S): 20 TABLET, DELAYED RELEASE ORAL at 12:50

## 2019-10-26 RX ADMIN — OXYCODONE HYDROCHLORIDE 5 MILLIGRAM(S): 5 TABLET ORAL at 19:21

## 2019-10-26 RX ADMIN — OXYCODONE HYDROCHLORIDE 5 MILLIGRAM(S): 5 TABLET ORAL at 02:19

## 2019-10-26 RX ADMIN — CHLORHEXIDINE GLUCONATE 1 APPLICATION(S): 213 SOLUTION TOPICAL at 05:48

## 2019-10-26 RX ADMIN — OXYCODONE HYDROCHLORIDE 5 MILLIGRAM(S): 5 TABLET ORAL at 09:01

## 2019-10-26 RX ADMIN — OXYCODONE HYDROCHLORIDE 10 MILLIGRAM(S): 5 TABLET ORAL at 06:20

## 2019-10-26 RX ADMIN — INSULIN HUMAN 2: 100 INJECTION, SOLUTION SUBCUTANEOUS at 00:53

## 2019-10-26 RX ADMIN — INSULIN HUMAN 2: 100 INJECTION, SOLUTION SUBCUTANEOUS at 07:14

## 2019-10-26 RX ADMIN — HEPARIN SODIUM 5000 UNIT(S): 5000 INJECTION INTRAVENOUS; SUBCUTANEOUS at 21:51

## 2019-10-26 RX ADMIN — HEPARIN SODIUM 5000 UNIT(S): 5000 INJECTION INTRAVENOUS; SUBCUTANEOUS at 05:49

## 2019-10-26 RX ADMIN — OXYCODONE HYDROCHLORIDE 5 MILLIGRAM(S): 5 TABLET ORAL at 10:05

## 2019-10-26 RX ADMIN — OXYCODONE HYDROCHLORIDE 10 MILLIGRAM(S): 5 TABLET ORAL at 14:24

## 2019-10-26 NOTE — PROGRESS NOTE ADULT - ASSESSMENT
A/P: 54M PMH HTN, HLD, rectal adenocarcinoma diagnosed 5/2018, underwent chemoXRT who presented for elective robotic assisted abdominal perineal resection (9/24), c/b SBO managed by NGT and TPN via PICC line but RTOR 10/14 for lap converted to open EDER with increasing lactate and hypotensive overnight, s/p RTOR 10/15 for1 L hematoma evacuation    - Pain/nausea control  - PCA  - CLD  - TPN with 20units insulin via PICC line. ISS  - Abx: Zosyn  - RLQ JAKUB drain x 1  - L axillary PICC line for TPN  - PT/OT; PT ordered

## 2019-10-26 NOTE — PROGRESS NOTE ADULT - SUBJECTIVE AND OBJECTIVE BOX
INTERVAL HPI/OVERNIGHT EVENTS: No acute events overnight. Riana clears, ambulating.     STATUS POST:  abdominal perineal resection/lap converted to open EDER/OR for 1L hematoma evacuation    POST OPERATIVE DAY #: 32/12/11    MEDICATIONS  (STANDING):  chlorhexidine 2% Cloths 1 Application(s) Topical <User Schedule>  dextrose 5%. 1000 milliLiter(s) (50 mL/Hr) IV Continuous <Continuous>  dextrose 50% Injectable 12.5 Gram(s) IV Push once  dextrose 50% Injectable 25 Gram(s) IV Push once  dextrose 50% Injectable 25 Gram(s) IV Push once  fat emulsion (Plant Based) 20% Infusion 0.64 Gm/kG/Day (20.83 mL/Hr) IV Continuous <Continuous>  heparin  Injectable 5000 Unit(s) SubCutaneous every 8 hours  influenza   Vaccine 0.5 milliLiter(s) IntraMuscular once  insulin regular  human corrective regimen sliding scale   SubCutaneous every 6 hours  pantoprazole  Injectable 40 milliGRAM(s) IV Push daily  Parenteral Nutrition - Adult 1 Each (100 mL/Hr) TPN Continuous <Continuous>    MEDICATIONS  (PRN):  acetaminophen   Tablet .. 975 milliGRAM(s) Oral every 8 hours PRN Severe Pain (7 - 10)  dextrose 40% Gel 15 Gram(s) Oral once PRN Blood Glucose LESS THAN 70 milliGRAM(s)/deciliter  diazepam    Tablet 2 milliGRAM(s) Oral every 8 hours PRN muscle spasms  glucagon  Injectable 1 milliGRAM(s) IntraMuscular once PRN Glucose LESS THAN 70 milligrams/deciliter  ketorolac 0.5% Ophthalmic Solution 1 Drop(s) Left EYE every 6 hours PRN eye pain irritation  morphine  - Injectable 2 milliGRAM(s) IV Push every 2 hours PRN breakthrough  oxyCODONE    IR 5 milliGRAM(s) Oral every 6 hours PRN Moderate Pain (4 - 6)  oxyCODONE    IR 10 milliGRAM(s) Oral every 6 hours PRN Severe Pain (7 - 10)      Vital Signs Last 24 Hrs  T(C): 37 (26 Oct 2019 05:00), Max: 37.1 (25 Oct 2019 09:01)  T(F): 98.6 (26 Oct 2019 05:00), Max: 98.7 (25 Oct 2019 09:01)  HR: 100 (26 Oct 2019 04:53) (98 - 102)  BP: 115/59 (26 Oct 2019 04:53) (108/72 - 115/78)  BP(mean): 82 (26 Oct 2019 04:53) (82 - 91)  RR: 20 (26 Oct 2019 04:53) (18 - 20)  SpO2: 96% (26 Oct 2019 04:53) (95% - 100%)    PHYSICAL EXAM:      Constitutional: A&Ox3    Respiratory: non labored breathing, no respiratory distress    Cardiovascular: NSR, RRR    Gastrointestinal:                 Incision:    Genitourinary:    Extremities: (-) edema                  I&O's Detail    24 Oct 2019 07:01  -  25 Oct 2019 07:00  --------------------------------------------------------  IN:    fat emulsion (Plant Based) 20% Infusion: 124.8 mL    fat emulsion (Plant Based) 20% Infusion: 62.4 mL    Oral Fluid: 360 mL    TPN (Total Parenteral Nutrition): 2000 mL  Total IN: 2547.2 mL    OUT:    Bulb: 50 mL    Colostomy: 110 mL    Voided: 1900 mL  Total OUT: 2060 mL    Total NET: 487.2 mL      25 Oct 2019 07:01  -  26 Oct 2019 06:12  --------------------------------------------------------  IN:    TPN (Total Parenteral Nutrition): 1200 mL  Total IN: 1200 mL    OUT:    Bulb: 35 mL    Colostomy: 60 mL    Estimated Blood Loss: 5 mL    Voided: 1150 mL  Total OUT: 1250 mL    Total NET: -50 mL          LABS:                        10.9   8.23  )-----------( 457      ( 26 Oct 2019 05:52 )             34.0     10-25    137  |  102  |  11  ----------------------------<  186<H>  4.3   |  25  |  0.58    Ca    9.6      25 Oct 2019 05:51  Phos  3.8     10-25  Mg     1.9     10-25            RADIOLOGY & ADDITIONAL STUDIES: INTERVAL HPI/OVERNIGHT EVENTS: No acute events overnight. Riana clears, ambulating. Pt is complaining of some right sided discomfort from drain site, but is otherwise doing well.     STATUS POST:  abdominal perineal resection/lap converted to open EDER/OR for 1L hematoma evacuation    POST OPERATIVE DAY #: 32/12/11    MEDICATIONS  (STANDING):  chlorhexidine 2% Cloths 1 Application(s) Topical <User Schedule>  dextrose 5%. 1000 milliLiter(s) (50 mL/Hr) IV Continuous <Continuous>  dextrose 50% Injectable 12.5 Gram(s) IV Push once  dextrose 50% Injectable 25 Gram(s) IV Push once  dextrose 50% Injectable 25 Gram(s) IV Push once  fat emulsion (Plant Based) 20% Infusion 0.64 Gm/kG/Day (20.83 mL/Hr) IV Continuous <Continuous>  heparin  Injectable 5000 Unit(s) SubCutaneous every 8 hours  influenza   Vaccine 0.5 milliLiter(s) IntraMuscular once  insulin regular  human corrective regimen sliding scale   SubCutaneous every 6 hours  pantoprazole  Injectable 40 milliGRAM(s) IV Push daily  Parenteral Nutrition - Adult 1 Each (100 mL/Hr) TPN Continuous <Continuous>    MEDICATIONS  (PRN):  acetaminophen   Tablet .. 975 milliGRAM(s) Oral every 8 hours PRN Severe Pain (7 - 10)  dextrose 40% Gel 15 Gram(s) Oral once PRN Blood Glucose LESS THAN 70 milliGRAM(s)/deciliter  diazepam    Tablet 2 milliGRAM(s) Oral every 8 hours PRN muscle spasms  glucagon  Injectable 1 milliGRAM(s) IntraMuscular once PRN Glucose LESS THAN 70 milligrams/deciliter  ketorolac 0.5% Ophthalmic Solution 1 Drop(s) Left EYE every 6 hours PRN eye pain irritation  morphine  - Injectable 2 milliGRAM(s) IV Push every 2 hours PRN breakthrough  oxyCODONE    IR 5 milliGRAM(s) Oral every 6 hours PRN Moderate Pain (4 - 6)  oxyCODONE    IR 10 milliGRAM(s) Oral every 6 hours PRN Severe Pain (7 - 10)      Vital Signs Last 24 Hrs  T(C): 37 (26 Oct 2019 05:00), Max: 37.1 (25 Oct 2019 09:01)  T(F): 98.6 (26 Oct 2019 05:00), Max: 98.7 (25 Oct 2019 09:01)  HR: 100 (26 Oct 2019 04:53) (98 - 102)  BP: 115/59 (26 Oct 2019 04:53) (108/72 - 115/78)  BP(mean): 82 (26 Oct 2019 04:53) (82 - 91)  RR: 20 (26 Oct 2019 04:53) (18 - 20)  SpO2: 96% (26 Oct 2019 04:53) (95% - 100%)    PHYSICAL EXAM:      Constitutional: A&Ox3    Respiratory: non labored breathing, no respiratory distress    Cardiovascular: NSR, RRR    Gastrointestinal: abdomen is soft, mildly tender to palpation, mildly distended                 Incision: c/d/i. JAKUB in place with ss output.     Extremities: (-) edema                  I&O's Detail    24 Oct 2019 07:01  -  25 Oct 2019 07:00  --------------------------------------------------------  IN:    fat emulsion (Plant Based) 20% Infusion: 124.8 mL    fat emulsion (Plant Based) 20% Infusion: 62.4 mL    Oral Fluid: 360 mL    TPN (Total Parenteral Nutrition): 2000 mL  Total IN: 2547.2 mL    OUT:    Bulb: 50 mL    Colostomy: 110 mL    Voided: 1900 mL  Total OUT: 2060 mL    Total NET: 487.2 mL      25 Oct 2019 07:01  -  26 Oct 2019 06:12  --------------------------------------------------------  IN:    TPN (Total Parenteral Nutrition): 1200 mL  Total IN: 1200 mL    OUT:    Bulb: 35 mL    Colostomy: 60 mL    Estimated Blood Loss: 5 mL    Voided: 1150 mL  Total OUT: 1250 mL    Total NET: -50 mL          LABS:                        10.9   8.23  )-----------( 457      ( 26 Oct 2019 05:52 )             34.0     10-25    137  |  102  |  11  ----------------------------<  186<H>  4.3   |  25  |  0.58    Ca    9.6      25 Oct 2019 05:51  Phos  3.8     10-25  Mg     1.9     10-25            RADIOLOGY & ADDITIONAL STUDIES:

## 2019-10-26 NOTE — PROGRESS NOTE ADULT - ASSESSMENT
s/p APR for rectal cancer in September, presented with SBO requiring EDER, had return to OR for pelvic hematoma, now with gradually returning bowel function.  1.  Keep on clears today.  Might be ready for a trial of solid food tomorrow.  2.  Continue TPN  3.  Continue drain for now, Dr. Lafleur may decide to remove it on Monday.

## 2019-10-26 NOTE — PROGRESS NOTE ADULT - SUBJECTIVE AND OBJECTIVE BOX
Attending Surgeon Progress Note (Covering for Dr. Lafleur)    STATUS POST:  lysis of adhesions, evacuation of pelvic hematoma  POST OPERATIVE DAY #: 17     SUBJECTIVE:  tolerating clear liquids, no nausea, poor appetite     Bowel movement: Yes via colostomy     PHYSICAL EXAM:  Alert, oriented  Lungs:  CTA bilaterally, good inspiratory effort  Cor:  RRR  Abdomen:  soft, nondistended, tender around drain site, no erythema, +bowel sounds, incision clean dry, intact with no erythema  Ext:  no edema, well-perfused

## 2019-10-27 LAB
ANION GAP SERPL CALC-SCNC: 9 MMOL/L — SIGNIFICANT CHANGE UP (ref 5–17)
BUN SERPL-MCNC: 12 MG/DL — SIGNIFICANT CHANGE UP (ref 7–23)
CALCIUM SERPL-MCNC: 9.9 MG/DL — SIGNIFICANT CHANGE UP (ref 8.4–10.5)
CHLORIDE SERPL-SCNC: 103 MMOL/L — SIGNIFICANT CHANGE UP (ref 96–108)
CO2 SERPL-SCNC: 26 MMOL/L — SIGNIFICANT CHANGE UP (ref 22–31)
CREAT SERPL-MCNC: 0.62 MG/DL — SIGNIFICANT CHANGE UP (ref 0.5–1.3)
GLUCOSE BLDC GLUCOMTR-MCNC: 124 MG/DL — HIGH (ref 70–99)
GLUCOSE BLDC GLUCOMTR-MCNC: 161 MG/DL — HIGH (ref 70–99)
GLUCOSE BLDC GLUCOMTR-MCNC: 214 MG/DL — HIGH (ref 70–99)
GLUCOSE SERPL-MCNC: 170 MG/DL — HIGH (ref 70–99)
HCT VFR BLD CALC: 32.6 % — LOW (ref 39–50)
HGB BLD-MCNC: 10.5 G/DL — LOW (ref 13–17)
MAGNESIUM SERPL-MCNC: 1.8 MG/DL — SIGNIFICANT CHANGE UP (ref 1.6–2.6)
MCHC RBC-ENTMCNC: 30.5 PG — SIGNIFICANT CHANGE UP (ref 27–34)
MCHC RBC-ENTMCNC: 32.2 GM/DL — SIGNIFICANT CHANGE UP (ref 32–36)
MCV RBC AUTO: 94.8 FL — SIGNIFICANT CHANGE UP (ref 80–100)
NRBC # BLD: 0 /100 WBCS — SIGNIFICANT CHANGE UP (ref 0–0)
PHOSPHATE SERPL-MCNC: 4.2 MG/DL — SIGNIFICANT CHANGE UP (ref 2.5–4.5)
PLATELET # BLD AUTO: 450 K/UL — HIGH (ref 150–400)
POTASSIUM SERPL-MCNC: 4.3 MMOL/L — SIGNIFICANT CHANGE UP (ref 3.5–5.3)
POTASSIUM SERPL-SCNC: 4.3 MMOL/L — SIGNIFICANT CHANGE UP (ref 3.5–5.3)
RBC # BLD: 3.44 M/UL — LOW (ref 4.2–5.8)
RBC # FLD: 14.5 % — SIGNIFICANT CHANGE UP (ref 10.3–14.5)
SODIUM SERPL-SCNC: 138 MMOL/L — SIGNIFICANT CHANGE UP (ref 135–145)
WBC # BLD: 7.31 K/UL — SIGNIFICANT CHANGE UP (ref 3.8–10.5)
WBC # FLD AUTO: 7.31 K/UL — SIGNIFICANT CHANGE UP (ref 3.8–10.5)

## 2019-10-27 RX ORDER — I.V. FAT EMULSION 20 G/100ML
0.64 EMULSION INTRAVENOUS
Qty: 50 | Refills: 0 | Status: DISCONTINUED | OUTPATIENT
Start: 2019-10-27 | End: 2019-10-27

## 2019-10-27 RX ORDER — MAGNESIUM SULFATE 500 MG/ML
1 VIAL (ML) INJECTION ONCE
Refills: 0 | Status: COMPLETED | OUTPATIENT
Start: 2019-10-27 | End: 2019-10-27

## 2019-10-27 RX ORDER — ELECTROLYTE SOLUTION,INJ
1 VIAL (ML) INTRAVENOUS
Refills: 0 | Status: DISCONTINUED | OUTPATIENT
Start: 2019-10-27 | End: 2019-10-27

## 2019-10-27 RX ADMIN — HEPARIN SODIUM 5000 UNIT(S): 5000 INJECTION INTRAVENOUS; SUBCUTANEOUS at 14:01

## 2019-10-27 RX ADMIN — OXYCODONE HYDROCHLORIDE 10 MILLIGRAM(S): 5 TABLET ORAL at 09:57

## 2019-10-27 RX ADMIN — OXYCODONE HYDROCHLORIDE 10 MILLIGRAM(S): 5 TABLET ORAL at 10:31

## 2019-10-27 RX ADMIN — INSULIN HUMAN 4: 100 INJECTION, SOLUTION SUBCUTANEOUS at 12:50

## 2019-10-27 RX ADMIN — Medication 100 GRAM(S): at 09:58

## 2019-10-27 RX ADMIN — HEPARIN SODIUM 5000 UNIT(S): 5000 INJECTION INTRAVENOUS; SUBCUTANEOUS at 05:28

## 2019-10-27 RX ADMIN — OXYCODONE HYDROCHLORIDE 10 MILLIGRAM(S): 5 TABLET ORAL at 03:00

## 2019-10-27 RX ADMIN — OXYCODONE HYDROCHLORIDE 10 MILLIGRAM(S): 5 TABLET ORAL at 17:30

## 2019-10-27 RX ADMIN — PANTOPRAZOLE SODIUM 40 MILLIGRAM(S): 20 TABLET, DELAYED RELEASE ORAL at 12:40

## 2019-10-27 RX ADMIN — I.V. FAT EMULSION 20.53 GM/KG/DAY: 20 EMULSION INTRAVENOUS at 21:44

## 2019-10-27 RX ADMIN — HEPARIN SODIUM 5000 UNIT(S): 5000 INJECTION INTRAVENOUS; SUBCUTANEOUS at 21:42

## 2019-10-27 RX ADMIN — OXYCODONE HYDROCHLORIDE 10 MILLIGRAM(S): 5 TABLET ORAL at 18:15

## 2019-10-27 RX ADMIN — CHLORHEXIDINE GLUCONATE 1 APPLICATION(S): 213 SOLUTION TOPICAL at 05:28

## 2019-10-27 RX ADMIN — INSULIN HUMAN 2: 100 INJECTION, SOLUTION SUBCUTANEOUS at 06:47

## 2019-10-27 RX ADMIN — OXYCODONE HYDROCHLORIDE 10 MILLIGRAM(S): 5 TABLET ORAL at 02:03

## 2019-10-27 RX ADMIN — MORPHINE SULFATE 2 MILLIGRAM(S): 50 CAPSULE, EXTENDED RELEASE ORAL at 19:49

## 2019-10-27 RX ADMIN — MORPHINE SULFATE 2 MILLIGRAM(S): 50 CAPSULE, EXTENDED RELEASE ORAL at 05:28

## 2019-10-27 RX ADMIN — MORPHINE SULFATE 2 MILLIGRAM(S): 50 CAPSULE, EXTENDED RELEASE ORAL at 06:45

## 2019-10-27 RX ADMIN — Medication 2 MILLIGRAM(S): at 05:26

## 2019-10-27 NOTE — PROGRESS NOTE ADULT - SUBJECTIVE AND OBJECTIVE BOX
Attending Surgeon Progress Note (Covering for Dr. Lafleur)    STATUS POST:  ex lap, EDER, evacuation of post-op hematoma       SUBJECTIVE: Tolerated liquids without nausea  Flatus: Yes via colostomy  Bowel movement: Yes via colostomy, both solid and liquid stool, emptied bag 3 times yesterday     PHYSICAL EXAM:  Alert, oriented  Lungs:  CTA bilaterally, good inspiratory effort  Cor:  RRR  Abdomen:  soft, nondistended, nontender, still with drain site tenderness, +bowel sounds, no erythema  Ext:  no edema, well-perfused

## 2019-10-27 NOTE — PROGRESS NOTE ADULT - ASSESSMENT
A/P: 54M PMH HTN, HLD, rectal adenocarcinoma diagnosed 5/2018, underwent chemoXRT who presented for elective robotic assisted abdominal perineal resection (9/24), c/b SBO managed by NGT and TPN via PICC line but RTOR 10/14 for lap converted to open EDER with increasing lactate and hypotensive overnight, s/p RTOR 10/15 for1 L hematoma evacuation    - Pain/nausea control  - CLD  - TPN with 20units insulin via PICC line. ISS  - Abx: Zosyn  - RLQ JAKUB drain x 1  - L axillary PICC line for TPN  - PT/OT; PT ordered

## 2019-10-27 NOTE — PROGRESS NOTE ADULT - SUBJECTIVE AND OBJECTIVE BOX
24 hr events:  ON: VENKATESH  10/26: pt doing well today, good ostomy output, no n/v, trev cld, TPN reordered    SUBJECTIVE:  Pt seen and examined by chief resident. Pt is doing well, resting comfortably on bed. Pain controlled. Clear liquid diet tolerated. Ambulating out of bed. No nausea or vomiting. Feeling like distention is going down. No complaints at this time.    Vital Signs Last 24 Hrs  T(C): 36.7 (27 Oct 2019 05:00), Max: 37 (26 Oct 2019 17:35)  T(F): 98.1 (27 Oct 2019 05:00), Max: 98.6 (26 Oct 2019 17:35)  HR: 104 (27 Oct 2019 04:59) (94 - 106)  BP: 113/66 (27 Oct 2019 04:59) (109/67 - 123/80)  BP(mean): 85 (27 Oct 2019 04:59) (83 - 95)  RR: 16 (27 Oct 2019 04:59) (16 - 18)  SpO2: 95% (27 Oct 2019 04:59) (95% - 99%)    Physical Exam:  General: NAD  Pulmonary: Nonlabored breathing, no respiratory distress  Abdominal: soft, mild distention, tender around iris site in the LLQ, IRIS serousanginous output, midline incision healing well with staples, clean dry and intact, ostomy in place with formed stool.   Extremities: warm, well perfused. SCDs in place.     Lines/drains/tubes:  IRIS  Ostomy    I&O's Summary    26 Oct 2019 07:01  -  27 Oct 2019 07:00  --------------------------------------------------------  IN: 2670.4 mL / OUT: 2507 mL / NET: 163.4 mL        LABS:                        10.9   8.23  )-----------( 457      ( 26 Oct 2019 05:52 )             34.0     10-26    139  |  104  |  12  ----------------------------<  191<H>  4.5   |  25  |  0.61    Ca    9.7      26 Oct 2019 05:52  Phos  3.6     10-26  Mg     1.9     10-26          CAPILLARY BLOOD GLUCOSE  POCT Blood Glucose.: 161 mg/dL (27 Oct 2019 06:24)  POCT Blood Glucose.: 148 mg/dL (26 Oct 2019 23:36)  POCT Blood Glucose.: 139 mg/dL (26 Oct 2019 18:12)  POCT Blood Glucose.: 145 mg/dL (26 Oct 2019 12:23)

## 2019-10-27 NOTE — PROGRESS NOTE ADULT - ASSESSMENT
Resolving post op ileus, with clinical signs of return of bowel function.  We will try solid PO today, although patient is fearful to take it.  I would continue TPN today since I doubt he will take much in terms of nutrition yet.  JAKUB drain is probably ready to come out, but I will defer to Dr. Lafleur's decision on that tomorrow.

## 2019-10-28 LAB
ALBUMIN SERPL ELPH-MCNC: 3.2 G/DL — LOW (ref 3.3–5)
ANION GAP SERPL CALC-SCNC: 10 MMOL/L — SIGNIFICANT CHANGE UP (ref 5–17)
BUN SERPL-MCNC: 13 MG/DL — SIGNIFICANT CHANGE UP (ref 7–23)
CALCIUM SERPL-MCNC: 9.8 MG/DL — SIGNIFICANT CHANGE UP (ref 8.4–10.5)
CHLORIDE SERPL-SCNC: 104 MMOL/L — SIGNIFICANT CHANGE UP (ref 96–108)
CHOLEST SERPL-MCNC: 159 MG/DL — SIGNIFICANT CHANGE UP (ref 10–199)
CO2 SERPL-SCNC: 26 MMOL/L — SIGNIFICANT CHANGE UP (ref 22–31)
CREAT SERPL-MCNC: 0.61 MG/DL — SIGNIFICANT CHANGE UP (ref 0.5–1.3)
FERRITIN SERPL-MCNC: 582 NG/ML — HIGH (ref 30–400)
GLUCOSE BLDC GLUCOMTR-MCNC: 129 MG/DL — HIGH (ref 70–99)
GLUCOSE BLDC GLUCOMTR-MCNC: 146 MG/DL — HIGH (ref 70–99)
GLUCOSE BLDC GLUCOMTR-MCNC: 150 MG/DL — HIGH (ref 70–99)
GLUCOSE BLDC GLUCOMTR-MCNC: 160 MG/DL — HIGH (ref 70–99)
GLUCOSE SERPL-MCNC: 171 MG/DL — HIGH (ref 70–99)
HCT VFR BLD CALC: 33.3 % — LOW (ref 39–50)
HDLC SERPL-MCNC: 24 MG/DL — LOW
HGB BLD-MCNC: 10.5 G/DL — LOW (ref 13–17)
IRON SATN MFR SERPL: 23 % — SIGNIFICANT CHANGE UP (ref 16–55)
IRON SATN MFR SERPL: 53 UG/DL — SIGNIFICANT CHANGE UP (ref 45–165)
LIPID PNL WITH DIRECT LDL SERPL: 90 MG/DL — SIGNIFICANT CHANGE UP
MAGNESIUM SERPL-MCNC: 1.9 MG/DL — SIGNIFICANT CHANGE UP (ref 1.6–2.6)
MCHC RBC-ENTMCNC: 29.8 PG — SIGNIFICANT CHANGE UP (ref 27–34)
MCHC RBC-ENTMCNC: 31.5 GM/DL — LOW (ref 32–36)
MCV RBC AUTO: 94.6 FL — SIGNIFICANT CHANGE UP (ref 80–100)
NRBC # BLD: 0 /100 WBCS — SIGNIFICANT CHANGE UP (ref 0–0)
PHOSPHATE SERPL-MCNC: 4 MG/DL — SIGNIFICANT CHANGE UP (ref 2.5–4.5)
PLATELET # BLD AUTO: 449 K/UL — HIGH (ref 150–400)
POTASSIUM SERPL-MCNC: 4.6 MMOL/L — SIGNIFICANT CHANGE UP (ref 3.5–5.3)
POTASSIUM SERPL-SCNC: 4.6 MMOL/L — SIGNIFICANT CHANGE UP (ref 3.5–5.3)
PREALB SERPL-MCNC: 27 MG/DL — SIGNIFICANT CHANGE UP (ref 20–40)
RBC # BLD: 3.52 M/UL — LOW (ref 4.2–5.8)
RBC # FLD: 14.3 % — SIGNIFICANT CHANGE UP (ref 10.3–14.5)
SODIUM SERPL-SCNC: 140 MMOL/L — SIGNIFICANT CHANGE UP (ref 135–145)
TIBC SERPL-MCNC: 233 UG/DL — SIGNIFICANT CHANGE UP (ref 220–430)
TOTAL CHOLESTEROL/HDL RATIO MEASUREMENT: 6.6 RATIO — SIGNIFICANT CHANGE UP (ref 3.4–9.6)
TRANSFERRIN SERPL-MCNC: 201 MG/DL — SIGNIFICANT CHANGE UP (ref 200–360)
TRIGL SERPL-MCNC: 227 MG/DL — HIGH (ref 10–149)
UIBC SERPL-MCNC: 180 UG/DL — SIGNIFICANT CHANGE UP (ref 110–370)
WBC # BLD: 7.03 K/UL — SIGNIFICANT CHANGE UP (ref 3.8–10.5)
WBC # FLD AUTO: 7.03 K/UL — SIGNIFICANT CHANGE UP (ref 3.8–10.5)

## 2019-10-28 RX ADMIN — OXYCODONE HYDROCHLORIDE 10 MILLIGRAM(S): 5 TABLET ORAL at 02:30

## 2019-10-28 RX ADMIN — OXYCODONE HYDROCHLORIDE 10 MILLIGRAM(S): 5 TABLET ORAL at 02:00

## 2019-10-28 RX ADMIN — CHLORHEXIDINE GLUCONATE 1 APPLICATION(S): 213 SOLUTION TOPICAL at 05:51

## 2019-10-28 RX ADMIN — PANTOPRAZOLE SODIUM 40 MILLIGRAM(S): 20 TABLET, DELAYED RELEASE ORAL at 11:47

## 2019-10-28 RX ADMIN — HEPARIN SODIUM 5000 UNIT(S): 5000 INJECTION INTRAVENOUS; SUBCUTANEOUS at 21:21

## 2019-10-28 RX ADMIN — INSULIN HUMAN 2: 100 INJECTION, SOLUTION SUBCUTANEOUS at 11:54

## 2019-10-28 RX ADMIN — OXYCODONE HYDROCHLORIDE 10 MILLIGRAM(S): 5 TABLET ORAL at 21:25

## 2019-10-28 RX ADMIN — Medication 2 MILLIGRAM(S): at 00:02

## 2019-10-28 RX ADMIN — OXYCODONE HYDROCHLORIDE 10 MILLIGRAM(S): 5 TABLET ORAL at 21:56

## 2019-10-28 RX ADMIN — OXYCODONE HYDROCHLORIDE 10 MILLIGRAM(S): 5 TABLET ORAL at 08:38

## 2019-10-28 RX ADMIN — MORPHINE SULFATE 2 MILLIGRAM(S): 50 CAPSULE, EXTENDED RELEASE ORAL at 15:45

## 2019-10-28 RX ADMIN — MORPHINE SULFATE 2 MILLIGRAM(S): 50 CAPSULE, EXTENDED RELEASE ORAL at 18:54

## 2019-10-28 RX ADMIN — HEPARIN SODIUM 5000 UNIT(S): 5000 INJECTION INTRAVENOUS; SUBCUTANEOUS at 05:51

## 2019-10-28 RX ADMIN — MORPHINE SULFATE 2 MILLIGRAM(S): 50 CAPSULE, EXTENDED RELEASE ORAL at 19:27

## 2019-10-28 RX ADMIN — Medication 2 MILLIGRAM(S): at 11:47

## 2019-10-28 RX ADMIN — HEPARIN SODIUM 5000 UNIT(S): 5000 INJECTION INTRAVENOUS; SUBCUTANEOUS at 14:51

## 2019-10-28 RX ADMIN — Medication 2 MILLIGRAM(S): at 22:48

## 2019-10-28 NOTE — PROGRESS NOTE ADULT - ASSESSMENT
53 y/o male s/p abdominal peritoneal resection with gracilis flap  - continue to observe  - prn analgesia  - care per primary team

## 2019-10-28 NOTE — PROGRESS NOTE ADULT - SUBJECTIVE AND OBJECTIVE BOX
SUBJECTIVE:  Doing well.   No overnight events.     OBJECTIVE:     ** VITAL SIGNS / I&O's **    Vital Signs Last 24 Hrs  T(C): 37.1 (28 Oct 2019 04:14), Max: 37.5 (27 Oct 2019 18:36)  T(F): 98.7 (28 Oct 2019 04:14), Max: 99.5 (27 Oct 2019 18:36)  HR: 100 (28 Oct 2019 04:20) (96 - 104)  BP: 107/74 (28 Oct 2019 04:20) (107/74 - 130/92)  BP(mean): 86 (28 Oct 2019 04:20) (86 - 106)  RR: 16 (28 Oct 2019 04:20) (14 - 17)  SpO2: 96% (28 Oct 2019 04:20) (96% - 99%)      27 Oct 2019 07:01  -  28 Oct 2019 07:00  --------------------------------------------------------  IN:    fat emulsion (Plant Based) 20% Infusion: 225.5 mL    TPN (Total Parenteral Nutrition): 2200 mL  Total IN: 2425.5 mL    OUT:    Bulb: 25 mL    Colostomy: 85 mL    Voided: 1850 mL  Total OUT: 1960 mL    Total NET: 465.5 mL          ** PHYSICAL EXAM **    -- CONSTITUTIONAL: Alert, Awake. NAD.   -- RESPIRATORY: unlabored breathing, no respiratory distress  -- ABDOMEN: less distended, JAKUB ss, stool in device  -- PERINEUM: incision c/d/i, no collections, no signs of infection  -- EXTREMITIES: L thigh donor site c/d/i, no collections      ** LABS **                          10.5   7.03  )-----------( 449      ( 28 Oct 2019 06:15 )             33.3     28 Oct 2019 06:15    140    |  104    |  13     ----------------------------<  171    4.6     |  26     |  0.61     Ca    9.8        28 Oct 2019 06:15  Phos  4.0       28 Oct 2019 06:15  Mg     1.9       28 Oct 2019 06:15    TPro  x      /  Alb  3.2    /  TBili  x      /  DBili  x      /  AST  x      /  ALT  x      /  AlkPhos  x      28 Oct 2019 06:15      CAPILLARY BLOOD GLUCOSE      POCT Blood Glucose.: 146 mg/dL (28 Oct 2019 06:07)  POCT Blood Glucose.: 150 mg/dL (28 Oct 2019 00:15)  POCT Blood Glucose.: 124 mg/dL (27 Oct 2019 16:35)  POCT Blood Glucose.: 214 mg/dL (27 Oct 2019 11:42)

## 2019-10-28 NOTE — PROGRESS NOTE ADULT - SUBJECTIVE AND OBJECTIVE BOX
Events of past 24 hours:    MEDICATIONS  (STANDING):  chlorhexidine 2% Cloths 1 Application(s) Topical <User Schedule>  dextrose 5%. 1000 milliLiter(s) (50 mL/Hr) IV Continuous <Continuous>  dextrose 50% Injectable 12.5 Gram(s) IV Push once  dextrose 50% Injectable 25 Gram(s) IV Push once  dextrose 50% Injectable 25 Gram(s) IV Push once  fat emulsion (Plant Based) 20% Infusion 0.64 Gm/kG/Day (20.533 mL/Hr) IV Continuous <Continuous>  heparin  Injectable 5000 Unit(s) SubCutaneous every 8 hours  influenza   Vaccine 0.5 milliLiter(s) IntraMuscular once  insulin regular  human corrective regimen sliding scale   SubCutaneous every 6 hours  pantoprazole  Injectable 40 milliGRAM(s) IV Push daily  Parenteral Nutrition - Adult 1 Each (100 mL/Hr) TPN Continuous <Continuous>    MEDICATIONS  (PRN):  acetaminophen   Tablet .. 975 milliGRAM(s) Oral every 8 hours PRN Severe Pain (7 - 10)  dextrose 40% Gel 15 Gram(s) Oral once PRN Blood Glucose LESS THAN 70 milliGRAM(s)/deciliter  diazepam    Tablet 2 milliGRAM(s) Oral every 8 hours PRN muscle spasms  glucagon  Injectable 1 milliGRAM(s) IntraMuscular once PRN Glucose LESS THAN 70 milligrams/deciliter  ketorolac 0.5% Ophthalmic Solution 1 Drop(s) Left EYE every 6 hours PRN eye pain irritation  morphine  - Injectable 2 milliGRAM(s) IV Push every 2 hours PRN breakthrough  oxyCODONE    IR 5 milliGRAM(s) Oral every 6 hours PRN Moderate Pain (4 - 6)  oxyCODONE    IR 10 milliGRAM(s) Oral every 6 hours PRN Severe Pain (7 - 10)        Vitals: T(F): 98.7 (10-28-19 @ 04:14), Max: 99.5 (10-27-19 @ 18:36)  HR: 100 (10-28-19 @ 04:20)  BP: 107/74 (10-28-19 @ 04:20)  RR: 16 (10-28-19 @ 04:20)  SpO2: 96% (10-28-19 @ 04:20)        Diet, Low Fiber:   60 gm Fat Restricted [facility] Only (FAT60G)      10-26-19 @ 07:01  -  10-27-19 @ 07:00  --------------------------------------------------------  IN:    fat emulsion (Plant Based) 20% Infusion: 270.4 mL    TPN (Total Parenteral Nutrition): 2400 mL  Total IN: 2670.4 mL    OUT:    Bulb: 17 mL    Colostomy: 90 mL    Voided: 2400 mL  Total OUT: 2507 mL    Total NET: 163.4 mL            PHYSICAL EXAM  General: NAD. Resting comfortably.  Pulmonary: Non-labored breathing. No respiratory distress.  Abdomen:   :   Extremities: WWP. No C/C/E          LAB/STUDIES:  CAPILLARY BLOOD GLUCOSE      POCT Blood Glucose.: 146 mg/dL (28 Oct 2019 06:07)  POCT Blood Glucose.: 150 mg/dL (28 Oct 2019 00:15)  POCT Blood Glucose.: 124 mg/dL (27 Oct 2019 16:35)  POCT Blood Glucose.: 214 mg/dL (27 Oct 2019 11:42)  POCT Blood Glucose.: 161 mg/dL (27 Oct 2019 06:24)                          10.5   7.31  )-----------( 450      ( 27 Oct 2019 07:35 )             32.6     10-27    138  |  103  |  12  ----------------------------<  170<H>  4.3   |  26  |  0.62    Ca    9.9      27 Oct 2019 07:35  Phos  4.2     10-27  Mg     1.8     10-27                    IMAGING:  < from: Xray Abdomen 2 Views (10.25.19 @ 10:34) >  Findings suggestive of an improving paralytic ileus pattern. No evidence of pneumoperitoneum.  < end of copied text > Events of past 24 hours: NEON. Pt states he did tolerate a small amount of LRD for dinner, states abdominal distension is still present but improved.      MEDICATIONS  (STANDING):  chlorhexidine 2% Cloths 1 Application(s) Topical <User Schedule>  dextrose 5%. 1000 milliLiter(s) (50 mL/Hr) IV Continuous <Continuous>  dextrose 50% Injectable 12.5 Gram(s) IV Push once  dextrose 50% Injectable 25 Gram(s) IV Push once  dextrose 50% Injectable 25 Gram(s) IV Push once  fat emulsion (Plant Based) 20% Infusion 0.64 Gm/kG/Day (20.533 mL/Hr) IV Continuous <Continuous>  heparin  Injectable 5000 Unit(s) SubCutaneous every 8 hours  influenza   Vaccine 0.5 milliLiter(s) IntraMuscular once  insulin regular  human corrective regimen sliding scale   SubCutaneous every 6 hours  pantoprazole  Injectable 40 milliGRAM(s) IV Push daily  Parenteral Nutrition - Adult 1 Each (100 mL/Hr) TPN Continuous <Continuous>    MEDICATIONS  (PRN):  acetaminophen   Tablet .. 975 milliGRAM(s) Oral every 8 hours PRN Severe Pain (7 - 10)  dextrose 40% Gel 15 Gram(s) Oral once PRN Blood Glucose LESS THAN 70 milliGRAM(s)/deciliter  diazepam    Tablet 2 milliGRAM(s) Oral every 8 hours PRN muscle spasms  glucagon  Injectable 1 milliGRAM(s) IntraMuscular once PRN Glucose LESS THAN 70 milligrams/deciliter  ketorolac 0.5% Ophthalmic Solution 1 Drop(s) Left EYE every 6 hours PRN eye pain irritation  morphine  - Injectable 2 milliGRAM(s) IV Push every 2 hours PRN breakthrough  oxyCODONE    IR 5 milliGRAM(s) Oral every 6 hours PRN Moderate Pain (4 - 6)  oxyCODONE    IR 10 milliGRAM(s) Oral every 6 hours PRN Severe Pain (7 - 10)        Vitals: T(F): 98.7 (10-28-19 @ 04:14), Max: 99.5 (10-27-19 @ 18:36)  HR: 100 (10-28-19 @ 04:20)  BP: 107/74 (10-28-19 @ 04:20)  RR: 16 (10-28-19 @ 04:20)  SpO2: 96% (10-28-19 @ 04:20)        Diet, Low Fiber:   60 gm Fat Restricted [facility] Only (FAT60G)      10-26-19 @ 07:01  -  10-27-19 @ 07:00  --------------------------------------------------------  IN:    fat emulsion (Plant Based) 20% Infusion: 270.4 mL    TPN (Total Parenteral Nutrition): 2400 mL  Total IN: 2670.4 mL    OUT:    Bulb: 17 mL    Colostomy: 90 mL    Voided: 2400 mL  Total OUT: 2507 mL    Total NET: 163.4 mL            PHYSICAL EXAM  General: NAD. Resting comfortably.  Pulmonary: Non-labored breathing. No respiratory distress.  Abdomen:   :   Extremities: WWP. No C/C/E          LAB/STUDIES:  CAPILLARY BLOOD GLUCOSE      POCT Blood Glucose.: 146 mg/dL (28 Oct 2019 06:07)  POCT Blood Glucose.: 150 mg/dL (28 Oct 2019 00:15)  POCT Blood Glucose.: 124 mg/dL (27 Oct 2019 16:35)  POCT Blood Glucose.: 214 mg/dL (27 Oct 2019 11:42)  POCT Blood Glucose.: 161 mg/dL (27 Oct 2019 06:24)                          10.5   7.31  )-----------( 450      ( 27 Oct 2019 07:35 )             32.6     10-27    138  |  103  |  12  ----------------------------<  170<H>  4.3   |  26  |  0.62    Ca    9.9      27 Oct 2019 07:35  Phos  4.2     10-27  Mg     1.8     10-27                    IMAGING:  < from: Xray Abdomen 2 Views (10.25.19 @ 10:34) >  Findings suggestive of an improving paralytic ileus pattern. No evidence of pneumoperitoneum.  < end of copied text > Events of past 24 hours: NEON. Pt states he did tolerate a small amount of LRD for dinner, states abdominal distension is still present but improved.      MEDICATIONS  (STANDING):  chlorhexidine 2% Cloths 1 Application(s) Topical <User Schedule>  dextrose 5%. 1000 milliLiter(s) (50 mL/Hr) IV Continuous <Continuous>  dextrose 50% Injectable 12.5 Gram(s) IV Push once  dextrose 50% Injectable 25 Gram(s) IV Push once  dextrose 50% Injectable 25 Gram(s) IV Push once  fat emulsion (Plant Based) 20% Infusion 0.64 Gm/kG/Day (20.533 mL/Hr) IV Continuous <Continuous>  heparin  Injectable 5000 Unit(s) SubCutaneous every 8 hours  influenza   Vaccine 0.5 milliLiter(s) IntraMuscular once  insulin regular  human corrective regimen sliding scale   SubCutaneous every 6 hours  pantoprazole  Injectable 40 milliGRAM(s) IV Push daily  Parenteral Nutrition - Adult 1 Each (100 mL/Hr) TPN Continuous <Continuous>    MEDICATIONS  (PRN):  acetaminophen   Tablet .. 975 milliGRAM(s) Oral every 8 hours PRN Severe Pain (7 - 10)  dextrose 40% Gel 15 Gram(s) Oral once PRN Blood Glucose LESS THAN 70 milliGRAM(s)/deciliter  diazepam    Tablet 2 milliGRAM(s) Oral every 8 hours PRN muscle spasms  glucagon  Injectable 1 milliGRAM(s) IntraMuscular once PRN Glucose LESS THAN 70 milligrams/deciliter  ketorolac 0.5% Ophthalmic Solution 1 Drop(s) Left EYE every 6 hours PRN eye pain irritation  morphine  - Injectable 2 milliGRAM(s) IV Push every 2 hours PRN breakthrough  oxyCODONE    IR 5 milliGRAM(s) Oral every 6 hours PRN Moderate Pain (4 - 6)  oxyCODONE    IR 10 milliGRAM(s) Oral every 6 hours PRN Severe Pain (7 - 10)        Vitals: T(F): 98.7 (10-28-19 @ 04:14), Max: 99.5 (10-27-19 @ 18:36)  HR: 100 (10-28-19 @ 04:20)  BP: 107/74 (10-28-19 @ 04:20)  RR: 16 (10-28-19 @ 04:20)  SpO2: 96% (10-28-19 @ 04:20)        Diet, Low Fiber:   60 gm Fat Restricted [facility] Only (FAT60G)      10-26-19 @ 07:01  -  10-27-19 @ 07:00  --------------------------------------------------------  IN:    fat emulsion (Plant Based) 20% Infusion: 270.4 mL    TPN (Total Parenteral Nutrition): 2400 mL  Total IN: 2670.4 mL    OUT:    Bulb: 17 mL    Colostomy: 90 mL    Voided: 2400 mL  Total OUT: 2507 mL    Total NET: 163.4 mL            PHYSICAL EXAM  General: NAD. Resting comfortably.  Pulmonary: Non-labored breathing. No respiratory distress.  Abdomen:   :   Extremities: WWP. No C/C/E          LAB/STUDIES:  CAPILLARY BLOOD GLUCOSE      POCT Blood Glucose.: 146 mg/dL (28 Oct 2019 06:07)  POCT Blood Glucose.: 150 mg/dL (28 Oct 2019 00:15)  POCT Blood Glucose.: 124 mg/dL (27 Oct 2019 16:35)  POCT Blood Glucose.: 214 mg/dL (27 Oct 2019 11:42)  POCT Blood Glucose.: 161 mg/dL (27 Oct 2019 06:24)               10-28    140  |  104  |  13  ----------------------------<  171<H>  4.6   |  26  |  0.61    Ca    9.8      28 Oct 2019 06:15  Phos  4.0     10-28  Mg     1.9     10-28    TPro  x   /  Alb  3.2<L>  /  TBili  x   /  DBili  x   /  AST  x   /  ALT  x   /  AlkPhos  x   10-28                          10.5   7.03  )-----------( 449      ( 28 Oct 2019 06:15 )             33.3                       IMAGING:  < from: Xray Abdomen 2 Views (10.25.19 @ 10:34) >  Findings suggestive of an improving paralytic ileus pattern. No evidence of pneumoperitoneum.  < end of copied text >

## 2019-10-28 NOTE — PROGRESS NOTE ADULT - ASSESSMENT
A/P: 54M PMH HTN, HLD, rectal adenocarcinoma diagnosed 5/2018, underwent chemoXRT who presented for elective robotic assisted abdominal perineal resection (9/24), c/b SBO managed by NGT and TPN via PICC line but RTOR 10/14 for lap converted to open EDER with increasing lactate and hypotensive overnight, s/p RTOR 10/15 for1 L hematoma evacuation    - Pain/nausea control  - LRD  - TPN with 20units insulin via PICC line. ISS  - RLQ JAKUB drain x 1  - L axillary PICC line for TPN  - PT/OT; PT ordered A/P: 54M PMH HTN, HLD, rectal adenocarcinoma diagnosed 5/2018, underwent chemoXRT who presented for elective robotic assisted abdominal perineal resection (9/24), c/b SBO managed by NGT and TPN via PICC line but RTOR 10/14 for lap converted to open EDER with increasing lactate and hypotensive overnight, s/p RTOR 10/15 for1 L hematoma evacuation    - Pain/nausea control  - LRD  - RLQ JAKUB drain x 1- DC today  - L axillary PICC line for TPN  - PT/OT; PT ordered A/P: 54M PMH HTN, HLD, rectal adenocarcinoma diagnosed 5/2018, underwent chemoXRT who presented for elective robotic assisted abdominal perineal resection (9/24), c/b SBO managed by NGT and TPN via PICC line but RTOR 10/14 for lap converted to open EDER with increasing lactate and hypotensive overnight, s/p RTOR 10/15 for1 L hematoma evacuation    - Pain/nausea control  - LRD  - DC RLQ JAKUB drain  - L axillary PICC line  - PT/OT; PT ordered

## 2019-10-28 NOTE — PROGRESS NOTE ADULT - SUBJECTIVE AND OBJECTIVE BOX
Pain Management Progress Note - [facility] [phone]    HPI: Patient seen during morning rounds, in NAD. States current oral regimen helps to manage his pain, feels that medications "take a while to kick in", tolerates well.       Pertinent PMH: Pain at: ___Back ___Neck___Knee ___Hip ___Shoulder ___ Opioid tolerance __x__abdominal    Pain is _x__ sharp ____dull ___burning __x_achy ___ Intensity: ____ mild ___x_mod ____severe     Location ___x__surgical site _____cervical _____lumbar ___x_abd _____upper ext____lower ext    Worse with _x___activity __x__movement _____physical therapy___ Rest    Improved with __x__medication __x__rest ____physical therapy    Parenteral Nutrition - Adult  fat emulsion (Plant Based) 20% Infusion  docusate sodium Liquid  Parenteral Nutrition - Adult  fat emulsion (Plant Based) 20% Infusion  acetaminophen    Suspension ..  Parenteral Nutrition - Adult  fat emulsion (Plant Based) 20% Infusion  simethicone  Parenteral Nutrition - Adult  fat emulsion (Plant Based) 20% Infusion  Parenteral Nutrition - Adult  (ADM OVERRIDE)  acetaminophen   Tablet ..  HYDROmorphone  Injectable  Parenteral Nutrition - Adult  fat emulsion (Plant Based) 20% Infusion  HYDROmorphone  Injectable  Parenteral Nutrition - Adult  Parenteral Nutrition - Adult  fat emulsion (Plant Based) 20% Infusion  (ADM OVERRIDE)  Parenteral Nutrition - Adult  fat emulsion (Plant Based) 20% Infusion  oxyCODONE    5 mG/acetaminophen 325 mG  oxyCODONE    IR  Parenteral Nutrition - Adult  fat emulsion (Plant Based) 20% Infusion  iohexol 300 mG (iodine)/mL Oral Solution  Parenteral Nutrition - Adult  fat emulsion (Plant Based) 20% Infusion  iohexol 300 mG (iodine)/mL Oral Solution  HYDROmorphone  Injectable  HYDROmorphone  Injectable  Parenteral Nutrition - Adult  fat emulsion (Plant Based) 20% Infusion  magnesium sulfate  IVPB  Parenteral Nutrition - Adult  BUpivacaine liposome 1.3% Injectable (no eMAR)  pantoprazole  Injectable  lactated ringers.  ondansetron Injectable  enoxaparin Injectable  cefTRIAXone   IVPB  metroNIDAZOLE  IVPB  acetaminophen  IVPB ..  sodium chloride 0.9% Bolus  insulin lispro (HumaLOG) corrective regimen sliding scale  dextrose 5%.  dextrose 40% Gel  dextrose 50% Injectable  acetaminophen  IVPB ..  famotidine Injectable  sodium chloride 0.9% Bolus  HYDROmorphone  Injectable  Parenteral Nutrition - Adult  fat emulsion (Plant Based) 20% Infusion  magnesium sulfate  IVPB  dextrose 5% + sodium chloride 0.9%.  sodium chloride 0.9% Bolus  sodium chloride 0.9% Bolus  sodium chloride 0.9%.    ROS: Const:  __-_febrile   Eyes:___ENT:___CV: __-_chest pain  Resp: ___-_sob  GI:___nausea ___vomiting __x__abd pain ___npo ___clears ___full diet __bm  :___ Musk: __x_pain __x_spasm  Skin:___ Neuro:  ___sedation___confusion____ numbness ___weakness ___paresthesia  Psych:___anxiety  Endo:___ Heme:___Allergy:___  __x__ all other systems reviewed and negative     10-28 @ 06:34261 mL/min/1.73M2      Hemoglobin: 10.5 g/dL (10-28 @ 06:15)  Hemoglobin: 10.5 g/dL (10-27 @ 07:35)      T(C): 37.1 (10-28-19 @ 04:14), Max: 37.5 (10-27-19 @ 18:36)  HR: 100 (10-28-19 @ 04:20) (96 - 104)  BP: 107/74 (10-28-19 @ 04:20) (107/74 - 130/92)  RR: 16 (10-28-19 @ 04:20) (14 - 17)  SpO2: 96% (10-28-19 @ 04:20) (96% - 99%)  Wt(kg): --     PHYSICAL EXAM:  Gen Appearance: __x_no acute distress _x__appropriate    Neuro: x___SILT feet____ EOM Intact Psych: AAOX__, __x_mood/affect appropriate        Eyes: __x_conjunctiva WNL  _____ Pupils equal and round        ENT: __x_ears and nose atraumatic__x_ Hearing grossly intact        Neck: x___trachea midline, no visible masses ___thyroid without palpable mass    Resp: _x__Nml WOB____No tactile fremitus ___clear to auscultation    Cardio: __x_extremities free from edema __x__pedal pulses palpable    GI/Abdomen: _x__soft ___x__tenderness to palpation____x__mild distention _____HSM    Lymphatic: ___no palpable nodes in neck  ___no palpable nodes calves and feet    Skin/Wound: ___Incision, ___Dressing c/d/i,   ____surrounding tissues soft,  ___drain/chest tube present____    Muscular: EHL __5_/5  Gastrocnemius_5__/5    _x__absent clubbing/cyanosis         ASSESSMENT:  This is a 54y old Male with a history of:  C20  Handoff  MEWS Score  HLD (hyperlipidemia)  HTN (hypertension)  Colon cancer  Rectal cancer  Hemorrhage  SBO (small bowel obstruction)  Rectal cancer  Hemorrhage  SBO (small bowel obstruction)  Rectal cancer  Control of hemorrhage, abdomen, postoperative  Abdominal washout  Washout, abdominal cavity  Exploratory laparotomy  Diagnostic laparoscopy  Laparoscopy-assisted abdominoperineal resection (APR) of rectum  Other elective surgery        Recommended Treatment PLAN:  1. Continue current regimen as patient is tolerating and responding well.   Plan discussed with Dr. Morales    Patient seen, evaluated and examined by attending physician, Dr. Morales.  PA active as scribe.

## 2019-10-28 NOTE — CHART NOTE - NSCHARTNOTEFT_GEN_A_CORE
Admitting Diagnosis:   Patient is a 54y old  Male who presents with a chief complaint of elective surgery (28 Oct 2019 09:42)      PAST MEDICAL & SURGICAL HISTORY:  HLD (hyperlipidemia)  HTN (hypertension)  Colon cancer: near rectum  Other elective surgery: Right Upper Chest- Chemo Port      Current Nutrition Order:  Low fiber, low fat (60g)-adv. 10/17  TPN 2.4L bag infusing @100ml/hr x24hrs via PICC providing 110g AA, 410g Dex, 50gm 20% IL (2322kcal, 1.4g pro/kg, GIR=3.7)    PO Intake: Good (%) [   ]  Fair (50-75%) [   ] Poor (<25%) [   ]  Remains hesitant to consume too much. Waiting for his wife to come before he eats.     GI Issues:   Denies N/V, adbominal discomfort, bloating/distension  Ostomy output 85ml x24hrs  Passing flatus  NGT d/c'd 10/20    Pain:   Denying pain at present    Skin Integrity:   Hector score 20  Midline surgical incision      Labs:   10-28    140  |  104  |  13  ----------------------------<  171<H>  4.6   |  26  |  0.61    Ca    9.8      28 Oct 2019 06:15  Phos  4.0     10-28  Mg     1.9     10-28    TPro  x   /  Alb  3.2<L>  /  TBili  x   /  DBili  x   /  AST  x   /  ALT  x   /  AlkPhos  x   10-28    CAPILLARY BLOOD GLUCOSE      POCT Blood Glucose.: 160 mg/dL (28 Oct 2019 11:40)  POCT Blood Glucose.: 146 mg/dL (28 Oct 2019 06:07)  POCT Blood Glucose.: 150 mg/dL (28 Oct 2019 00:15)  POCT Blood Glucose.: 124 mg/dL (27 Oct 2019 16:35)      Medications:  MEDICATIONS  (STANDING):  chlorhexidine 2% Cloths 1 Application(s) Topical <User Schedule>  dextrose 5%. 1000 milliLiter(s) (50 mL/Hr) IV Continuous <Continuous>  dextrose 50% Injectable 12.5 Gram(s) IV Push once  dextrose 50% Injectable 25 Gram(s) IV Push once  dextrose 50% Injectable 25 Gram(s) IV Push once  heparin  Injectable 5000 Unit(s) SubCutaneous every 8 hours  influenza   Vaccine 0.5 milliLiter(s) IntraMuscular once  insulin regular  human corrective regimen sliding scale   SubCutaneous every 6 hours  pantoprazole  Injectable 40 milliGRAM(s) IV Push daily  Parenteral Nutrition - Adult 1 Each (100 mL/Hr) TPN Continuous <Continuous>    MEDICATIONS  (PRN):  acetaminophen   Tablet .. 975 milliGRAM(s) Oral every 8 hours PRN Severe Pain (7 - 10)  dextrose 40% Gel 15 Gram(s) Oral once PRN Blood Glucose LESS THAN 70 milliGRAM(s)/deciliter  diazepam    Tablet 2 milliGRAM(s) Oral every 8 hours PRN muscle spasms  glucagon  Injectable 1 milliGRAM(s) IntraMuscular once PRN Glucose LESS THAN 70 milligrams/deciliter  ketorolac 0.5% Ophthalmic Solution 1 Drop(s) Left EYE every 6 hours PRN eye pain irritation  morphine  - Injectable 2 milliGRAM(s) IV Push every 2 hours PRN breakthrough  oxyCODONE    IR 5 milliGRAM(s) Oral every 6 hours PRN Moderate Pain (4 - 6)  oxyCODONE    IR 10 milliGRAM(s) Oral every 6 hours PRN Severe Pain (7 - 10)      Weight: 170lbs  Height: 5'10", IBW 166lbs+/-10%, %%, BMI 25.5    77.7 kg  10/2 76.2 kg  10/3 76.7 kg  10/5 77.6 kg  10/6 78.6 kg  10/7 77.9 kg  10/14 77.0 kg  10/21 82.3kg  10/28 75.5kg    Weight Change:   Weight has been consistent since admission with minimal fluctuations. Continue to weigh patient weekly to assess weight trends.     Nutrition Focused Physical Exam: Nutrition Focused Physical Exam: Completed [ X; Completed ]  Not Pertinent [   ]  (From ): Moderate muscle loss noted around clavicles and moderate fat loss noted around triceps. Suspect severe malnutrition based on NFPE, ~10% unintentional wt loss x1.5 months, pt meeting <75% EER for >1 month    Estimated energy needs:   ABW (77.2kg) used to calculate energy needs due to pt's current body weight within % IBW.   Needs adjusted post-op, suspected malnutrition, hypermetabolic state.   2483-9571 kcal/day (30-35kcal/kg)  92-107g protein/day (1.2-1.4g pro/kg)  2310-2695ml fluid/day (30-35ml/kg)    Subjective:   53yo M PMH HTN, HLD, rectal adenocarcinoma diagnosed 2018, underwent chemoXRT who presented for elective robotic assisted abdominal perineal resection (), c/b SBO managed by NGT and TPN via PICC line but RTOR 10/14 for lap converted to open EDER with increasing lactate and hypotensive overnight, s/p RTOR 10/15 for1 L hematoma evacuation. Pt has been on TPN since 10/1. NGT was d/c'd 10/20. Diet gradually advancing, now on low fat, low fiber diet w/ minimal intake. Pt remains anxious about PO intake and does not want to drink until wife arrives.  Remains understanding of PN being primary form of nutrition support. POCT , 146, 150; Lytes WNL; Tmg/dL (10/27), 161 (10/24), 140 (10/21), 214mg/dL (10/14). Recommend advancing to fulls w/ EnsureEnlive once deemed medically feasible. Please continue w/ PN until pt is capable of meeting >60% EER PO. RD to follow.     Previous Nutrition Diagnosis:  Malnutrition (suspect severe) RT decreased ability to meet EER AEB NFPE, ~10% unintentional wt loss x1.5 months, pt meeting <75% EER for >1 month.  Active [ X ]  Resolved [   ]    Goal: Pt to consistently meet % of estimated needs via most appropriate route    Recommendations:  1) Recommend continuing w/ PN as primary form of nutrition support:  410g Dex, 107g AA, 50g 20% Lipids daily to provide in total: 2322 Kcal, 1.4g protein/kg, GIR 3.7 mg/kg/min based on ABW (77 kg). Fluids and lytes per MD discretion.   >>Check TG weekly. Monitor Mg, Phos, K daily and POC BG Q6hrs. Replete electrolytes prn.  2) Trend daily weights.   3) Insulin regimen per team  4) Trend weights  5) Continue w/ PN until pt is capable of meeting >60% EER PO.     Education: Pt understanding of meeting needs via PN for the time being.     Risk Level: High [ X ]  Moderate [   ] Low [   ].

## 2019-10-29 LAB
ANION GAP SERPL CALC-SCNC: 12 MMOL/L — SIGNIFICANT CHANGE UP (ref 5–17)
BUN SERPL-MCNC: 14 MG/DL — SIGNIFICANT CHANGE UP (ref 7–23)
CALCIUM SERPL-MCNC: 10.8 MG/DL — HIGH (ref 8.4–10.5)
CHLORIDE SERPL-SCNC: 100 MMOL/L — SIGNIFICANT CHANGE UP (ref 96–108)
CO2 SERPL-SCNC: 24 MMOL/L — SIGNIFICANT CHANGE UP (ref 22–31)
CREAT SERPL-MCNC: 0.69 MG/DL — SIGNIFICANT CHANGE UP (ref 0.5–1.3)
GLUCOSE BLDC GLUCOMTR-MCNC: 107 MG/DL — HIGH (ref 70–99)
GLUCOSE BLDC GLUCOMTR-MCNC: 108 MG/DL — HIGH (ref 70–99)
GLUCOSE BLDC GLUCOMTR-MCNC: 111 MG/DL — HIGH (ref 70–99)
GLUCOSE BLDC GLUCOMTR-MCNC: 116 MG/DL — HIGH (ref 70–99)
GLUCOSE BLDC GLUCOMTR-MCNC: 140 MG/DL — HIGH (ref 70–99)
GLUCOSE SERPL-MCNC: 117 MG/DL — HIGH (ref 70–99)
HCT VFR BLD CALC: 33.8 % — LOW (ref 39–50)
HGB BLD-MCNC: 10.9 G/DL — LOW (ref 13–17)
MAGNESIUM SERPL-MCNC: 1.7 MG/DL — SIGNIFICANT CHANGE UP (ref 1.6–2.6)
MCHC RBC-ENTMCNC: 30 PG — SIGNIFICANT CHANGE UP (ref 27–34)
MCHC RBC-ENTMCNC: 32.2 GM/DL — SIGNIFICANT CHANGE UP (ref 32–36)
MCV RBC AUTO: 93.1 FL — SIGNIFICANT CHANGE UP (ref 80–100)
NRBC # BLD: 0 /100 WBCS — SIGNIFICANT CHANGE UP (ref 0–0)
PHOSPHATE SERPL-MCNC: 4.2 MG/DL — SIGNIFICANT CHANGE UP (ref 2.5–4.5)
PLATELET # BLD AUTO: 435 K/UL — HIGH (ref 150–400)
POTASSIUM SERPL-MCNC: 4.6 MMOL/L — SIGNIFICANT CHANGE UP (ref 3.5–5.3)
POTASSIUM SERPL-SCNC: 4.6 MMOL/L — SIGNIFICANT CHANGE UP (ref 3.5–5.3)
RBC # BLD: 3.63 M/UL — LOW (ref 4.2–5.8)
RBC # FLD: 14.3 % — SIGNIFICANT CHANGE UP (ref 10.3–14.5)
SODIUM SERPL-SCNC: 136 MMOL/L — SIGNIFICANT CHANGE UP (ref 135–145)
WBC # BLD: 8.15 K/UL — SIGNIFICANT CHANGE UP (ref 3.8–10.5)
WBC # FLD AUTO: 8.15 K/UL — SIGNIFICANT CHANGE UP (ref 3.8–10.5)

## 2019-10-29 PROCEDURE — 93970 EXTREMITY STUDY: CPT | Mod: 26

## 2019-10-29 RX ORDER — MAGNESIUM HYDROXIDE 400 MG/1
30 TABLET, CHEWABLE ORAL ONCE
Refills: 0 | Status: COMPLETED | OUTPATIENT
Start: 2019-10-29 | End: 2019-10-29

## 2019-10-29 RX ORDER — DOCUSATE SODIUM 100 MG
200 CAPSULE ORAL
Refills: 0 | Status: DISCONTINUED | OUTPATIENT
Start: 2019-10-29 | End: 2019-10-31

## 2019-10-29 RX ADMIN — HEPARIN SODIUM 5000 UNIT(S): 5000 INJECTION INTRAVENOUS; SUBCUTANEOUS at 13:21

## 2019-10-29 RX ADMIN — Medication 2 MILLIGRAM(S): at 07:03

## 2019-10-29 RX ADMIN — OXYCODONE HYDROCHLORIDE 10 MILLIGRAM(S): 5 TABLET ORAL at 17:07

## 2019-10-29 RX ADMIN — OXYCODONE HYDROCHLORIDE 10 MILLIGRAM(S): 5 TABLET ORAL at 06:40

## 2019-10-29 RX ADMIN — HEPARIN SODIUM 5000 UNIT(S): 5000 INJECTION INTRAVENOUS; SUBCUTANEOUS at 23:20

## 2019-10-29 RX ADMIN — OXYCODONE HYDROCHLORIDE 10 MILLIGRAM(S): 5 TABLET ORAL at 05:58

## 2019-10-29 RX ADMIN — OXYCODONE HYDROCHLORIDE 10 MILLIGRAM(S): 5 TABLET ORAL at 11:17

## 2019-10-29 RX ADMIN — Medication 200 MILLIGRAM(S): at 17:07

## 2019-10-29 RX ADMIN — HEPARIN SODIUM 5000 UNIT(S): 5000 INJECTION INTRAVENOUS; SUBCUTANEOUS at 05:57

## 2019-10-29 RX ADMIN — OXYCODONE HYDROCHLORIDE 10 MILLIGRAM(S): 5 TABLET ORAL at 11:16

## 2019-10-29 RX ADMIN — PANTOPRAZOLE SODIUM 40 MILLIGRAM(S): 20 TABLET, DELAYED RELEASE ORAL at 09:36

## 2019-10-29 RX ADMIN — Medication 2 MILLIGRAM(S): at 23:20

## 2019-10-29 RX ADMIN — Medication 2 MILLIGRAM(S): at 13:26

## 2019-10-29 RX ADMIN — CHLORHEXIDINE GLUCONATE 1 APPLICATION(S): 213 SOLUTION TOPICAL at 05:59

## 2019-10-29 NOTE — PROGRESS NOTE ADULT - SUBJECTIVE AND OBJECTIVE BOX
Pain Management Progress Note - [facility] [phone]        HPI: Patient seen and examined today. Patient is a 54 year old male, with pmh HTN, HLD adenocarcinoma, s/p abdominal perineal resection, now s/p lap conversion to open, EDER. Patient tolerating a low fiber diet,. Patient reporting diffuse abdominal pain and surgical site pain, pain managed with current pain medication regimen.  Patient Axox3, denies, n,v, no s/s of oversedation.       Pain is __x_ sharp ____dull ___burning ___achy ___ Intensity: ____ mild _x__mod ___severe     Location __x__surgical site ____cervical _____lumbar _x___abd ____upper ext____lower ext    Worse with _x___activity __x__movement _____physical therapy___ Rest    Improved with __x__medication ___x_rest ____physical therapy      acetaminophen    Suspension ..  Parenteral Nutrition - Adult  fat emulsion (Plant Based) 20% Infusion  simethicone  Parenteral Nutrition - Adult  fat emulsion (Plant Based) 20% Infusion  Parenteral Nutrition - Adult  acetaminophen   Tablet ..  HYDROmorphone  Injectable  Parenteral Nutrition - Adult  fat emulsion (Plant Based) 20% Infusion  HYDROmorphone  Injectable  Parenteral Nutrition - Adult  fat emulsion (Plant Based) 20% Infusion  oxyCODONE    5 mG/acetaminophen 325 mG  oxyCODONE    IR  Parenteral Nutrition - Adult  fat emulsion (Plant Based) 20% Infusion  iohexol 300 mG (iodine)/mL Oral Solution  Parenteral Nutrition - Adult  fat emulsion (Plant Based) 20% Infusion  iohexol 300 mG (iodine)/mL Oral Solution  HYDROmorphone  Injectable  HYDROmorphone  Injectable  Parenteral Nutrition - Adult  fat emulsion (Plant Based) 20% Infusion  magnesium sulfate  IVPB  Parenteral Nutrition - Adult  BUpivacaine liposome 1.3% Injectable (no eMAR)  pantoprazole  Injectable  lactated ringers.  HYDROmorphone  Injectable  HYDROmorphone  Injectable  ondansetron Injectable  enoxaparin Injectable  cefTRIAXone   IVPB  metroNIDAZOLE  IVPB  acetaminophen  IVPB ..  HYDROmorphone  Injectable  HYDROmorphone  Injectable  sodium chloride 0.9% Bolus  insulin lispro (HumaLOG) corrective regimen sliding scale  dextrose 5%.  dextrose 40% Gel  dextrose 50% Injectable  glucagon  Injectable  acetaminophen  IVPB ..  famotidine Injectable  sodium chloride 0.9% Bolus  calcium gluconate IVPB  sodium chloride 0.9% Bolus  acetaminophen  IVPB ..  acetaminophen  IVPB ..  HYDROmorphone  Injectable  HYDROmorphone  Injectable  dextrose 10%.  piperacillin/tazobactam IVPB..  insulin lispro (HumaLOG) corrective regimen sliding scale  HYDROmorphone  Injectable  chlorhexidine 2% Cloths  acetaminophen  IVPB ..  sodium bicarbonate  Injectable  Parenteral Nutrition - Adult  fat emulsion (Plant Based) 20% Infusion  magnesium sulfate  IVPB  magnesium sulfate  IVPB  dextrose 5% + sodium chloride 0.9%.  sodium chloride 0.9% Bolus  sodium chloride 0.9% Bolus  HYDROmorphone  Injectable  magnesium sulfate  IVPB  sodium phosphate IVPB  HYDROmorphone  Injectable  HYDROmorphone  Injectable  LORazepam   Injectable  Parenteral Nutrition - Adult  fat emulsion (Plant Based) 20% Infusion  insulin NPH human recombinant  chlorproMAZINE    IVPB  Parenteral Nutrition - Adult  fat emulsion (Plant Based) 20% Infusion  sodium chloride 0.9%.  insulin regular  human corrective regimen sliding scale  dextrose 5%.  dextrose 40% Gel  dextrose 50% Injectable  glucagon  Injectable  diazepam  Injectable  acetaminophen  IVPB ..  diazepam  Injectable  pantoprazole  Injectable  potassium phosphate IVPB  Parenteral Nutrition - Adult  fat emulsion (Plant Based) 20% Infusion  potassium chloride  10 mEq/100 mL IVPB  acetaminophen  IVPB ..  heparin  Injectable  acetaminophen  IVPB ..  potassium chloride  20 mEq/100 mL IVPB  potassium chloride  20 mEq/100 mL IVPB  Parenteral Nutrition - Adult  fat emulsion (Plant Based) 20% Infusion  acetaminophen  IVPB ..  morphine PCA (5 mG/mL)  naloxone Injectable  acetaminophen  IVPB ..  morphine  - Injectable  morphine PCA (1 mG/mL)  Parenteral Nutrition - Adult  fat emulsion (Plant Based) 20% Infusion  potassium chloride  10 mEq/100 mL IVPB  Parenteral Nutrition - Adult  fat emulsion (Plant Based) 20% Infusion  morphine  - Injectable  acetaminophen  IVPB ..  Parenteral Nutrition - Adult  fat emulsion (Plant Based) 20% Infusion  cyclobenzaprine  Parenteral Nutrition - Adult  fat emulsion (Plant Based) 20% Infusion  Parenteral Nutrition - Adult  fat emulsion (Plant Based) 20% Infusion  Parenteral Nutrition - Adult  fat emulsion (Plant Based) 20% Infusion  Parenteral Nutrition - Adult  fat emulsion (Plant Based) 20% Infusion  oxyCODONE    IR  oxyCODONE    IR  morphine  - Injectable  acetaminophen   Tablet ..  diazepam    Tablet  Parenteral Nutrition - Adult  fat emulsion (Plant Based) 20% Infusion  magnesium sulfate  IVPB  Parenteral Nutrition - Adult  fat emulsion (Plant Based) 20% Infusion  magnesium hydroxide Suspension  docusate sodium      ROS: Const:  __-_febrile   Eyes:___ENT:___CV: _-__chest pain  Resp: __-__sob  GI:___nausea ___vomiting __x_abd pain ___npo ___clears _x_full diet __bm  :___ Musk: __x_pain __x_spasm  Skin:___ Neuro:  ___sedation___confusion___ numbness ___weakness ___paresth  Psych:__anxiety  Endo:___ Heme:___Allergy:_________, _x__all others reviewed and negative      PAST MEDICAL & SURGICAL HISTORY:  HLD (hyperlipidemia)  HTN (hypertension)  Colon cancer: near rectum  Other elective surgery: Right Upper Chest- Chemo Port      10-29 @ [id number]:59342 mL/min/1.73M2      Hemoglobin: 10.9 g/dL (10-29 @ 07:17)  Hemoglobin: 10.5 g/dL (10-28 @ 06:15)        T(C): 37.1 (10-29-19 @ 09:01), Max: 37.2 (10-29-19 @ 04:49)  HR: 96 (10-29-19 @ 08:56) (96 - 108)  BP: 106/69 (10-29-19 @ 08:56) (106/69 - 130/78)  RR: 18 (10-29-19 @ 08:56) (14 - 19)  SpO2: 97% (10-29-19 @ 08:56) (96% - 99%)  Wt(kg): --        PHYSICAL EXAM:  Gen Appearance: _x__no acute distress __x_appropriate        Neuro: _x__SILT feet____ EOM Intact Psych: AAOX_3_, _x__mood/affect appropriate        Eyes: _x__conjunctiva WNL  x_____ Pupils equal and round        ENT: _x_ears and nose atraumatic__x_ Hearing grossly intact        Neck: _x__trachea midline, no visible masses ___thyroid without palpable mass    Resp: _x__Nml WOB____No tactile fremitus ___clear to auscultation    Cardio: __x_extremities free from edema __x__pedal pulses palpable    GI/Abdomen: _x__soft ___x__ Nontender___x___Nondistended_____HSM    Lymphatic: ___no palpable nodes in neck  ___no palpable nodes calves and feet    Skin/Wound: ___Incision, _x__Dressing c/d/i,   ____surrounding tissues soft,  ___drain/chest tube present____    Muscular: EHL __5_/5  Gastrocnemius___5/5    _x__absent clubbing/cyanosis        ASSESSMENT: Patient is a 54 year old male, with pmh HTN, HLD adenocarcinoma, s/p abdominal perineal resection, now s/p lap conversion to open, EDER, pain managed with current pain medication regimen.       Recommended Treatment PLAN:  1. Oxycodone 5-10mg PO q4h prn moderate to severe pain   2. Morphine 2mg IVP Q2h prn breakthrough pain  3. tylenol 975mg PO q8h prn muscle spasms  4. Diazepam 2mg Po Q8h prn muscle spasms  Plan discussed with Dr. Morales Pain Management Progress Note - [facility] [phone]        HPI: Patient seen and examined today. Patient is a 54 year old male, with pmh HTN, HLD adenocarcinoma, s/p abdominal perineal resection, now s/p lap conversion to open, EDER. Patient tolerating a low fiber diet,. Patient reporting diffuse abdominal pain and surgical site pain, pain managed with current pain medication regimen.  Patient Axox3, denies, n,v, no s/s of oversedation.       Pain is __x_ sharp ____dull ___burning ___achy ___ Intensity: ____ mild _x__mod ___severe     Location __x__surgical site ____cervical _____lumbar _x___abd ____upper ext____lower ext    Worse with _x___activity __x__movement _____physical therapy___ Rest    Improved with __x__medication ___x_rest ____physical therapy      acetaminophen    Suspension ..  Parenteral Nutrition - Adult  fat emulsion (Plant Based) 20% Infusion  simethicone  Parenteral Nutrition - Adult  fat emulsion (Plant Based) 20% Infusion  Parenteral Nutrition - Adult  acetaminophen   Tablet ..  HYDROmorphone  Injectable  Parenteral Nutrition - Adult  fat emulsion (Plant Based) 20% Infusion  HYDROmorphone  Injectable  Parenteral Nutrition - Adult  fat emulsion (Plant Based) 20% Infusion  oxyCODONE    5 mG/acetaminophen 325 mG  oxyCODONE    IR  Parenteral Nutrition - Adult  fat emulsion (Plant Based) 20% Infusion  iohexol 300 mG (iodine)/mL Oral Solution  Parenteral Nutrition - Adult  fat emulsion (Plant Based) 20% Infusion  iohexol 300 mG (iodine)/mL Oral Solution  HYDROmorphone  Injectable  HYDROmorphone  Injectable  Parenteral Nutrition - Adult  fat emulsion (Plant Based) 20% Infusion  magnesium sulfate  IVPB  Parenteral Nutrition - Adult  BUpivacaine liposome 1.3% Injectable (no eMAR)  pantoprazole  Injectable  lactated ringers.  HYDROmorphone  Injectable  HYDROmorphone  Injectable  ondansetron Injectable  enoxaparin Injectable  cefTRIAXone   IVPB  metroNIDAZOLE  IVPB  acetaminophen  IVPB ..  HYDROmorphone  Injectable  HYDROmorphone  Injectable  sodium chloride 0.9% Bolus  insulin lispro (HumaLOG) corrective regimen sliding scale  dextrose 5%.  dextrose 40% Gel  dextrose 50% Injectable  glucagon  Injectable  acetaminophen  IVPB ..  famotidine Injectable  sodium chloride 0.9% Bolus  calcium gluconate IVPB  sodium chloride 0.9% Bolus  acetaminophen  IVPB ..  acetaminophen  IVPB ..  HYDROmorphone  Injectable  HYDROmorphone  Injectable  dextrose 10%.  piperacillin/tazobactam IVPB..  insulin lispro (HumaLOG) corrective regimen sliding scale  HYDROmorphone  Injectable  chlorhexidine 2% Cloths  acetaminophen  IVPB ..  sodium bicarbonate  Injectable  Parenteral Nutrition - Adult  fat emulsion (Plant Based) 20% Infusion  magnesium sulfate  IVPB  magnesium sulfate  IVPB  dextrose 5% + sodium chloride 0.9%.  sodium chloride 0.9% Bolus  sodium chloride 0.9% Bolus  HYDROmorphone  Injectable  magnesium sulfate  IVPB  sodium phosphate IVPB  HYDROmorphone  Injectable  HYDROmorphone  Injectable  LORazepam   Injectable  Parenteral Nutrition - Adult  fat emulsion (Plant Based) 20% Infusion  insulin NPH human recombinant  chlorproMAZINE    IVPB  Parenteral Nutrition - Adult  fat emulsion (Plant Based) 20% Infusion  sodium chloride 0.9%.  insulin regular  human corrective regimen sliding scale  dextrose 5%.  dextrose 40% Gel  dextrose 50% Injectable  glucagon  Injectable  diazepam  Injectable  acetaminophen  IVPB ..  diazepam  Injectable  pantoprazole  Injectable  potassium phosphate IVPB  Parenteral Nutrition - Adult  fat emulsion (Plant Based) 20% Infusion  potassium chloride  10 mEq/100 mL IVPB  acetaminophen  IVPB ..  heparin  Injectable  acetaminophen  IVPB ..  potassium chloride  20 mEq/100 mL IVPB  potassium chloride  20 mEq/100 mL IVPB  Parenteral Nutrition - Adult  fat emulsion (Plant Based) 20% Infusion  acetaminophen  IVPB ..  morphine PCA (5 mG/mL)  naloxone Injectable  acetaminophen  IVPB ..  morphine  - Injectable  morphine PCA (1 mG/mL)  Parenteral Nutrition - Adult  fat emulsion (Plant Based) 20% Infusion  potassium chloride  10 mEq/100 mL IVPB  Parenteral Nutrition - Adult  fat emulsion (Plant Based) 20% Infusion  morphine  - Injectable  acetaminophen  IVPB ..  Parenteral Nutrition - Adult  fat emulsion (Plant Based) 20% Infusion  cyclobenzaprine  Parenteral Nutrition - Adult  fat emulsion (Plant Based) 20% Infusion  Parenteral Nutrition - Adult  fat emulsion (Plant Based) 20% Infusion  Parenteral Nutrition - Adult  fat emulsion (Plant Based) 20% Infusion  Parenteral Nutrition - Adult  fat emulsion (Plant Based) 20% Infusion  oxyCODONE    IR  oxyCODONE    IR  morphine  - Injectable  acetaminophen   Tablet ..  diazepam    Tablet  Parenteral Nutrition - Adult  fat emulsion (Plant Based) 20% Infusion  magnesium sulfate  IVPB  Parenteral Nutrition - Adult  fat emulsion (Plant Based) 20% Infusion  magnesium hydroxide Suspension  docusate sodium      ROS: Const:  __-_febrile   Eyes:___ENT:___CV: _-__chest pain  Resp: __-__sob  GI:___nausea ___vomiting __x_abd pain ___npo ___clears _x_full diet __bm  :___ Musk: __x_pain __x_spasm  Skin:___ Neuro:  ___sedation___confusion___ numbness ___weakness ___paresth  Psych:__anxiety  Endo:___ Heme:___Allergy:_________, _x__all others reviewed and negative      PAST MEDICAL & SURGICAL HISTORY:  HLD (hyperlipidemia)  HTN (hypertension)  Colon cancer: near rectum  Other elective surgery: Right Upper Chest- Chemo Port      10-29 @ 07:18954 mL/min/1.73M2      Hemoglobin: 10.9 g/dL (10-29 @ 07:17)  Hemoglobin: 10.5 g/dL (10-28 @ 06:15)        T(C): 37.1 (10-29-19 @ 09:01), Max: 37.2 (10-29-19 @ 04:49)  HR: 96 (10-29-19 @ 08:56) (96 - 108)  BP: 106/69 (10-29-19 @ 08:56) (106/69 - 130/78)  RR: 18 (10-29-19 @ 08:56) (14 - 19)  SpO2: 97% (10-29-19 @ 08:56) (96% - 99%)  Wt(kg): --        PHYSICAL EXAM:  Gen Appearance: _x__no acute distress __x_appropriate        Neuro: _x__SILT feet____ EOM Intact Psych: AAOX_3_, _x__mood/affect appropriate        Eyes: _x__conjunctiva WNL  x_____ Pupils equal and round        ENT: _x_ears and nose atraumatic__x_ Hearing grossly intact        Neck: _x__trachea midline, no visible masses ___thyroid without palpable mass    Resp: _x__Nml WOB____No tactile fremitus ___clear to auscultation    Cardio: __x_extremities free from edema __x__pedal pulses palpable    GI/Abdomen: _x__soft ___x__ Nontender___x___Nondistended_____HSM    Lymphatic: ___no palpable nodes in neck  ___no palpable nodes calves and feet    Skin/Wound: ___Incision, _x__Dressing c/d/i,   ____surrounding tissues soft,  ___drain/chest tube present____    Muscular: EHL __5_/5  Gastrocnemius___5/5    _x__absent clubbing/cyanosis        ASSESSMENT: Patient is a 54 year old male, with pmh HTN, HLD adenocarcinoma, s/p abdominal perineal resection, now s/p lap conversion to open, EDER, pain managed with current pain medication regimen.       Recommended Treatment PLAN:  1. Oxycodone 5-10mg PO q4h prn moderate to severe pain   2. Morphine 2mg IVP Q2h prn breakthrough pain  3. tylenol 975mg PO q8h prn muscle spasms  4. Diazepam 2mg Po Q8h prn muscle spasms  Plan discussed with Dr. Morales       Addendum:   1. Patient continues to report muscle spasms to abdomen worsened with activity. Reviewed pain medication regimen with patient.   Diazepam 5mg PO Q8h prn muscle spasms  Plan discussed with Dr. Morales

## 2019-10-29 NOTE — PROGRESS NOTE ADULT - SUBJECTIVE AND OBJECTIVE BOX
Post-Op Day #:    Procedure/Dx/Injuries:     Events of past 24 hours: NAEON. Tolerated dinner. OOBA.    MEDICATIONS  (STANDING):  chlorhexidine 2% Cloths 1 Application(s) Topical <User Schedule>  dextrose 5%. 1000 milliLiter(s) (50 mL/Hr) IV Continuous <Continuous>  dextrose 50% Injectable 12.5 Gram(s) IV Push once  dextrose 50% Injectable 25 Gram(s) IV Push once  dextrose 50% Injectable 25 Gram(s) IV Push once  heparin  Injectable 5000 Unit(s) SubCutaneous every 8 hours  influenza   Vaccine 0.5 milliLiter(s) IntraMuscular once  insulin regular  human corrective regimen sliding scale   SubCutaneous every 6 hours  pantoprazole  Injectable 40 milliGRAM(s) IV Push daily    MEDICATIONS  (PRN):  acetaminophen   Tablet .. 975 milliGRAM(s) Oral every 8 hours PRN Severe Pain (7 - 10)  dextrose 40% Gel 15 Gram(s) Oral once PRN Blood Glucose LESS THAN 70 milliGRAM(s)/deciliter  diazepam    Tablet 2 milliGRAM(s) Oral every 8 hours PRN muscle spasms  glucagon  Injectable 1 milliGRAM(s) IntraMuscular once PRN Glucose LESS THAN 70 milligrams/deciliter  ketorolac 0.5% Ophthalmic Solution 1 Drop(s) Left EYE every 6 hours PRN eye pain irritation  morphine  - Injectable 2 milliGRAM(s) IV Push every 2 hours PRN breakthrough  oxyCODONE    IR 5 milliGRAM(s) Oral every 6 hours PRN Moderate Pain (4 - 6)  oxyCODONE    IR 10 milliGRAM(s) Oral every 6 hours PRN Severe Pain (7 - 10)        Vitals: T(F): 98.9 (10-29-19 @ 04:49), Max: 99.1 (10-28-19 @ 10:05)  HR: 102 (10-29-19 @ 04:00)  BP: 107/67 (10-29-19 @ 04:00)  RR: 15 (10-29-19 @ 04:00)  SpO2: 96% (10-29-19 @ 04:00)        Diet, Low Fiber:   60 gm Fat Restricted [facility] Only (FAT60G)      10-27-19 @ 07:01  -  10-28-19 @ 07:00  --------------------------------------------------------  IN:    fat emulsion (Plant Based) 20% Infusion: 225.5 mL    TPN (Total Parenteral Nutrition): 2200 mL  Total IN: 2425.5 mL    OUT:    Bulb: 25 mL    Colostomy: 85 mL    Voided: 1850 mL  Total OUT: 1960 mL    Total NET: 465.5 mL            PHYSICAL EXAM  General: NAD. Resting comfortably.  Pulmonary: Non-labored breathing. No respiratory distress.  Abdomen: Soft, non-tender, non-distended.  :   Extremities: WWP. No C/C/E          LAB/STUDIES:  CAPILLARY BLOOD GLUCOSE      POCT Blood Glucose.: 129 mg/dL (28 Oct 2019 16:44)  POCT Blood Glucose.: 160 mg/dL (28 Oct 2019 11:40)  POCT Blood Glucose.: 146 mg/dL (28 Oct 2019 06:07)                          10.5   7.03  )-----------( 449      ( 28 Oct 2019 06:15 )             33.3     10-28    140  |  104  |  13  ----------------------------<  171<H>  4.6   |  26  |  0.61    Ca    9.8      28 Oct 2019 06:15  Phos  4.0     10-28  Mg     1.9     10-28    TPro  x   /  Alb  3.2<L>  /  TBili  x   /  DBili  x   /  AST  x   /  ALT  x   /  AlkPhos  x   10-28               x    | x    | x        ------------------[x       ( 28 Oct 2019 06:15 )  3.2  | x    | x           Lipase:x      Amylase:x Events of past 24 hours: NAEON. Tolerated LRD dinner. OOBA. C/o some abdominal pain but no acute complaints.      MEDICATIONS  (STANDING):  chlorhexidine 2% Cloths 1 Application(s) Topical <User Schedule>  dextrose 5%. 1000 milliLiter(s) (50 mL/Hr) IV Continuous <Continuous>  dextrose 50% Injectable 12.5 Gram(s) IV Push once  dextrose 50% Injectable 25 Gram(s) IV Push once  dextrose 50% Injectable 25 Gram(s) IV Push once  heparin  Injectable 5000 Unit(s) SubCutaneous every 8 hours  influenza   Vaccine 0.5 milliLiter(s) IntraMuscular once  insulin regular  human corrective regimen sliding scale   SubCutaneous every 6 hours  pantoprazole  Injectable 40 milliGRAM(s) IV Push daily    MEDICATIONS  (PRN):  acetaminophen   Tablet .. 975 milliGRAM(s) Oral every 8 hours PRN Severe Pain (7 - 10)  dextrose 40% Gel 15 Gram(s) Oral once PRN Blood Glucose LESS THAN 70 milliGRAM(s)/deciliter  diazepam    Tablet 2 milliGRAM(s) Oral every 8 hours PRN muscle spasms  glucagon  Injectable 1 milliGRAM(s) IntraMuscular once PRN Glucose LESS THAN 70 milligrams/deciliter  ketorolac 0.5% Ophthalmic Solution 1 Drop(s) Left EYE every 6 hours PRN eye pain irritation  morphine  - Injectable 2 milliGRAM(s) IV Push every 2 hours PRN breakthrough  oxyCODONE    IR 5 milliGRAM(s) Oral every 6 hours PRN Moderate Pain (4 - 6)  oxyCODONE    IR 10 milliGRAM(s) Oral every 6 hours PRN Severe Pain (7 - 10)        Vitals: T(F): 98.9 (10-29-19 @ 04:49), Max: 99.1 (10-28-19 @ 10:05)  HR: 102 (10-29-19 @ 04:00)  BP: 107/67 (10-29-19 @ 04:00)  RR: 15 (10-29-19 @ 04:00)  SpO2: 96% (10-29-19 @ 04:00)        Diet, Low Fiber:   60 gm Fat Restricted [facility] Only (FAT60G)      10-27-19 @ 07:01  -  10-28-19 @ 07:00  --------------------------------------------------------  IN:    fat emulsion (Plant Based) 20% Infusion: 225.5 mL    TPN (Total Parenteral Nutrition): 2200 mL  Total IN: 2425.5 mL    OUT:    Bulb: 25 mL    Colostomy: 85 mL    Voided: 1850 mL  Total OUT: 1960 mL    Total NET: 465.5 mL            PHYSICAL EXAM  General: NAD. Resting comfortably.  Pulmonary: Non-labored breathing. No respiratory distress.  Abdomen: Soft, mildly tender, mildly distended. Midline incision c/d/i. Ostomy L abdomen with +stool, gas. JAKUB drain site with dressing, clean and dry.  : No Marie  Extremities: WWP. No C/C/E        LAB/STUDIES:  CAPILLARY BLOOD GLUCOSE    POCT Blood Glucose.: 129 mg/dL (28 Oct 2019 16:44)  POCT Blood Glucose.: 160 mg/dL (28 Oct 2019 11:40)  POCT Blood Glucose.: 146 mg/dL (28 Oct 2019 06:07)             10-29    136  |  100  |  14  ----------------------------<  117<H>  4.6   |  24  |  0.69    Ca    10.8<H>      29 Oct 2019 07:17  Phos  4.2     10-29  Mg     1.7     10-29    TPro  x   /  Alb  3.2<L>  /  TBili  x   /  DBili  x   /  AST  x   /  ALT  x   /  AlkPhos  x   10-28                          10.9   8.15  )-----------( 435      ( 29 Oct 2019 07:17 )             33.8

## 2019-10-29 NOTE — PROGRESS NOTE ADULT - ASSESSMENT
A/P: 54M PMH HTN, HLD, rectal adenocarcinoma diagnosed 5/2018, underwent chemoXRT who presented for elective robotic assisted abdominal perineal resection (9/24), c/b SBO managed by NGT and TPN via PICC line but RTOR 10/14 for lap converted to open EDER with increasing lactate and hypotensive overnight, s/p RTOR 10/15 for1 L hematoma evacuation    - Pain/nausea control  - LRD  - Abx: Zosyn dc'd 10/24  - RLQ JAKUB drain x 1- dc'ed 10/28  - L axillary PICC line for TPN  - PT/OT; PT ordered A/P: 54M PMH HTN, HLD, rectal adenocarcinoma diagnosed 5/2018, underwent chemoXRT who presented for elective robotic assisted abdominal perineal resection (9/24), c/b SBO managed by NGT and TPN via PICC line but RTOR 10/14 for lap converted to open EDER with increasing lactate and hypotensive overnight, s/p RTOR 10/15 for1 L hematoma evacuation    - Pain/nausea control  - LRD  - Mag citrate x1  - Colace 200 BID  - B/l LE doppler  - Abx: Zosyn dc'd 10/24  - RLQ JAKUB drain x 1- dc'ed 10/28  - L axillary PICC line for TPN  - PT/OT; PT ordered

## 2019-10-30 ENCOUNTER — TRANSCRIPTION ENCOUNTER (OUTPATIENT)
Age: 54
End: 2019-10-30

## 2019-10-30 LAB
ANION GAP SERPL CALC-SCNC: 11 MMOL/L — SIGNIFICANT CHANGE UP (ref 5–17)
BUN SERPL-MCNC: 18 MG/DL — SIGNIFICANT CHANGE UP (ref 7–23)
CALCIUM SERPL-MCNC: 10.6 MG/DL — HIGH (ref 8.4–10.5)
CHLORIDE SERPL-SCNC: 99 MMOL/L — SIGNIFICANT CHANGE UP (ref 96–108)
CO2 SERPL-SCNC: 27 MMOL/L — SIGNIFICANT CHANGE UP (ref 22–31)
CREAT SERPL-MCNC: 0.73 MG/DL — SIGNIFICANT CHANGE UP (ref 0.5–1.3)
GLUCOSE BLDC GLUCOMTR-MCNC: 110 MG/DL — HIGH (ref 70–99)
GLUCOSE BLDC GLUCOMTR-MCNC: 117 MG/DL — HIGH (ref 70–99)
GLUCOSE BLDC GLUCOMTR-MCNC: 124 MG/DL — HIGH (ref 70–99)
GLUCOSE SERPL-MCNC: 121 MG/DL — HIGH (ref 70–99)
HCT VFR BLD CALC: 33.3 % — LOW (ref 39–50)
HGB BLD-MCNC: 11 G/DL — LOW (ref 13–17)
MAGNESIUM SERPL-MCNC: 1.7 MG/DL — SIGNIFICANT CHANGE UP (ref 1.6–2.6)
MCHC RBC-ENTMCNC: 30.1 PG — SIGNIFICANT CHANGE UP (ref 27–34)
MCHC RBC-ENTMCNC: 33 GM/DL — SIGNIFICANT CHANGE UP (ref 32–36)
MCV RBC AUTO: 91 FL — SIGNIFICANT CHANGE UP (ref 80–100)
NRBC # BLD: 0 /100 WBCS — SIGNIFICANT CHANGE UP (ref 0–0)
PHOSPHATE SERPL-MCNC: 4 MG/DL — SIGNIFICANT CHANGE UP (ref 2.5–4.5)
PLATELET # BLD AUTO: 414 K/UL — HIGH (ref 150–400)
POTASSIUM SERPL-MCNC: 4.3 MMOL/L — SIGNIFICANT CHANGE UP (ref 3.5–5.3)
POTASSIUM SERPL-SCNC: 4.3 MMOL/L — SIGNIFICANT CHANGE UP (ref 3.5–5.3)
RBC # BLD: 3.66 M/UL — LOW (ref 4.2–5.8)
RBC # FLD: 14.2 % — SIGNIFICANT CHANGE UP (ref 10.3–14.5)
SODIUM SERPL-SCNC: 137 MMOL/L — SIGNIFICANT CHANGE UP (ref 135–145)
WBC # BLD: 6.76 K/UL — SIGNIFICANT CHANGE UP (ref 3.8–10.5)
WBC # FLD AUTO: 6.76 K/UL — SIGNIFICANT CHANGE UP (ref 3.8–10.5)

## 2019-10-30 RX ORDER — MAGNESIUM SULFATE 500 MG/ML
1 VIAL (ML) INJECTION ONCE
Refills: 0 | Status: COMPLETED | OUTPATIENT
Start: 2019-10-30 | End: 2019-10-30

## 2019-10-30 RX ADMIN — HEPARIN SODIUM 5000 UNIT(S): 5000 INJECTION INTRAVENOUS; SUBCUTANEOUS at 13:20

## 2019-10-30 RX ADMIN — Medication 200 MILLIGRAM(S): at 17:04

## 2019-10-30 RX ADMIN — OXYCODONE HYDROCHLORIDE 5 MILLIGRAM(S): 5 TABLET ORAL at 00:37

## 2019-10-30 RX ADMIN — OXYCODONE HYDROCHLORIDE 5 MILLIGRAM(S): 5 TABLET ORAL at 01:30

## 2019-10-30 RX ADMIN — CHLORHEXIDINE GLUCONATE 1 APPLICATION(S): 213 SOLUTION TOPICAL at 05:14

## 2019-10-30 RX ADMIN — MORPHINE SULFATE 2 MILLIGRAM(S): 50 CAPSULE, EXTENDED RELEASE ORAL at 18:17

## 2019-10-30 RX ADMIN — MORPHINE SULFATE 2 MILLIGRAM(S): 50 CAPSULE, EXTENDED RELEASE ORAL at 12:30

## 2019-10-30 RX ADMIN — OXYCODONE HYDROCHLORIDE 10 MILLIGRAM(S): 5 TABLET ORAL at 23:45

## 2019-10-30 RX ADMIN — Medication 200 MILLIGRAM(S): at 05:14

## 2019-10-30 RX ADMIN — MORPHINE SULFATE 2 MILLIGRAM(S): 50 CAPSULE, EXTENDED RELEASE ORAL at 12:01

## 2019-10-30 RX ADMIN — Medication 100 GRAM(S): at 11:55

## 2019-10-30 RX ADMIN — HEPARIN SODIUM 5000 UNIT(S): 5000 INJECTION INTRAVENOUS; SUBCUTANEOUS at 21:32

## 2019-10-30 RX ADMIN — PANTOPRAZOLE SODIUM 40 MILLIGRAM(S): 20 TABLET, DELAYED RELEASE ORAL at 12:07

## 2019-10-30 RX ADMIN — OXYCODONE HYDROCHLORIDE 10 MILLIGRAM(S): 5 TABLET ORAL at 18:17

## 2019-10-30 RX ADMIN — OXYCODONE HYDROCHLORIDE 10 MILLIGRAM(S): 5 TABLET ORAL at 06:53

## 2019-10-30 RX ADMIN — Medication 2 MILLIGRAM(S): at 08:55

## 2019-10-30 RX ADMIN — OXYCODONE HYDROCHLORIDE 10 MILLIGRAM(S): 5 TABLET ORAL at 16:55

## 2019-10-30 RX ADMIN — HEPARIN SODIUM 5000 UNIT(S): 5000 INJECTION INTRAVENOUS; SUBCUTANEOUS at 05:11

## 2019-10-30 NOTE — DISCHARGE NOTE PROVIDER - CARE PROVIDER_API CALL
Lemuel Lafleur)  ColonRectal Surgery; Surgery  [address] [place] [address]  Phone: [phone]  Fax: [phone]  Follow Up Time:     Saturnino Preciado)  ColonRectal Surgery; Plastic Surgery; Surgery; Surgery of the Hand  [address], [address]  [place] [address]  Phone: [phone]  Fax: [phone]  Follow Up Time:

## 2019-10-30 NOTE — DISCHARGE NOTE PROVIDER - CARE PROVIDERS DIRECT ADDRESSES
[email or web address]

## 2019-10-30 NOTE — DISCHARGE NOTE PROVIDER - NSDCACTIVITY_GEN_ALL_CORE
Stairs allowed/Walking - Outdoors allowed/Walking - Indoors allowed/No heavy lifting/straining No heavy lifting/straining/Walking - Indoors allowed/Walking - Outdoors allowed/Return to Work/School allowed/Stairs allowed

## 2019-10-30 NOTE — PROGRESS NOTE ADULT - ASSESSMENT
55 y/o male s/p abdominal peritoneal resection with gracilis flap  - will continue to follow  - prn analgesia  - care per primary team

## 2019-10-30 NOTE — DISCHARGE NOTE PROVIDER - NSDCFUADDINST_GEN_ALL_CORE_FT
- Please follow up with Dr. Lafleur in 1 week. Call [phone] to schedule an appointment.    - Call provider or return to ER for fevers (temperature >101.4F), chills, persistent nausea/vomiting, worsening or severe pain, redness or drainage from abdominal wound    - Wound care: You may shower but do not scrub wound or incisions. Keep incisions and dressings clean and dry. Wet to dry gauze packing to abdominal wound. Home services for instruction and wound care 1-2 days PRN.    - You may resume any home medications as prescribed    - You may take 1 Percocet tab every 6 hours as needed for pain. Take with Colace, 2 tabs a day for constipation, as needed. - Please follow up with Dr. Lafleur in 1 week. Call [phone] to schedule an appointment.    - Please follow up with Dr. Preciado in 1 week. His office will call you to schedule an appointment.    - Call provider or return to ER for fevers (temperature >101.4F), chills, persistent nausea/vomiting, worsening or severe pain, redness or drainage from abdominal wound, cessation of ostomy function    - Wound care: You may shower but do not scrub wound or incisions. Keep incisions and dressings clean and dry. Bathing is not advised.    - You may resume any home medications as prescribed unless specifically instructed not to    Pain Regimen:  - You may take 1-2 tablets of 5 mg oxycodone every 4 hours as needed for severe pain. You can take Colace 200mg twice a day as needed for constipation/firm stool output in ostomy. Do not drive while taking narcotics.    - You may take 3 Tylenol 325 mg tablets (total 975 mg) every 8 hours as needed for pain control. Do not exceed 4000mg daily.    -You may take Valium, 1-2 tablets of 2 mg every 8 hours as needed for abdominal spasms.     * Please do not exceed prescribed pain medication doses *

## 2019-10-30 NOTE — PROGRESS NOTE ADULT - ASSESSMENT
54M PMH HTN, HLD, rectal adenocarcinoma diagnosed 5/2018, underwent chemoXRT who presented for elective robotic assisted abdominal perineal resection (9/24), c/b SBO managed by NGT and TPN via PICC line but RTOR 10/14 for lap converted to open EDER with increasing lactate and hypotensive overnight, s/p RTOR 10/15 for 1L hematoma evacuation.    - Pain/nausea control  - LRD  - Colace 200 BID  - L axillary PICC line for TPN  - F/u PT/OT

## 2019-10-30 NOTE — PROGRESS NOTE ADULT - SUBJECTIVE AND OBJECTIVE BOX
SUBJECTIVE:  No overnight events.     OBJECTIVE:     ** VITAL SIGNS / I&O's **    Vital Signs Last 24 Hrs  T(C): 36.7 (30 Oct 2019 09:26), Max: 37.3 (29 Oct 2019 22:50)  T(F): 98.1 (30 Oct 2019 09:26), Max: 99.2 (30 Oct 2019 04:54)  HR: 102 (30 Oct 2019 08:24) (90 - 106)  BP: 109/72 (30 Oct 2019 08:24) (97/57 - 117/65)  BP(mean): 86 (30 Oct 2019 08:24) (70 - 86)  RR: 16 (30 Oct 2019 08:24) (16 - 18)  SpO2: 97% (30 Oct 2019 08:24) (95% - 97%)      29 Oct 2019 07:01  -  30 Oct 2019 07:00  --------------------------------------------------------  IN:  Total IN: 0 mL    OUT:    Colostomy: 80 mL    Voided: 700 mL  Total OUT: 780 mL    Total NET: -780 mL          ** PHYSICAL EXAM **    -- CONSTITUTIONAL: Alert, Awake. NAD.   -- RESPIRATORY: unlabored breathing, no respiratory distress  -- ABDOMEN: mildly distended, ostomy with gas and stool      ** LABS **                          10.9   8.15  )-----------( 435      ( 29 Oct 2019 07:17 )             33.8     29 Oct 2019 07:17    136    |  100    |  14     ----------------------------<  117    4.6     |  24     |  0.69     Ca    10.8       29 Oct 2019 07:17  Phos  4.2       29 Oct 2019 07:17  Mg     1.7       29 Oct 2019 07:17        CAPILLARY BLOOD GLUCOSE      POCT Blood Glucose.: 117 mg/dL (30 Oct 2019 06:08)  POCT Blood Glucose.: 111 mg/dL (29 Oct 2019 23:19)  POCT Blood Glucose.: 140 mg/dL (29 Oct 2019 16:54)  POCT Blood Glucose.: 107 mg/dL (29 Oct 2019 11:23)

## 2019-10-30 NOTE — PROGRESS NOTE ADULT - SUBJECTIVE AND OBJECTIVE BOX
Vital Signs Last 24 Hrs  T(C): 37.3 (30 Oct 2019 04:54), Max: 37.3 (29 Oct 2019 22:50)  T(F): 99.2 (30 Oct 2019 04:54), Max: 99.2 (30 Oct 2019 04:54)  HR: 106 (30 Oct 2019 04:36) (90 - 106)  BP: 109/63 (30 Oct 2019 04:36) (97/57 - 117/65)  BP(mean): 79 (30 Oct 2019 04:36) (70 - 82)  RR: 18 (30 Oct 2019 04:36) (17 - 18)  SpO2: 97% (30 Oct 2019 04:36) (95% - 97%)

## 2019-10-30 NOTE — PROGRESS NOTE ADULT - SUBJECTIVE AND OBJECTIVE BOX
INTERVAL HPI/OVERNIGHT EVENTS: NAEO. Overnight refusing Milk of Mg. Tachy to 106. No other acute complaints.    STATUS POST:  Abdominal perineal resection 9/24; Lap converted to open EDER 10/14, RTOR for hematoma evacuation (1L) 10/15    SUBJECTIVE: Pt seen and examined at bedside this AM by surgery team. No acute complaints. Denies f/n/v/cp/sob.    MEDICATIONS  (STANDING):  chlorhexidine 2% Cloths 1 Application(s) Topical <User Schedule>  dextrose 5%. 1000 milliLiter(s) (50 mL/Hr) IV Continuous <Continuous>  dextrose 50% Injectable 12.5 Gram(s) IV Push once  dextrose 50% Injectable 25 Gram(s) IV Push once  dextrose 50% Injectable 25 Gram(s) IV Push once  docusate sodium 200 milliGRAM(s) Oral two times a day  heparin  Injectable 5000 Unit(s) SubCutaneous every 8 hours  influenza   Vaccine 0.5 milliLiter(s) IntraMuscular once  insulin regular  human corrective regimen sliding scale   SubCutaneous every 6 hours  pantoprazole  Injectable 40 milliGRAM(s) IV Push daily    MEDICATIONS  (PRN):  acetaminophen   Tablet .. 975 milliGRAM(s) Oral every 8 hours PRN Severe Pain (7 - 10)  dextrose 40% Gel 15 Gram(s) Oral once PRN Blood Glucose LESS THAN 70 milliGRAM(s)/deciliter  diazepam    Tablet 2 milliGRAM(s) Oral every 8 hours PRN muscle spasms  glucagon  Injectable 1 milliGRAM(s) IntraMuscular once PRN Glucose LESS THAN 70 milligrams/deciliter  ketorolac 0.5% Ophthalmic Solution 1 Drop(s) Left EYE every 6 hours PRN eye pain irritation  morphine  - Injectable 2 milliGRAM(s) IV Push every 2 hours PRN breakthrough  oxyCODONE    IR 5 milliGRAM(s) Oral every 6 hours PRN Moderate Pain (4 - 6)  oxyCODONE    IR 10 milliGRAM(s) Oral every 6 hours PRN Severe Pain (7 - 10)      Vital Signs Last 24 Hrs  T(C): 37.3 (30 Oct 2019 04:54), Max: 37.3 (29 Oct 2019 22:50)  T(F): 99.2 (30 Oct 2019 04:54), Max: 99.2 (30 Oct 2019 04:54)  HR: 106 (30 Oct 2019 04:36) (90 - 106)  BP: 109/63 (30 Oct 2019 04:36) (97/57 - 117/65)  BP(mean): 79 (30 Oct 2019 04:36) (70 - 82)  RR: 18 (30 Oct 2019 04:36) (17 - 18)  SpO2: 97% (30 Oct 2019 04:36) (95% - 97%)    PHYSICAL EXAM:      Constitutional: A&Ox3, NAD     Respiratory: non labored breathing, no respiratory distress    Cardiovascular: NSR, RRR    Gastrointestinal: Abdomen soft, mildly distended, mildly diffusely ttp. Midline incision c/d/i. Ostomy L abdomen with +stool, gas. JAKUB drain site c/d/i.    Extremities: wwp, no calf tenderness or edema. SCDs in place.      I&O's Detail    29 Oct 2019 07:01  -  30 Oct 2019 07:00  --------------------------------------------------------  IN:  Total IN: 0 mL    OUT:    Colostomy: 80 mL    Voided: 700 mL  Total OUT: 780 mL    Total NET: -780 mL      LABS:                        10.9   8.15  )-----------( 435      ( 29 Oct 2019 07:17 )             33.8     10-29    136  |  100  |  14  ----------------------------<  117<H>  4.6   |  24  |  0.69    Ca    10.8<H>      29 Oct 2019 07:17  Phos  4.2     10-29  Mg     1.7     10-29        RADIOLOGY & ADDITIONAL STUDIES:    < from: US Duplex Venous Lower Ext Complete, Bilateral (10.29.19 @ 16:26) >  EXAM:  US DPLX LWR EXT VEINS COMPL BI                          PROCEDURE DATE:  10/29/2019        INTERPRETATION:  VENOUS DUPLEX DOPPLER OF BOTH LOWER EXTREMITIES dated   10/29/2019 4:26 PM    INDICATION: Persistent tachycardia with swelling of the lower extremities.    TECHNIQUE: Duplex Doppler evaluation including gray-scale ultrasound   imaging, color flow Doppler imaging, and Doppler spectral analysis of the   veins of both lower extremities was performed.     COMPARISON: None    FINDINGS:    Thigh veins: The common femoral, femoral, popliteal, proximal greater   saphenous, and proximal deep femoral veins are patent and free of   thrombus bilaterally. The veins are normally compressible and have normal   phasic flow and augmentation response.    Calf veins: The paired peroneal and posterior tibial calf veins are   patent bilaterally.    IMPRESSION:  No deep vein thrombosis seen.    Thank you for the opportunity to participate in the care of this patient.    CORINE RAE M.D., RADIOLOGY RESIDENT  This document has been electronically signed.  MARVEL MAYA M.D., ATTENDING RADIOLOGIST  This document has been electronically signed. Oct 29 2019  4:45PM           < end of copied text >

## 2019-10-30 NOTE — PROGRESS NOTE ADULT - SUBJECTIVE AND OBJECTIVE BOX
Pain Management Progress Note - [facility] [phone]      HPI: Patient seen and examined today. Patient is a 54 year old male, with pmh HTN, HLD adenocarcinoma, s/p abdominal perineal resection, now s/p lap conversion to open, EDER. Patient tolerating a low fiber diet. Patient reporting diffuse abdominal pain and surgical site pain, pain managed with current pain medication regimen. patient reports optimal pain relief when he takes Oxycodone Po and then follow ups with Diazepam PO an hour later. Reviewed pain medication regimen with patient.   Patient Axox3, denies, n,v, no s/s of oversedation.       Pain is __x_ sharp ____dull ___burning ___achy ___ Intensity: ____ mild _x__mod ___severe     Location __x__surgical site ____cervical _____lumbar _x___abd ____upper ext____lower ext    Worse with _x___activity __x__movement _____physical therapy___ Rest    Improved with __x__medication ___x_rest ____physical therapy      Parenteral Nutrition - Adult  fat emulsion (Plant Based) 20% Infusion  Parenteral Nutrition - Adult  acetaminophen   Tablet ..  HYDROmorphone  Injectable  Parenteral Nutrition - Adult  fat emulsion (Plant Based) 20% Infusion  HYDROmorphone  Injectable  Parenteral Nutrition - Adult  Parenteral Nutrition - Adult  fat emulsion (Plant Based) 20% Infusion  (ADM OVERRIDE)  Parenteral Nutrition - Adult  fat emulsion (Plant Based) 20% Infusion  oxyCODONE    5 mG/acetaminophen 325 mG  oxyCODONE    IR  Parenteral Nutrition - Adult  fat emulsion (Plant Based) 20% Infusion  iohexol 300 mG (iodine)/mL Oral Solution  Parenteral Nutrition - Adult  fat emulsion (Plant Based) 20% Infusion  iohexol 300 mG (iodine)/mL Oral Solution  HYDROmorphone  Injectable  HYDROmorphone  Injectable  Parenteral Nutrition - Adult  fat emulsion (Plant Based) 20% Infusion  magnesium sulfate  IVPB  Parenteral Nutrition - Adult  BUpivacaine liposome 1.3% Injectable (no eMAR)  pantoprazole  Injectable  lactated ringers.  HYDROmorphone  Injectable  HYDROmorphone  Injectable  ondansetron Injectable  enoxaparin Injectable  cefTRIAXone   IVPB  metroNIDAZOLE  IVPB  acetaminophen  IVPB ..  HYDROmorphone  Injectable  sodium chloride 0.9% Bolus  insulin lispro (HumaLOG) corrective regimen sliding scale  dextrose 5%.  dextrose 40% Gel  dextrose 50% Injectable  glucagon  Injectable  acetaminophen  IVPB ..  famotidine Injectable  sodium chloride 0.9% Bolus  sodium chloride 0.9% Bolus  calcium gluconate IVPB  sodium chloride 0.9% Bolus  acetaminophen  IVPB ..  acetaminophen  IVPB ..  ([place])  HYDROmorphone  Injectable  HYDROmorphone  Injectable  dextrose 10%.  piperacillin/tazobactam IVPB..  insulin lispro (HumaLOG) corrective regimen sliding scale  HYDROmorphone  Injectable  HYDROmorphone  Injectable  HYDROmorphone  Injectable  chlorhexidine 2% Cloths  acetaminophen  IVPB ..  sodium bicarbonate  Injectable  Parenteral Nutrition - Adult  fat emulsion (Plant Based) 20% Infusion  magnesium sulfate  IVPB  magnesium sulfate  IVPB  dextrose 5% + sodium chloride 0.9%.  sodium chloride 0.9% Bolus  sodium chloride 0.9% Bolus  sodium chloride 0.9%.  HYDROmorphone  Injectable  magnesium sulfate  IVPB  HYDROmorphone  Injectable  LORazepam   Injectable  Parenteral Nutrition - Adult  fat emulsion (Plant Based) 20% Infusion  insulin NPH human recombinant  chlorproMAZINE    IVPB  Parenteral Nutrition - Adult  fat emulsion (Plant Based) 20% Infusion  sodium chloride 0.9%.  insulin regular  human corrective regimen sliding scale  dextrose 5%.  dextrose 40% Gel  dextrose 50% Injectable  glucagon  Injectable  diazepam  Injectable  acetaminophen  IVPB ..  (ADM OVERRIDE)  (ADM OVERRIDE)  diazepam  Injectable  pantoprazole  Injectable  potassium phosphate IVPB  Parenteral Nutrition - Adult  fat emulsion (Plant Based) 20% Infusion  potassium chloride  10 mEq/100 mL IVPB  acetaminophen  IVPB ..  heparin  Injectable  acetaminophen  IVPB ..  potassium chloride  20 mEq/100 mL IVPB  potassium chloride  20 mEq/100 mL IVPB  Parenteral Nutrition - Adult  fat emulsion (Plant Based) 20% Infusion  acetaminophen  IVPB ..  morphine PCA (5 mG/mL)  acetaminophen  IVPB ..  morphine  - Injectable  morphine PCA (1 mG/mL)  Parenteral Nutrition - Adult  fat emulsion (Plant Based) 20% Infusion  potassium chloride  10 mEq/100 mL IVPB  Parenteral Nutrition - Adult  fat emulsion (Plant Based) 20% Infusion  morphine  - Injectable  acetaminophen  IVPB ..  Parenteral Nutrition - Adult  fat emulsion (Plant Based) 20% Infusion  cyclobenzaprine  Parenteral Nutrition - Adult  diazepam  Injectable  diazepam  Injectable  Parenteral Nutrition - Adult  fat emulsion (Plant Based) 20% Infusion  oxyCODONE    IR  oxyCODONE    IR  morphine  - Injectable  acetaminophen   Tablet ..  diazepam    Tablet  Parenteral Nutrition - Adult  fat emulsion (Plant Based) 20% Infusion  magnesium sulfate  IVPB  Parenteral Nutrition - Adult  fat emulsion (Plant Based) 20% Infusion  magnesium hydroxide Suspension  docusate sodium  magnesium sulfate  IVPB      ROS: Const:  __-_febrile   Eyes:___ENT:___CV: _-__chest pain  Resp: __-__sob  GI:___nausea ___vomiting __x_abd pain ___npo ___clears _x_full diet __bm  :___ Musk: __x_pain __x_spasm  Skin:___ Neuro:  ___sedation___confusion___ numbness ___weakness ___paresth  Psych:__anxiety  Endo:___ Heme:___Allergy:_________, _x__all others reviewed and negative      PAST MEDICAL & SURGICAL HISTORY:  HLD (hyperlipidemia)  HTN (hypertension)  Colon cancer: near rectum  Other elective surgery: Right Upper Chest- Chemo Port      10-30 @ [id number]:[id number] mL/min/1.73M2      Hemoglobin: 11.0 g/dL (10-30 @ 10:23)  Hemoglobin: 10.9 g/dL (10-29 @ 07:17)        T(C): 36.7 (10-30-19 @ 09:26), Max: 37.3 (10-29-19 @ 22:50)  HR: 102 (10-30-19 @ 08:24) (90 - 106)  BP: 109/72 (10-30-19 @ 08:24) (97/57 - 117/65)  RR: 16 (10-30-19 @ 08:24) (16 - 18)  SpO2: 97% (10-30-19 @ 08:24) (95% - 97%)  Wt(kg): --        PHYSICAL EXAM:  Gen Appearance: _x__no acute distress __x_appropriate        Neuro: _x__SILT feet____ EOM Intact Psych: AAOX_3_, _x__mood/affect appropriate        Eyes: _x__conjunctiva WNL  x_____ Pupils equal and round        ENT: _x_ears and nose atraumatic__x_ Hearing grossly intact        Neck: _x__trachea midline, no visible masses ___thyroid without palpable mass    Resp: _x__Nml WOB____No tactile fremitus ___clear to auscultation    Cardio: __x_extremities free from edema __x__pedal pulses palpable    GI/Abdomen: _x__soft ___x__ Nontender___x___Nondistended_____HSM    Lymphatic: ___no palpable nodes in neck  ___no palpable nodes calves and feet    Skin/Wound: ___Incision, _x__Dressing c/d/i,   ____surrounding tissues soft,  ___drain/chest tube present____    Muscular: EHL __5_/5  Gastrocnemius___5/5    _x__absent clubbing/cyanosis        ASSESSMENT: Patient is a 54 year old male, with pmh HTN, HLD adenocarcinoma, s/p abdominal perineal resection, now s/p lap conversion to open, EDER, pain managed with current pain medication regimen.       Recommended Treatment PLAN:  1. Oxycodone 5-10mg PO q4h prn moderate to severe pain   2. Morphine 2mg IVP Q2h prn breakthrough pain  3. tylenol 975mg PO q8h prn muscle spasms  4. Recommend Diazepam 5mg Po Q8h prn muscle spasms  Plan discussed with Dr. Delaney Pain Management Progress Note - [facility] [phone]      HPI: Patient seen and examined today. Patient is a 54 year old male, with pmh HTN, HLD adenocarcinoma, s/p abdominal perineal resection, now s/p lap conversion to open, EDER. Patient tolerating a low fiber diet. Patient reporting diffuse abdominal pain and surgical site pain, pain managed with current pain medication regimen. patient reports optimal pain relief when he takes Oxycodone Po and then follow ups with Diazepam PO an hour later. Reviewed pain medication regimen with patient.   Patient Axox3, denies, n,v, no s/s of oversedation.       Pain is __x_ sharp ____dull ___burning ___achy ___ Intensity: ____ mild _x__mod ___severe     Location __x__surgical site ____cervical _____lumbar _x___abd ____upper ext____lower ext    Worse with _x___activity __x__movement _____physical therapy___ Rest    Improved with __x__medication ___x_rest ____physical therapy      Parenteral Nutrition - Adult  fat emulsion (Plant Based) 20% Infusion  Parenteral Nutrition - Adult  acetaminophen   Tablet ..  HYDROmorphone  Injectable  Parenteral Nutrition - Adult  fat emulsion (Plant Based) 20% Infusion  HYDROmorphone  Injectable  Parenteral Nutrition - Adult  Parenteral Nutrition - Adult  fat emulsion (Plant Based) 20% Infusion  Parenteral Nutrition - Adult  fat emulsion (Plant Based) 20% Infusion  oxyCODONE    5 mG/acetaminophen 325 mG  oxyCODONE    IR  Parenteral Nutrition - Adult  fat emulsion (Plant Based) 20% Infusion  iohexol 300 mG (iodine)/mL Oral Solution  Parenteral Nutrition - Adult  fat emulsion (Plant Based) 20% Infusion  iohexol 300 mG (iodine)/mL Oral Solution  HYDROmorphone  Injectable  HYDROmorphone  Injectable  Parenteral Nutrition - Adult  fat emulsion (Plant Based) 20% Infusion  magnesium sulfate  IVPB  Parenteral Nutrition - Adult  BUpivacaine liposome 1.3% Injectable (no eMAR)  pantoprazole  Injectable  lactated ringers.  HYDROmorphone  Injectable  HYDROmorphone  Injectable  ondansetron Injectable  enoxaparin Injectable  cefTRIAXone   IVPB  metroNIDAZOLE  IVPB  acetaminophen  IVPB ..  HYDROmorphone  Injectable  sodium chloride 0.9% Bolus  insulin lispro (HumaLOG) corrective regimen sliding scale  dextrose 5%.  dextrose 40% Gel  dextrose 50% Injectable  glucagon  Injectable  acetaminophen  IVPB ..  famotidine Injectable  sodium chloride 0.9% Bolus  sodium chloride 0.9% Bolus  calcium gluconate IVPB  sodium chloride 0.9% Bolus  acetaminophen  IVPB ..  acetaminophen  IVPB ..  HYDROmorphone  Injectable  HYDROmorphone  Injectable  dextrose 10%.  piperacillin/tazobactam IVPB..  insulin lispro (HumaLOG) corrective regimen sliding scale  HYDROmorphone  Injectable  HYDROmorphone  Injectable  HYDROmorphone  Injectable  chlorhexidine 2% Cloths  acetaminophen  IVPB ..  sodium bicarbonate  Injectable  Parenteral Nutrition - Adult  fat emulsion (Plant Based) 20% Infusion  magnesium sulfate  IVPB  magnesium sulfate  IVPB  dextrose 5% + sodium chloride 0.9%.  sodium chloride 0.9% Bolus  sodium chloride 0.9% Bolus  sodium chloride 0.9%.  HYDROmorphone  Injectable  magnesium sulfate  IVPB  HYDROmorphone  Injectable  LORazepam   Injectable  Parenteral Nutrition - Adult  fat emulsion (Plant Based) 20% Infusion  insulin NPH human recombinant  chlorproMAZINE    IVPB  Parenteral Nutrition - Adult  fat emulsion (Plant Based) 20% Infusion  sodium chloride 0.9%.  insulin regular  human corrective regimen sliding scale  dextrose 5%.  dextrose 40% Gel  dextrose 50% Injectable  glucagon  Injectable  diazepam  Injectable  acetaminophen  IVPB ..  diazepam  Injectable  pantoprazole  Injectable  potassium phosphate IVPB  Parenteral Nutrition - Adult  fat emulsion (Plant Based) 20% Infusion  potassium chloride  10 mEq/100 mL IVPB  acetaminophen  IVPB ..  heparin  Injectable  acetaminophen  IVPB ..  potassium chloride  20 mEq/100 mL IVPB  potassium chloride  20 mEq/100 mL IVPB  Parenteral Nutrition - Adult  fat emulsion (Plant Based) 20% Infusion  acetaminophen  IVPB ..  morphine PCA (5 mG/mL)  acetaminophen  IVPB ..  morphine  - Injectable  morphine PCA (1 mG/mL)  Parenteral Nutrition - Adult  fat emulsion (Plant Based) 20% Infusion  potassium chloride  10 mEq/100 mL IVPB  Parenteral Nutrition - Adult  fat emulsion (Plant Based) 20% Infusion  morphine  - Injectable  acetaminophen  IVPB ..  Parenteral Nutrition - Adult  fat emulsion (Plant Based) 20% Infusion  cyclobenzaprine  Parenteral Nutrition - Adult  diazepam  Injectable  diazepam  Injectable  Parenteral Nutrition - Adult  fat emulsion (Plant Based) 20% Infusion  oxyCODONE    IR  oxyCODONE    IR  morphine  - Injectable  acetaminophen   Tablet ..  diazepam    Tablet  Parenteral Nutrition - Adult  fat emulsion (Plant Based) 20% Infusion  magnesium sulfate  IVPB  Parenteral Nutrition - Adult  fat emulsion (Plant Based) 20% Infusion  magnesium hydroxide Suspension  docusate sodium  magnesium sulfate  IVPB      ROS: Const:  __-_febrile   Eyes:___ENT:___CV: _-__chest pain  Resp: __-__sob  GI:___nausea ___vomiting __x_abd pain ___npo ___clears _x_full diet __bm  :___ Musk: __x_pain __x_spasm  Skin:___ Neuro:  ___sedation___confusion___ numbness ___weakness ___paresth  Psych:__anxiety  Endo:___ Heme:___Allergy:_________, _x__all others reviewed and negative      PAST MEDICAL & SURGICAL HISTORY:  HLD (hyperlipidemia)  HTN (hypertension)  Colon cancer: near rectum  Other elective surgery: Right Upper Chest- Chemo Port      10-30 @ 10:61286 mL/min/1.73M2      Hemoglobin: 11.0 g/dL (10-30 @ 10:23)  Hemoglobin: 10.9 g/dL (10-29 @ 07:17)        T(C): 36.7 (10-30-19 @ 09:26), Max: 37.3 (10-29-19 @ 22:50)  HR: 102 (10-30-19 @ 08:24) (90 - 106)  BP: 109/72 (10-30-19 @ 08:24) (97/57 - 117/65)  RR: 16 (10-30-19 @ 08:24) (16 - 18)  SpO2: 97% (10-30-19 @ 08:24) (95% - 97%)  Wt(kg): --        PHYSICAL EXAM:  Gen Appearance: _x__no acute distress __x_appropriate        Neuro: _x__SILT feet____ EOM Intact Psych: AAOX_3_, _x__mood/affect appropriate        Eyes: _x__conjunctiva WNL  x_____ Pupils equal and round        ENT: _x_ears and nose atraumatic__x_ Hearing grossly intact        Neck: _x__trachea midline, no visible masses ___thyroid without palpable mass    Resp: _x__Nml WOB____No tactile fremitus ___clear to auscultation    Cardio: __x_extremities free from edema __x__pedal pulses palpable    GI/Abdomen: _x__soft ___x__ Nontender___x___Nondistended_____HSM    Lymphatic: ___no palpable nodes in neck  ___no palpable nodes calves and feet    Skin/Wound: ___Incision, _x__Dressing c/d/i,   ____surrounding tissues soft,  ___drain/chest tube present____    Muscular: EHL __5_/5  Gastrocnemius___5/5    _x__absent clubbing/cyanosis        ASSESSMENT: Patient is a 54 year old male, with pmh HTN, HLD adenocarcinoma, s/p abdominal perineal resection, now s/p lap conversion to open, EDER, pain managed with current pain medication regimen.       Recommended Treatment PLAN:  1. Oxycodone 5-10mg PO q4h prn moderate to severe pain   2. Morphine 2mg IVP Q2h prn breakthrough pain  3. tylenol 975mg PO q8h prn muscle spasms  4. Continue Diazepam 2mg Po Q8h prn muscle spasms, recommend Diazepam 5mg PO Q8h prn muscle spasms, if spasms worsen  Plan discussed with Dr. Delaney

## 2019-10-30 NOTE — DISCHARGE NOTE PROVIDER - HOSPITAL COURSE
54M [facility] HTN, HLD, rectal adenocarcinoma diagnosed 5/2018, underwent chemoXRT who initially presented for elective robotic assisted abdominal perineal resection (9/24). On POD2, abdominal XR was suspicious for distal small bowel obstruction and/or ileus. Patient was made NPO and NGT was placed. On POD5, Kim was removed.  On 10/1, CT abd/pelvis IV contrast showed persistent dilated loops with transition point, PICC was placed, TPN was started. NGT was discontinued on 10/3. Abdominal distension and pain persisted, patient remained NPO w/ sips and chips, ostomy continuing to have minimal output. Repeat abdominal x-rays on 10/6 and 10/7 not showing much change from previous x-rays. Abdominal JAKUB drain removed 10/8. 10/11.        CT scan done on 10/13 due to poor ostomy output, continued abdominal pain and distension showed partial small bowel obstruction seen with dilated bowel loops  preceding collapsed small bowel. Stable presacral collection again noted.        Patient returned to OR 10/14 for lap converted to open lysis of adhesions with increasing lactate overnight and became hypotensive overnight, taken back to the OR on the morning of 10/15 due to concern for intraabdominal bleeding. In OR pt was found to have generalized oozing from wound bed and small vessel bleed from rectus muscle which was ligated, following which patient was transferred to ICU extubated for postoperative monitoring.         On 10/20 NGT was removed. Diet was gradually advanced to sips&chips, then clear liquid diet which the patient tolerated well. Antibiotics were discontinued on 10/24 due to lack of signs and symptoms of infection.        On 10/27, pt was advanced to low fiber diet. Decision was made to discontinue TPN as patient was tolerating low fiber diet well. Abdominal JAKUB drain was removed on 10/28. Patient has been tachycardic but has been denying chest pain, shortness of breath. Lower extremity doppler from 10/29 was negative for DVT. Colace 200 BID was ordered. On 10/30, midline staples were removed and patient was stepped down.                                 10/25: TPN reordered. Repeat AXR today showing improving paralytic ileus, no pneumoperitoneum.    10/23: AXR Supine and upright views of the abdomen compared to 10/17/2019 demonstrates multiple air-fluid levels within distended loops of small bowel, consistent with severe ileus. Reports pain improved. + minimal stool, gas output in ostomy    O/N: VENKATESH, VSS         O/N: pt wants to talk with Kristy the wound care nurse in regard to different ostomy supplies, pain controlled, VSS    10/21: tolerating sips&chips, pain controlled, tachy slightly improved.        O/N: NGT back to suction with little o/p, pain controlled, tachy overnight to 104, NGT: 0cc, ostomy o/p: 0cc, JAKUB: 60cc    10/19: NGT clamped at 12pm, ostomy changed 2/2 leaking near midline incision. some output in ostomy, voiding    O/N: pain recs appreciated, feeling well, ambulating    10/18: HSQ resumed. Per pain mgmt, PCA, 5 mg morphine IVP 2qh    10/17: hg am stable 9.3 (9.3), right IJ removed, SDU. Good urine output, d/c kim    ON: -200 & Bladder pressure 8. Hgb @11:9.3 chief aware, 's. JAKUB 280  Given extra 2 of valium in am for muscle spasm.    10/16: TPN reordered added 20 units insulin and includes 100cc/hr fluid, IV fluids decreased to 100cc/hr, bladder pressure ok will check q4,IV thorazine for hiccups, NPH 10units x 1, pain management consult. Am Hg 8.2, repeat 8am 7.3, repeat noon hgb 6.7, 2 units prbcs ordered, 5pm Hg 9.0. Valium 2mg q8 prn per pain management    ON: Hgb stable at 8.3, lactate 2.3 from 3.2; bladder pressure ok, changed to q2h @12AM    10/15: S/P RTOR for bleeding, 1L hematoma evacuated, 2 arterial bleed ligated w/placement of surgisil. Post op labs stable, K 6.2, given Na bicarbonate, no EKG changes repeat 5.4. ET tube pushed in 2 cm, xray confirmed placement. 11am and 4pm labs, lactate equivocal, given 1L bolus, Central line pulled back 3 cm, xray confirmed placement. PT consult for early mobility.    ________________________SICU________________________________________    O/N: pain controlled, POC wnl, fs at 23:50 was 326 gave pt ISS, pt c/o of hiccups around 12am, NGT flushed, 1x pepcid, 500cc bolus of NS given for low bp, tachy to 112, bp cont to be low, another 500cc bolus of NS given, stat labs, lactate 5.1, hgb 6.7 (10.3), K + 6.0 calcium gluconate given, 1 L bolus of NS given, ekg wnl, cxr for NGT placement, peripheral IV (18g) in L arm obtained for 2 units of PRBC (2L of ns given in total),     10/14: lap converted to open lysis of adhesions. NGT, Kim in place    O/N: Prelim report of CT: Partial small bowel obstruction seen with dilated bowel loops  preceding collapsed small bowel. Stable presacral collection again noted, trev CLD, ostomy with has more o/p today, encouraged pt to ambulate    10/13: CT abd/pel performed. otherwise pt feeling well, ostomy output, drank contrast with no n/v    O/N: planning for repeat CT per Dr Lafleur. took PO contrast 1PM. Given 2x dilaudid (12AM, 430AM) instead of scheduled pain meds.    10/12: VENKATESH. VSS. Spoke w/ radiology in AM, did not recommend repeat CT given ostomy output    O/N: CT w/ no change in degree of small bowel dilation w/ swirling appearance of bowel and mesenteric vessels c/w SBO v ileus, recommending repeat CT w/ copious amounts of oral contrast in 4-6 hours to determine if contrast reaches ileostomy bag, interval inc in size of pelvic and R perineum fluid collection, probably expected, ostomy w/ liquid stool/gas    10/11: AXR showing still poss high grade SBO. Went for CT scan    O/N: trev CLD, no output in ostomy, soft/NT    10/10: VENKATESH. VSS. Pt requesting pain meds to be changed to PO    O/N: + gas and liquid stool in ostomy, OOBA, less disteded, colostomy: 59cc, L thigh JAKUB: 2.5cc 54M [facility] HTN, HLD, rectal adenocarcinoma diagnosed 5/2018, underwent chemoXRT who initially presented for elective robotic assisted abdominal perineal resection (9/24). On POD2, abdominal XR was suspicious for distal small bowel obstruction and/or ileus. Patient was made NPO and NGT was placed. On POD5, Kim was removed.  On 10/1, CT abd/pelvis IV contrast showed persistent dilated loops with transition point, PICC was placed, TPN was started. NGT was discontinued on 10/3. Abdominal distension and pain persisted, patient remained NPO w/ sips and chips, ostomy continuing to have minimal output. Repeat abdominal x-rays on 10/6 and 10/7 not showing much change from previous x-rays. Abdominal JAKUB drain removed 10/8. 10/11.        CT scan done on 10/13 due to poor ostomy output, continued abdominal pain and distension showed partial small bowel obstruction seen with dilated bowel loops  preceding collapsed small bowel. Stable presacral collection again noted.        Patient returned to OR 10/14 for lap converted to open lysis of adhesions with increasing lactate overnight and became hypotensive overnight, taken back to the OR on the morning of 10/15 due to concern for intraabdominal bleeding. In OR pt was found to have generalized oozing from wound bed and small vessel bleed from rectus muscle which was ligated, following which patient was transferred to ICU extubated for postoperative monitoring. R        On 10/20 NGT was removed. Diet was gradually advanced to sips&chips, then clear liquid diet which the patient tolerated well. Antibiotics were discontinued on 10/24 due to lack of signs and symptoms of infection.        On 10/27, pt was advanced to low fiber diet. Decision was made to discontinue TPN as patient was tolerating low fiber diet well. Abdominal JAKUB drain was removed on 10/28. Patient has been tachycardic but has been denying chest pain, shortness of breath. Lower extremity doppler from 10/29 was negative for DVT. Colace 200 BID was ordered. On 10/30, midline staples were removed and patient was stepped down.                                 10/25: TPN reordered. Repeat AXR today showing improving paralytic ileus, no pneumoperitoneum.    10/23: AXR Supine and upright views of the abdomen compared to 10/17/2019 demonstrates multiple air-fluid levels within distended loops of small bowel, consistent with severe ileus. Reports pain improved. + minimal stool, gas output in ostomy                10/18: HSQ resumed. Per pain mgmt, PCA, 5 mg morphine IVP 2qh    10/17: hg am stable 9.3 (9.3), right IJ removed, SDU. Good urine output, d/c kim    ON: -200 & Bladder pressure 8. Hgb @11:9.3 chief aware, 's. JAKUB 280  Given extra 2 of valium in am for muscle spasm.    10/16: TPN reordered added 20 units insulin and includes 100cc/hr fluid, IV fluids decreased to 100cc/hr, bladder pressure ok will check q4,IV thorazine for hiccups, NPH 10units x 1, pain management consult. Am Hg 8.2, repeat 8am 7.3, repeat noon hgb 6.7, 2 units prbcs ordered, 5pm Hg 9.0. Valium 2mg q8 prn per pain management    ON: Hgb stable at 8.3, lactate 2.3 from 3.2; bladder pressure ok, changed to q2h @12AM    10/15: S/P RTOR for bleeding, 1L hematoma evacuated, 2 arterial bleed ligated w/placement of surgisil. Post op labs stable, K 6.2, given Na bicarbonate, no EKG changes repeat 5.4. ET tube pushed in 2 cm, xray confirmed placement. 11am and 4pm labs, lactate equivocal, given 1L bolus, Central line pulled back 3 cm, xray confirmed placement. PT consult for early mobility.    ________________________SICU________________________________________    O/N: pain controlled, POC wnl, fs at 23:50 was 326 gave pt ISS, pt c/o of hiccups around 12am, NGT flushed, 1x pepcid, 500cc bolus of NS given for low bp, tachy to 112, bp cont to be low, another 500cc bolus of NS given, stat labs, lactate 5.1, hgb 6.7 (10.3), K + 6.0 calcium gluconate given, 1 L bolus of NS given, ekg wnl, cxr for NGT placement, peripheral IV (18g) in L arm obtained for 2 units of PRBC (2L of ns given in total),     10/14: lap converted to open lysis of adhesions. NGT, Kim in place    O/N: Prelim report of CT: Partial small bowel obstruction seen with dilated bowel loops  preceding collapsed small bowel. Stable presacral collection again noted, trev CLD, ostomy with has more o/p today, encouraged pt to ambulate    10/13: CT abd/pel performed. otherwise pt feeling well, ostomy output, drank contrast with no n/v    O/N: planning for repeat CT per Dr Lafleur. took PO contrast 1PM. Given 2x dilaudid (12AM, 430AM) instead of scheduled pain meds.    10/12: VENKATESH. VSS. Spoke w/ radiology in AM, did not recommend repeat CT given ostomy output    O/N: CT w/ no change in degree of small bowel dilation w/ swirling appearance of bowel and mesenteric vessels c/w SBO v ileus, recommending repeat CT w/ copious amounts of oral contrast in 4-6 hours to determine if contrast reaches ileostomy bag, interval inc in size of pelvic and R perineum fluid collection, probably expected, ostomy w/ liquid stool/gas    10/11: AXR showing still poss high grade SBO. Went for CT scan    O/N: trev CLD, no output in ostomy, soft/NT    10/10: VENKATESH. VSS. Pt requesting pain meds to be changed to PO    O/N: + gas and liquid stool in ostomy, OOBA, less disteded, colostomy: 59cc, L thigh JAKUB: 2.5cc 54M [facility] HTN, HLD, rectal adenocarcinoma diagnosed 5/2018, underwent chemoXRT who initially presented for elective robotic assisted abdominal perineal resection with gracilis flap (9/24). On POD2, abdominal XR was suspicious for distal small bowel obstruction and/or ileus. Patient was made NPO and NGT was placed. On POD5, Marie was removed.  On 10/1, CT abd/pelvis IV contrast showed persistent dilated loops with transition point, PICC was placed, TPN was started. NGT was discontinued on 10/3. Abdominal distension and pain persisted, patient remained NPO w/ sips and chips, ostomy continuing to have minimal output. Repeat abdominal x-rays on 10/6 and 10/7 not showing much change from previous x-rays. Abdominal JAKUB drain was removed 10/8. 10/11 abdominal x ray showing still poss high grade SBO. Went for CT scan on 10/13 due to poor ostomy output and continued abdominal pain and distension, CT showed partial small bowel obstruction seen with dilated bowel loops preceding collapsed small bowel. Stable presacral collection was again noted at that time. Given these findings, decision was made to bring patient back to operating room on OR 10/14 for laparoscopic converted to open lysis of adhesions. Following the surgery, he had increasing lactate overnight and became hypotensive with a drop in his blood counts; subsequently he was taken back to the OR on the morning of 10/15 due to high suspicion for intraabdominal bleeding. In OR pt was found to have 1L pelvic hematoma which was evacuated, and generalized oozing from wound bed as well as small vessel bleed from rectus muscle which was ligated, following which patient was transferred to ICU extubated for postoperative monitoring. Patient continued to improve, R IJ line and Marie catheter were removed on 10/17, patient was deemed stable to be stepped down from ICU to monitored bed. On 10/20 NGT was removed. Diet was gradually advanced to sips & chips, then clear liquid diet which the patient tolerated well. Antibiotics were discontinued on 10/24 due to lack of signs and symptoms of infection. Patient had slow progression of ostomy output and interim abdominal x-rays continued to indicate possible ileus. However, patient continued to tolerate diet without nausea or vomiting. On 10/27, patient was advanced to low fiber diet. Decision was made to discontinue TPN as patient was tolerating low fiber diet well. Abdominal JAKUB drain was removed on 10/28. Patient has been tachycardic but has been denying chest pain, shortness of breath. Lower extremity doppler from 10/29 was negative for DVT. On 10/30, midline staples were removed and patient was stepped down. He is continuing to tolerate diet, have good ostomy output, and pain has been controlled with oral medications. He is now stable and ready for discharge home with follow up with Dr. Lafleur next week and Dr. Preciado in 1 week.

## 2019-10-30 NOTE — DISCHARGE NOTE PROVIDER - NSDCCPTREATMENT_GEN_ALL_CORE_FT
PRINCIPAL PROCEDURE  Procedure: Laparoscopy-assisted abdominoperineal resection (APR) of rectum  Findings and Treatment:       SECONDARY PROCEDURE  Procedure: Closure of ischial wound or perineal wound using gracilis flap  Findings and Treatment:     Procedure: Diagnostic laparoscopy  Findings and Treatment:     Procedure: Control of hemorrhage, abdomen, postoperative  Findings and Treatment:     Procedure: Abdominal washout  Findings and Treatment:     Procedure: Exploratory laparotomy  Findings and Treatment:

## 2019-10-30 NOTE — DISCHARGE NOTE PROVIDER - NSDCMRMEDTOKEN_GEN_ALL_CORE_FT
amLODIPine 2.5 mg oral tablet: 2 tab(s) orally once a day  Lipitor 10 mg oral tablet: 1 tab(s) orally once a day  traMADol 50 mg oral tablet: 1 tab(s) orally every 4 hours, As Needed acetaminophen 325 mg oral tablet: 3 tab(s) orally every 8 hours, As Needed -Severe Pain (7 - 10) - for severe pain   amLODIPine 2.5 mg oral tablet: 2 tab(s) orally once a day  diazePAM 2 mg oral tablet: 1-2 tab(s) orally every 8 hours, As Needed -muscle spasms - for muscle spasm MDD:6 tablets   docusate sodium 100 mg oral capsule: 2 cap(s) orally 2 times a day, As Needed for firm stool output/constipation  Lipitor 10 mg oral tablet: 1 tab(s) orally once a day  oxyCODONE 5 mg oral tablet: 1-2 tab(s) orally every 4 hours, As Needed -for severe pain  -for severe pain MDD:12 tabs/day

## 2019-10-31 ENCOUNTER — TRANSCRIPTION ENCOUNTER (OUTPATIENT)
Age: 54
End: 2019-10-31

## 2019-10-31 VITALS
HEART RATE: 102 BPM | RESPIRATION RATE: 16 BRPM | DIASTOLIC BLOOD PRESSURE: 69 MMHG | SYSTOLIC BLOOD PRESSURE: 103 MMHG | OXYGEN SATURATION: 96 % | TEMPERATURE: 98 F

## 2019-10-31 LAB
ANION GAP SERPL CALC-SCNC: 12 MMOL/L — SIGNIFICANT CHANGE UP (ref 5–17)
BUN SERPL-MCNC: 17 MG/DL — SIGNIFICANT CHANGE UP (ref 7–23)
CALCIUM SERPL-MCNC: 11 MG/DL — HIGH (ref 8.4–10.5)
CHLORIDE SERPL-SCNC: 99 MMOL/L — SIGNIFICANT CHANGE UP (ref 96–108)
CO2 SERPL-SCNC: 26 MMOL/L — SIGNIFICANT CHANGE UP (ref 22–31)
CREAT SERPL-MCNC: 0.75 MG/DL — SIGNIFICANT CHANGE UP (ref 0.5–1.3)
GLUCOSE BLDC GLUCOMTR-MCNC: 112 MG/DL — HIGH (ref 70–99)
GLUCOSE BLDC GLUCOMTR-MCNC: 115 MG/DL — HIGH (ref 70–99)
GLUCOSE SERPL-MCNC: 144 MG/DL — HIGH (ref 70–99)
HCT VFR BLD CALC: 34.2 % — LOW (ref 39–50)
HGB BLD-MCNC: 11.4 G/DL — LOW (ref 13–17)
MAGNESIUM SERPL-MCNC: 1.8 MG/DL — SIGNIFICANT CHANGE UP (ref 1.6–2.6)
MCHC RBC-ENTMCNC: 30.3 PG — SIGNIFICANT CHANGE UP (ref 27–34)
MCHC RBC-ENTMCNC: 33.3 GM/DL — SIGNIFICANT CHANGE UP (ref 32–36)
MCV RBC AUTO: 91 FL — SIGNIFICANT CHANGE UP (ref 80–100)
NRBC # BLD: 0 /100 WBCS — SIGNIFICANT CHANGE UP (ref 0–0)
PHOSPHATE SERPL-MCNC: 3.4 MG/DL — SIGNIFICANT CHANGE UP (ref 2.5–4.5)
PLATELET # BLD AUTO: 407 K/UL — HIGH (ref 150–400)
POTASSIUM SERPL-MCNC: 4.2 MMOL/L — SIGNIFICANT CHANGE UP (ref 3.5–5.3)
POTASSIUM SERPL-SCNC: 4.2 MMOL/L — SIGNIFICANT CHANGE UP (ref 3.5–5.3)
RBC # BLD: 3.76 M/UL — LOW (ref 4.2–5.8)
RBC # FLD: 14.1 % — SIGNIFICANT CHANGE UP (ref 10.3–14.5)
SODIUM SERPL-SCNC: 137 MMOL/L — SIGNIFICANT CHANGE UP (ref 135–145)
WBC # BLD: 6.66 K/UL — SIGNIFICANT CHANGE UP (ref 3.8–10.5)
WBC # FLD AUTO: 6.66 K/UL — SIGNIFICANT CHANGE UP (ref 3.8–10.5)

## 2019-10-31 PROCEDURE — 84478 ASSAY OF TRIGLYCERIDES: CPT

## 2019-10-31 PROCEDURE — 84134 ASSAY OF PREALBUMIN: CPT

## 2019-10-31 PROCEDURE — 82803 BLOOD GASES ANY COMBINATION: CPT

## 2019-10-31 PROCEDURE — 36415 COLL VENOUS BLD VENIPUNCTURE: CPT

## 2019-10-31 PROCEDURE — P9016: CPT

## 2019-10-31 PROCEDURE — 97530 THERAPEUTIC ACTIVITIES: CPT

## 2019-10-31 PROCEDURE — 74019 RADEX ABDOMEN 2 VIEWS: CPT

## 2019-10-31 PROCEDURE — 86900 BLOOD TYPING SEROLOGIC ABO: CPT

## 2019-10-31 PROCEDURE — 84295 ASSAY OF SERUM SODIUM: CPT

## 2019-10-31 PROCEDURE — 93005 ELECTROCARDIOGRAM TRACING: CPT

## 2019-10-31 PROCEDURE — 88309 TISSUE EXAM BY PATHOLOGIST: CPT

## 2019-10-31 PROCEDURE — 85025 COMPLETE CBC W/AUTO DIFF WBC: CPT

## 2019-10-31 PROCEDURE — 83735 ASSAY OF MAGNESIUM: CPT

## 2019-10-31 PROCEDURE — 80053 COMPREHEN METABOLIC PANEL: CPT

## 2019-10-31 PROCEDURE — 83605 ASSAY OF LACTIC ACID: CPT

## 2019-10-31 PROCEDURE — 83036 HEMOGLOBIN GLYCOSYLATED A1C: CPT

## 2019-10-31 PROCEDURE — C1889: CPT

## 2019-10-31 PROCEDURE — 83540 ASSAY OF IRON: CPT

## 2019-10-31 PROCEDURE — 86850 RBC ANTIBODY SCREEN: CPT

## 2019-10-31 PROCEDURE — 88342 IMHCHEM/IMCYTCHM 1ST ANTB: CPT

## 2019-10-31 PROCEDURE — 85730 THROMBOPLASTIN TIME PARTIAL: CPT

## 2019-10-31 PROCEDURE — 97116 GAIT TRAINING THERAPY: CPT

## 2019-10-31 PROCEDURE — S2900: CPT

## 2019-10-31 PROCEDURE — 97161 PT EVAL LOW COMPLEX 20 MIN: CPT

## 2019-10-31 PROCEDURE — 83550 IRON BINDING TEST: CPT

## 2019-10-31 PROCEDURE — 36573 INSJ PICC RS&I 5 YR+: CPT

## 2019-10-31 PROCEDURE — 82962 GLUCOSE BLOOD TEST: CPT

## 2019-10-31 PROCEDURE — 71045 X-RAY EXAM CHEST 1 VIEW: CPT

## 2019-10-31 PROCEDURE — 84100 ASSAY OF PHOSPHORUS: CPT

## 2019-10-31 PROCEDURE — 82728 ASSAY OF FERRITIN: CPT

## 2019-10-31 PROCEDURE — 82040 ASSAY OF SERUM ALBUMIN: CPT

## 2019-10-31 PROCEDURE — 85027 COMPLETE CBC AUTOMATED: CPT

## 2019-10-31 PROCEDURE — 84466 ASSAY OF TRANSFERRIN: CPT

## 2019-10-31 PROCEDURE — 80061 LIPID PANEL: CPT

## 2019-10-31 PROCEDURE — 85018 HEMOGLOBIN: CPT

## 2019-10-31 PROCEDURE — 88341 IMHCHEM/IMCYTCHM EA ADD ANTB: CPT

## 2019-10-31 PROCEDURE — 93970 EXTREMITY STUDY: CPT

## 2019-10-31 PROCEDURE — 86901 BLOOD TYPING SEROLOGIC RH(D): CPT

## 2019-10-31 PROCEDURE — 88305 TISSUE EXAM BY PATHOLOGIST: CPT

## 2019-10-31 PROCEDURE — 85384 FIBRINOGEN ACTIVITY: CPT

## 2019-10-31 PROCEDURE — 86803 HEPATITIS C AB TEST: CPT

## 2019-10-31 PROCEDURE — 97164 PT RE-EVAL EST PLAN CARE: CPT

## 2019-10-31 PROCEDURE — 84132 ASSAY OF SERUM POTASSIUM: CPT

## 2019-10-31 PROCEDURE — 80048 BASIC METABOLIC PNL TOTAL CA: CPT

## 2019-10-31 PROCEDURE — 82248 BILIRUBIN DIRECT: CPT

## 2019-10-31 PROCEDURE — 74177 CT ABD & PELVIS W/CONTRAST: CPT

## 2019-10-31 PROCEDURE — 36430 TRANSFUSION BLD/BLD COMPNT: CPT

## 2019-10-31 PROCEDURE — 82330 ASSAY OF CALCIUM: CPT

## 2019-10-31 PROCEDURE — 86923 COMPATIBILITY TEST ELECTRIC: CPT

## 2019-10-31 PROCEDURE — 85610 PROTHROMBIN TIME: CPT

## 2019-10-31 RX ORDER — OXYCODONE HYDROCHLORIDE 5 MG/1
2 TABLET ORAL
Qty: 48 | Refills: 0
Start: 2019-10-31 | End: 2019-11-03

## 2019-10-31 RX ORDER — MAGNESIUM SULFATE 500 MG/ML
1 VIAL (ML) INJECTION ONCE
Refills: 0 | Status: COMPLETED | OUTPATIENT
Start: 2019-10-31 | End: 2019-10-31

## 2019-10-31 RX ORDER — ACETAMINOPHEN 500 MG
3 TABLET ORAL
Qty: 126 | Refills: 0
Start: 2019-10-31 | End: 2019-11-13

## 2019-10-31 RX ORDER — DIAZEPAM 5 MG
2 TABLET ORAL
Qty: 42 | Refills: 0
Start: 2019-10-31 | End: 2019-11-06

## 2019-10-31 RX ORDER — TRAMADOL HYDROCHLORIDE 50 MG/1
1 TABLET ORAL
Qty: 0 | Refills: 0 | DISCHARGE

## 2019-10-31 RX ORDER — DOCUSATE SODIUM 100 MG
2 CAPSULE ORAL
Qty: 56 | Refills: 0
Start: 2019-10-31 | End: 2019-11-13

## 2019-10-31 RX ADMIN — Medication 2 MILLIGRAM(S): at 00:40

## 2019-10-31 RX ADMIN — CHLORHEXIDINE GLUCONATE 1 APPLICATION(S): 213 SOLUTION TOPICAL at 10:35

## 2019-10-31 RX ADMIN — HEPARIN SODIUM 5000 UNIT(S): 5000 INJECTION INTRAVENOUS; SUBCUTANEOUS at 14:22

## 2019-10-31 RX ADMIN — PANTOPRAZOLE SODIUM 40 MILLIGRAM(S): 20 TABLET, DELAYED RELEASE ORAL at 11:46

## 2019-10-31 RX ADMIN — OXYCODONE HYDROCHLORIDE 10 MILLIGRAM(S): 5 TABLET ORAL at 07:40

## 2019-10-31 RX ADMIN — Medication 100 GRAM(S): at 10:34

## 2019-10-31 RX ADMIN — MORPHINE SULFATE 2 MILLIGRAM(S): 50 CAPSULE, EXTENDED RELEASE ORAL at 14:38

## 2019-10-31 RX ADMIN — OXYCODONE HYDROCHLORIDE 10 MILLIGRAM(S): 5 TABLET ORAL at 00:40

## 2019-10-31 RX ADMIN — OXYCODONE HYDROCHLORIDE 10 MILLIGRAM(S): 5 TABLET ORAL at 06:48

## 2019-10-31 RX ADMIN — MORPHINE SULFATE 2 MILLIGRAM(S): 50 CAPSULE, EXTENDED RELEASE ORAL at 14:22

## 2019-10-31 RX ADMIN — HEPARIN SODIUM 5000 UNIT(S): 5000 INJECTION INTRAVENOUS; SUBCUTANEOUS at 06:48

## 2019-10-31 RX ADMIN — OXYCODONE HYDROCHLORIDE 5 MILLIGRAM(S): 5 TABLET ORAL at 12:51

## 2019-10-31 RX ADMIN — Medication 2 MILLIGRAM(S): at 08:20

## 2019-10-31 RX ADMIN — Medication 200 MILLIGRAM(S): at 06:48

## 2019-10-31 RX ADMIN — OXYCODONE HYDROCHLORIDE 5 MILLIGRAM(S): 5 TABLET ORAL at 11:51

## 2019-10-31 NOTE — PROGRESS NOTE ADULT - ASSESSMENT
54M [facility] HTN, HLD, rectal adenocarcinoma diagnosed 5/2018, underwent chemoXRT who presented for elective robotic assisted abdominal perineal resection (9/24), c/b SBO managed by NGT and TPN via PICC line but RTOR 10/14 for lap converted to open EDER with increasing lactate and hypotensive overnight, s/p RTOR 10/15 for 1L hematoma evacuation.    - Pain/nausea control  - LRD  - Colace 200 BID  - L axillary PICC line for TPN- pull today  - F/u PT/OT  - Dispo: home today with pain meds

## 2019-10-31 NOTE — PROGRESS NOTE ADULT - SUBJECTIVE AND OBJECTIVE BOX
Post-Op Day #: NAEO. Overnight refusing Milk of Mg. Tachy to 106. No other acute complaints.      Procedure/Dx/Injuries: Abdominal perineal resection 9/24; Lap converted to open EDER 10/14, RTOR for hematoma evacuation (1L) 10/15    Events of past 24 hours: Pt seen and examined at bedside this AM by surgery team. No acute complaints. Denies f/n/v/cp/sob.      MEDICATIONS  (STANDING):  chlorhexidine 2% Cloths 1 Application(s) Topical <User Schedule>  dextrose 5%. 1000 milliLiter(s) (50 mL/Hr) IV Continuous <Continuous>  dextrose 50% Injectable 12.5 Gram(s) IV Push once  dextrose 50% Injectable 25 Gram(s) IV Push once  dextrose 50% Injectable 25 Gram(s) IV Push once  docusate sodium 200 milliGRAM(s) Oral two times a day  heparin  Injectable 5000 Unit(s) SubCutaneous every 8 hours  influenza   Vaccine 0.5 milliLiter(s) IntraMuscular once  insulin regular  human corrective regimen sliding scale   SubCutaneous every 6 hours  pantoprazole  Injectable 40 milliGRAM(s) IV Push daily    MEDICATIONS  (PRN):  acetaminophen   Tablet .. 975 milliGRAM(s) Oral every 8 hours PRN Severe Pain (7 - 10)  dextrose 40% Gel 15 Gram(s) Oral once PRN Blood Glucose LESS THAN 70 milliGRAM(s)/deciliter  diazepam    Tablet 2 milliGRAM(s) Oral every 8 hours PRN muscle spasms  glucagon  Injectable 1 milliGRAM(s) IntraMuscular once PRN Glucose LESS THAN 70 milligrams/deciliter  ketorolac 0.5% Ophthalmic Solution 1 Drop(s) Left EYE every 6 hours PRN eye pain irritation  morphine  - Injectable 2 milliGRAM(s) IV Push every 2 hours PRN breakthrough  oxyCODONE    IR 5 milliGRAM(s) Oral every 6 hours PRN Moderate Pain (4 - 6)  oxyCODONE    IR 10 milliGRAM(s) Oral every 6 hours PRN Severe Pain (7 - 10)        Vitals: T(F): 98.5 (10-31-19 @ 05:59), Max: 98.7 (10-30-19 @ 18:19)  HR: 99 (10-31-19 @ 05:59)  BP: 96/65 (10-31-19 @ 05:59)  RR: 16 (10-31-19 @ 05:59)  SpO2: 96% (10-31-19 @ 05:59)        Diet, Low Fiber:   60 gm Fat Restricted [facility] Only (FAT60G)  Supplement Feeding Modality:  Oral  Ensure Enlive Cans or Servings Per Day:  1       Frequency:  Two Times a day      10-30-19 @ 07:01  -  10-31-19 @ 07:00  --------------------------------------------------------  IN:    Solution: 400 mL  Total IN: 400 mL    OUT:    Colostomy: 90 mL    Voided: 1200 mL  Total OUT: 1290 mL    Total NET: -890 mL            PHYSICAL EXAM  General: NAD. Resting comfortably.  Pulmonary: Non-labored breathing. No respiratory distress.  Abdomen: Soft, mildly tender, mildly distended. Midline incision c/d/i without erythema or drainage. Ostomy L abdomen with +stool, gas. JAKUB drain site dressing clean and dry.  : No Marie  Extremities: WWP. No C/C/E        LAB/STUDIES:  CAPILLARY BLOOD GLUCOSE      POCT Blood Glucose.: 112 mg/dL (31 Oct 2019 06:13)  POCT Blood Glucose.: 115 mg/dL (31 Oct 2019 02:00)  POCT Blood Glucose.: 110 mg/dL (30 Oct 2019 17:42)  POCT Blood Glucose.: 124 mg/dL (30 Oct 2019 11:35)                          11.4   6.66  )-----------( 407      ( 31 Oct 2019 08:04 )             34.2     10-31    137  |  99  |  17  ----------------------------<  144<H>  4.2   |  26  |  0.75    Ca    11.0<H>      31 Oct 2019 08:04  Phos  3.4     10-31  Mg     1.8     10-31

## 2019-10-31 NOTE — DISCHARGE NOTE NURSING/CASE MANAGEMENT/SOCIAL WORK - PATIENT PORTAL LINK FT
You can access the FollowMyHealth Patient Portal offered by [facility] by registering at the following website: [email or web address]. By joining [email or web address]’s FollowMyHealth portal, you will also be able to view your health information using other applications (apps) compatible with our system.

## 2019-10-31 NOTE — ADVANCED PRACTICE NURSE CONSULT - ASSESSMENT
Pt to be discharged today, reviewed previous teaching on ostomy care, diet, how to order supplies after discharge. Extra supplies at bedside for home, no other needs at this time.

## 2019-10-31 NOTE — PROGRESS NOTE ADULT - PROVIDER SPECIALTY LIST ADULT
Anesthesia
Colorectal Surgery
Pain Medicine
Plastic Surgery
SICU
Surgery
Plastic Surgery
Colorectal Surgery
Pain Medicine
Pain Medicine
SICU
Surgery
Plastic Surgery
Surgery

## 2019-10-31 NOTE — PROGRESS NOTE ADULT - SUBJECTIVE AND OBJECTIVE BOX
Pain Management Progress Note - [facility] [phone]      HPI: Patient seen and examined today. Patient is a 54 year old male, with pmh HTN, HLD adenocarcinoma, s/p abdominal perineal resection, now s/p lap conversion to open, EDER. Patient tolerating a low fiber diet. Patient reporting diffuse abdominal pain and surgical site pain, pain managed with current pain medication regimen.   Patient Axox3, denies, n,v, no s/s of oversedation.       Pain is __x_ sharp ____dull ___burning ___achy ___ Intensity: ____ mild _x__mod ___severe     Location __x__surgical site ____cervical _____lumbar _x___abd ____upper ext____lower ext    Worse with _x___activity __x__movement _____physical therapy___ Rest    Improved with __x__medication ___x_rest ____physical therapy      Parenteral Nutrition - Adult  acetaminophen   Tablet ..  HYDROmorphone  Injectable  Parenteral Nutrition - Adult  fat emulsion (Plant Based) 20% Infusion  HYDROmorphone  Injectable  Parenteral Nutrition - Adult  Parenteral Nutrition - Adult  fat emulsion (Plant Based) 20% Infusion  Parenteral Nutrition - Adult  fat emulsion (Plant Based) 20% Infusion  oxyCODONE    5 mG/acetaminophen 325 mG  oxyCODONE    IR  Parenteral Nutrition - Adult  fat emulsion (Plant Based) 20% Infusion  iohexol 300 mG (iodine)/mL Oral Solution  Parenteral Nutrition - Adult  fat emulsion (Plant Based) 20% Infusion  iohexol 300 mG (iodine)/mL Oral Solution  HYDROmorphone  Injectable  HYDROmorphone  Injectable  Parenteral Nutrition - Adult  fat emulsion (Plant Based) 20% Infusion  magnesium sulfate  IVPB  Parenteral Nutrition - Adult  BUpivacaine liposome 1.3% Injectable (no eMAR)  pantoprazole  Injectable  lactated ringers.  HYDROmorphone  Injectable  HYDROmorphone  Injectable  ondansetron Injectable  enoxaparin Injectable  cefTRIAXone   IVPB  metroNIDAZOLE  IVPB  acetaminophen  IVPB ..  HYDROmorphone  Injectable  HYDROmorphone  Injectable  sodium chloride 0.9% Bolus  insulin lispro (HumaLOG) corrective regimen sliding scale  dextrose 5%.  dextrose 40% Gel  dextrose 50% Injectable  dextrose 50% Injectable  dextrose 50% Injectable  glucagon  Injectable  (ADM OVERRIDE)  acetaminophen  IVPB ..  famotidine Injectable  sodium chloride 0.9% Bolus  sodium chloride 0.9% Bolus  calcium gluconate IVPB  sodium chloride 0.9% Bolus  acetaminophen  IVPB ..  acetaminophen  IVPB ..  HYDROmorphone  Injectable  HYDROmorphone  Injectable  dextrose 10%.  piperacillin/tazobactam IVPB..  insulin lispro (HumaLOG) corrective regimen sliding scale  HYDROmorphone  Injectable  HYDROmorphone  Injectable  HYDROmorphone  Injectable  chlorhexidine 2% Cloths  acetaminophen  IVPB ..  sodium bicarbonate  Injectable  Parenteral Nutrition - Adult  fat emulsion (Plant Based) 20% Infusion  magnesium sulfate  IVPB  magnesium sulfate  IVPB  dextrose 5% + sodium chloride 0.9%.  sodium chloride 0.9% Bolus  sodium chloride 0.9% Bolus  sodium chloride 0.9%.  HYDROmorphone  Injectable  magnesium sulfate  IVPB  sodium phosphate IVPB  HYDROmorphone  Injectable  HYDROmorphone  Injectable  LORazepam   Injectable  Parenteral Nutrition - Adult  fat emulsion (Plant Based) 20% Infusion  insulin NPH human recombinant  chlorproMAZINE    IVPB  Parenteral Nutrition - Adult  fat emulsion (Plant Based) 20% Infusion  sodium chloride 0.9%.  insulin regular  human corrective regimen sliding scale  dextrose 5%.  dextrose 40% Gel  dextrose 50% Injectable  glucagon  Injectable  diazepam  Injectable  acetaminophen  IVPB ..  diazepam  Injectable  pantoprazole  Injectable  potassium phosphate IVPB  Parenteral Nutrition - Adult  fat emulsion (Plant Based) 20% Infusion  potassium chloride  10 mEq/100 mL IVPB  acetaminophen  IVPB ..  heparin  Injectable  acetaminophen  IVPB ..  potassium chloride  20 mEq/100 mL IVPB  potassium chloride  20 mEq/100 mL IVPB  Parenteral Nutrition - Adult  fat emulsion (Plant Based) 20% Infusion  acetaminophen  IVPB ..  morphine PCA (5 mG/mL)  naloxone Injectable  acetaminophen  IVPB ..  morphine  - Injectable  morphine PCA (1 mG/mL)  Parenteral Nutrition - Adult  fat emulsion (Plant Based) 20% Infusion  potassium chloride  10 mEq/100 mL IVPB  Parenteral Nutrition - Adult  fat emulsion (Plant Based) 20% Infusion  morphine  - Injectable  acetaminophen  IVPB ..  Parenteral Nutrition - Adult  fat emulsion (Plant Based) 20% Infusion  cyclobenzaprine  Parenteral Nutrition - Adult  fat emulsion (Plant Based) 20% Infusion  Parenteral Nutrition - Adult  fat emulsion (Plant Based) 20% Infusion  Parenteral Nutrition - Adult  fat emulsion (Plant Based) 20% Infusion  diazepam  Injectable  diazepam  Injectable  Parenteral Nutrition - Adult  fat emulsion (Plant Based) 20% Infusion  oxyCODONE    IR  oxyCODONE    IR  morphine  - Injectable  acetaminophen   Tablet ..  diazepam    Tablet  Parenteral Nutrition - Adult  fat emulsion (Plant Based) 20% Infusion  magnesium sulfate  IVPB  Parenteral Nutrition - Adult  fat emulsion (Plant Based) 20% Infusion  magnesium hydroxide Suspension  docusate sodium  magnesium sulfate  IVPB  magnesium sulfate  IVPB      ROS: Const:  __-_febrile   Eyes:___ENT:___CV: _-__chest pain  Resp: __-__sob  GI:___nausea ___vomiting __x_abd pain ___npo ___clears _x_full diet __bm  :___ Musk: __x_pain __x_spasm  Skin:___ Neuro:  ___sedation___confusion___ numbness ___weakness ___paresth  Psych:__anxiety  Endo:___ Heme:___Allergy:_________, _x__all others reviewed and negative      PAST MEDICAL & SURGICAL HISTORY:  HLD (hyperlipidemia)  HTN (hypertension)  Colon cancer: near rectum  Other elective surgery: Right Upper Chest- Chemo Port    10-31 @ 08:56224 mL/min/1.73M2      Hemoglobin: 11.4 g/dL (10-31 @ 08:04)  Hemoglobin: 11.0 g/dL (10-30 @ 10:23)        T(C): 36.9 (10-31-19 @ 09:00), Max: 37.1 (10-30-19 @ 18:19)  HR: 100 (10-31-19 @ 09:00) (96 - 102)  BP: 124/78 (10-31-19 @ 09:00) (96/65 - 124/78)  RR: 17 (10-31-19 @ 09:00) (16 - 17)  SpO2: 99% (10-31-19 @ 09:00) (94% - 99%)  Wt(kg): --     PHYSICAL EXAM:  Gen Appearance: _x__no acute distress __x_appropriate        Neuro: _x__SILT feet____ EOM Intact Psych: AAOX_3_, _x__mood/affect appropriate        Eyes: _x__conjunctiva WNL  x_____ Pupils equal and round        ENT: _x_ears and nose atraumatic__x_ Hearing grossly intact        Neck: _x__trachea midline, no visible masses ___thyroid without palpable mass    Resp: _x__Nml WOB____No tactile fremitus ___clear to auscultation    Cardio: __x_extremities free from edema __x__pedal pulses palpable    GI/Abdomen: _x__soft ___x__ Nontender___x___Nondistended_____HSM    Lymphatic: ___no palpable nodes in neck  ___no palpable nodes calves and feet    Skin/Wound: ___Incision, _x__Dressing c/d/i,   ____surrounding tissues soft,  ___drain/chest tube present____    Muscular: EHL __5_/5  Gastrocnemius___5/5    _x__absent clubbing/cyanosis        ASSESSMENT: Patient is a 54 year old male, with pmh HTN, HLD adenocarcinoma, s/p abdominal perineal resection, now s/p lap conversion to open, EDER, pain managed with current pain medication regimen.       Recommended Treatment PLAN:  1. Oxycodone 5-10mg PO q4h prn moderate to severe pain   2. Morphine 2mg IVP Q2h prn breakthrough pain  3. tylenol 975mg PO q8h prn muscle spasms  4. Continue Diazepam 2mg Po Q8h prn muscle spasms, recommend Diazepam 5mg PO Q8h prn muscle spasms, if spasms worsen  Plan discussed with Dr. Morales Pain Management Progress Note - [facility] [phone]      HPI: Patient seen and examined today. Patient is a 54 year old male, with pmh HTN, HLD adenocarcinoma, s/p abdominal perineal resection, now s/p lap conversion to open, EDER. Patient tolerating a low fiber diet. Patient reporting diffuse abdominal pain and surgical site pain, pain managed with current pain medication regimen.   Patient Axox3, denies, n,v, no s/s of oversedation.       Pain is __x_ sharp ____dull ___burning ___achy ___ Intensity: ____ mild _x__mod ___severe     Location __x__surgical site ____cervical _____lumbar _x___abd ____upper ext____lower ext    Worse with _x___activity __x__movement _____physical therapy___ Rest    Improved with __x__medication ___x_rest ____physical therapy      Parenteral Nutrition - Adult  acetaminophen   Tablet ..  HYDROmorphone  Injectable  Parenteral Nutrition - Adult  fat emulsion (Plant Based) 20% Infusion  HYDROmorphone  Injectable  Parenteral Nutrition - Adult  Parenteral Nutrition - Adult  fat emulsion (Plant Based) 20% Infusion  Parenteral Nutrition - Adult  fat emulsion (Plant Based) 20% Infusion  oxyCODONE    5 mG/acetaminophen 325 mG  oxyCODONE    IR  Parenteral Nutrition - Adult  fat emulsion (Plant Based) 20% Infusion  iohexol 300 mG (iodine)/mL Oral Solution  Parenteral Nutrition - Adult  fat emulsion (Plant Based) 20% Infusion  iohexol 300 mG (iodine)/mL Oral Solution  HYDROmorphone  Injectable  HYDROmorphone  Injectable  Parenteral Nutrition - Adult  fat emulsion (Plant Based) 20% Infusion  magnesium sulfate  IVPB  Parenteral Nutrition - Adult  BUpivacaine liposome 1.3% Injectable (no eMAR)  pantoprazole  Injectable  lactated ringers.  HYDROmorphone  Injectable  HYDROmorphone  Injectable  ondansetron Injectable  enoxaparin Injectable  cefTRIAXone   IVPB  metroNIDAZOLE  IVPB  acetaminophen  IVPB ..  HYDROmorphone  Injectable  HYDROmorphone  Injectable  sodium chloride 0.9% Bolus  insulin lispro (HumaLOG) corrective regimen sliding scale  dextrose 5%.  dextrose 40% Gel  dextrose 50% Injectable  dextrose 50% Injectable  dextrose 50% Injectable  glucagon  Injectable  (ADM OVERRIDE)  acetaminophen  IVPB ..  famotidine Injectable  sodium chloride 0.9% Bolus  sodium chloride 0.9% Bolus  calcium gluconate IVPB  sodium chloride 0.9% Bolus  acetaminophen  IVPB ..  acetaminophen  IVPB ..  HYDROmorphone  Injectable  HYDROmorphone  Injectable  dextrose 10%.  piperacillin/tazobactam IVPB..  insulin lispro (HumaLOG) corrective regimen sliding scale  HYDROmorphone  Injectable  HYDROmorphone  Injectable  HYDROmorphone  Injectable  chlorhexidine 2% Cloths  acetaminophen  IVPB ..  sodium bicarbonate  Injectable  Parenteral Nutrition - Adult  fat emulsion (Plant Based) 20% Infusion  magnesium sulfate  IVPB  magnesium sulfate  IVPB  dextrose 5% + sodium chloride 0.9%.  sodium chloride 0.9% Bolus  sodium chloride 0.9% Bolus  sodium chloride 0.9%.  HYDROmorphone  Injectable  magnesium sulfate  IVPB  sodium phosphate IVPB  HYDROmorphone  Injectable  HYDROmorphone  Injectable  LORazepam   Injectable  Parenteral Nutrition - Adult  fat emulsion (Plant Based) 20% Infusion  insulin NPH human recombinant  chlorproMAZINE    IVPB  Parenteral Nutrition - Adult  fat emulsion (Plant Based) 20% Infusion  sodium chloride 0.9%.  insulin regular  human corrective regimen sliding scale  dextrose 5%.  dextrose 40% Gel  dextrose 50% Injectable  glucagon  Injectable  diazepam  Injectable  acetaminophen  IVPB ..  diazepam  Injectable  pantoprazole  Injectable  potassium phosphate IVPB  Parenteral Nutrition - Adult  fat emulsion (Plant Based) 20% Infusion  potassium chloride  10 mEq/100 mL IVPB  acetaminophen  IVPB ..  heparin  Injectable  acetaminophen  IVPB ..  potassium chloride  20 mEq/100 mL IVPB  potassium chloride  20 mEq/100 mL IVPB  Parenteral Nutrition - Adult  fat emulsion (Plant Based) 20% Infusion  acetaminophen  IVPB ..  morphine PCA (5 mG/mL)  naloxone Injectable  acetaminophen  IVPB ..  morphine  - Injectable  morphine PCA (1 mG/mL)  Parenteral Nutrition - Adult  fat emulsion (Plant Based) 20% Infusion  potassium chloride  10 mEq/100 mL IVPB  Parenteral Nutrition - Adult  fat emulsion (Plant Based) 20% Infusion  morphine  - Injectable  acetaminophen  IVPB ..  Parenteral Nutrition - Adult  fat emulsion (Plant Based) 20% Infusion  cyclobenzaprine  Parenteral Nutrition - Adult  fat emulsion (Plant Based) 20% Infusion  Parenteral Nutrition - Adult  fat emulsion (Plant Based) 20% Infusion  Parenteral Nutrition - Adult  fat emulsion (Plant Based) 20% Infusion  diazepam  Injectable  diazepam  Injectable  Parenteral Nutrition - Adult  fat emulsion (Plant Based) 20% Infusion  oxyCODONE    IR  oxyCODONE    IR  morphine  - Injectable  acetaminophen   Tablet ..  diazepam    Tablet  Parenteral Nutrition - Adult  fat emulsion (Plant Based) 20% Infusion  magnesium sulfate  IVPB  Parenteral Nutrition - Adult  fat emulsion (Plant Based) 20% Infusion  magnesium hydroxide Suspension  docusate sodium  magnesium sulfate  IVPB  magnesium sulfate  IVPB      ROS: Const:  __-_febrile   Eyes:___ENT:___CV: _-__chest pain  Resp: __-__sob  GI:___nausea ___vomiting __x_abd pain ___npo ___clears _x_full diet __bm  :___ Musk: __x_pain __x_spasm  Skin:___ Neuro:  ___sedation___confusion___ numbness ___weakness ___paresth  Psych:__anxiety  Endo:___ Heme:___Allergy:_________, _x__all others reviewed and negative      PAST MEDICAL & SURGICAL HISTORY:  HLD (hyperlipidemia)  HTN (hypertension)  Colon cancer: near rectum  Other elective surgery: Right Upper Chest- Chemo Port    10-31 @ 08:[id number] mL/min/1.73M2      Hemoglobin: 11.4 g/dL (10-31 @ 08:04)  Hemoglobin: 11.0 g/dL (10-30 @ 10:23)        T(C): 36.9 (10-31-19 @ 09:00), Max: 37.1 (10-30-19 @ 18:19)  HR: 100 (10-31-19 @ 09:00) (96 - 102)  BP: 124/78 (10-31-19 @ 09:00) (96/65 - 124/78)  RR: 17 (10-31-19 @ 09:00) (16 - 17)  SpO2: 99% (10-31-19 @ 09:00) (94% - 99%)  Wt(kg): --     PHYSICAL EXAM:  Gen Appearance: _x__no acute distress __x_appropriate        Neuro: _x__SILT feet____ EOM Intact Psych: AAOX_3_, _x__mood/affect appropriate        Eyes: _x__conjunctiva WNL  x_____ Pupils equal and round        ENT: _x_ears and nose atraumatic__x_ Hearing grossly intact        Neck: _x__trachea midline, no visible masses ___thyroid without palpable mass    Resp: _x__Nml WOB____No tactile fremitus ___clear to auscultation    Cardio: __x_extremities free from edema __x__pedal pulses palpable    GI/Abdomen: _x__soft ___x__ Nontender___x___Nondistended_____HSM    Lymphatic: ___no palpable nodes in neck  ___no palpable nodes calves and feet    Skin/Wound: ___Incision, _x__Dressing c/d/i,   ____surrounding tissues soft,  ___drain/chest tube present____    Muscular: EHL __5_/5  Gastrocnemius___5/5    _x__absent clubbing/cyanosis        ASSESSMENT: Patient is a 54 year old male, with pmh HTN, HLD adenocarcinoma, s/p abdominal perineal resection, now s/p lap conversion to open, EDER, pain managed with current pain medication regimen.       Recommended Treatment PLAN:  1. Oxycodone 5-10mg PO q4h prn moderate to severe pain   2. Morphine 2mg IVP Q2h prn breakthrough pain  3. tylenol 975mg PO q8h prn muscle spasms  4. Continue Diazepam 2mg Po Q8h prn muscle spasms, recommend Diazepam 5mg PO Q8h prn muscle spasms, if spasms worsen  Plan discussed with Dr. Morales at bedside

## 2019-10-31 NOTE — DISCHARGE NOTE NURSING/CASE MANAGEMENT/SOCIAL WORK - NSDCPNINST_GEN_ALL_CORE
Call Dr. Lafleur if you have any questions or concerns. Continue care of your ostomy as you were instructed and as you had demonstrated.
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
## 2019-11-04 DIAGNOSIS — C20 MALIGNANT NEOPLASM OF RECTUM: ICD-10-CM

## 2019-11-04 DIAGNOSIS — I95.81 POSTPROCEDURAL HYPOTENSION: ICD-10-CM

## 2019-11-04 DIAGNOSIS — E78.5 HYPERLIPIDEMIA, UNSPECIFIED: ICD-10-CM

## 2019-11-04 DIAGNOSIS — K91.870 POSTPROCEDURAL HEMATOMA OF A DIGESTIVE SYSTEM ORGAN OR STRUCTURE FOLLOWING A DIGESTIVE SYSTEM PROCEDURE: ICD-10-CM

## 2019-11-04 DIAGNOSIS — K56.7 ILEUS, UNSPECIFIED: ICD-10-CM

## 2019-11-04 DIAGNOSIS — K91.89 OTHER POSTPROCEDURAL COMPLICATIONS AND DISORDERS OF DIGESTIVE SYSTEM: ICD-10-CM

## 2019-11-04 DIAGNOSIS — I10 ESSENTIAL (PRIMARY) HYPERTENSION: ICD-10-CM

## 2019-11-04 DIAGNOSIS — E43 UNSPECIFIED SEVERE PROTEIN-CALORIE MALNUTRITION: ICD-10-CM

## 2019-11-04 DIAGNOSIS — R00.0 TACHYCARDIA, UNSPECIFIED: ICD-10-CM

## 2019-11-08 ENCOUNTER — APPOINTMENT (OUTPATIENT)
Dept: COLORECTAL SURGERY | Facility: CLINIC | Age: 54
End: 2019-11-08
Payer: COMMERCIAL

## 2019-11-08 VITALS
SYSTOLIC BLOOD PRESSURE: 123 MMHG | DIASTOLIC BLOOD PRESSURE: 72 MMHG | HEIGHT: 70 IN | BODY MASS INDEX: 22.48 KG/M2 | WEIGHT: 157 LBS | TEMPERATURE: 97.7 F | HEART RATE: 113 BPM

## 2019-11-08 DIAGNOSIS — Z93.3 COLOSTOMY STATUS: ICD-10-CM

## 2019-11-08 PROCEDURE — 99024 POSTOP FOLLOW-UP VISIT: CPT

## 2019-11-12 RX ORDER — METRONIDAZOLE 500 MG/1
500 TABLET ORAL
Qty: 6 | Refills: 0 | Status: DISCONTINUED | COMMUNITY
Start: 2019-08-19 | End: 2019-11-12

## 2019-11-12 RX ORDER — NEOMYCIN SULFATE 500 MG/1
500 TABLET ORAL
Qty: 6 | Refills: 0 | Status: DISCONTINUED | COMMUNITY
Start: 2019-08-19 | End: 2019-11-12

## 2019-11-12 RX ORDER — TRAMADOL HYDROCHLORIDE 50 MG/1
50 TABLET, COATED ORAL
Qty: 20 | Refills: 0 | Status: DISCONTINUED | COMMUNITY
Start: 2019-09-20 | End: 2019-11-12

## 2019-11-15 NOTE — ASSESSMENT
[FreeTextEntry1] : At this time the patient has considerable postoperative pain as well as secondary constipation intermittently from pain medication. I strongly counseled him regarding the need for diminishing pain medication as tolerated. Increasing daily stool softeners and laxatives. Pain consultation arranged.\par \par \par I reviewed his pathology results. The need for long-term surveillance was detail. I reviewed this with his medical oncologist as well.

## 2019-11-15 NOTE — CONSULT LETTER
[Dear  ___] : Dear  [unfilled], [( Thank you for referring [unfilled] for consultation for _____ )] : Thank you for referring [unfilled] for consultation for [unfilled] [Consult Letter:] : I had the pleasure of evaluating your patient, [unfilled]. [Sincerely,] : Sincerely, [Consult Closing:] : Thank you very much for allowing me to participate in the care of this patient.  If you have any questions, please do not hesitate to contact me. [Please see my note below.] : Please see my note below. [FreeTextEntry2] : NATANAEL ROTHMAN [FreeTextEntry3] : MARGARET EDWARDS MD

## 2019-11-15 NOTE — PHYSICAL EXAM
[No HSM] : no hepatosplenomegaly [Abdomen Masses] : No abdominal masses [Abdomen Tenderness] : ~T No ~M abdominal tenderness [de-identified] : Midline incision well-healed. Colostomy with air. Nondistended. No focal tenderness. [FreeTextEntry1] : Perineal incision well-healed no induration.

## 2019-11-15 NOTE — HISTORY OF PRESENT ILLNESS
[FreeTextEntry1] : 53 yo M presents for f/u rectal cancer, s/p robotic APR and colostomy w/ gracilis flap construction w/ Dr. Preciado on 9/24/19, complicated by SBO s/p lap assisted EDER and exploratory lap w/ washout on 10/15/19\par \par Surgical Final Report\par Final Diagnosis\par \par 1. Colon, rectum, anus, resection:\par - Poorly differentiated large cell  neuroendocrine carcinoma\par involving anorectal junction with extensive necrosis (see note).\par - Tumor extends into muscularis propria but does not invade\par skeletal muscle (pT2).\par - Negative proximal, distal, and perirectal/perianal soft\par tissue margins of resection.\par - Negative for lymphovascular or perineural invasion.\par - Changes consistent with prior chemo-radiation are noted.\par - Eleven negative lymph nodes (0/11).\par - See synoptic summary.\par \par 2. Anterior margin:\par - Negative fibroadipose tissue, smooth muscle and skeletal\par muscle.\par \par 3. Sigmoid colon, segmental resection:\par - Segment of colon without significant histologic\par abnormality.\par - No evidence of dysplasia or malignancy.\par - Five negative lymph nodes (0/5).\par \par NOTE:  Immunohistochemical studies performed on block 1G\par demonstrate positive staining of the tumor cells for\par synaptophysin, patchy positive staining for CDX-2, CK20, and\par chromogranin, and negative staining for CK7.\par ________________________________________\par \par Synoptic Summary\par Procedure\par ___ Abdominoperineal resection\par Tumor Site\par ___ Rectum\par Tumor Location\par ___ Below the anterior peritoneal reflection\par Tumor Size\par ___ Greatest dimension: 4.2 cm\par ___ Additional dimensions: 4.0 x 1.2 cm\par Macroscopic Tumor Perforation\par ___ Not identified\par Macroscopic Intactness of Mesorectum\par \par ___ Complete\par Histologic Type\par ___ Large cell neuroendocrine carcinoma\par Histologic Grade\par ___ G3: Poorly differentiated\par Tumor Extension\par ___ Tumor invades into muscularis propria but not beyond\par Margins\par ___ All margins are uninvolved by carcinoma\par ___ Margins examined: proximal, distal, radial\par ___ Distance of invasive carcinoma from closest margin: 1.6 cm\par from radial margin\par ___ Distance of tumor from distal margin: 5.1 cm\par ___ Distance of tumor from proximal margin: 23.5 cm\par Treatment Effect\par ___ Present\par ___ Residual cancer with approximately 60% necrosis (partial\par response)\par Lymphovascular Invasion\par ___ Not identified\par Perineural Invasion\par ___ Not identified\par Tumor Budding\par ___ Intermediate score (5-9)\par Tumor Deposits\par ___ Not identified\par Regional Lymph Nodes\par ___ Number of Lymph Nodes Involved: 0\par ___ Number of Lymph Nodes Examined: 16\par Pathologic Stage Classification (pTNM, AJCC 8th Edition)\par ___ ypT2, pN0\par Additional Pathologic Findings\par ___ None identified\par \par MMR proteins intact\par Previous office based biopsy c/w poorly differentiated adenocarcinoma w/ necrosis\par \par Pt c/o pain level 7/10 across abdomen that is reduced to 2/10 when he takes (2) oxycodone 5 mg every 4 hours and (1) muscle relaxant every 4 hours. However pain wakes him in the night.\par Patient reports down mood due to prolonged hospital course and pain level. He find difficulty returning to normal routine and is worried pain will continue.\par Reports good social support system in place\par c/o bloating\par BH: output varies from colostomy, daily to every other day. Taking docusate stool softener daily and senna.

## 2019-11-19 ENCOUNTER — APPOINTMENT (OUTPATIENT)
Dept: PLASTIC SURGERY | Facility: CLINIC | Age: 54
End: 2019-11-19
Payer: COMMERCIAL

## 2019-11-19 PROCEDURE — 99024 POSTOP FOLLOW-UP VISIT: CPT

## 2019-11-23 ENCOUNTER — APPOINTMENT (OUTPATIENT)
Dept: PLASTIC SURGERY | Facility: CLINIC | Age: 54
End: 2019-11-23

## 2019-12-12 ENCOUNTER — APPOINTMENT (OUTPATIENT)
Dept: PLASTIC SURGERY | Facility: CLINIC | Age: 54
End: 2019-12-12
Payer: COMMERCIAL

## 2019-12-12 PROCEDURE — 99024 POSTOP FOLLOW-UP VISIT: CPT

## 2020-01-31 ENCOUNTER — APPOINTMENT (OUTPATIENT)
Dept: COLORECTAL SURGERY | Facility: CLINIC | Age: 55
End: 2020-01-31
Payer: COMMERCIAL

## 2020-01-31 VITALS
BODY MASS INDEX: 24.91 KG/M2 | DIASTOLIC BLOOD PRESSURE: 100 MMHG | TEMPERATURE: 98.6 F | WEIGHT: 174 LBS | HEART RATE: 93 BPM | SYSTOLIC BLOOD PRESSURE: 150 MMHG | HEIGHT: 70 IN

## 2020-01-31 PROCEDURE — 99214 OFFICE O/P EST MOD 30 MIN: CPT

## 2020-01-31 RX ORDER — OXYCODONE 5 MG/1
5 TABLET ORAL EVERY 4 HOURS
Qty: 60 | Refills: 0 | Status: DISCONTINUED | COMMUNITY
Start: 2019-11-06 | End: 2020-01-31

## 2020-01-31 NOTE — HISTORY OF PRESENT ILLNESS
[FreeTextEntry1] : 55 y/o M presents for f/u T2N0 rectal cancer\par S/p robotic APR and colostomy w/ gracilis flap construction w/ Dr. Preciado on 9/24/19, complicated by SBO s/p lap assisted EDER and exploratory lap w/ washout on 10/15/19\par Reports continued discomfort from surgery. Has been evaluated by pain management and is being managed with buccal sheets of pain medications. Reports occasional itching and burning sensation across the entire abdomen. Denies fever or rash, not previously discussed with other providers \par Reports good appetite and energy level\par BH: 2-3 times daily, soft and formed. Changing appliance every 3 days \par No longer using stool softeners or Miralax. Using Colace PRN\par \par

## 2020-01-31 NOTE — ASSESSMENT
[FreeTextEntry1] : I have advised the patient to return to medical oncology for surveillance imaging and followup. Recommend enterostomal nursing consultation.\par \par Followup 6 months

## 2020-01-31 NOTE — PHYSICAL EXAM
[Abdomen Masses] : No abdominal masses [Abdomen Tenderness] : ~T No ~M abdominal tenderness [No HSM] : no hepatosplenomegaly [de-identified] : Midline incision well-healed. Colostomy with air. Nondistended. no ttenderness.

## 2020-06-12 NOTE — PHYSICAL THERAPY INITIAL EVALUATION ADULT - NEUROVASCULAR ASSESSMENT LLE
stable post op  Incentive   spirometry    DVT and post op coagulopathy no discoloration/no numbness/no tingling/warm

## 2020-07-31 ENCOUNTER — APPOINTMENT (OUTPATIENT)
Dept: COLORECTAL SURGERY | Facility: CLINIC | Age: 55
End: 2020-07-31
Payer: COMMERCIAL

## 2020-07-31 VITALS
HEIGHT: 70 IN | BODY MASS INDEX: 28.06 KG/M2 | DIASTOLIC BLOOD PRESSURE: 101 MMHG | SYSTOLIC BLOOD PRESSURE: 150 MMHG | HEART RATE: 109 BPM | TEMPERATURE: 97.7 F | WEIGHT: 196 LBS

## 2020-07-31 PROCEDURE — 99214 OFFICE O/P EST MOD 30 MIN: CPT

## 2020-07-31 RX ORDER — DOCUSATE SODIUM 100 MG/1
100 CAPSULE, LIQUID FILLED ORAL
Qty: 270 | Refills: 2 | Status: DISCONTINUED | COMMUNITY
Start: 2019-11-08 | End: 2020-07-31

## 2020-07-31 RX ORDER — CALCIUM CARBONATE/VITAMIN D3 600 MG-10
TABLET ORAL
Refills: 0 | Status: ACTIVE | COMMUNITY

## 2020-07-31 NOTE — ASSESSMENT
[FreeTextEntry1] : I have reviewed with the patient the findings of his recent imaging from February and June 2020. Given the persistence of the right pelvic/ perineal enhancing mass, I recommended that he proceed with a biopsy for definitive assessment. Although he is asymptomatic and was margin negative/R0 resection, his neuroendocrine poorly differentiated tumor is a significant risk factor for local pelvic recurrent disease.\par \par He will follow up with Dr. Pina regarding biopsy.

## 2020-07-31 NOTE — PHYSICAL EXAM
[Abdomen Masses] : No abdominal masses [Abdomen Tenderness] : ~T No ~M abdominal tenderness [No HSM] : no hepatosplenomegaly [de-identified] : Midline incision well-healed. Colostomy with air. Nondistended. no tenderness. [de-identified] : Perineal incisions well healed. No tenderness. No induration. No masses

## 2020-07-31 NOTE — HISTORY OF PRESENT ILLNESS
[FreeTextEntry1] : 55 y/o M presents for f/u T2N0 rectal cancer\par S/p robotic APR and colostomy w/ gracilis flap construction w/ Dr. Preciado on 9/24/19, complicated by SBO s/p lap assisted EDER and exploratory lap w/ washout on 10/15/19\par Denies abdominal pain, reports occasional itching and discomfort that resolves on its own. No longer seeing pain management\par Denies N/V, change in appetite and weight. Reports good appetite, adequate dietary fiber intake\par C/o low energy levels in the morning making it difficult to get out of bed sometimes. Reports good energy levels once he is up and moving\par BH: emptying bag twice per day with very soft stools. Changing entire appliance every 3 days\par C/o intermittent mild intermittent irritation and bleeding from the stoma\par Following-up with medical oncology, Dr. Pina. Last seen 6/10/20. Ct C/A/P completed around that time

## 2020-12-08 NOTE — ADVANCED PRACTICE NURSE CONSULT - ASSESSMENT
IV infusion rate decreased to 25ml/hr to total IV's to 125ml/hr New 1 piece Simpson appliance placed over stoma, which is producing dark brown liquid. Staples at midline, no s/s of infection. Extra supplies left at bedside.

## 2021-05-03 ENCOUNTER — APPOINTMENT (OUTPATIENT)
Dept: COLORECTAL SURGERY | Facility: CLINIC | Age: 56
End: 2021-05-03
Payer: COMMERCIAL

## 2021-05-03 VITALS
BODY MASS INDEX: 28.35 KG/M2 | DIASTOLIC BLOOD PRESSURE: 97 MMHG | WEIGHT: 198 LBS | HEART RATE: 105 BPM | HEIGHT: 70 IN | TEMPERATURE: 97.3 F | SYSTOLIC BLOOD PRESSURE: 152 MMHG

## 2021-05-03 DIAGNOSIS — Z12.11 ENCOUNTER FOR SCREENING FOR MALIGNANT NEOPLASM OF COLON: ICD-10-CM

## 2021-05-03 PROCEDURE — 99072 ADDL SUPL MATRL&STAF TM PHE: CPT

## 2021-05-03 PROCEDURE — 99214 OFFICE O/P EST MOD 30 MIN: CPT

## 2021-05-03 RX ORDER — ASPIRIN 81 MG
81 TABLET, DELAYED RELEASE (ENTERIC COATED) ORAL
Refills: 0 | Status: ACTIVE | COMMUNITY

## 2021-05-03 RX ORDER — AMOXICILLIN 500 MG/1
500 TABLET, FILM COATED ORAL
Qty: 4 | Refills: 0 | Status: COMPLETED | COMMUNITY
Start: 2020-11-30

## 2021-05-03 RX ORDER — CHLORHEXIDINE GLUCONATE, 0.12% ORAL RINSE 1.2 MG/ML
0.12 SOLUTION DENTAL
Qty: 473 | Refills: 0 | Status: COMPLETED | COMMUNITY
Start: 2020-11-30

## 2021-05-03 RX ORDER — LACTOBACILLUS ACIDOPHILUS/PECT 30 MG-20MG
TABLET ORAL
Refills: 0 | Status: ACTIVE | COMMUNITY

## 2021-05-03 RX ORDER — CLINDAMYCIN HYDROCHLORIDE 300 MG/1
300 CAPSULE ORAL
Qty: 28 | Refills: 0 | Status: COMPLETED | COMMUNITY
Start: 2020-11-30

## 2021-05-03 NOTE — PHYSICAL EXAM
[Abdomen Masses] : No abdominal masses [Abdomen Tenderness] : ~T No ~M abdominal tenderness [No HSM] : no hepatosplenomegaly [de-identified] : Midline incision well-healed. Colostomy with air. Nondistended. no tenderness. [de-identified] : Perineal incisions well healed. No tenderness. No induration. No masses

## 2021-05-03 NOTE — ASSESSMENT
[FreeTextEntry1] : Looks well.  Clinically PILAR.\par \par will arrange colonoscopy.\par \par I have reviewed with the patient in detail the role of colonoscopy any evaluation of possible colon polyps and cancer. The risks, alternatives and benefits of the procedure were detailed and client including but not limited to risk of bleeding, risk of infection, risk of perforation and requiring further and potential surgery, and other secondary complications of the procedure. The colonoscopy will be performed to evaluate for possible polyps and cancer and if possible a polypectomy or removal of the polyp will be performed. Bowel preparation instructions were reviewed. The need for long-term surveillance based on findings of the colonoscopy were discussed.\par The patient consents to the planned procedure.\par

## 2021-05-03 NOTE — HISTORY OF PRESENT ILLNESS
[FreeTextEntry1] : 54 yo M presents for f/u ypT2N0 neuroendocrine rectal cancer\par Diagnosed 5/2018, completed CRT 7/2018 followed by chemotherapy 12/2018\par Colonoscopy 08/23/2019 w/ distal rectal mass c/f cancer located 2-5 cm from AV, abutting levators, posterior. Biopsies taken, pathology c/w Necrotic debris and fibrino-purulent exudate with bacterial colonies. Area was re-biopsied (08/28/19)and c/w poorly differentiated adenocarcinoma with necrosis.\par \par S/p robotic APR and colostomy w/ gracilis flap construction w/ Dr. Preciado on 9/24/19, complicated by SBO s/p lap assisted EDER and exploratory lap w/ washout on 10/15/19\par \par Last seen 7/31/20, outside imaging from Feb and June 2020, given the persistence of the right pelvic/ perineal enhancing mass, biopsy recommended and patient to f/u with Dr. Pina, (9/2020) Biopsy was normal as per patient.\par \par Pt continues to see Dr. Pina every 3 months for imaging and port flushes, last seen one week ago and was reminded to schedule repeat colonoscopy w/ this office. His next f/u with ONC in 9/2021\par CT c/a/p (4/19/2021) no evidence of metastasis\par CEA 0.8 (4/7/21)\par \par Pt reports appetite good. Energy level still lower since surgery.\par c/o continued pressure sensation in perineal area and spasm sensation near perineum at end of urination (unchanged since surgery). \par Denies fever, abd pain, n/v, flank pain or hematuria\par \par BH: emptying ostomy twice daily, stool consistency paste-like, changing appliance every 2-3 times per week. Denies constipation\par Admits to occasional leakage during increased physical activity. Denies peristomal irritation\par

## 2021-05-04 NOTE — DIETITIAN INITIAL EVALUATION ADULT. - OBTAIN WEEKLY WEIGHT
This is a 8-month office follow-up on this 67 year old  male whom I am following for problems of      Patient Active Problem List    Diagnosis Date Noted   • AF (atrial fibrillation) (CMS/[facility]) 08/21/2019     Priority: High     Persistent atrial fibrillation chads 2 vas score 3 considering hypertension age and carotid atherosclerosis anticoagulated with Eliquis     • Chronic kidney disease (CKD) 08/21/2019     Priority: Medium   • LV dysfunction 08/21/2019     Priority: Medium     History of tachycardia induced cardiomyopathy with improvement in ejection fraction of 55% on SPECT imaging after rate control     • Essential (primary) hypertension 08/21/2019     Priority: Medium   • Arteriosclerosis of carotid artery, bilateral 08/21/2019     Priority: Medium     Minimal in past     • Multiple myeloma in remission (CMS/[facility]) 08/24/2019     Priority: Low     Follow-up been treated by Dr. Castorena with bone marrow transplant June 2018.  Treated with chemotherapy also     • History of DVT (deep vein thrombosis) 08/24/2019     Priority: Low     Follow-up duplex showing chronic thrombus right femoral vein           HPI  The patient was asked to see me sooner due to problems with dyspnea on exertion.    Dr. Castorena feels that his status of his myeloma is stable but he is now developing dyspnea on exertion when walking stairs without orthopnea but he also has shortness of breath bending over.  He does have some chronic lower extremity edema but interestingly his weight is down 8 pounds from last year still in the obese range with a BMI of 33.5.    He denies chest pain syncope or near syncope.    He is in chronic atrial fibrillation and to evaluate his LV I recommended an echo last August for some reason this was never obtained.    Additionally he has chronic renal insufficiency and the creatinine was 1.7 last week not significantly deteriorated from the past.    Physical examination reveals his weight is down 8 pounds to 261 compared  to his 8 months ago and blood pressure is in the 100-1 06 systolic range over the 60s.  Lungs are clear there is no jugular venous distention there are no heart murmurs pulses irregularly irregular rate controlled to moderate ventricular response of 80 and there is 1-2+ lower extremity edema similar to what was described on the previous evaluation.    Twelve-lead electrocardiogram reveals atrial fibrillation with a moderate ventricular response of 80 and there is also a minor intraventricular conduction delay and a leftward axis.  Early transition is seen          Review of Systems   Constitution: Negative for chills, fever, weight gain and weight loss.   HENT: Negative for hearing loss.    Eyes: Negative for visual disturbance.   Cardiovascular: Positive for dyspnea on exertion and leg swelling. Negative for chest pain, claudication, orthopnea, palpitations, paroxysmal nocturnal dyspnea and syncope.   Respiratory: Negative for cough and hemoptysis.    Endocrine: Negative for cold intolerance.   Hematologic/Lymphatic: Does not bruise/bleed easily.   Skin: Negative for rash and suspicious lesions.   Musculoskeletal: Negative for arthritis.   Gastrointestinal: Negative for hematemesis and melena.   Genitourinary: Negative for hematuria.   Neurological: Negative for focal weakness and seizures.   Psychiatric/Behavioral: Negative for suicidal ideas.   Allergic/Immunologic: Negative for environmental allergies.       Visit Vitals  /68 (BP Location: RUE - Right upper extremity, Patient Position: Standing)   Pulse 77   Temp 97 °F (36.1 °C)   Ht 6' 2\" (1.88 m)   Wt 118.4 kg (261 lb)   SpO2 97%   BMI 33.51 kg/m²       Physical Exam   Constitutional: He appears well-developed and well-nourished.   Obese but weight down 8 pounds from 8 months ago   HENT:   Head: Normocephalic.   Eyes: Pupils are equal, round, and reactive to light. Conjunctivae and EOM are normal.   Neck: Normal carotid pulses, no hepatojugular reflux  and no JVD present. Carotid bruit is not present. No thyromegaly present.   Cardiovascular: Normal rate, S1 normal, S2 normal, intact distal pulses and normal pulses. An irregularly irregular rhythm present.  No extrasystoles are present. Exam reveals no gallop, no S3, no S4 and no friction rub.   No murmur heard.   No systolic murmur is present.   No diastolic murmur is present.  Pulses:       Carotid pulses are 2+ on the right side and 2+ on the left side.       Radial pulses are 2+ on the right side and 2+ on the left side.        Femoral pulses are 2+ on the right side and 2+ on the left side.       Popliteal pulses are 2+ on the right side and 2+ on the left side.        Dorsalis pedis pulses are 2+ on the right side and 2+ on the left side.        Posterior tibial pulses are 2+ on the right side and 2+ on the left side.   1-2+ bilateral lower extremity edema   Pulmonary/Chest: Effort normal and breath sounds normal. No respiratory distress. He has no wheezes. He has no rales. He exhibits no tenderness.   Abdominal: Soft. Bowel sounds are normal. He exhibits no distension and no mass. There is no abdominal tenderness.   Musculoskeletal:         General: No edema. Normal range of motion.      Cervical back: Neck supple.   Lymphadenopathy:     He has no cervical adenopathy.   Neurological: Coordination normal.   Skin: Skin is warm and dry. No rash noted. No pallor.   Psychiatric: He has a normal mood and affect. His behavior is normal.         Results for orders placed or performed in visit on 06/12/18   ELECTROCARDIOGRAM 12-LEAD    Narrative    Ordered by an unspecified provider.           Current Outpatient Medications   Medication Sig Dispense Refill   • tamsulosin (FLOMAX) 0.4 MG Cap Take 0.4 mg by mouth daily.     • gabapentin (NEURONTIN) 300 MG capsule      • digoxin (LANOXIN) 0.125 MG tablet Take 1 tablet by mouth daily. (Patient taking differently: Take 125 mcg by mouth daily. 125mg x 3 days and 250mg x 4  days) 90 tablet 3   • apixaBAN (Eliquis) 5 MG Tab Take 1 tablet by mouth every 12 hours. 180 tablet 3   • GABAPENTIN PO Take 600 mg by mouth 2 times daily.      • ixazomib (NINLARO) 3 MG capsule Take 3 mg by mouth every 7 days.     • Pomalidomide (POMALYST PO) Once a day for 3 weeks     • dexamethasone (DECADRON) 2 MG tablet Take 4 mg by mouth. 10 pills Every Tuesday once a week     • metoPROLOL tartrate (LOPRESSOR) 25 MG tablet Take 25 mg by mouth 2 times daily.      • valACYclovir (VALTREX) 500 MG tablet Take 500 mg by mouth daily.        No current facility-administered medications for this visit.         ASSESSMENT/PLAN  1. Atrial fibrillation, unspecified type (CMS/HCC)  Seems to be rate controlled, appropriately anticoagulated with Eliquis without any bleeding issues.  Digoxin level recently checked according to patient we will try to get these results from Dr. Castorena's office  - ELECTROCARDIOGRAM 12-LEAD    2. Multiple myeloma in remission (CMS/HCC)  In remission following with Dr. Rashaun Castorena    3. LV dysfunction  Considering symptoms suggestion of congestive heart failure.  We will check NT pro BNP as well as echocardiogram.  He has had tachycardic induced cardiomyopathy in the past so this needs to be checked.  Considering shortness of breath I am hesitant at the present time to just begin him on a diuretic considering his multiple myeloma And fear of dehydration.  Will await further tests  - NT PROBNP; Future  - TRANSTHORACIC ECHO(TTE) COMPLETE W/ W/O IMAGING AGENT; Future  - BASIC METABOLIC PANEL; Future  - BASIC METABOLIC PANEL  - NT PROBNP    4. Essential (primary) hypertension  Well-controlled continue current regimen    5. Arteriosclerosis of carotid artery, bilateral  Asymptomatic mild plaque formation noted on previous duplex.  No need to repeat at this time    6. Chronic kidney disease, unspecified CKD stage  Stable creatinine continue to observe, obviously a risk with multiple myeloma    7. CHF  (congestive heart failure), NYHA class II, acute on chronic, systolic (CMS/HCC)  See #3 above await NT proBNP and echo  - NT PROBNP; Future  - NT PROBNP      Return in about 1 month (around 6/3/2021) for CHF.      Antonio Felix MD, [facility],                 bi-weekly/yes

## 2021-06-02 ENCOUNTER — APPOINTMENT (OUTPATIENT)
Dept: COLORECTAL SURGERY | Facility: CLINIC | Age: 56
End: 2021-06-02
Payer: COMMERCIAL

## 2021-06-02 PROCEDURE — 99072 ADDL SUPL MATRL&STAF TM PHE: CPT

## 2021-06-02 PROCEDURE — 44388 COLONOSCOPY THRU STOMA SPX: CPT

## 2022-06-24 NOTE — CHART NOTE - NSCHARTNOTEFT_GEN_A_CORE
Admitting Diagnosis:   Patient is a 54y old  Male who presents with a chief complaint of elective surgery (21 Oct 2019 13:36)      PAST MEDICAL & SURGICAL HISTORY:  HLD (hyperlipidemia)  HTN (hypertension)  Colon cancer: near rectum  Other elective surgery: Right Upper Chest- Chemo Port      Current Nutrition Order:  NPO except Ice chips (adv this am)  TPN 2.4L bag infusing @100ml/hr x24hrs via PICC providing 110g AA, 346g Dex, 50gm 20% IL (2116kcal, 1.4g pro/kg, GIR=3.12)    PO Intake: Good (%) [   ]  Fair (50-75%) [   ] Poor (<25%) [   ]  Had yet to have ice chips    GI Issues:   Denies N/V, adbominal discomfort, bloating/distension  100ml x24hrs via colostomy  NGT d/c'd yesterday    Pain:   Denying pain at present    Skin Integrity:   Hector score 18  Midline surgical incision      Labs:   10-21    138  |  103  |  12  ----------------------------<  170<H>  4.4   |  27  |  0.68    Ca    9.3      21 Oct 2019 05:37  Phos  4.6     10-21  Mg     2.1     10-21    TPro  6.3  /  Alb  2.8<L>  /  TBili  1.0  /  DBili  x   /  AST  27  /  ALT  33  /  AlkPhos  170<H>  10-    CAPILLARY BLOOD GLUCOSE      POCT Blood Glucose.: 157 mg/dL (21 Oct 2019 12:12)  POCT Blood Glucose.: 165 mg/dL (21 Oct 2019 06:51)      Medications:  MEDICATIONS  (STANDING):  acetaminophen  IVPB .. 1000 milliGRAM(s) IV Intermittent once  chlorhexidine 2% Cloths 1 Application(s) Topical <User Schedule>  dextrose 5%. 1000 milliLiter(s) (50 mL/Hr) IV Continuous <Continuous>  dextrose 50% Injectable 12.5 Gram(s) IV Push once  dextrose 50% Injectable 25 Gram(s) IV Push once  dextrose 50% Injectable 25 Gram(s) IV Push once  fat emulsion (Plant Based) 20% Infusion 0.65 Gm/kG/Day (20.83 mL/Hr) IV Continuous <Continuous>  heparin  Injectable 5000 Unit(s) SubCutaneous every 8 hours  influenza   Vaccine 0.5 milliLiter(s) IntraMuscular once  insulin regular  human corrective regimen sliding scale   SubCutaneous every 6 hours  morphine PCA (1 mG/mL) 30 milliLiter(s) PCA Continuous PCA Continuous  pantoprazole  Injectable 40 milliGRAM(s) IV Push daily  Parenteral Nutrition - Adult 1 Each (100 mL/Hr) TPN Continuous <Continuous>  Parenteral Nutrition - Adult 1 Each (100 mL/Hr) TPN Continuous <Continuous>  piperacillin/tazobactam IVPB.. 3.375 Gram(s) IV Intermittent every 6 hours    MEDICATIONS  (PRN):  dextrose 40% Gel 15 Gram(s) Oral once PRN Blood Glucose LESS THAN 70 milliGRAM(s)/deciliter  diazepam  Injectable 2 milliGRAM(s) IV Push every 8 hours PRN Severe muscle spasms  glucagon  Injectable 1 milliGRAM(s) IntraMuscular once PRN Glucose LESS THAN 70 milligrams/deciliter  ketorolac 0.5% Ophthalmic Solution 1 Drop(s) Left EYE every 6 hours PRN eye pain irritation  morphine  - Injectable 5 milliGRAM(s) IV Push every 2 hours PRN for breakthrough  naloxone Injectable 0.1 milliGRAM(s) IV Push every 3 minutes PRN For ANY of the following changes in patient status:  A. RR LESS THAN 10 breaths per minute, B. Oxygen saturation LESS THAN 90%, C. Sedation score of 6      Weight: 170lbs  Height: 5'10", IBW 166lbs+/-10%, %%, BMI 25.5    77.7 kg  10/2 76.2 kg  10/3 76.7 kg  10/5 77.6 kg  10/6 78.6 kg  10/7 77.9 kg  10/14 77.0 kg  10/21 82.3kg    Weight Change:   Weight has been consistent since admission with minimal fluctuations. Continue to weigh patient weekly to assess weight trends.     Nutrition Focused Physical Exam: Nutrition Focused Physical Exam: Completed [ X; Completed ]  Not Pertinent [   ]  (From ): Moderate muscle loss noted around clavicles and moderate fat loss noted around triceps. Suspect severe malnutrition based on NFPE, ~10% unintentional wt loss x1.5 months, pt meeting <75% EER for >1 month    Estimated energy needs:   ABW (77.2kg) used to calculate energy needs due to pt's current body weight within % IBW.   Needs adjusted post-op, suspected malnutrition, hypermetabolic state.   2708-3381 kcal/day (30-35kcal/kg)  92-107g protein/day (1.2-1.4g pro/kg)  2310-2695ml fluid/day (30-35ml/kg)    Subjective:   55yo M PMH HTN, HLD, rectal adenocarcinoma diagnosed 2018, underwent chemoXRT who presented for elective robotic assisted abdominal perineal resection (), c/b SBO managed by NGT and TPN via PICC line but RTOR 10/14 for lap converted to open EDER with increasing lactate and hypotensive overnight, s/p RTOR 10/15 for1 L hematoma evacuation. Pt has been on TPN since 10/1. NGT was d/c'd 10/20. Pt was advanced to Ice chips this am. Pt seen in room, resting in bed. Remains understanding of PN being primary form of nutrition support. POCT , 165, 160; Lytes WNL except phos 4.6; T (10/21), 214mg/dL (10/14). Discussed w/ pt possible diet advancement in the near future and taking it slow w/ intake. Recommend CLD once deemed medically feasible. Please continue w/ PN until pt is capable of meeting >60% EER PO. RD to follow.     Previous Nutrition Diagnosis:  Malnutrition (suspect severe) RT decreased ability to meet EER AEB NFPE, ~10% unintentional wt loss x1.5 months, pt meeting <75% EER for >1 month.  Active [ X ]  Resolved [   ]    Goal: Pt to consistently meet % of estimated needs via most appropriate route    Recommendations:  1) Recommend increasing dextrose in current PN order: Recommend    410g Dex, 107g AA, 50g 20% Lipids daily to provide in total: 2322 Kcal, 1.4g protein/kg, GIR 3.7 mg/kg/min based on ABW (77 kg). Fluids and lytes per MD discretion.   >>Check TG weekly. Monitor Mg, Phos, K daily and POC BG Q6hrs. Replete electrolytes prn.  2) Trend daily weights.   3) Insulin regimen per team  4) Trend weights  5) Continue w/ PN until pt is capable of meeting >60% EER PO.     Education: Pt understanding of meeting needs via PN for the time being.     Risk Level: High [ X ]  Moderate [   ] Low [   ]. Patient given Rx for glasses and contacts.

## 2022-08-22 NOTE — PROVIDER CONTACT NOTE (CRITICAL VALUE NOTIFICATION) - NS PROVIDER READ BACK TO LAB
Symptoms of Retinal tear and detachment reviewed. Patient understands to call immediately with any such symptoms. yes

## 2022-12-05 ENCOUNTER — NON-APPOINTMENT (OUTPATIENT)
Age: 57
End: 2022-12-05

## 2022-12-05 ENCOUNTER — APPOINTMENT (OUTPATIENT)
Dept: COLORECTAL SURGERY | Facility: CLINIC | Age: 57
End: 2022-12-05

## 2022-12-05 VITALS
WEIGHT: 194 LBS | DIASTOLIC BLOOD PRESSURE: 106 MMHG | BODY MASS INDEX: 27.77 KG/M2 | HEIGHT: 70 IN | HEART RATE: 102 BPM | SYSTOLIC BLOOD PRESSURE: 156 MMHG | TEMPERATURE: 97.9 F

## 2022-12-05 DIAGNOSIS — C20 MALIGNANT NEOPLASM OF RECTUM: ICD-10-CM

## 2022-12-05 PROCEDURE — 99213 OFFICE O/P EST LOW 20 MIN: CPT | Mod: 25

## 2022-12-05 PROCEDURE — 17250 CHEM CAUT OF GRANLTJ TISSUE: CPT

## 2022-12-05 NOTE — PHYSICAL EXAM
[Abdomen Masses] : No abdominal masses [Abdomen Tenderness] : ~T No ~M abdominal tenderness [No HSM] : no hepatosplenomegaly [de-identified] : Midline incision well-healed. Colostomy with air. Nondistended. no tenderness.  Multiple granuloma/tags at the superior margin of the mucocutaneous junction.  Cauterized. [de-identified] : Perineal incisions well healed. No tenderness. No induration. No masses

## 2022-12-05 NOTE — ASSESSMENT
[FreeTextEntry1] : Clinically PILAR\par \par Recommend return in 1 month if bleeding is persistent for excision of granulomas at the mucocutaneous junction and enterostomal consultation for refitting of stomal appliance.

## 2022-12-05 NOTE — HISTORY OF PRESENT ILLNESS
[FreeTextEntry1] : 56 y/o M presents for follow up evaluation of ypT2N0 neuroendocrine rectal cancer \par \par Initially diagnosed 5/2018, completed CRT 7/2018 followed by chemotherapy with Dr. Pina 9/2018-12/20/2018. \par Colonoscopy performed 8/23/2019 revealed, distal mass c/f cancer located 2-5 cm from AV, abutting posterior levators. Biopsies obtained. Pathology c/w necrotic debris and fibrinopurulent exudate with bacterial colonies. Area was rebiopsied (8/28/2019) c/w poorly differentiated adenocarcinoma with necrosis. \par \par Patient underwent robotic assisted APR and colostomy w/ gracilis flap reconstruction w/ Dr. Preciado 9/24/2019, c/b SBO. S/p Laparoscopy assisted EDER and exploratory lap with washout 10/15/2019\par \par Seen 7/2020, outside imaging from Feb and June 2020 reviewed, given the persistence of the right pelvic/ perineal enhancing mass, biopsy recommended and patient to f/u with Dr. Pina, (9/2020) Biopsy was normal as per patient.\par \par Continues q 3 month follow up w/ Dr. Pina, reports port removed 9/14/2022\par Last received CT C/A/P 4/19/21 no evidence of metastasis \par CEA 4/7/21 - 0.8 ng/mL \par \par Most recently seen in follow up 5/3/21, No midline tenderness, midline incision well-healed. Colostomy with air. Non distended no tenderness. Perineal incisions well healed. No tenderness, no induration no masses. Patient clinically PILAR. Role of colonoscopy discussed.\par \par Last colonoscopy 6/2/21 w/ Dr. Lafleur, colonoscope introduced through sigmoid colostomy and advanced to the cecum\par Entire examined colon is normal. No specimens collected\par Recommended repeat in 3 years (due 06/2024)\par \par Last CT c/a/p completed 3/30/2022 (patient showed report on his portal): no evidence of recurrence\par CEA 1.3 (2/4/22), 0.7 (10/19/22)\par Last seen by Dr. Pina 10/14/22, has f/u April 2023 after CT scans completed\par \par Pt presents today c/o stoma issue, noticed growing bumps at border of stoma w/ white ulceration. Denies pain. Admits to scant BRB when he changes ostomy appliance every 2-3 days\par Ostomy otherwise functioning fine and has output typically after meals, typically looser consistency. Rare instances of leakage when he experiences excess gas and bag bursts\par Denies peristomal rashes\par Pt wears ostomy support belt

## 2023-05-01 ENCOUNTER — TRANSCRIPTION ENCOUNTER (OUTPATIENT)
Age: 58
End: 2023-05-01

## 2023-09-07 NOTE — PATIENT PROFILE ADULT - JOB HELP
"This encounter was created in error - please disregard.    Pt signed on > 20 minutes after his scheduled video visit. He, then, immediately signed off. Thus when my sign in went through to the connection, the patient had "left" the visit  "
no

## 2023-10-17 NOTE — PROGRESS NOTE ADULT - ATTENDING COMMENTS
Suspect decrease in Hb is hemodilution as urine output stable. Tachycardia appears pain/spasm related. Thorazine for hiccups. continue to follow CBC. Transfuse 2 units PRBC's. OOB. Pain management consult. NGT to suction. TPN ordered with insulin in bag today.
0

## 2025-01-07 NOTE — PROGRESS NOTE ADULT - REASON FOR ADMISSION
elective surgery
elective surgery for rectal adenocarcinoma
elective surgery
APR with SBO and then bleeding event
Cancer Resection with immediate reconstruction
elective surgery
s/p abdominal peritoneal resection with gracilis flap
elective surgery
elective surgery
Yes